# Patient Record
Sex: FEMALE | Race: WHITE | Employment: UNEMPLOYED | RURAL
[De-identification: names, ages, dates, MRNs, and addresses within clinical notes are randomized per-mention and may not be internally consistent; named-entity substitution may affect disease eponyms.]

---

## 2017-08-14 ENCOUNTER — OFFICE VISIT (OUTPATIENT)
Dept: INTERNAL MEDICINE CLINIC | Age: 57
End: 2017-08-14

## 2017-08-14 VITALS
HEART RATE: 73 BPM | BODY MASS INDEX: 24.88 KG/M2 | OXYGEN SATURATION: 95 % | DIASTOLIC BLOOD PRESSURE: 52 MMHG | RESPIRATION RATE: 16 BRPM | SYSTOLIC BLOOD PRESSURE: 108 MMHG | WEIGHT: 168 LBS | HEIGHT: 69 IN | TEMPERATURE: 97 F

## 2017-08-14 DIAGNOSIS — J44.9 CHRONIC OBSTRUCTIVE PULMONARY DISEASE, UNSPECIFIED COPD TYPE (HCC): ICD-10-CM

## 2017-08-14 DIAGNOSIS — M48.00 SPINAL STENOSIS, UNSPECIFIED SPINAL REGION: Primary | ICD-10-CM

## 2017-08-14 DIAGNOSIS — Z72.0 TOBACCO ABUSE: ICD-10-CM

## 2017-08-14 RX ORDER — DICLOFENAC SODIUM 10 MG/G
GEL TOPICAL 4 TIMES DAILY
Qty: 1 EACH | Refills: 5 | Status: SHIPPED | OUTPATIENT
Start: 2017-08-14 | End: 2017-08-16 | Stop reason: CLARIF

## 2017-08-14 RX ORDER — DICLOFENAC SODIUM 10 MG/G
GEL TOPICAL 4 TIMES DAILY
COMMUNITY
End: 2017-08-14 | Stop reason: SDUPTHER

## 2017-08-14 RX ORDER — ALBUTEROL SULFATE 90 UG/1
1 AEROSOL, METERED RESPIRATORY (INHALATION)
Qty: 1 INHALER | Refills: 5 | Status: SHIPPED | OUTPATIENT
Start: 2017-08-14 | End: 2017-11-17 | Stop reason: SDUPTHER

## 2017-08-14 RX ORDER — GABAPENTIN 300 MG/1
300 CAPSULE ORAL 3 TIMES DAILY
Qty: 90 CAP | Refills: 5 | Status: SHIPPED | OUTPATIENT
Start: 2017-08-14 | End: 2017-11-17 | Stop reason: SDUPTHER

## 2017-08-14 RX ORDER — OXYCODONE AND ACETAMINOPHEN 5; 325 MG/1; MG/1
TABLET ORAL
COMMUNITY
End: 2017-08-14 | Stop reason: ALTCHOICE

## 2017-08-14 RX ORDER — LIDOCAINE 50 MG/G
PATCH TOPICAL
Qty: 30 EACH | Refills: 5 | Status: SHIPPED | OUTPATIENT
Start: 2017-08-14 | End: 2018-08-30 | Stop reason: ALTCHOICE

## 2017-08-14 RX ORDER — FLUTICASONE PROPIONATE AND SALMETEROL 500; 50 UG/1; UG/1
1 POWDER RESPIRATORY (INHALATION) EVERY 12 HOURS
Qty: 1 INHALER | Refills: 5 | Status: SHIPPED | OUTPATIENT
Start: 2017-08-14 | End: 2017-11-17 | Stop reason: SDUPTHER

## 2017-08-14 RX ORDER — TRAMADOL HYDROCHLORIDE 50 MG/1
50 TABLET ORAL
Qty: 30 TAB | Refills: 0 | Status: SHIPPED | OUTPATIENT
Start: 2017-08-14 | End: 2017-09-13 | Stop reason: SDUPTHER

## 2017-08-14 RX ORDER — CYCLOBENZAPRINE HCL 5 MG
5 TABLET ORAL
Qty: 90 TAB | Refills: 1 | Status: SHIPPED | OUTPATIENT
Start: 2017-08-14 | End: 2017-10-17 | Stop reason: SDUPTHER

## 2017-08-14 RX ORDER — FLUTICASONE PROPIONATE AND SALMETEROL 500; 50 UG/1; UG/1
1 POWDER RESPIRATORY (INHALATION) EVERY 12 HOURS
COMMUNITY
End: 2017-08-14 | Stop reason: SDUPTHER

## 2017-08-14 RX ORDER — GABAPENTIN 300 MG/1
300 CAPSULE ORAL 3 TIMES DAILY
COMMUNITY
End: 2017-08-14 | Stop reason: SDUPTHER

## 2017-08-14 RX ORDER — DULOXETIN HYDROCHLORIDE 30 MG/1
30 CAPSULE, DELAYED RELEASE ORAL DAILY
COMMUNITY
End: 2017-08-14 | Stop reason: SDUPTHER

## 2017-08-14 RX ORDER — ALBUTEROL SULFATE 90 UG/1
AEROSOL, METERED RESPIRATORY (INHALATION)
COMMUNITY
End: 2017-08-14 | Stop reason: SDUPTHER

## 2017-08-14 RX ORDER — CYCLOBENZAPRINE HCL 5 MG
5 TABLET ORAL
COMMUNITY
End: 2017-08-14 | Stop reason: SDUPTHER

## 2017-08-14 RX ORDER — DULOXETIN HYDROCHLORIDE 30 MG/1
30 CAPSULE, DELAYED RELEASE ORAL DAILY
Qty: 30 CAP | Refills: 5 | Status: SHIPPED | OUTPATIENT
Start: 2017-08-14 | End: 2017-11-17 | Stop reason: SDUPTHER

## 2017-08-14 RX ORDER — HYDROCODONE BITARTRATE AND ACETAMINOPHEN 5; 325 MG/1; MG/1
TABLET ORAL
COMMUNITY
End: 2017-08-14 | Stop reason: ALTCHOICE

## 2017-08-14 NOTE — PROGRESS NOTES
HISTORY OF PRESENT ILLNESS  Adryan Marcelino is a 55 y.o. female. Breathing Problem   The history is provided by the patient. This is a chronic problem. The problem occurs intermittently. The problem has been gradually improving (since dec tob to 1 pack per day). Associated symptoms include cough. Pertinent negatives include no fever and no hemoptysis. She has tried beta-agonist inhalers and inhaled steroids for the symptoms. The treatment provided mild relief. Associated medical issues include COPD and chronic lung disease. Associated medical issues do not include CAD, heart failure, past MI, DVT or recent surgery. Back Pain    This is a chronic problem. The problem occurs daily. The pain is associated with no known injury. The pain is present in the lower back. The quality of the pain is described as sharp. The pain is at a severity of 7/10 (at night 8). The pain is worse during the night. Pertinent negatives include no fever, no weight loss, no bowel incontinence and no bladder incontinence. Treatments tried: see med list. The patient's surgical history includes laminectomy. Has in the past been Rx with percocet and HC with some relief but the other meds like top have provided some relief. Patient is new to this clinic. Comes in to establish care. Previous care was with in [facility] doesn't know his name. Reason for the change is: moved, has been out of all meds since left and would like to re-start. Current Outpatient Prescriptions   Medication Sig Dispense Refill    oxyCODONE-acetaminophen (PERCOCET) 5-325 mg per tablet Take  by mouth every four (4) hours as needed for Pain.  HYDROcodone-acetaminophen (NORCO) 5-325 mg per tablet Take  by mouth.  gabapentin (NEURONTIN) 300 mg capsule Take 300 mg by mouth three (3) times daily.  diclofenac (VOLTAREN) 1 % gel Apply  to affected area four (4) times daily.  DULoxetine (CYMBALTA) 30 mg capsule Take 30 mg by mouth daily.       fluticasone-salmeterol (ADVAIR DISKUS) 500-50 mcg/dose diskus inhaler Take 1 Puff by inhalation every twelve (12) hours.  albuterol (PROVENTIL HFA, VENTOLIN HFA, PROAIR HFA) 90 mcg/actuation inhaler Take  by inhalation.  cyclobenzaprine (FLEXERIL) 5 mg tablet Take 5 mg by mouth three (3) times daily as needed for Muscle Spasm(s). No Known Allergies    Review of Systems   Constitutional: Negative for fever and weight loss. Respiratory: Positive for cough. Negative for hemoptysis. Gastrointestinal: Negative for bowel incontinence. Genitourinary: Negative for bladder incontinence. Musculoskeletal: Positive for back pain. Past Medical History:   Diagnosis Date    COPD (chronic obstructive pulmonary disease) ([facility] [address]     Spinal stenosis      Social History     Social History    Marital status: LEGALLY      Spouse name: N/A    Number of children: 2    Years of education: N/A     Occupational History    disabled      Social History Main Topics    Smoking status: Current Every Day Smoker     Packs/day: 1.00     Years: 30.00     Types: Cigarettes    Smokeless tobacco: Never Used    Alcohol use 11.4 oz/week     14 Glasses of wine, 5 Cans of beer per week    Drug use: No    Sexual activity: Not Currently     Partners: Male     Other Topics Concern    Not on file     Social History Narrative    Lives with friends     Denies drug abuse prob  Physical Exam  Visit Vitals    /52 (BP 1 Location: Left arm, BP Patient Position: Sitting)    Pulse 73    Temp 97 °F (36.1 °C) (Oral)    Resp 16    Ht 5' 9\" (1.753 m)    Wt 168 lb (76.2 kg)    SpO2 95%    BMI 24.81 kg/m2       WDWN NAD  TM clear, throat wnl  Neck no adenopathy  Heart RRR no C/M/R  Lungs CTA  Abdo soft non tender  Ext No redness swelling or edema  Back was examined, grossly normal, no lesions or rash. Saddle area, sensation present. Patellar reflexes 2+ equal bilaterally.   Lower leg strength 5 out of 5 bilateral.  Straight leg raises +    ASSESSMENT and PLAN  Encounter Diagnoses   Name Primary?  Spinal stenosis, unspecified spinal region Yes    Chronic obstructive pulmonary disease, unspecified COPD type ([facility] [address]     Tobacco abuse      Orders Placed This Encounter    REFERRAL TO PAIN MANAGEMENT    DISCONTD: oxyCODONE-acetaminophen (PERCOCET) 5-325 mg per tablet    DISCONTD: HYDROcodone-acetaminophen (NORCO) 5-325 mg per tablet    DISCONTD: gabapentin (NEURONTIN) 300 mg capsule    DISCONTD: diclofenac (VOLTAREN) 1 % gel    DISCONTD: DULoxetine (CYMBALTA) 30 mg capsule    DISCONTD: fluticasone-salmeterol (ADVAIR DISKUS) 500-50 mcg/dose diskus inhaler    DISCONTD: albuterol (PROVENTIL HFA, VENTOLIN HFA, PROAIR HFA) 90 mcg/actuation inhaler    DISCONTD: cyclobenzaprine (FLEXERIL) 5 mg tablet    gabapentin (NEURONTIN) 300 mg capsule    diclofenac (VOLTAREN) 1 % gel    DULoxetine (CYMBALTA) 30 mg capsule    fluticasone-salmeterol (ADVAIR DISKUS) 500-50 mcg/dose diskus inhaler    albuterol (PROVENTIL HFA, VENTOLIN HFA, PROAIR HFA) 90 mcg/actuation inhaler    cyclobenzaprine (FLEXERIL) 5 mg tablet    lidocaine (LIDODERM) 5 %    traMADol (ULTRAM) 50 mg tablet     Discussed the nature of back pain. Discussed how this is in general a 'red flag' diagnosis and the general limited and temporary nature of this problem as well as its re-occurrence. Discussed further work-up and treatment options. Discused narcotics and back pain: yes and we don't do chronic narcotics for back pain. SHe asks for some tramadol and I agreed to a short coarse but I will not RF this HC or percs, can try and get her into see a PM.   COPD RX as above. Discussed possible side affects, precautions, and drug interactions and possible benefits of the medication(s). The patient was counseled on the dangers of tobacco use, and was advised to quit. Reviewed strategies to maximize success, including written materials.     Follow-up Disposition:  Return in about 4 weeks (around 9/11/2017) for routine follow up.

## 2017-08-14 NOTE — PATIENT INSTRUCTIONS

## 2017-08-14 NOTE — MR AVS SNAPSHOT
Visit Information Date & Time Provider Department Dept. Phone Encounter #  
 8/14/2017  2:00 PM Renata Mayes  [place]  [id number] Follow-up Instructions Return in about 4 weeks (around 9/11/2017) for routine follow up. Upcoming Health Maintenance Date Due Hepatitis C Screening 1960 DTaP/Tdap/Td series (1 - Tdap) 12/10/1982 PAP AKA CERVICAL CYTOLOGY 12/10/1982 BREAST CANCER SCRN MAMMOGRAM 12/10/2011 FOBT Q 1 YEAR AGE 50-75 12/10/2011 INFLUENZA AGE 9 TO ADULT 8/1/2017 Allergies as of 8/14/2017  Review Complete On: 8/14/2017 By: Renata Mayes MD  
 No Known Allergies Current Immunizations  Never Reviewed No immunizations on file. Not reviewed this visit You Were Diagnosed With   
  
 Codes Comments Spinal stenosis, unspecified spinal region    -  Primary ICD-10-CM: M48.00 ICD-9-CM: 724.00 Chronic obstructive pulmonary disease, unspecified COPD type ([facility]     ICD-10-CM: J44.9 ICD-9-CM: 494 Tobacco abuse     ICD-10-CM: Z72.0 ICD-9-CM: 305.1 Vitals BP Pulse Temp Resp Height(growth percentile) Weight(growth percentile) 108/52 (BP 1 Location: Left arm, BP Patient Position: Sitting) 73 97 °F (36.1 °C) (Oral) 16 5' 9\" (1.753 m) 168 lb (76.2 kg) SpO2 BMI OB Status Smoking Status 95% 24.81 kg/m2 Hysterectomy Current Every Day Smoker BMI and BSA Data Body Mass Index Body Surface Area  
 24.81 kg/m 2 1.93 m 2 Preferred Pharmacy Pharmacy Name Phone [address] [facility], [address] [facility] [phone] Your Updated Medication List  
  
   
This list is accurate as of: 8/14/17  3:25 PM.  Always use your most recent med list.  
  
  
  
  
 albuterol 90 mcg/actuation inhaler Commonly known as:  PROVENTIL HFA, VENTOLIN HFA, PROAIR HFA Take 1 Puff by inhalation every six (6) hours as needed for Wheezing. cyclobenzaprine 5 mg tablet Commonly known as:  FLEXERIL Take 1 Tab by mouth three (3) times daily as needed for Muscle Spasm(s). diclofenac 1 % Gel Commonly known as:  VOLTAREN Apply  to affected area four (4) times daily. DULoxetine 30 mg capsule Commonly known as:  CYMBALTA Take 1 Cap by mouth daily. fluticasone-salmeterol 500-50 mcg/dose diskus inhaler Commonly known as:  ADVAIR DISKUS Take 1 Puff by inhalation every twelve (12) hours. gabapentin 300 mg capsule Commonly known as:  NEURONTIN Take 1 Cap by mouth three (3) times daily. lidocaine 5 % Commonly known as:  Alberteen Citron Apply patch to the affected area for 12 hours a day and remove for 12 hours a day. Prescriptions Sent to Pharmacy Refills  
 gabapentin (NEURONTIN) 300 mg capsule 5 Sig: Take 1 Cap by mouth three (3) times daily. Class: Normal  
 Pharmacy: [address], [facility] [address] [facility] Ph #: [phone] Route: Oral  
 diclofenac (VOLTAREN) 1 % gel 5 Sig: Apply  to affected area four (4) times daily. Class: Normal  
 Pharmacy: [address], [facility] Ph #: [phone] Route: Topical  
 DULoxetine (CYMBALTA) 30 mg capsule 5 Sig: Take 1 Cap by mouth daily. Class: Normal  
 Pharmacy: [address], [facility] Ph #: [phone] Route: Oral  
 fluticasone-salmeterol (ADVAIR DISKUS) 500-50 mcg/dose diskus inhaler 5 Sig: Take 1 Puff by inhalation every twelve (12) hours. Class: Normal  
 Pharmacy: [address], [address] [facility] Ph #: [phone] Route: Inhalation  
 albuterol (PROVENTIL HFA, VENTOLIN HFA, PROAIR HFA) 90 mcg/actuation inhaler 5 Sig: Take 1 Puff by inhalation every six (6) hours as needed for Wheezing.   
 Class: Normal  
 Pharmacy: [facility] [address] [facility], [address] Ph #: [phone] Route: Inhalation  
 cyclobenzaprine (FLEXERIL) 5 mg tablet 1 Sig: Take 1 Tab by mouth three (3) times daily as needed for Muscle Spasm(s). Class: Normal  
 Pharmacy: [facility] [address] [facility], [address] Ph #: [phone] Route: Oral  
 lidocaine (LIDODERM) 5 % 5 Sig: Apply patch to the affected area for 12 hours a day and remove for 12 hours a day. Class: Normal  
 Pharmacy: [facility] [address] [facility], [address] Ph #: [phone] We Performed the Following REFERRAL TO PAIN MANAGEMENT [[id number] Custom] Comments:  
 Please evaluate patient for spinal stenosis records at [facility]. Follow-up Instructions Return in about 4 weeks (around 9/11/2017) for routine follow up. Referral Information Referral ID Referred By Referred To  
  
 [id number] Lorelei KINCAID MD   
   [address] [facility] Phone: [phone] Fax: [phone] Visits Status Start Date End Date 1 New Request 8/14/17 8/14/18 If your referral has a status of pending review or denied, additional information will be sent to support the outcome of this decision. Patient Instructions Stopping Smoking: Care Instructions Your Care Instructions Cigarette smokers crave the nicotine in cigarettes. Giving it up is much harder than simply changing a habit. Your body has to stop craving the nicotine. It is hard to quit, but you can do it. There are many tools that people use to quit smoking. You may find that combining tools works best for you. There are several steps to quitting. First you get ready to quit. Then you get support to help you. After that, you learn new skills and behaviors to become a nonsmoker. For many people, a necessary step is getting and using medicine. Your doctor will help you set up the plan that best meets your needs. You may want to attend a smoking cessation program to help you quit smoking. When you choose a program, look for one that has proven success. Ask your doctor for ideas. You will greatly increase your chances of success if you take medicine as well as get counseling or join a cessation program. 
Some of the changes you feel when you first quit tobacco are uncomfortable. Your body will miss the nicotine at first, and you may feel short-tempered and grumpy. You may have trouble sleeping or concentrating. Medicine can help you deal with these symptoms. You may struggle with changing your smoking habits and rituals. The last step is the tricky one: Be prepared for the smoking urge to continue for a time. This is a lot to deal with, but keep at it. You will feel better. Follow-up care is a key part of your treatment and safety. Be sure to make and go to all appointments, and call your doctor if you are having problems. Its also a good idea to know your test results and keep a list of the medicines you take. How can you care for yourself at home? · Ask your family, friends, and coworkers for support. You have a better chance of quitting if you have help and support. · Join a support group, such as Nicotine Anonymous, for people who are trying to quit smoking. · Consider signing up for a smoking cessation program, such as the American Lung Association's Freedom from Smoking program. 
· Set a quit date. Pick your date carefully so that it is not right in the middle of a big deadline or stressful time. Once you quit, do not even take a puff. Get rid of all ashtrays and lighters after your last cigarette. Clean your house and your clothes so that they do not smell of smoke. · Learn how to be a nonsmoker. Think about ways you can avoid those things that make you reach for a cigarette. ¨ Avoid situations that put you at greatest risk for smoking. For some people, it is hard to have a drink with friends without smoking. For others, they might skip a coffee break with coworkers who smoke. ¨ Change your daily routine. Take a different route to work or eat a meal in a different place. · Cut down on stress. Calm yourself or release tension by doing an activity you enjoy, such as reading a book, taking a hot bath, or gardening. · Talk to your doctor or pharmacist about nicotine replacement therapy, which replaces the nicotine in your body. You still get nicotine but you do not use tobacco. Nicotine replacement products help you slowly reduce the amount of nicotine you need. These products come in several forms, many of them available over-the-counter: ¨ Nicotine patches ¨ Nicotine gum and lozenges ¨ Nicotine inhaler · Ask your doctor about bupropion (Wellbutrin) or varenicline (Chantix), which are prescription medicines. They do not contain nicotine. They help you by reducing withdrawal symptoms, such as stress and anxiety. · Some people find hypnosis, acupuncture, and massage helpful for ending the smoking habit. · Eat a healthy diet and get regular exercise. Having healthy habits will help your body move past its craving for nicotine. · Be prepared to keep trying. Most people are not successful the first few times they try to quit. Do not get mad at yourself if you smoke again. Make a list of things you learned and think about when you want to try again, such as next week, next month, or next year. Where can you learn more? Go to http://ruperto-gabriela.info/. Enter W125 in the search box to learn more about \"Stopping Smoking: Care Instructions. \" Current as of: March 20, 2017 Content Version: 11.3 © 4979-0822 [employer], [employer].  Care instructions adapted under license by [employer] (which disclaims liability or warranty for this information). If you have questions about a medical condition or this instruction, always ask your healthcare professional. [address] any warranty or liability for your use of this information. Introducing [facility] & HEALTH SERVICES! Antonia Wilson introduces TouchOfModern patient portal. Now you can access parts of your medical record, email your doctor's office, and request medication refills online. 1. In your internet browser, go to [email or web address] 2. Click on the First Time User? Click Here link in the Sign In box. You will see the New Member Sign Up page. 3. Enter your TouchOfModern Access Code exactly as it appears below. You will not need to use this code after youve completed the sign-up process. If you do not sign up before the expiration date, you must request a new code. · TouchOfModern Access Code: [email or web address] Expires: 11/12/2017  2:06 PM 
 
4. Enter the last four digits of your Social Security Number (xxxx) and Date of Birth (mm/dd/yyyy) as indicated and click Submit. You will be taken to the next sign-up page. 5. Create a TouchOfModern ID. This will be your TouchOfModern login ID and cannot be changed, so think of one that is secure and easy to remember. 6. Create a TouchOfModern password. You can change your password at any time. 7. Enter your Password Reset Question and Answer. This can be used at a later time if you forget your password. 8. Enter your e-mail address. You will receive e-mail notification when new information is available in [address] 9. Click Sign Up. You can now view and download portions of your medical record. 10. Click the Download Summary menu link to download a portable copy of your medical information. If you have questions, please visit the Frequently Asked Questions section of the TouchOfModern website. Remember, TouchOfModern is NOT to be used for urgent needs. For medical emergencies, dial 911. Now available from your iPhone and Android! Please provide this summary of care documentation to your next provider. Your primary care clinician is listed as Sully Jaramillo. If you have any questions after today's visit, please call [phone].

## 2017-08-14 NOTE — PROGRESS NOTES
Chief Complaint   Patient presents with   [address]     I have reviewed the patient's medical history in detail and updated the computerized patient record. Do you have an [address] in place in the event that you have a healthcare crisis that could impact your decision making as it pertains to your health?no    Would you like information about the [address] [place], [place] [address]way given. no

## 2017-08-15 ENCOUNTER — DOCUMENTATION ONLY (OUTPATIENT)
Dept: INTERNAL MEDICINE CLINIC | Age: 57
End: 2017-08-15

## 2017-08-15 NOTE — PROGRESS NOTES
8/15/17 PA submitted thru cover my meds for Diclofenac sodium 15 GEL. Plan typically respond back via fax in 1 to 5 business days. If no response contact plan @ [phone].

## 2017-08-16 ENCOUNTER — TELEPHONE (OUTPATIENT)
Dept: INTERNAL MEDICINE CLINIC | Age: 57
End: 2017-08-16

## 2017-08-16 RX ORDER — DICLOFENAC SODIUM 50 MG/1
50 TABLET, DELAYED RELEASE ORAL 2 TIMES DAILY
Qty: 60 TAB | Refills: 5 | Status: SHIPPED | OUTPATIENT
Start: 2017-08-16 | End: 2017-11-17 | Stop reason: SDUPTHER

## 2017-08-16 NOTE — PROGRESS NOTES
8/16/17 @ 11:14 AM Called  to supply diagnosis for Diclofenac 1% gel. PA rep Meca state patient will need to try oral Diclofenac prior to PA approval.Outcome will be faxed to office.

## 2017-09-13 ENCOUNTER — TELEPHONE (OUTPATIENT)
Dept: INTERNAL MEDICINE CLINIC | Age: 57
End: 2017-09-13

## 2017-09-13 NOTE — TELEPHONE ENCOUNTER
Patient would like a call in reference to wanting to know if records have been received from [facility]. She can be reached at [phone].

## 2017-09-15 RX ORDER — TRAMADOL HYDROCHLORIDE 50 MG/1
50 TABLET ORAL
Qty: 30 TAB | Refills: 0 | Status: SHIPPED | OUTPATIENT
Start: 2017-09-15 | End: 2017-10-17 | Stop reason: SDUPTHER

## 2017-09-19 NOTE — TELEPHONE ENCOUNTER
Called patient - no record of receiving any records from [facility] - patient states that she signed a request get medical records from them on August 14, 2017 - dont see either in her record - will have to contact [facility] medical records to request these again  Mounika San LPN  3/55/1561  7:18 PM

## 2017-10-06 ENCOUNTER — TELEPHONE (OUTPATIENT)
Dept: INTERNAL MEDICINE CLINIC | Age: 57
End: 2017-10-06

## 2017-10-10 ENCOUNTER — TELEPHONE (OUTPATIENT)
Dept: INTERNAL MEDICINE CLINIC | Age: 57
End: 2017-10-10

## 2017-10-10 NOTE — TELEPHONE ENCOUNTER
Patient called in reference to wanting to know if Dr. Pau Castillo will increase her tramadol to 2 at a time because she is in so much pain. Please call her at [phone].

## 2017-10-16 ENCOUNTER — TELEPHONE (OUTPATIENT)
Dept: INTERNAL MEDICINE CLINIC | Age: 57
End: 2017-10-16

## 2017-10-16 NOTE — TELEPHONE ENCOUNTER
Patient called in reference to wanting to know if Dr. Miguelina Sawant will increase her tramadol to 2 at a time because she is in so much pain. Please call her at [phone].

## 2017-10-16 NOTE — TELEPHONE ENCOUNTER
Returned call, pt stated her pain is 8/10 with back and legs and wanted to know if she can take two Ultram BID.   She is making an appt fo rnext week with Juliana Chung

## 2017-10-17 ENCOUNTER — TELEPHONE (OUTPATIENT)
Dept: INTERNAL MEDICINE CLINIC | Age: 57
End: 2017-10-17

## 2017-10-17 RX ORDER — TRAMADOL HYDROCHLORIDE 50 MG/1
50 TABLET ORAL
Qty: 30 TAB | Refills: 0 | Status: SHIPPED | OUTPATIENT
Start: 2017-10-17 | End: 2017-12-20 | Stop reason: ALTCHOICE

## 2017-10-17 RX ORDER — CYCLOBENZAPRINE HCL 5 MG
TABLET ORAL
Qty: 270 TAB | Refills: 1 | Status: SHIPPED | OUTPATIENT
Start: 2017-10-17 | End: 2017-11-17 | Stop reason: SDUPTHER

## 2017-10-17 NOTE — TELEPHONE ENCOUNTER
Pt stated that she was advised to call back in ref to her tramadol,stated that she would like a call once it is ready

## 2017-10-17 NOTE — TELEPHONE ENCOUNTER
Requested Prescriptions     Pending Prescriptions Disp Refills    traMADol (ULTRAM) 50 mg tablet [Pharmacy Med Name: TRAMADOL HCL 50MG   TAB] 120 Tab 0     Sig: TAKE ONE TABLET BY MOUTH EVERY 6 HOURS AS NEEDED FOR PAIN    cyclobenzaprine (FLEXERIL) 5 mg tablet [Pharmacy Med Name: CYCLOBENZAPR 5MG    TAB] 270 Tab 1     Sig: TAKE ONE TABLET BY MOUTH THREE TIMES DAILY AS NEEDED FOR MUSCLE SPASM     Last office visit 8/14/17 future 10/23/17  Last filled 9/15/17 and 8/14/17  Changes made to medication on last visit none

## 2017-12-20 ENCOUNTER — OFFICE VISIT (OUTPATIENT)
Dept: INTERNAL MEDICINE CLINIC | Age: 57
End: 2017-12-20

## 2017-12-20 VITALS
DIASTOLIC BLOOD PRESSURE: 88 MMHG | TEMPERATURE: 96.7 F | WEIGHT: 170 LBS | HEART RATE: 75 BPM | RESPIRATION RATE: 16 BRPM | HEIGHT: 69 IN | BODY MASS INDEX: 25.18 KG/M2 | OXYGEN SATURATION: 99 % | SYSTOLIC BLOOD PRESSURE: 130 MMHG

## 2017-12-20 DIAGNOSIS — Z72.0 TOBACCO ABUSE: ICD-10-CM

## 2017-12-20 DIAGNOSIS — M48.04 SPINAL STENOSIS OF THORACIC REGION: Primary | ICD-10-CM

## 2017-12-20 DIAGNOSIS — G25.81 RESTLESS LEGS SYNDROME: ICD-10-CM

## 2017-12-20 DIAGNOSIS — F32.0 MILD MAJOR DEPRESSION (HCC): ICD-10-CM

## 2017-12-20 RX ORDER — TRAMADOL HYDROCHLORIDE 50 MG/1
50 TABLET ORAL
Qty: 30 TAB | Refills: 0 | Status: CANCELLED | OUTPATIENT
Start: 2017-12-20

## 2017-12-20 RX ORDER — TRAMADOL HYDROCHLORIDE 50 MG/1
100 TABLET ORAL
Qty: 120 TAB | Refills: 1 | Status: SHIPPED | OUTPATIENT
Start: 2017-12-20 | End: 2018-08-30 | Stop reason: SDUPTHER

## 2017-12-20 RX ORDER — IBUPROFEN 200 MG
1 TABLET ORAL EVERY 24 HOURS
Qty: 30 PATCH | Refills: 1 | Status: SHIPPED | OUTPATIENT
Start: 2017-12-20 | End: 2018-01-19

## 2017-12-20 RX ORDER — PRAMIPEXOLE DIHYDROCHLORIDE 0.12 MG/1
0.25 TABLET ORAL
Qty: 60 TAB | Refills: 2 | Status: SHIPPED | OUTPATIENT
Start: 2017-12-20 | End: 2018-08-30

## 2017-12-20 NOTE — TELEPHONE ENCOUNTER
Requested Prescriptions     Pending Prescriptions Disp Refills    traMADol (ULTRAM) 50 mg tablet 30 Tab 0     Sig: Take 1 Tab by mouth every eight (8) hours as needed for Pain. Max Daily Amount: 150 mg. Appointment needed to refill this medication again.

## 2017-12-20 NOTE — PATIENT INSTRUCTIONS
Deciding About Using Medicines To Quit Smoking  Deciding About Using Medicines To Quit Smoking  What are the medicines you can use? Your doctor may prescribe varenicline (Chantix) or bupropion (Zyban). These medicines can help you cope with cravings for tobacco. They are pills that don't contain nicotine. You also can use nicotine replacement products. These do contain nicotine. There are many types. · Gum and lozenges slowly release nicotine into your mouth. · Patches stick to your skin. They slowly release nicotine into your bloodstream.  · An inhaler has a long that contains nicotine. You breathe in a puff of nicotine vapor through your mouth and throat. · Nasal spray releases a mist that contains nicotine. What are key points about this decision? · Using medicines can double your chances of quitting smoking. They can ease cravings and withdrawal symptoms. · Getting counseling along with using medicine can raise your chances of quitting even more. · If you smoke fewer than 5 cigarettes a day, you may not need medicines to help you quit smoking. · These medicines have less nicotine than cigarettes. And by itself, nicotine is not nearly as harmful as smoking. The tars, carbon monoxide, and other toxic chemicals in tobacco cause the harmful effects. · The side effects of nicotine replacement products depend on the type of product. For example, a patch can make your skin red and itchy. Medicines in pill form can make you sick to your stomach. They can also cause dry mouth and trouble sleeping. For most people, the side effects are not bad enough to make them stop using the products. Why might you choose to use medicines to quit smoking? · You have tried on your own to stop smoking, but you were not able to stop. · You smoke more than 5 cigarettes a day. · You want to increase your chances of quitting smoking. · You want to reduce your cravings and withdrawal symptoms.   · You feel the benefits of medicine outweigh the side effects. Why might you choose not to use medicine? · You want to try quitting on your own by stopping all at once (\"cold turkey\"). · You want to cut back slowly on the number of cigarettes you smoke. · You smoke fewer than 5 cigarettes a day. · You do not like using medicine. · You feel the side effects of medicines outweigh the benefits. · You are worried about the cost of medicines. Your decision  Thinking about the facts and your feelings can help you make a decision that is right for you. Be sure you understand the benefits and risks of your options, and think about what else you need to do before you make the decision. Where can you learn more? Go to http://ruperto-gabriela.info/. Enter T054 in the search box to learn more about \"Deciding About Using Medicines To Quit Smoking. \"  Current as of: March 20, 2017  Content Version: 11.4  © 3633-2742 [employer]. Care instructions adapted under license by [employer] (which disclaims liability or warranty for this information). If you have questions about a medical condition or this instruction, always ask your healthcare professional. [place] [address] any warranty or liability for your use of this information.

## 2017-12-20 NOTE — MR AVS SNAPSHOT
Visit Information Date & Time Provider Department Dept. Phone Encounter #  
 12/20/2017  2:30 PM Domi Mckeon MD [facility] [phone] [id number] Follow-up Instructions Return in about 2 months (around 2/20/2018). Upcoming Health Maintenance Date Due Hepatitis C Screening 1960 Pneumococcal 19-64 Medium Risk (1 of 1 - PPSV23) 12/10/1980 DTaP/Tdap/Td series (1 - Tdap) 12/10/1982 PAP AKA CERVICAL CYTOLOGY 12/10/1982 FOBT Q 1 YEAR AGE 50-75 12/10/2011 Influenza Age 5 to Adult 8/1/2017 Allergies as of 12/20/2017  Review Complete On: 12/20/2017 By: Domi Mckeon MD  
 No Known Allergies Current Immunizations  Never Reviewed No immunizations on file. Not reviewed this visit You Were Diagnosed With   
  
 Codes Comments Spinal stenosis of thoracic region    -  Primary ICD-10-CM: M48.04 
ICD-9-CM: 724.01 Restless legs syndrome     ICD-10-CM: G25.81 ICD-9-CM: 333.94 Mild major depression (HCC)     ICD-10-CM: F32.0 ICD-9-CM: 296.21 Vitals BP Pulse Temp Resp Height(growth percentile) Weight(growth percentile) 130/88 (BP 1 Location: Left arm, BP Patient Position: Sitting) 75 96.7 °F (35.9 °C) (Oral) 16 5' 9\" (1.753 m) 170 lb (77.1 kg) SpO2 BMI OB Status Smoking Status 99% 25.1 kg/m2 Hysterectomy Current Every Day Smoker Vitals History BMI and BSA Data Body Mass Index Body Surface Area  
 25.1 kg/m 2 1.94 m 2 Preferred Pharmacy Pharmacy Name Phone [facility] [phone] [facility] 20 [phone] Your Updated Medication List  
  
   
This list is accurate as of: 12/20/17  3:17 PM.  Always use your most recent med list.  
  
  
  
  
 albuterol 90 mcg/actuation inhaler Commonly known as:  PROVENTIL HFA, VENTOLIN HFA, PROAIR HFA Take 1 Puff by inhalation every six (6) hours as needed for Wheezing. cyclobenzaprine 5 mg tablet Commonly known as:  FLEXERIL  
TAKE ONE TABLET BY MOUTH THREE TIMES DAILY AS NEEDED FOR MUSCLE SPASM  
  
 diclofenac EC 50 mg EC tablet Commonly known as:  VOLTAREN Take 1 Tab by mouth two (2) times a day. DULoxetine 30 mg capsule Commonly known as:  CYMBALTA Take 1 Cap by mouth daily. fluticasone-salmeterol 500-50 mcg/dose diskus inhaler Commonly known as:  ADVAIR DISKUS Take 1 Puff by inhalation every twelve (12) hours. gabapentin 300 mg capsule Commonly known as:  NEURONTIN Take 1 Cap by mouth three (3) times daily. lidocaine 5 % Commonly known as:  Susy Connellch Apply patch to the affected area for 12 hours a day and remove for 12 hours a day. pramipexole 0.125 mg tablet Commonly known as:  MIRAPEX Take 2 Tabs by mouth nightly. Start 1 tablet daily in the evening, every few days increase as tolerated, for legs. traMADol 50 mg tablet Commonly known as:  ULTRAM  
Take 2 Tabs by mouth two (2) times daily as needed for Pain. Max Daily Amount: 200 mg. Prescriptions Printed Refills  
 traMADol (ULTRAM) 50 mg tablet 1 Sig: Take 2 Tabs by mouth two (2) times daily as needed for Pain. Max Daily Amount: 200 mg. Class: Print Route: Oral  
  
Prescriptions Sent to Pharmacy Refills  
 pramipexole (MIRAPEX) 0.125 mg tablet 2 Sig: Take 2 Tabs by mouth nightly. Start 1 tablet daily in the evening, every few days increase as tolerated, for legs. Class: Normal  
 Pharmacy: [address] #: [phone] Route: Oral  
  
Follow-up Instructions Return in about 2 months (around 2/20/2018). Introducing [facility]! Shayy Worthy introduces [employer] patient portal. Now you can access parts of your medical record, email your doctor's office, and request medication refills online. 1. In your internet browser, go to [email or web address] 2. Click on the First Time User? Click Here link in the Sign In box. You will see the New Member Sign Up page. 3. Enter your [employer] Access Code exactly as it appears below. You will not need to use this code after youve completed the sign-up process. If you do not sign up before the expiration date, you must request a new code. · [employer] Access Code: [email or web address] Expires: 3/20/2018  1:48 PM 
 
4. Enter the last four digits of your Social Security Number (xxxx) and Date of Birth (mm/dd/yyyy) as indicated and click Submit. You will be taken to the next sign-up page. 5. Create a [employer] ID. This will be your [employer] login ID and cannot be changed, so think of one that is secure and easy to remember. 6. Create a [employer] password. You can change your password at any time. 7. Enter your Password Reset Question and Answer. This can be used at a later time if you forget your password. 8. Enter your e-mail address. You will receive e-mail notification when new information is available in [address] 9. Click Sign Up. You can now view and download portions of your medical record. 10. Click the Download Summary menu link to download a portable copy of your medical information. If you have questions, please visit the Frequently Asked Questions section of the [employer] website. Remember, [employer] is NOT to be used for urgent needs. For medical emergencies, dial 911. Now available from your iPhone and Android! Please provide this summary of care documentation to your next provider. Your primary care clinician is listed as Salome Chang. If you have any questions after today's visit, please call [phone].

## 2017-12-20 NOTE — PROGRESS NOTES
Chief Complaint   Patient presents with    Wrist Pain     I have reviewed the patient's medical history in detail and updated the computerized patient record. Health Maintenance reviewed. 1. Have you been to the ER, urgent care clinic since your last visit? Hospitalized since your last visit?no    2. Have you seen or consulted any other health care providers outside of the [address] [place] [address] since your last visit? Include any pap smears or colon screening.  No    Encouraged pt to discuss pt's wishes with spouse/partner/family and bring them in the next appt to follow thru with the Advanced Directive

## 2017-12-20 NOTE — LETTER
AGREEMENT for controlled medication treatment Nina Sanchez, have agreed to a course of treatment that includes taking controlled medication. For this treatment I designate _____________________________________ as the [facility] Provider.  The purpose of this agreement is to prevent misunderstandings about controlled medications I may be taking for treatment, and to comply with applicable laws. I understand this agreement is essential to the trust and confidence necessary in a provider-patient relationship and that the designated provider will treat me in accordance with the statements below. As part of my treatment I agree to the followin.  USE 
a. I will take the controlled medication as instructed by the designated provider and avoid improper use of controlled medications. b.   I will not share, sell or trade controlled medication with anyone as this is a criminal offense.  
c.   I will not use any illegal controlled substance, including marijuana, cocaine, etc. as this is a criminal offense. 2.  PROVIDERS 
a. I will only obtain controlled medication from the designated provider. b.   I will not attempt to obtain the same or similar controlled medications, such as opioid pain medication, stimulants, anti-anxiety or hypnotics from any other provider as this is a criminal offense. 
c.   I will inform the designated provider about all other licensed professionals providing medical care to me and authorize communication between all providers to coordinate care, particularly prescribing or dispensing of controlled medications. 3.  PHARMACY 
a.   I will only fill controlled medication prescriptions at the approved pharmacy as listed below. 4.  OFFICE VISITS 
a. I agree to attend scheduled office visit appointments. b.   I am aware my office visits may be monthly or as determined necessary by the designated provider. c.   I will communicate fully with the designated provider about the character and intensity of my symptoms, the effect of the symptoms on my daily life, and how well the controlled medication is helping to relieve the cause of my symptoms. 5.  REFILLS OR CALL-IN PRESCRIPTIONS OF CONTROLLED MEDICATIONS 
a. I agree that refills of my prescriptions for controlled medications will be made at the time of an office visit during regular office hours. No refills will be available during evenings or on weekends. Abusive or inappropriate behavior related to medication refills will not be tolerated. b.   I will not call the office repeatedly to inquire about my controlled medication. I understand that medications will be written on the due date and not before. Calling the office repeatedly will be considered harassment, and I may be discharged from the practice. c.   Controlled medications may not be called in for refill, but doing so is at the discretion of the designated provider. d.   I am aware that only I must  prescriptions for controlled medications at the office but the designated provider may allow a designee to  the prescription from the office under very specific circumstance that may develop. 6.  TOXICOLOGY SCREENING 
a. I agree to random urine toxicology screenings in order to comply with government and [address] [place] [address] regulations. I understand that I will be financially responsible for any charges incurred by this office, [facility], [employer], or [facility] for the urine toxicology screening, which may not be covered by my insurance. Failure to do so will be considered non-compliance and I may be discharged. b. In some cases an oral swab or hair sample may be substituted for a urine screen. This is at the discretion of the designated provider. I understand that I will be financially responsible for any charges incurred as well. c.   I understand that if the urine toxicology does not show my medications prescribed to me by the designated provider, or it shows any illegal substance or any other medications NOT prescribed by the designated providers, I may be discharged from this practice at the discretion of the designated provider and no further controlled medications will be prescribed or follow-up appointments scheduled. Also, if any illegal substances are detected on the urine toxicology, this information may be provided to local law enforcement. 7. PILL COUNTS 
a. I am aware I may be called at any time to come into the office for a count of all my remaining controlled medications in order to help the designated provider understand the rate at which I use my controlled medications and to more effectively adjust dosage. b. I agree that I will use my medication at a rate NO greater than the prescribed rate and that use of my medication at a greater rate will result in my being without medication for the period of time until next expected due date. c. I will bring in the containers with the medication prescribed by all providers, including the designated provider, to each office visit even if there is no medication remaining. All controlled medication will be in the original containers from the pharmacy for each medication. Failure to do so will be considered non-compliance and I may be discharged from the practice. 8.  LOSS OR THEFT OF MEDICATION 
a. I will safeguard my controlled medication from loss or theft. b.   Lost or stolen controlled medication may not be replaced. This includes a prescription that has not yet been filled at the pharmacy. c.   In the event my controlled medications are stolen or lost, I will notify the designated providers office immediately.   If such event occurs during the night, weekend or holiday, I will leave a detailed message on the answering machine or answering service at the number listed above. 
d.   I will file and produce an official police report for any effort to replace controlled medications prescribed. 9.  AGENCY COLLABORATION I authorize the designated provider and the authorized pharmacy/pharmacist to cooperate fully with any city, state, or federal law enforcement agency in the investigation of any possible misuse, sale or other diversion of my controlled medication. 10.  TREATMENT I understand that if I violate any of the conditions, my controlled medication and/or treatment will be terminated. If the violation involves obtaining controlled substances and/or dangerous drugs from another source, the incident may be reported to other medical facilities and authorities, including law enforcement. In this case, the designated provider may taper off the medication over a period of several days, as necessary, to avoid withdrawal symptoms or will suggest alternate treatment facilities. Also, a drug dependence treatment program may be recommended. 11.  AGREEMENT I agree to follow these guidelines that have been fully explained to me. All of my questions and concerns regarding treatment have been adequately answered. A copy of this document has been given to me. I agree to use ______________________________ Authorized Pharmacy located at _________________________________________________________________ Telephone ________________________________for filling ALL controlled medication prescriptions. This agreement is entered into on 12/20/2017 Patient signature__________________________________________________________ Legal Guardian signature___________________________________________________ Provider signature_________________________________________________________ Witness signature_________________________________________________________

## 2017-12-20 NOTE — PROGRESS NOTES
PROGRESS NOTE        SUBJECTIVE:  Diagnosis/Chief Complaint: Wrist Pain and Arthritis      Doing well with pain yes - tramadol works OK  Improvement in function: yes - helps  Pain levels 7.5/10   Usage of benzodiazapine or other sedatives no  Symptoms pain is back from spinal stenosis, had surgery did nopt help  Activities: Able to do activities of daily living. yes - OK uses a cane  Side affects: no  States taking medications per medicine list.yes - wanats restless legs   queried yes - HC 10 tabs from [facility] 12/9/2017    Urine drug screen done no  Opiod Rx exceeds 50 MME/dayno  Hx of depression yes seeing counselor  Hx of drug addiction: no  Safe storage of the opiods: no  Narcotic contract reviewed and discussed: yes - signed since Percocets which she had been on previously were too strong, hydrocodone works better but tramadol works okay. Notes that her legs are jittery and that she has restless legs and has not been treated for it. No fever, weight loss, urine or fecal incontinence, suicidal ideation.     Patient Active Problem List    Diagnosis Date Noted    Spinal stenosis 08/14/2017       No Known Allergies  Social History   Substance Use Topics    Smoking status: Current Every Day Smoker     Packs/day: 1.00     Years: 30.00     Types: Cigarettes    Smokeless tobacco: Never Used    Alcohol use 11.4 oz/week     14 Glasses of wine, 5 Cans of beer per week        OBJECTIVE:    .  Visit Vitals    /88 (BP 1 Location: Left arm, BP Patient Position: Sitting)    Pulse 75    Temp 96.7 °F (35.9 °C) (Oral)    Resp 16    Ht 5' 9\" (1.753 m)    Wt 170 lb (77.1 kg)    SpO2 99%    BMI 25.1 kg/m2     WDWN in NAD, mental status normal  Heart RRR, no:C/M/R  Lungs CTA No wheezes, rales or rhonchi  Abdo: soft no tenderness, rebound or guarding  Neurological exam[de-identified] 2-12 intact  Psychiatric: Normal mood, judgement    Reviewed: Medications, allergies, clinical lab test results and imaging results have been reviewed. Any abnormal findings have been addressed. ASSESSMENT:       ICD-10-CM ICD-9-CM    1. Spinal stenosis of thoracic region M48.04 724.01 traMADol (ULTRAM) 50 mg tablet   2. Restless legs syndrome G25.81 333.94    3. Mild major depression ([address] F32.0 296.21      Okay for tramadol refill. We will treat for restless legs as below. Depression stable. Would need compliance upon F/u    PLAN:     Orders Placed This Encounter    traMADol (ULTRAM) 50 mg tablet     Sig: Take 2 Tabs by mouth two (2) times daily as needed for Pain. Max Daily Amount: 200 mg. Dispense:  120 Tab     Refill:  1    pramipexole (MIRAPEX) 0.125 mg tablet     Sig: Take 2 Tabs by mouth nightly. Start 1 tablet daily in the evening, every few days increase as tolerated, for legs. Dispense:  60 Tab     Refill:  2    nicotine (NICODERM CQ) 21 mg/24 hr     Si Patch by TransDERmal route every twenty-four (24) hours for 30 days. Dispense:  30 Patch     Refill:  1     The patient was counseled on the dangers of tobacco use, and was advised to quit. Reviewed strategies to maximize success, including pharmacotherapy (Nicotine patches). Discussed possible side affects, precautions, and drug interactions and possible benefits of the medication(s). Ok Rx for restless legs. Depression stable    Follow-up Disposition:  Return in about 2 months (around 2018).

## 2017-12-21 PROBLEM — Z72.0 TOBACCO ABUSE: Status: ACTIVE | Noted: 2017-12-21

## 2017-12-21 PROBLEM — F33.0 DEPRESSION, MAJOR, RECURRENT, MILD (HCC): Status: ACTIVE | Noted: 2017-12-21

## 2017-12-21 PROBLEM — G25.81 RESTLESS LEGS: Status: ACTIVE | Noted: 2017-12-21

## 2018-01-17 DIAGNOSIS — M48.04 SPINAL STENOSIS OF THORACIC REGION: ICD-10-CM

## 2018-01-17 RX ORDER — TRAMADOL HYDROCHLORIDE 50 MG/1
100 TABLET ORAL
Qty: 120 TAB | Refills: 1 | Status: CANCELLED | OUTPATIENT
Start: 2018-01-17

## 2018-01-17 NOTE — TELEPHONE ENCOUNTER
Requested Prescriptions     Pending Prescriptions Disp Refills    traMADol (ULTRAM) 50 mg tablet 120 Tab 1     Sig: Take 2 Tabs by mouth two (2) times daily as needed for Pain. Max Daily Amount: 200 mg. Pt can be reached on [phone].

## 2018-01-17 NOTE — TELEPHONE ENCOUNTER
Last office visit:  12/20/17  Last filled:  Tramadol 12/20/17 #120 X 1 refills  BID dosing - too soon to refill

## 2018-01-18 ENCOUNTER — TELEPHONE (OUTPATIENT)
Dept: INTERNAL MEDICINE CLINIC | Age: 58
End: 2018-01-18

## 2018-01-18 NOTE — TELEPHONE ENCOUNTER
1/18/18 @ 13:59 Called 9  attempted PA on Diclofenac with PA rep Vivienne,for Diclofenac gel 1 %,stated no PA is needed if pharmacy run as [facility] which is the preferred drug per plan. [address] called @ [phone] given to pharmacist Viky Wong with phone # to call back if need. Melo verified understanding.

## 2018-01-25 ENCOUNTER — TELEPHONE (OUTPATIENT)
Dept: INTERNAL MEDICINE CLINIC | Age: 58
End: 2018-01-25

## 2018-01-25 NOTE — TELEPHONE ENCOUNTER
Please call pt on [phone] in ref to her tramadol she sttd that she wanted to know what was going on with her refill

## 2018-01-25 NOTE — TELEPHONE ENCOUNTER
Returned call to patient - no answer - LM on phone number of Larbryan Less) to return my call  Eliu Yang LPN  1/29/4383  4:45 PM  Patient filled #120 on 12/20/17, 4 tablets per day, =30 days - with 1 refill ordered -  still should have 1 refill left on this prescription  Eliu Yang LPN  6/64/3798  2:67 PM

## 2018-01-25 NOTE — TELEPHONE ENCOUNTER
Filled last prescription at [facility] - did not realize she had a refill - will call Feroz Salinas and get that ordered today - requested she follow up on 2/20/18 as scheduled  Star Naranjo LPN  6/97/0528  8:19 PM

## 2018-03-06 DIAGNOSIS — M48.04 SPINAL STENOSIS OF THORACIC REGION: ICD-10-CM
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
## 2018-03-07 RX ORDER — LIDOCAINE 50 MG/G
PATCH TOPICAL
Qty: 30 PATCH | Refills: 2 | OUTPATIENT
Start: 2018-03-07

## 2018-03-07 RX ORDER — TRAMADOL HYDROCHLORIDE 50 MG/1
100 TABLET ORAL
Qty: 120 TAB | Refills: 1 | OUTPATIENT
Start: 2018-03-07

## 2018-05-30 RX ORDER — GABAPENTIN 300 MG/1
CAPSULE ORAL
Qty: 90 CAP | Refills: 2 | Status: SHIPPED | OUTPATIENT
Start: 2018-05-30 | End: 2018-08-30 | Stop reason: SDUPTHER

## 2018-05-30 NOTE — TELEPHONE ENCOUNTER
Last office visit:  12/20/17  Last filled:  Gabapentin 300mg 11/17/17 #180 X 1 refill  No changes  Follow up 6/1/18 with Dr Lulu Harmon

## 2018-05-30 NOTE — TELEPHONE ENCOUNTER
----- Message from Henny Sharma sent at 5/30/2018  9:33 AM EDT -----  Regarding: Dr. Jhonathan Peoples  Pt is inquiring on the status of a authorization request for \"Gabapentin\". Pt uses [employer] on file.     Pharmacy number [phone]  Pt best contact number [phone]

## 2018-08-08 RX ORDER — ALBUTEROL SULFATE 90 UG/1
1 AEROSOL, METERED RESPIRATORY (INHALATION)
Qty: 3 INHALER | Refills: 1 | Status: SHIPPED | OUTPATIENT
Start: 2018-08-08 | End: 2018-08-22 | Stop reason: SDUPTHER

## 2018-08-08 NOTE — TELEPHONE ENCOUNTER
Requested Prescriptions     Pending Prescriptions Disp Refills    albuterol (PROVENTIL HFA, VENTOLIN HFA, PROAIR HFA) 90 mcg/actuation inhaler 3 Inhaler 1     Sig: Take 1 Puff by inhalation every six (6) hours as needed for Wheezing.

## 2018-08-08 NOTE — TELEPHONE ENCOUNTER
Requested Prescriptions     Pending Prescriptions Disp Refills    albuterol (PROVENTIL HFA, VENTOLIN HFA, PROAIR HFA) 90 mcg/actuation inhaler 3 Inhaler 1     Sig: Take 1 Puff by inhalation every six (6) hours as needed for Wheezing.      Future Appointments:  8/9/2018   8:45 AM    Toby Flower MD                Last Appointment With Me:  3/16/2018   Last Filled:  11.17.2017 + 1 refill  Changes Made to Medication on Last Visit:  None

## 2018-08-22 RX ORDER — ALBUTEROL SULFATE 90 UG/1
1 AEROSOL, METERED RESPIRATORY (INHALATION)
Qty: 3 INHALER | Refills: 1 | Status: SHIPPED | OUTPATIENT
Start: 2018-08-22 | End: 2018-08-30 | Stop reason: SDUPTHER

## 2018-08-22 RX ORDER — FLUTICASONE PROPIONATE AND SALMETEROL 500; 50 UG/1; UG/1
1 POWDER RESPIRATORY (INHALATION) EVERY 12 HOURS
Qty: 3 INHALER | Refills: 1 | Status: SHIPPED | OUTPATIENT
Start: 2018-08-22 | End: 2018-08-30 | Stop reason: SDUPTHER

## 2018-08-22 NOTE — TELEPHONE ENCOUNTER
----- Message from Inocente Zuniga sent at 8/22/2018 12:45 PM EDT -----  Regarding: Toni Murdock / Telephone  Pt is requesting a refill for her breathing medication. Best contact number is  [phone].

## 2018-08-22 NOTE — TELEPHONE ENCOUNTER
Unable to reach patient - voice mail box is full - unsure of which breathing medication she needs, but will send a refill request to Dr Karyle Balding for both Albuterol and Advair  Si Grace, COLEMAN  0/17/7030  2:99 PM

## 2018-08-28 ENCOUNTER — TELEPHONE (OUTPATIENT)
Dept: INTERNAL MEDICINE CLINIC | Age: 58
End: 2018-08-28

## 2018-08-28 NOTE — TELEPHONE ENCOUNTER
----- Message from Olivia Gato sent at 8/28/2018 11:54 AM EDT -----  Regarding: Dr. Lakeisha Kraus  Pt called to advice practice that her incontinence medical supplies is being faxed over. Pt requested that it be filled out and faxed back asap.  Best contact: [phone]

## 2018-08-30 ENCOUNTER — OFFICE VISIT (OUTPATIENT)
Dept: INTERNAL MEDICINE CLINIC | Age: 58
End: 2018-08-30

## 2018-08-30 VITALS
OXYGEN SATURATION: 98 % | TEMPERATURE: 95.8 F | WEIGHT: 175 LBS | HEIGHT: 69 IN | BODY MASS INDEX: 25.92 KG/M2 | HEART RATE: 90 BPM | SYSTOLIC BLOOD PRESSURE: 131 MMHG | RESPIRATION RATE: 20 BRPM | DIASTOLIC BLOOD PRESSURE: 84 MMHG

## 2018-08-30 DIAGNOSIS — Z12.39 SCREENING FOR BREAST CANCER: ICD-10-CM

## 2018-08-30 DIAGNOSIS — F33.0 DEPRESSION, MAJOR, RECURRENT, MILD (HCC): ICD-10-CM

## 2018-08-30 DIAGNOSIS — Z72.0 TOBACCO ABUSE: ICD-10-CM

## 2018-08-30 DIAGNOSIS — Z23 ENCOUNTER FOR IMMUNIZATION: ICD-10-CM

## 2018-08-30 DIAGNOSIS — M48.00 SPINAL STENOSIS, UNSPECIFIED SPINAL REGION: Primary | ICD-10-CM

## 2018-08-30 DIAGNOSIS — M48.04 SPINAL STENOSIS OF THORACIC REGION: ICD-10-CM

## 2018-08-30 DIAGNOSIS — G25.81 RESTLESS LEGS: ICD-10-CM

## 2018-08-30 RX ORDER — ROPINIROLE 0.25 MG/1
0.5 TABLET, FILM COATED ORAL
Qty: 60 TAB | Refills: 1 | Status: SHIPPED | OUTPATIENT
Start: 2018-08-30 | End: 2018-12-11 | Stop reason: SDUPTHER

## 2018-08-30 RX ORDER — TRAMADOL HYDROCHLORIDE 50 MG/1
100 TABLET ORAL
Qty: 120 TAB | Refills: 1 | Status: SHIPPED | OUTPATIENT
Start: 2018-08-30 | End: 2019-01-11 | Stop reason: ALTCHOICE

## 2018-08-30 RX ORDER — GABAPENTIN 300 MG/1
CAPSULE ORAL
Qty: 270 CAP | Refills: 1 | Status: SHIPPED | OUTPATIENT
Start: 2018-08-30 | End: 2019-01-11 | Stop reason: SDUPTHER

## 2018-08-30 RX ORDER — FLUTICASONE PROPIONATE AND SALMETEROL 500; 50 UG/1; UG/1
1 POWDER RESPIRATORY (INHALATION) EVERY 12 HOURS
Qty: 3 INHALER | Refills: 1 | Status: SHIPPED | OUTPATIENT
Start: 2018-08-30 | End: 2019-07-03 | Stop reason: ALTCHOICE

## 2018-08-30 RX ORDER — DICLOFENAC SODIUM 50 MG/1
50 TABLET, DELAYED RELEASE ORAL 2 TIMES DAILY
Qty: 180 TAB | Refills: 1 | Status: SHIPPED | OUTPATIENT
Start: 2018-08-30 | End: 2019-01-11 | Stop reason: ALTCHOICE

## 2018-08-30 RX ORDER — DULOXETIN HYDROCHLORIDE 30 MG/1
30 CAPSULE, DELAYED RELEASE ORAL DAILY
Qty: 90 CAP | Refills: 1 | Status: SHIPPED | OUTPATIENT
Start: 2018-08-30 | End: 2019-01-11 | Stop reason: SDUPTHER

## 2018-08-30 RX ORDER — BUPROPION HYDROCHLORIDE 100 MG/1
100 TABLET ORAL 3 TIMES DAILY
Qty: 90 TAB | Refills: 1 | Status: SHIPPED | OUTPATIENT
Start: 2018-08-30 | End: 2018-12-11 | Stop reason: SDUPTHER

## 2018-08-30 RX ORDER — CYCLOBENZAPRINE HCL 5 MG
TABLET ORAL
Qty: 270 TAB | Refills: 1 | Status: SHIPPED | OUTPATIENT
Start: 2018-08-30 | End: 2019-07-24 | Stop reason: SDUPTHER

## 2018-08-30 RX ORDER — ALBUTEROL SULFATE 90 UG/1
1 AEROSOL, METERED RESPIRATORY (INHALATION)
Qty: 3 INHALER | Refills: 1 | Status: SHIPPED | OUTPATIENT
Start: 2018-08-30 | End: 2019-07-03 | Stop reason: SDUPTHER

## 2018-08-30 NOTE — MR AVS SNAPSHOT
[address] [place] [address]. [phone] [facility] 
[phone] Patient: Shaquille Kemp MRN: [id number] :1960 Visit Information Date & Time Provider Department Dept. Phone Encounter #  
 2018  7:30 AM Terry Florence MD [address] [facility] [phone] [id number] Follow-up Instructions Return in about 2 months (around 10/30/2018) for routine follow up. Upcoming Health Maintenance Date Due Hepatitis C Screening 1960 Pneumococcal 19-64 Medium Risk (1 of 1 - PPSV23) 12/10/1980 DTaP/Tdap/Td series (1 - Tdap) 12/10/1982 PAP AKA CERVICAL CYTOLOGY 12/10/1982 BREAST CANCER SCRN MAMMOGRAM 12/10/2011 FOBT Q 1 YEAR AGE 50-75 12/10/2011 Influenza Age 5 to Adult 2018 Allergies as of 2018  Review Complete On: 2018 By: Radha Gasca LPN No Known Allergies Current Immunizations  Never Reviewed Name Date Pneumococcal Conjugate (PCV-13)  Incomplete Not reviewed this visit You Were Diagnosed With   
  
 Codes Comments Spinal stenosis, unspecified spinal region    -  Primary ICD-10-CM: M48.00 ICD-9-CM: 724.00 Tobacco abuse     ICD-10-CM: Z72.0 ICD-9-CM: 305.1 Restless legs     ICD-10-CM: G25.81 ICD-9-CM: 333.94 Depression, major, recurrent, mild ([facility]     ICD-10-CM: F33.0 ICD-9-CM: 296.31 Screening for breast cancer     ICD-10-CM: Z12.31 
ICD-9-CM: V76.10 Encounter for immunization     ICD-10-CM: G32 ICD-9-CM: V03.89 Spinal stenosis of thoracic region     ICD-10-CM: M48.04 
ICD-9-CM: 724.01 Vitals BP Pulse Temp Resp Height(growth percentile) Weight(growth percentile) 131/84 (BP 1 Location: Left arm, BP Patient Position: Sitting) 90 95.8 °F (35.4 °C) (Oral) 20 5' 9\" (1.753 m) 175 lb (79.4 kg) SpO2 BMI OB Status Smoking Status 98% 25.84 kg/m2 Hysterectomy Current Every Day Smoker BMI and BSA Data Body Mass Index Body Surface Area  
 25.84 kg/m 2 1.97 m 2 Preferred Pharmacy Pharmacy Name Phone [facility] [address] [facility], [address] [place] [address] Demario Corey 101 Your Updated Medication List  
  
   
This list is accurate as of 8/30/18  7:57 AM.  Always use your most recent med list.  
  
  
  
  
 albuterol 90 mcg/actuation inhaler Commonly known as:  PROVENTIL HFA, VENTOLIN HFA, PROAIR HFA Take 1 Puff by inhalation every six (6) hours as needed for Wheezing. buPROPion 100 mg tablet Commonly known as:  [facility] Take 1 Tab by mouth three (3) times daily. Start 1 tablet daily, usually in the evening, every few days increase as tolerated, quit. cyclobenzaprine 5 mg tablet Commonly known as:  FLEXERIL  
TAKE ONE TABLET BY MOUTH THREE TIMES DAILY AS NEEDED FOR MUSCLE SPASM  
  
 diclofenac EC 50 mg EC tablet Commonly known as:  VOLTAREN Take 1 Tab by mouth two (2) times a day. diph,Pertuss(Acell),Tet Vac-PF 2 Lf-(2.5-5-3-5 mcg)-5Lf/0.5 mL susp Commonly known as:  ADACEL  
0.5 mL by IntraMUSCular route once for 1 dose. DULoxetine 30 mg capsule Commonly known as:  CYMBALTA Take 1 Cap by mouth daily. fluticasone-salmeterol 500-50 mcg/dose diskus inhaler Commonly known as:  ADVAIR DISKUS Take 1 Puff by inhalation every twelve (12) hours. gabapentin 300 mg capsule Commonly known as:  NEURONTIN  
take 1 capsule by mouth three times a day  
  
 rOPINIRole 0.25 mg tablet Commonly known as:  [place] Take 2 Tabs by mouth nightly. Start 1 tablet daily, usually in the evening, every few days increase as tolerated, restless. traMADol 50 mg tablet Commonly known as:  ULTRAM  
Take 2 Tabs by mouth two (2) times daily as needed for Pain. Max Daily Amount: 200 mg. Prescriptions Printed Refills  diph,Pertuss,Acell,,Tet Vac-PF (ADACEL) 2 Lf-(2.5-5-3-5 mcg)-5Lf/0.5 mL susp 0  
 Si.5 mL by IntraMUSCular route once for 1 dose. Class: Print Route: IntraMUSCular  
 traMADol (ULTRAM) 50 mg tablet 1 Sig: Take 2 Tabs by mouth two (2) times daily as needed for Pain. Max Daily Amount: 200 mg. Class: Print Route: Oral  
  
Prescriptions Sent to Pharmacy Refills buPROPion (WELLBUTRIN) 100 mg tablet 1 Sig: Take 1 Tab by mouth three (3) times daily. Start 1 tablet daily, usually in the evening, every few days increase as tolerated, quit. Class: Normal  
 Pharmacy: [id number] Ph #: [phone] Route: Oral  
 fluticasone-salmeterol (ADVAIR DISKUS) 500-50 mcg/dose diskus inhaler 1 Sig: Take 1 Puff by inhalation every twelve (12) hours. Class: Normal  
 Pharmacy: [id number] [address], [place] Ph #: [phone] Route: Inhalation  
 albuterol (PROVENTIL HFA, VENTOLIN HFA, PROAIR HFA) 90 mcg/actuation inhaler 1 Sig: Take 1 Puff by inhalation every six (6) hours as needed for Wheezing. Class: Normal  
 Pharmacy: [id number] [address], [place] Ph #: [phone] Route: Inhalation  
 cyclobenzaprine (FLEXERIL) 5 mg tablet 1 Sig: TAKE ONE TABLET BY MOUTH THREE TIMES DAILY AS NEEDED FOR MUSCLE SPASM Class: Normal  
 Pharmacy: [id number] [address], [address] Ph #: [phone]  
 gabapentin (NEURONTIN) 300 mg capsule 1 Sig: take 1 capsule by mouth three times a day Class: Normal  
 Pharmacy: [id number] [address], [place] Ph #: [phone] DULoxetine (CYMBALTA) 30 mg capsule 1 Sig: Take 1 Cap by mouth daily. Class: Normal  
 Pharmacy: [facility] [place] Ph #: [phone] Route: Oral  
 diclofenac EC (VOLTAREN) 50 mg EC tablet 1 Sig: Take 1 Tab by mouth two (2) times a day.   
 Class: Normal  
 [facility] [address] [place] [address] [place]  Barbra Reed Ph #: [phone] Route: Oral  
 rOPINIRole (REQUIP) 0.25 mg tablet 1 Sig: Take 2 Tabs by mouth nightly. Start 1 tablet daily, usually in the evening, every few days increase as tolerated, restless. Class: Normal  
 Pharmacy: [id number] [place] Ph #: [phone] Route: Oral  
  
We Performed the Following PNEUMOCOCCAL CONJ VACCINE 13 VALENT IM [id number] CPT(R)] Follow-up Instructions Return in about 2 months (around 10/30/2018) for routine follow up. To-Do List   
 08/30/2018 Imaging:  [facility] MAMMO BI SCREENING INCL CAD Patient Instructions Deciding About Using Medicines To Quit Smoking Deciding About Using Medicines To Quit Smoking What are the medicines you can use? Your doctor may prescribe varenicline (Chantix) or bupropion (Zyban). These medicines can help you cope with cravings for tobacco. They are pills that don't contain nicotine. You also can use nicotine replacement products. These do contain nicotine. There are many types. · Gum and lozenges slowly release nicotine into your mouth. · Patches stick to your skin. They slowly release nicotine into your bloodstream. 
· An inhaler has a long that contains nicotine. You breathe in a puff of nicotine vapor through your mouth and throat. · Nasal spray releases a mist that contains nicotine. What are key points about this decision? · Using medicines can double your chances of quitting smoking. They can ease cravings and withdrawal symptoms. · Getting counseling along with using medicine can raise your chances of quitting even more. · If you smoke fewer than 5 cigarettes a day, you may not need medicines to help you quit smoking. · These medicines have less nicotine than cigarettes. And by itself, nicotine is not nearly as harmful as smoking.  The tars, carbon monoxide, and other toxic chemicals in tobacco cause the harmful effects. · The side effects of nicotine replacement products depend on the type of product. For example, a patch can make your skin red and itchy. Medicines in pill form can make you sick to your stomach. They can also cause dry mouth and trouble sleeping. For most people, the side effects are not bad enough to make them stop using the products. Why might you choose to use medicines to quit smoking? · You have tried on your own to stop smoking, but you were not able to stop. · You smoke more than 5 cigarettes a day. · You want to increase your chances of quitting smoking. · You want to reduce your cravings and withdrawal symptoms. · You feel the benefits of medicine outweigh the side effects. Why might you choose not to use medicine? · You want to try quitting on your own by stopping all at once (\"cold turkey\"). · You want to cut back slowly on the number of cigarettes you smoke. · You smoke fewer than 5 cigarettes a day. · You do not like using medicine. · You feel the side effects of medicines outweigh the benefits. · You are worried about the cost of medicines. Your decision Thinking about the facts and your feelings can help you make a decision that is right for you. Be sure you understand the benefits and risks of your options, and think about what else you need to do before you make the decision. Where can you learn more? Go to http://ruperto-gabriela.info/. Enter H856 in the search box to learn more about \"Deciding About Using Medicines To Quit Smoking. \" Current as of: November 29, 2017 Content Version: 11.7 © 3224-0070 [employer]. Care instructions adapted under license by [employer] (which disclaims liability or warranty for this information).  If you have questions about a medical condition or this instruction, always ask your healthcare professional. [employer] disclaims any warranty or liability for your use of this information. Introducing [facility]! [employer] introduces [employer] patient portal. Now you can access parts of your medical record, email your doctor's office, and request medication refills online. 1. In your internet browser, go to [email or web address] 2. Click on the First Time User? Click Here link in the Sign In box. You will see the New Member Sign Up page. 3. Enter your [employer] Access Code exactly as it appears below. You will not need to use this code after youve completed the sign-up process. If you do not sign up before the expiration date, you must request a new code. · [employer] Access Code: [email or web address] Expires: 11/28/2018  7:26 AM 
 
4. Enter the last four digits of your Social Security Number (xxxx) and Date of Birth (mm/dd/yyyy) as indicated and click Submit. You will be taken to the next sign-up page. 5. Create a [employer] ID. This will be your [employer] login ID and cannot be changed, so think of one that is secure and easy to remember. 6. Create a [employer] password. You can change your password at any time. 7. Enter your Password Reset Question and Answer. This can be used at a later time if you forget your password. 8. Enter your e-mail address. You will receive e-mail notification when new information is available in [address] 9. Click Sign Up. You can now view and download portions of your medical record. 10. Click the Download Summary menu link to download a portable copy of your medical information. If you have questions, please visit the Frequently Asked Questions section of the [employer] website. Remember, [employer] is NOT to be used for urgent needs. For medical emergencies, dial 911. Now available from your iPhone and Android! Please provide this summary of care documentation to your next provider. Your primary care clinician is listed as Iva Hough. If you have any questions after today's visit, please call [phone].

## 2018-08-30 NOTE — PROGRESS NOTES
Needs refills of all medications - neck pain and back pain - insurance will no longer pay for Diclofenac , needs a replacement  Giuliaedyta QuinnCOLEMAN  9/20/[birth year]36  7:74 AM  Fall Risk Assessment, last 12 mths 8/14/2017   Able to walk? Yes   Fall in past 12 months? Yes   Fall with injury? Yes   Number of falls in past 12 months 3   Fall Risk Score 4       PHQ over the last two weeks 12/20/2017   Little interest or pleasure in doing things Several days   Feeling down, depressed, irritable, or hopeless -   Total Score PHQ 2 -   Trouble falling or staying asleep, or sleeping too much Nearly every day   Feeling tired or having little energy Several days   Poor appetite, weight loss, or overeating Several days   Feeling bad about yourself - or that you are a failure or have let yourself or your family down Not at all   Trouble concentrating on things such as school, work, reading, or watching TV Not at all   Moving or speaking so slowly that other people could have noticed; or the opposite being so fidgety that others notice Not at all   Thoughts of being better off dead, or hurting yourself in some way Not at all   How difficult have these problems made it for you to do your work, take care of your home and get along with others Not difficult at all       Abuse Screening Questionnaire 8/14/2017   Do you ever feel afraid of your partner? N   Are you in a relationship with someone who physically or mentally threatens you? N   Is it safe for you to go home?  Y       ADL Assessment 8/14/2017   Feeding yourself No Help Needed   Getting from bed to chair No Help Needed   Getting dressed No Help Needed   Bathing or showering No Help Needed   Walk across the room (includes cane/walker) No Help Needed   Using the telphone No Help Needed   Taking your medications No Help Needed   Preparing meals No Help Needed   Managing money (expenses/bills) No Help Needed   Moderately strenuous housework (laundry) No Help Needed   Shopping for personal items (toiletries/medicines) No Help Needed   Shopping for groceries No Help Needed   Driving No Help Needed   Climbing a flight of stairs No Help Needed   Getting to places beyond walking distances Help Needed

## 2018-08-30 NOTE — PATIENT INSTRUCTIONS
Deciding About Using Medicines To Quit Smoking  Deciding About Using Medicines To Quit Smoking  What are the medicines you can use? Your doctor may prescribe varenicline (Chantix) or bupropion (Zyban). These medicines can help you cope with cravings for tobacco. They are pills that don't contain nicotine. You also can use nicotine replacement products. These do contain nicotine. There are many types. · Gum and lozenges slowly release nicotine into your mouth. · Patches stick to your skin. They slowly release nicotine into your bloodstream.  · An inhaler has a long that contains nicotine. You breathe in a puff of nicotine vapor through your mouth and throat. · Nasal spray releases a mist that contains nicotine. What are key points about this decision? · Using medicines can double your chances of quitting smoking. They can ease cravings and withdrawal symptoms. · Getting counseling along with using medicine can raise your chances of quitting even more. · If you smoke fewer than 5 cigarettes a day, you may not need medicines to help you quit smoking. · These medicines have less nicotine than cigarettes. And by itself, nicotine is not nearly as harmful as smoking. The tars, carbon monoxide, and other toxic chemicals in tobacco cause the harmful effects. · The side effects of nicotine replacement products depend on the type of product. For example, a patch can make your skin red and itchy. Medicines in pill form can make you sick to your stomach. They can also cause dry mouth and trouble sleeping. For most people, the side effects are not bad enough to make them stop using the products. Why might you choose to use medicines to quit smoking? · You have tried on your own to stop smoking, but you were not able to stop. · You smoke more than 5 cigarettes a day. · You want to increase your chances of quitting smoking. · You want to reduce your cravings and withdrawal symptoms.   · You feel the benefits of medicine outweigh the side effects. Why might you choose not to use medicine? · You want to try quitting on your own by stopping all at once (\"cold turkey\"). · You want to cut back slowly on the number of cigarettes you smoke. · You smoke fewer than 5 cigarettes a day. · You do not like using medicine. · You feel the side effects of medicines outweigh the benefits. · You are worried about the cost of medicines. Your decision  Thinking about the facts and your feelings can help you make a decision that is right for you. Be sure you understand the benefits and risks of your options, and think about what else you need to do before you make the decision. Where can you learn more? Go to http://ruperto-gabriela.info/. Enter F961 in the search box to learn more about \"Deciding About Using Medicines To Quit Smoking. \"  Current as of: November 29, 2017  Content Version: 11.7  © 7526-5880 [employer]. Care instructions adapted under license by [employer] (which disclaims liability or warranty for this information). If you have questions about a medical condition or this instruction, always ask your healthcare professional. [place] [address] any warranty or liability for your use of this information.

## 2018-08-30 NOTE — PROGRESS NOTES
PROGRESS NOTE        SUBJECTIVE:  Diagnosis/Chief Complaint:   Pain in neck back and knees  Restless legs cont to bother her despite relpax. Doing well with pain no 8 to 10 after exertions  Improvement in function: yes - tramadol helps  Pain levels 8/10   Usage of benzodiazapine or other sedatives no  Symptoms pain  Activities: Able to do activities of daily living. yes - moves better with tramadol  Side affects: no  States taking medications per medicine list.no   queried yes - re  Urine drug screen done no  Opiod Rx exceeds 50 MME/dayno  Hx of depression yes, not currently, Cymbalta helps  Hx of drug addiction: no  Safe storage of the opiods: yes - lockbox  Narcotic contract reviewed and discussed: yes - has 15 y/o boy    Patient Active Problem List    Diagnosis Date Noted    Depression, major, recurrent, mild ([facility] [address] 12/21/2017    Restless legs 12/21/2017    Tobacco abuse 12/21/2017    Spinal stenosis 08/14/2017     No Known Allergies  Social History   Substance Use Topics    Smoking status: Current Every Day Smoker     Packs/day: 0.50     Years: 30.00     Types: Cigarettes    Smokeless tobacco: Never Used    Alcohol use 11.4 oz/week     14 Glasses of wine, 5 Cans of beer per week        OBJECTIVE:    .  Visit Vitals    /84 (BP 1 Location: Left arm, BP Patient Position: Sitting)    Pulse 90    Temp 95.8 °F (35.4 °C) (Oral)    Resp 20    Ht 5' 9\" (1.753 m)    Wt 175 lb (79.4 kg)    SpO2 98%    BMI 25.84 kg/m2     WDWN in NAD, mental status normal  Heart RRR, no:C/M/R  Lungs CTA No wheezes, rales or rhonchi  Abdo: soft no tenderness, rebound or guarding  Neurological exam[de-identified] 2-12 intact  Psychiatric: Normal mood, judgement    Reviewed: Medications, allergies, clinical lab test results and imaging results have been reviewed. Any abnormal findings have been addressed. ASSESSMENT:       ICD-10-CM ICD-9-CM    1.  Spinal stenosis, unspecified spinal region M48.00 724.00 [place] IMMUNIZ ADMIN,1 SINGLE/COMB VAC/TOXOID   2. Tobacco abuse Z72.0 305.1 [place] IMMUNIZ ADMIN,1 SINGLE/COMB VAC/TOXOID   3. Restless legs G25.81 333.94 [place] IMMUNIZ ADMIN,1 SINGLE/COMB VAC/TOXOID   4. Depression, major, recurrent, mild (HCC) F33.0 296.31 [place] IMMUNIZ ADMIN,1 SINGLE/COMB VAC/TOXOID   5. Screening for breast cancer Z12.31 V76.10 GOVIND MAMMO BI SCREENING INCL CAD      [place] IMMUNIZ ADMIN,1 SINGLE/COMB VAC/TOXOID   6. Encounter for immunization Z23 V03.89 PNEUMOCOCCAL CONJ VACCINE 13 VALENT IM      [place] IMMUNIZ ADMIN,1 SINGLE/COMB VAC/TOXOID   7. Spinal stenosis of thoracic region M48.04 724.01 traMADol (ULTRAM) 50 mg tablet      [place] IMMUNIZ ADMIN,1 SINGLE/COMB VAC/TOXOID         Patient is 65 y.o. with diagnosis of :   Patient Active Problem List   Diagnosis Code    Spinal stenosis M48.00    Depression, major, recurrent, mild ([facility] [address] F33.0    Restless legs G25.81    Tobacco abuse Z72.0       PLAN:     Orders Placed This Encounter    [place] IMMUNIZ ADMIN,1 SINGLE/COMB VAC/TOXOID    GOVIND MAMMO BI SCREENING INCL CAD     Standing Status:   Future     Standing Expiration Date:   2019     Scheduling Instructions:      [place]     Order Specific Question:   Reason for Exam     Answer:   screening breast CA    PNEUMOCOCCAL CONJ VACCINE 13 VALENT IM    diph,Pertuss,Acell,,Tet Vac-PF (ADACEL) 2 Lf-(2.5-5-3-5 mcg)-5Lf/0.5 mL susp     Si.5 mL by IntraMUSCular route once for 1 dose. Dispense:  1 Syringe     Refill:  0    buPROPion (WELLBUTRIN) 100 mg tablet     Sig: Take 1 Tab by mouth three (3) times daily. Start 1 tablet daily, usually in the evening, every few days increase as tolerated, quit. Dispense:  90 Tab     Refill:  1    fluticasone-salmeterol (ADVAIR DISKUS) 500-50 mcg/dose diskus inhaler     Sig: Take 1 Puff by inhalation every twelve (12) hours.      Dispense:  3 Inhaler     Refill:  1    albuterol (PROVENTIL HFA, VENTOLIN HFA, PROAIR HFA) 90 mcg/actuation inhaler     Sig: Take 1 Puff by inhalation every six (6) hours as needed for Wheezing. Dispense:  3 Inhaler     Refill:  1    cyclobenzaprine (FLEXERIL) 5 mg tablet     Sig: TAKE ONE TABLET BY MOUTH THREE TIMES DAILY AS NEEDED FOR MUSCLE SPASM     Dispense:  270 Tab     Refill:  1     Please consider 90 day supplies to promote better adherence    gabapentin (NEURONTIN) 300 mg capsule     Sig: take 1 capsule by mouth three times a day     Dispense:  270 Cap     Refill:  1    traMADol (ULTRAM) 50 mg tablet     Sig: Take 2 Tabs by mouth two (2) times daily as needed for Pain. Max Daily Amount: 200 mg. Dispense:  120 Tab     Refill:  1    DULoxetine (CYMBALTA) 30 mg capsule     Sig: Take 1 Cap by mouth daily. Dispense:  90 Cap     Refill:  1    diclofenac EC (VOLTAREN) 50 mg EC tablet     Sig: Take 1 Tab by mouth two (2) times a day. Dispense:  180 Tab     Refill:  1    rOPINIRole (REQUIP) 0.25 mg tablet     Sig: Take 2 Tabs by mouth nightly. Start 1 tablet daily, usually in the evening, every few days increase as tolerated, restless. Dispense:  60 Tab     Refill:  1       We discussed this pain and the usage of controlled substances for back pain relief. In general these medications are indicated for the acute symptom relief and not for chronic usage, allthough they are frequently used for chronic management  After a certain period of time they should be stopped to avoid dependence and/or addiction. I warned her about the dangers of addiction and other side affects including respiratory depression and death. Discussed how this is a controlled substance and that the prescribing of it is should be monitored very carefully. Drug usage is also monitored by our practise and the [facility], since there has been a lot of abuse and diversion of controlled substances. In general we do not refill this medication over the phone. PLease ask for enough pills to get you to your next appointment and make sure you keep your appointments.  Warned not to take other medications like alcohol, other benzodiazapines marijuana or other narcotics when on this medication. Current Outpatient Prescriptions   Medication Sig Dispense Refill    diph,Pertuss,Acell,,Tet Vac-PF (ADACEL) 2 Lf-(2.5-5-3-5 mcg)-5Lf/0.5 mL susp 0.5 mL by IntraMUSCular route once for 1 dose. 1 Syringe 0    buPROPion (WELLBUTRIN) 100 mg tablet Take 1 Tab by mouth three (3) times daily. Start 1 tablet daily, usually in the evening, every few days increase as tolerated, quit. 90 Tab 1    fluticasone-salmeterol (ADVAIR DISKUS) 500-50 mcg/dose diskus inhaler Take 1 Puff by inhalation every twelve (12) hours. 3 Inhaler 1    albuterol (PROVENTIL HFA, VENTOLIN HFA, PROAIR HFA) 90 mcg/actuation inhaler Take 1 Puff by inhalation every six (6) hours as needed for Wheezing. 3 Inhaler 1    cyclobenzaprine (FLEXERIL) 5 mg tablet TAKE ONE TABLET BY MOUTH THREE TIMES DAILY AS NEEDED FOR MUSCLE SPASM 270 Tab 1    gabapentin (NEURONTIN) 300 mg capsule take 1 capsule by mouth three times a day 270 Cap 1    traMADol (ULTRAM) 50 mg tablet Take 2 Tabs by mouth two (2) times daily as needed for Pain. Max Daily Amount: 200 mg. 120 Tab 1    DULoxetine (CYMBALTA) 30 mg capsule Take 1 Cap by mouth daily. 90 Cap 1    diclofenac EC (VOLTAREN) 50 mg EC tablet Take 1 Tab by mouth two (2) times a day. 180 Tab 1    rOPINIRole (REQUIP) 0.25 mg tablet Take 2 Tabs by mouth nightly. Start 1 tablet daily, usually in the evening, every few days increase as tolerated, restless. 60 Tab 1     The patient was counseled on the dangers of tobacco use, and was advised to quit. Reviewed strategies to maximize success, including pharmacotherapy (wellbutrin).

## 2018-09-11 ENCOUNTER — DOCUMENTATION ONLY (OUTPATIENT)
Dept: INTERNAL MEDICINE CLINIC | Age: 58
End: 2018-09-11

## 2018-09-11 NOTE — PROGRESS NOTES
Received notification from [facility] - approval status received for PA request for Tramadol - approved 9/5/18 through 10/5/18

## 2018-09-24 DIAGNOSIS — M48.04 SPINAL STENOSIS OF THORACIC REGION: ICD-10-CM

## 2018-09-25 ENCOUNTER — DOCUMENTATION ONLY (OUTPATIENT)
Dept: INTERNAL MEDICINE CLINIC | Age: 58
End: 2018-09-25

## 2018-09-25 RX ORDER — TRAMADOL HYDROCHLORIDE 50 MG/1
100 TABLET ORAL
Qty: 120 TAB | Refills: 1 | OUTPATIENT
Start: 2018-09-25

## 2018-09-25 NOTE — PROGRESS NOTES
Pt came in today with a FX knee and ankle asking permission to take the ER RX [facility] gave her for percocet (6) pills, Dr. Velasquez Graces the RX. Pt has a contract with us.

## 2018-10-18 RX ORDER — LIDOCAINE 50 MG/G
PATCH TOPICAL
Qty: 30 PATCH | Refills: 2 | Status: SHIPPED | OUTPATIENT
Start: 2018-10-18 | End: 2020-10-26

## 2019-01-11 ENCOUNTER — OFFICE VISIT (OUTPATIENT)
Dept: INTERNAL MEDICINE CLINIC | Age: 59
End: 2019-01-11

## 2019-01-11 VITALS
WEIGHT: 185 LBS | RESPIRATION RATE: 16 BRPM | HEIGHT: 69 IN | SYSTOLIC BLOOD PRESSURE: 134 MMHG | BODY MASS INDEX: 27.4 KG/M2 | TEMPERATURE: 97.8 F | HEART RATE: 86 BPM | DIASTOLIC BLOOD PRESSURE: 54 MMHG | OXYGEN SATURATION: 96 %

## 2019-01-11 DIAGNOSIS — M89.9 BONE DISORDER: ICD-10-CM

## 2019-01-11 DIAGNOSIS — Z23 ENCOUNTER FOR IMMUNIZATION: ICD-10-CM

## 2019-01-11 DIAGNOSIS — J44.9 CHRONIC OBSTRUCTIVE PULMONARY DISEASE, UNSPECIFIED COPD TYPE (HCC): ICD-10-CM

## 2019-01-11 DIAGNOSIS — Z12.11 SCREENING FOR COLON CANCER: ICD-10-CM

## 2019-01-11 DIAGNOSIS — G25.81 RESTLESS LEGS: ICD-10-CM

## 2019-01-11 DIAGNOSIS — M48.00 SPINAL STENOSIS, UNSPECIFIED SPINAL REGION: Primary | ICD-10-CM

## 2019-01-11 DIAGNOSIS — Z12.39 SCREENING FOR BREAST CANCER: ICD-10-CM

## 2019-01-11 RX ORDER — PREDNISONE 10 MG/1
TABLET ORAL
Qty: 20 TAB | Refills: 0 | Status: SHIPPED | OUTPATIENT
Start: 2019-01-11 | End: 2019-02-11 | Stop reason: ALTCHOICE

## 2019-01-11 RX ORDER — GABAPENTIN 400 MG/1
CAPSULE ORAL
Qty: 90 CAP | Refills: 5 | Status: SHIPPED | OUTPATIENT
Start: 2019-01-11 | End: 2019-01-11 | Stop reason: SDUPTHER

## 2019-01-11 RX ORDER — GABAPENTIN 400 MG/1
CAPSULE ORAL
Qty: 90 CAP | Refills: 5 | Status: SHIPPED | OUTPATIENT
Start: 2019-01-11 | End: 2019-07-24 | Stop reason: SDUPTHER

## 2019-01-11 RX ORDER — DULOXETIN HYDROCHLORIDE 30 MG/1
30 CAPSULE, DELAYED RELEASE ORAL DAILY
Qty: 90 CAP | Refills: 1 | Status: SHIPPED | OUTPATIENT
Start: 2019-01-11 | End: 2019-01-11 | Stop reason: SDUPTHER

## 2019-01-11 RX ORDER — DICLOFENAC SODIUM 10 MG/G
GEL TOPICAL 4 TIMES DAILY
Qty: 1 EACH | Refills: 5 | Status: SHIPPED | OUTPATIENT
Start: 2019-01-11 | End: 2019-03-08 | Stop reason: SDUPTHER

## 2019-01-11 RX ORDER — PREDNISONE 10 MG/1
TABLET ORAL
Qty: 20 TAB | Refills: 0 | Status: SHIPPED | OUTPATIENT
Start: 2019-01-11 | End: 2019-01-11 | Stop reason: SDUPTHER

## 2019-01-11 RX ORDER — DULOXETIN HYDROCHLORIDE 30 MG/1
30 CAPSULE, DELAYED RELEASE ORAL DAILY
Qty: 90 CAP | Refills: 1 | Status: SHIPPED | OUTPATIENT
Start: 2019-01-11 | End: 2019-07-24

## 2019-01-11 RX ORDER — DICLOFENAC SODIUM 10 MG/G
GEL TOPICAL 4 TIMES DAILY
Qty: 1 EACH | Refills: 5 | Status: SHIPPED | OUTPATIENT
Start: 2019-01-11 | End: 2019-01-11 | Stop reason: SDUPTHER

## 2019-01-11 RX ORDER — TRAMADOL HYDROCHLORIDE 50 MG/1
50 TABLET ORAL
Qty: 30 TAB | Refills: 0 | Status: SHIPPED | OUTPATIENT
Start: 2019-01-11 | End: 2019-02-11 | Stop reason: SDUPTHER

## 2019-01-11 NOTE — PROGRESS NOTES
Chief Complaint Patient presents with  Back Pain  
  low back pain, neck pain and L/knee I have reviewed the patient's medical history in detail and updated the computerized patient record. Health Maintenance reviewed. 1. Have you been to the ER, urgent care clinic since your last visit? Hospitalized since your last visit?no 2. Have you seen or consulted any other health care providers outside of the [address] [place] [address] since your last visit? Include any pap smears or colon screening. No 
 
 
Encouraged pt to discuss pt's wishes with spouse/partner/family and bring them in the next appt to follow thru with the Advanced Directive Fall Risk Assessment, last 12 mths 1/11/2019 Able to walk? Yes Fall in past 12 months? No  
Fall with injury? -  
Number of falls in past 12 months - Fall Risk Score -  
 
 
PHQ over the last two weeks 1/11/2019 Little interest or pleasure in doing things Several days Feeling down, depressed, irritable, or hopeless Several days Total Score PHQ 2 2 Trouble falling or staying asleep, or sleeping too much - Feeling tired or having little energy - Poor appetite, weight loss, or overeating - Feeling bad about yourself - or that you are a failure or have let yourself or your family down - Trouble concentrating on things such as school, work, reading, or watching TV - Moving or speaking so slowly that other people could have noticed; or the opposite being so fidgety that others notice - Thoughts of being better off dead, or hurting yourself in some way - How difficult have these problems made it for you to do your work, take care of your home and get along with others - Abuse Screening Questionnaire 1/11/2019 Do you ever feel afraid of your partner? Willow Forbes Are you in a relationship with someone who physically or mentally threatens you? Willow Forbes Is it safe for you to go home? Y  
 
 
ADL Assessment 1/11/2019 Feeding yourself No Help Needed Getting from bed to chair No Help Needed Getting dressed No Help Needed Bathing or showering No Help Needed Walk across the room (includes cane/walker) No Help Needed Using the telphone No Help Needed Taking your medications No Help Needed Preparing meals No Help Needed Managing money (expenses/bills) No Help Needed Moderately strenuous housework (laundry) No Help Needed Shopping for personal items (toiletries/medicines) No Help Needed Shopping for groceries No Help Needed Driving Help Needed Climbing a flight of stairs Help Needed Getting to places beyond walking distances Help Needed

## 2019-01-11 NOTE — PROGRESS NOTES
PROGRESS NOTE SUBJECTIVE: 
Diagnosis/Chief Complaint:  
Agree with comments, see chief complaint. She wants tramadol for her pain. Told her we do not prescribe chronic tramadol chronic pain. She got upset and left. Later did return. Doing well with pain no- want tramadol at least to help her through the weekend. She is been seeing surgeons at [facility]. Improvement in function: yes -cannot play with her grandkids. Request RF, despite Requip still complains of restless legs. Takes her gabapentin, some improvement. Pain levels 9/10 Usage of benzodiazapine or other sedatives no Symptoms low back pain Side affects: no 
States taking medications per medicine list.yes - but out of tramadol  queried Urine drug screen done no Opiod Rx exceeds 50 MME/dayno Hx of depression no Hx of drug addiction: no No fever, weight loss, urine or fecal incontinence. Patient Active Problem List  
 Diagnosis Date Noted  Depression, major, recurrent, mild ([facility] [address] 12/21/2017  Restless legs 12/21/2017  Tobacco abuse 12/21/2017  Spinal stenosis 08/14/2017 Current Outpatient Medications Medication Sig Dispense Refill  buPROPion (WELLBUTRIN) 100 mg tablet Take 1 Tab by mouth three (3) times daily. 90 Tab 5  lidocaine (LIDODERM) 5 % apply 1 patch to the affected area daily. Leave on for 12 hours and then REMOVE for 12 hours. 30 Patch 2  
 fluticasone-salmeterol (ADVAIR DISKUS) 500-50 mcg/dose diskus inhaler Take 1 Puff by inhalation every twelve (12) hours. 3 Inhaler 1  
 albuterol (PROVENTIL HFA, VENTOLIN HFA, PROAIR HFA) 90 mcg/actuation inhaler Take 1 Puff by inhalation every six (6) hours as needed for Wheezing. 3 Inhaler 1  cyclobenzaprine (FLEXERIL) 5 mg tablet TAKE ONE TABLET BY MOUTH THREE TIMES DAILY AS NEEDED FOR MUSCLE SPASM 270 Tab 1  
 gabapentin (NEURONTIN) 300 mg capsule take 1 capsule by mouth three times a day 270 Cap 1  
  DULoxetine (CYMBALTA) 30 mg capsule Take 1 Cap by mouth daily. 90 Cap 1  
 rOPINIRole (REQUIP) 0.25 mg tablet Take 2 Tabs by mouth nightly. 60 Tab 5 No Known Allergies Social History Tobacco Use  Smoking status: Current Every Day Smoker Packs/day: 0.50 Years: 30.00 Pack years: 15.00 Types: Cigarettes  Smokeless tobacco: Never Used Substance Use Topics  Alcohol use: Yes Alcohol/week: 11.4 oz Types: 14 Glasses of wine, 5 Cans of beer per week OBJECTIVE: . 
Visit Vitals /54 (BP 1 Location: Left arm, BP Patient Position: Sitting) Pulse 86 Resp 16 Ht 5' 9\" (1.753 m) Wt 185 lb (83.9 kg) SpO2 96% BMI 27.32 kg/m² WDWN in NAD, mental status normal 
Heart RRR, no:C/M/R Lungs CTA No wheezes, rales or rhonchi Abdo: soft no tenderness, rebound or guarding Neurological exam[de-identified] 2-12 intact Psychiatric: Normal mood, judgement Reviewed: Medications, allergies, clinical lab test results and imaging results have been reviewed. Any abnormal findings have been addressed. ASSESSMENT:  
 
  ICD-10-CM ICD-9-CM 1. Spinal stenosis, unspecified spinal region M48.00 724.00 REFERRAL TO NEUROSURGERY  
   traMADol (ULTRAM) 50 mg tablet THER/PROPH/DIAG INJECTION, SUBCUT/IM  
   diclofenac (VOLTAREN) 1 % gel  
   gabapentin (NEURONTIN) 400 mg capsule DISCONTINUED: diclofenac (VOLTAREN) 1 % gel DISCONTINUED: gabapentin (NEURONTIN) 400 mg capsule 2. Encounter for immunization Z23 V03.89 INFLUENZA VIRUS VAC QUAD,SPLIT,PRESV FREE SYRINGE IM  
   THER/PROPH/DIAG INJECTION, SUBCUT/IM 3. Restless legs G25.81 333.94 THER/PROPH/DIAG INJECTION, SUBCUT/IM 4. Bone disorder M89.9 733.90 DEXA BONE DENSITY STUDY AXIAL  
   THER/PROPH/DIAG INJECTION, SUBCUT/IM 5. Screening for breast cancer Z12.31 V76.10 GOVIND MAMMO BI SCREENING INCL CAD THER/PROPH/DIAG INJECTION, SUBCUT/IM 6. Screening for colon cancer Z12.11 V76.51 AMB POC FECAL OCCULT BLOOD (INSURE FIT) THER/PROPH/DIAG INJECTION, SUBCUT/IM  
   OCCULT BLOOD IMMUNOASSAY,DIAGNOSTIC  
   CANCELED: OCCULT BLOOD IMMUNOASSAY,DIAGNOSTIC 7. Chronic obstructive pulmonary disease, unspecified COPD type ([facility] J44.9 496 THER/PROPH/DIAG INJECTION, SUBCUT/IM  
   predniSONE (DELTASONE) 10 mg tablet DISCONTINUED: predniSONE (DELTASONE) 10 mg tablet Patient is 63 y.o. with diagnosis of :  
Patient Active Problem List  
Diagnosis Code  Spinal stenosis M48.00  Depression, major, recurrent, mild ([facility] F33.0  Restless legs G25.81  
 Tobacco abuse Z72.0 PLAN:  
 
Orders Placed This Encounter  THER/PROPH/DIAG INJECTION, SUBCUT/IM  
 DEXA BONE DENSITY STUDY AXIAL Standing Status:   Future Standing Expiration Date:   2/12/2020 Order Specific Question:   Reason for Exam  
  Answer:   screening  GOVIND MAMMO BI SCREENING INCL CAD Standing Status:   Future Standing Expiration Date:   2/11/2020 Order Specific Question:   Reason for Exam  
  Answer:   screening breast cancer  INFLUENZA VIRUS VACCINE QUADRIVALENT, PRESERVATIVE FREE SYRINGE (71637)  OCCULT BLOOD IMMUNOASSAY,DIAGNOSTIC  
 REFERRAL TO NEUROSURGERY Referral Priority:   Routine Referral Type:   Consultation Referral Reason:   Specialty Services Required Referred to Provider:   Lc Pearce MD  
  Number of Visits Requested:   1  AMB POC FECAL OCCULT BLOOD (INSURE FIT)  DISCONTD: diclofenac (VOLTAREN) 1 % gel Sig: Apply  to affected area four (4) times daily. Dispense:  1 Each Refill:  5  
 DISCONTD: gabapentin (NEURONTIN) 400 mg capsule Sig: take 1 capsule by mouth three times a day Dispense:  90 Cap Refill:  5  
 traMADol (ULTRAM) 50 mg tablet Sig: Take 1 Tab by mouth every six (6) hours as needed for Pain. Max Daily Amount: 200 mg. Dispense:  30 Tab Refill:  0  
 DISCONTD: DULoxetine (CYMBALTA) 30 mg capsule Sig: Take 1 Cap by mouth daily. Dispense:  90 Cap Refill:  1  
 DISCONTD: predniSONE (DELTASONE) 10 mg tablet Si tabs for 2 day, 3 tabs for 2 days, 2 tabs for 2 days, 1tab for 2 days Dispense:  20 Tab Refill:  0  
 diclofenac (VOLTAREN) 1 % gel Sig: Apply  to affected area four (4) times daily. Dispense:  1 Each Refill:  5  
 gabapentin (NEURONTIN) 400 mg capsule Sig: take 1 capsule by mouth three times a day Dispense:  90 Cap Refill:  5  
 DULoxetine (CYMBALTA) 30 mg capsule Sig: Take 1 Cap by mouth daily. Dispense:  90 Cap Refill:  1  predniSONE (DELTASONE) 10 mg tablet Si tabs for 2 day, 3 tabs for 2 days, 2 tabs for 2 days, 1tab for 2 days Dispense:  20 Tab Refill:  0 We do not prescribe chronic narcotics for chronic back pain. Discussed the nature of back pain. Discussed how this is in general a 'red flag' diagnosis and the general limited and temporary nature of this problem as well as its re-occurrence. Discussed further work-up and treatment options. Discused narcotics and back pain: yes Did okay 30 tramadol but will not continue to refill this, needs to follow-up with the surgeons to see if treatments other than narcotics can be used to help her with her pain issues. We discussed a screening exam FIT bone density mammgram and the  the pros and cons of having the test done. The patient made a decision to do the test: yes Patient was looked up in the . There are multiple prescribers of controlled substances  yes. Follow-up Disposition: 
Return in about 3 months (around 2019) for routine follow up.

## 2019-01-21 ENCOUNTER — DOCUMENTATION ONLY (OUTPATIENT)
Dept: INTERNAL MEDICINE CLINIC | Age: 59
End: 2019-01-21

## 2019-01-29 ENCOUNTER — TELEPHONE (OUTPATIENT)
Dept: INTERNAL MEDICINE CLINIC | Age: 59
End: 2019-01-29

## 2019-01-29 NOTE — TELEPHONE ENCOUNTER
Pt called in reference to being diagnosed with pneumonia last night at the ER. She would like to know if some cough medicine can be called in to the [facility]. Ms. Joss Patel said that over the counter medicine does not help. She would also like the albuterol liquid solution called to the pharmacy. Please call her at [phone].

## 2019-01-29 NOTE — TELEPHONE ENCOUNTER
Returned pt's call and she went to [place] [facility] in [place] last night and diag with pneumonia. They put her on antib amoxicillin but no cough meds.   Over the counter cough meds not working please send something in to [facility] in [place], [address]  Thank you

## 2019-01-29 NOTE — TELEPHONE ENCOUNTER
Ana, from [facility], called in reference to wanting to speak with the provider know that they will having a interdisciplinary meeting on February 7, 2019 at 3:15pm. If Dr. Marilu Sood would like to call in he can dial [phone] ext [phone].

## 2019-01-30 RX ORDER — BENZONATATE 200 MG/1
200 CAPSULE ORAL
Qty: 21 CAP | Refills: 0 | Status: SHIPPED | OUTPATIENT
Start: 2019-01-30 | End: 2019-02-06 | Stop reason: SDUPTHER

## 2019-01-30 NOTE — TELEPHONE ENCOUNTER
Attempted to call patient - unable to reach her - phone number is not in service  Read COLEMAN Watkins  9/74/4319  1:48 PM

## 2019-01-31 LAB — HEMOCCULT STL QL IA: ABNORMAL

## 2019-02-05 ENCOUNTER — TELEPHONE (OUTPATIENT)
Dept: INTERNAL MEDICINE CLINIC | Age: 59
End: 2019-02-05

## 2019-02-06 ENCOUNTER — TELEPHONE (OUTPATIENT)
Dept: INTERNAL MEDICINE CLINIC | Age: 59
End: 2019-02-06

## 2019-02-06 DIAGNOSIS — R19.5 STOOL GUAIAC POSITIVE: Primary | ICD-10-CM

## 2019-02-06 RX ORDER — BENZONATATE 200 MG/1
200 CAPSULE ORAL
Qty: 21 CAP | Refills: 0 | Status: SHIPPED | OUTPATIENT
Start: 2019-02-06 | End: 2019-02-11 | Stop reason: SDUPTHER

## 2019-02-06 NOTE — TELEPHONE ENCOUNTER
Chief Complaint   Patient presents with    Incontinence    Results     STOOL OCCULT     Informed patient that an appointment is required to discuss incontinence supplies through mail order. Appointment scheduled. Informed patient that the stool test needs to be repeated related to issues with the [facility].    Requested Prescriptions     Pending Prescriptions Disp Refills    benzonatate (TESSALON) 200 mg capsule 21 Cap 0     Sig: Take 1 Cap by mouth three (3) times daily as needed for Cough for up to 7 days. Patient is out of her medication and has scheduled an appointment to be seen 11 February 2019.

## 2019-02-07 ENCOUNTER — DOCUMENTATION ONLY (OUTPATIENT)
Dept: INTERNAL MEDICINE CLINIC | Age: 59
End: 2019-02-07

## 2019-02-08 ENCOUNTER — TELEPHONE (OUTPATIENT)
Dept: INTERNAL MEDICINE CLINIC | Age: 59
End: 2019-02-08

## 2019-02-08 DIAGNOSIS — R19.5 POSITIVE FIT (FECAL IMMUNOCHEMICAL TEST): Primary | ICD-10-CM

## 2019-02-08 NOTE — TELEPHONE ENCOUNTER
----- Message from Mamadou Motta sent at 2/8/2019 11:23 AM EST -----  Regarding: Dr. Mg Homes  Patient needs a referral for an Oncologist. She has not yet set up an appointment. Please call her at [phone].

## 2019-02-08 NOTE — TELEPHONE ENCOUNTER
verified. Patient requests an oncology referral related to positive stool occult test.  She prefers Dr. Adina Lazo. Phone number [phone].

## 2019-02-08 NOTE — TELEPHONE ENCOUNTER
----- Message from Judith Sykes sent at 2/8/2019  1:33 PM EST -----  Regarding: Dr. Dennie Evener  Pt would like the nurse to give her a call back about get a referral to see Dr. Aggie Brown the Oncologist in [place].  Pt's callback: [phone]

## 2019-02-11 ENCOUNTER — OFFICE VISIT (OUTPATIENT)
Dept: INTERNAL MEDICINE CLINIC | Age: 59
End: 2019-02-11

## 2019-02-11 ENCOUNTER — TELEPHONE (OUTPATIENT)
Dept: INTERNAL MEDICINE CLINIC | Age: 59
End: 2019-02-11

## 2019-02-11 VITALS
DIASTOLIC BLOOD PRESSURE: 82 MMHG | OXYGEN SATURATION: 95 % | BODY MASS INDEX: 28.08 KG/M2 | WEIGHT: 189.6 LBS | RESPIRATION RATE: 22 BRPM | TEMPERATURE: 97.3 F | HEART RATE: 75 BPM | SYSTOLIC BLOOD PRESSURE: 127 MMHG | HEIGHT: 69 IN

## 2019-02-11 DIAGNOSIS — J42 CHRONIC BRONCHITIS, UNSPECIFIED CHRONIC BRONCHITIS TYPE (HCC): Primary | ICD-10-CM

## 2019-02-11 DIAGNOSIS — R19.5 GUAIAC + STOOL: ICD-10-CM

## 2019-02-11 DIAGNOSIS — M48.00 SPINAL STENOSIS, UNSPECIFIED SPINAL REGION: ICD-10-CM

## 2019-02-11 DIAGNOSIS — F32.0 MILD MAJOR DEPRESSION (HCC): ICD-10-CM

## 2019-02-11 RX ORDER — PREDNISONE 20 MG/1
40 TABLET ORAL
Qty: 10 TAB | Refills: 0 | Status: SHIPPED | OUTPATIENT
Start: 2019-02-11 | End: 2019-02-16

## 2019-02-11 RX ORDER — BENZONATATE 200 MG/1
200 CAPSULE ORAL
Qty: 21 CAP | Refills: 0 | Status: SHIPPED | OUTPATIENT
Start: 2019-02-11 | End: 2019-02-20 | Stop reason: SDUPTHER

## 2019-02-11 RX ORDER — TRAMADOL HYDROCHLORIDE 50 MG/1
50 TABLET ORAL
Qty: 30 TAB | Refills: 0 | Status: SHIPPED | OUTPATIENT
Start: 2019-02-11 | End: 2019-03-07 | Stop reason: SDUPTHER

## 2019-02-11 NOTE — PROGRESS NOTES
Chief Complaint   Patient presents with    Urinary Incontinence     1. Have you been to the ER, urgent care clinic since your last visit? Hospitalized since your last visit? Yes When: 2 weeks ago [facility] Reason for visit: Fall    2. Have you seen or consulted any other health care providers outside of the [address] [place] [address] since your last visit? Include any pap smears or colon screening.  No     Health Maintenance Due   Topic Date Due    Hepatitis C Screening  1960    Pneumococcal 19-64 Medium Risk (1 of 1 - PPSV23) 12/10/1980    DTaP/Tdap/Td series (1 - Tdap) 12/10/1982    PAP AKA CERVICAL CYTOLOGY  12/10/1982    Shingrix Vaccine Age 50> (1 of 2) 12/10/2011    BREAST CANCER SCRN MAMMOGRAM  12/10/2011

## 2019-02-11 NOTE — PROGRESS NOTES
Subjective:   Naya Mejia is a 63 y.o. female who complains of cough described as productive of clear sputum and wheezing for 30 days, stable since that time. She denies a history of fevers and rash on body. States she is been having some diarrhea has noted some darkness to the stools. Evaluation to date: none. Treatment to date: OTC products. Patient does not smoke cigarettes. Relevant PMH: No pertinent additional PMH. Has chronic pain, tramadol helps with that. Needs urine incontinence supplies. Her spinal stenosis makes it difficult to walk and she requests a cane. No Known Allergies      Patient Active Problem List    Diagnosis Date Noted    Depression, major, recurrent, mild ([facility] [address] 12/21/2017    Restless legs 12/21/2017    Tobacco abuse 12/21/2017    Spinal stenosis 08/14/2017     Current Outpatient Medications   Medication Sig Dispense Refill    benzonatate (TESSALON) 200 mg capsule Take 1 Cap by mouth three (3) times daily as needed for Cough for up to 7 days. 21 Cap 0    traMADol (ULTRAM) 50 mg tablet Take 1 Tab by mouth two (2) times daily as needed for Pain. Max Daily Amount: 100 mg. 30 Tab 0    predniSONE (DELTASONE) 20 mg tablet Take 40 mg by mouth daily (with breakfast) for 5 days. 10 Tab 0    tiotropium (SPIRIVA) 18 mcg inhalation capsule Take 1 Cap by inhalation daily. 30 Cap 5    diclofenac (VOLTAREN) 1 % gel Apply  to affected area four (4) times daily. 1 Each 5    gabapentin (NEURONTIN) 400 mg capsule take 1 capsule by mouth three times a day 90 Cap 5    DULoxetine (CYMBALTA) 30 mg capsule Take 1 Cap by mouth daily. 90 Cap 1    buPROPion (WELLBUTRIN) 100 mg tablet Take 1 Tab by mouth three (3) times daily. 90 Tab 5    rOPINIRole (REQUIP) 0.25 mg tablet Take 2 Tabs by mouth nightly. 60 Tab 5    lidocaine (LIDODERM) 5 % apply 1 patch to the affected area daily. Leave on for 12 hours and then REMOVE for 12 hours.  30 Patch 2    fluticasone-salmeterol (ADVAIR DISKUS) 500-50 mcg/dose diskus inhaler Take 1 Puff by inhalation every twelve (12) hours. 3 Inhaler 1    albuterol (PROVENTIL HFA, VENTOLIN HFA, PROAIR HFA) 90 mcg/actuation inhaler Take 1 Puff by inhalation every six (6) hours as needed for Wheezing. 3 Inhaler 1    cyclobenzaprine (FLEXERIL) 5 mg tablet TAKE ONE TABLET BY MOUTH THREE TIMES DAILY AS NEEDED FOR MUSCLE SPASM 270 Tab 1     No Known Allergies  Social History     Tobacco Use    Smoking status: Current Every Day Smoker     Packs/day: 0.25     Years: 30.00     Pack years: 7.50     Types: Cigarettes    Smokeless tobacco: Never Used   Substance Use Topics    Alcohol use: Yes     Alcohol/week: 11.4 oz     Types: 14 Glasses of wine, 5 Cans of beer per week        Review of Systems  Pertinent items are noted in HPI. Objective:     Visit Vitals  /82 (BP 1 Location: Right arm, BP Patient Position: Sitting)   Pulse 75   Temp 97.3 °F (36.3 °C) (Oral)   Resp 22   Ht 5' 9\" (1.753 m)   Wt 189 lb 9.6 oz (86 kg)   SpO2 95%   BMI 28.00 kg/m²     General:  alert, fatigued, cooperative, no distress   Eyes: negative   Ears: normal TM's and external ear canals AU   Sinuses: Normal paranasal sinuses without tenderness   Mouth:  Lips, mucosa, and tongue normal. Teeth and gums normal   Neck: supple, symmetrical, trachea midline and no adenopathy. Heart: S1 and S2 normal, no murmurs noted. Lungs: clear to auscultation bilaterally   Abdomen: soft, non-tender. Bowel sounds normal. No masses,  no organomegaly, abnormal findings:  tenderness mild in the lower abdomen, guic + stool      Assessment/Plan:   asthma  Suggested symptomatic OTC remedies. RTC in 6 weeks or PRN. Discussed diagnosis and treatment of viral URIs. Discussed the importance of avoiding unnecessary antibiotic therapy. Encounter Diagnoses   Name Primary?     Chronic bronchitis, unspecified chronic bronchitis type ([address] Yes    Guaiac + stool     Spinal stenosis, unspecified spinal region    [address] [place] [address] Mild major depression (HCC)      Chronic Conditions Addressed Today     1. Spinal stenosis     Relevant Medications     traMADol (ULTRAM) 50 mg tablet     Other Relevant Orders     REFERRAL TO BEHAVIORAL HEALTH     AMB SUPPLY ORDER      Acute Diagnoses Addressed Today     Chronic bronchitis, unspecified chronic bronchitis type ([facility] [address]    -  Primary        Relevant Medications        benzonatate (TESSALON) 200 mg capsule        predniSONE (DELTASONE) 20 mg tablet        tiotropium (SPIRIVA) 18 mcg inhalation capsule    Guaiac + stool            Relevant Orders        REFERRAL TO GENERAL SURGERY    Mild major depression (HCC)            Relevant Medications        traMADol (ULTRAM) 50 mg tablet          Orders Placed This Encounter    AMB SUPPLY ORDER    REFERRAL TO GENERAL SURGERY    REFERRAL TO BEHAVIORAL HEALTH    benzonatate (TESSALON) 200 mg capsule    traMADol (ULTRAM) 50 mg tablet    predniSONE (DELTASONE) 20 mg tablet    tiotropium (SPIRIVA) 18 mcg inhalation capsule   . Guaiac positive stool and diarrhea, will send her to surgeon for colonoscopy. Asthma COPD treatment as above. Chronic pain issues, okay refill of tramadol but will not continue to feel that. Perhaps Suboxone might help her and we will send her to see addiction medicine. We discussed this pain and the usage of controlled substances for back relief. In general these medications are indicated for the acute symptom relief and not for chronic usage, allthough they are frequently used for chronic management  After a certain period of time they should be stopped to avoid dependence and/or addiction. I warned her about the dangers of addiction and other side affects including respiratory depression and death. Discussed how this is a controlled substance and that the prescribing of it is should be monitored very carefully.  Drug usage is also monitored by our practise and the [facility], since there has been a lot of abuse and diversion of controlled substances. In general we do not refill this medication over the phone. PLease ask for enough pills to get you to your next appointment and make sure you keep your appointments. Warned not to take other medications like alcohol, other benzodiazapines marijuana or other narcotics when on this medication. Orders Placed This Encounter    REFERRAL TO GENERAL SURGERY     Referral Priority:   Routine     Referral Type:   Consultation     Referral Reason:   Specialty Services Required     Referred to Provider:   Alana Knight MD     Follow-up Disposition:  Return in about 6 weeks (around 3/25/2019).

## 2019-02-11 NOTE — TELEPHONE ENCOUNTER
Pt called in reference to trying to  tramadol. She said when she got to the pharmacy they said ins would not allow her to get it because the office had to authorize it. Please call her at [phone].

## 2019-02-12 ENCOUNTER — TELEPHONE (OUTPATIENT)
Dept: INTERNAL MEDICINE CLINIC | Age: 59
End: 2019-02-12

## 2019-02-12 ENCOUNTER — DOCUMENTATION ONLY (OUTPATIENT)
Dept: INTERNAL MEDICINE CLINIC | Age: 59
End: 2019-02-12

## 2019-02-12 NOTE — TELEPHONE ENCOUNTER
Pt would like a prescription for diarrhea called into the [place] pharmacy. She said she has taken everything over the counter. She can be reached at [phone].

## 2019-02-12 NOTE — TELEPHONE ENCOUNTER
Returned pt's call and told her she needed an appt for a diarrhea RX. She has gone 8 times today. Nothing over the counter is working. Will you call in something into [facility] until she sees her oncology doctor this Friday?

## 2019-02-12 NOTE — PROGRESS NOTES
2/11/19 PA submitted thru [employer] for Tramadol 50 mg tab. Claim pending outcome. Decision will be faxed to office. May call Rigo @ [phone] if outcome is not received in time frame allowed.

## 2019-02-13 ENCOUNTER — TELEPHONE (OUTPATIENT)
Dept: INTERNAL MEDICINE CLINIC | Age: 59
End: 2019-02-13

## 2019-02-13 ENCOUNTER — DOCUMENTATION ONLY (OUTPATIENT)
Dept: INTERNAL MEDICINE CLINIC | Age: 59
End: 2019-02-13

## 2019-02-13 DIAGNOSIS — M17.0 PRIMARY OSTEOARTHRITIS OF BOTH KNEES: Primary | ICD-10-CM

## 2019-02-13 NOTE — TELEPHONE ENCOUNTER
----- Message from Kelley Roy sent at 2/12/2019  5:34 PM EST -----  Regarding: Flori Guo / telephone   Patient is requesting a call back regarding a recent prescription Best contact [phone]

## 2019-02-13 NOTE — TELEPHONE ENCOUNTER
Pt called and wanted to let Dr. Ginna Jorge know she was cleared at the hospital and she needs her prescription sent in to the [address] [place] [address] pharmacy please call pt

## 2019-02-13 NOTE — PROGRESS NOTES
Faxed signed and completed form for short & long acting opioid PA to Lamar Freeman at [phone] - confirmation of receipt received  Luis F Stephens LPN  9/04/5259  8:95 PMJuliana Power LPN  7/38/1575  5:76 PM

## 2019-02-13 NOTE — PROGRESS NOTES
Received notice from Sanjay Harrington regarding denial of Tramadol PA request - will need to submit a long and short acting opioid form - PA coordinator notified  Yamilex German LPN  7/16/9312  0:44 AM

## 2019-02-13 NOTE — PROGRESS NOTES
2/13/19 PA form for  Long acting opioid,obtained and initiated for Tramodol 50 mg tabs. Andreina chaves will complete remainder from  sheet with Dr Meseret Flower signature prior to faxing back to plan for reconsideration of denial sent via fax on 2/12/19.

## 2019-02-13 NOTE — TELEPHONE ENCOUNTER
Says tarry black diarrhea and very watrey stools, given guiac +, told her to go to the emrgency room to check H/H.

## 2019-02-14 ENCOUNTER — TELEPHONE (OUTPATIENT)
Dept: INTERNAL MEDICINE CLINIC | Age: 59
End: 2019-02-14

## 2019-02-14 NOTE — TELEPHONE ENCOUNTER
JANIAI - Returned pt's call and told her she would be OK. Encouraged pt to try to wait until tomorrow before taking a dose again. Call us if needed.

## 2019-02-14 NOTE — TELEPHONE ENCOUNTER
Pt called in reference to wanting to speak to the nurse. She said that she swallowed instead of inhaling her spiriva. Please call her at [phone].

## 2019-02-15 ENCOUNTER — OFFICE VISIT (OUTPATIENT)
Dept: ONCOLOGY | Age: 59
End: 2019-02-15

## 2019-02-15 ENCOUNTER — TELEPHONE (OUTPATIENT)
Dept: INTERNAL MEDICINE CLINIC | Age: 59
End: 2019-02-15

## 2019-02-15 VITALS
SYSTOLIC BLOOD PRESSURE: 170 MMHG | OXYGEN SATURATION: 97 % | BODY MASS INDEX: 27.76 KG/M2 | TEMPERATURE: 96.3 F | DIASTOLIC BLOOD PRESSURE: 73 MMHG | HEART RATE: 88 BPM | WEIGHT: 187.4 LBS | RESPIRATION RATE: 19 BRPM | HEIGHT: 69 IN

## 2019-02-15 DIAGNOSIS — R19.7 DIARRHEA, UNSPECIFIED TYPE: Primary | ICD-10-CM

## 2019-02-15 RX ORDER — LOPERAMIDE HYDROCHLORIDE 2 MG/1
2 CAPSULE ORAL AS NEEDED
COMMUNITY
End: 2019-07-03 | Stop reason: ALTCHOICE

## 2019-02-15 RX ORDER — DIPHENOXYLATE HYDROCHLORIDE AND ATROPINE SULFATE 2.5; .025 MG/1; MG/1
1 TABLET ORAL
Qty: 60 TAB | Refills: 1 | Status: SHIPPED | OUTPATIENT
Start: 2019-02-15 | End: 2019-07-03 | Stop reason: ALTCHOICE

## 2019-02-15 NOTE — TELEPHONE ENCOUNTER
Pt called wanting to let Dr. Owen Morejon know that her cancer doctor sent over a prescription for her diarrhea. She wants to know the status of her tramadol. Please call her at [phone].

## 2019-02-15 NOTE — PROGRESS NOTES
Kimberly Schmidt is a 63 y.o. female diarrhea for the past couple months, Hemorid which bleed. She is having black, tarry stools. Denies problems with eating, States she is holding her weight. Patient has never had a colonoscopy. She is taking imodium for the diarrhea, she takes 3 imodium at a time.

## 2019-02-15 NOTE — PROGRESS NOTES
Reason for Visit:  
Naya Mejia is a 63 y.o. female who is seen in consultation at the request of Dr. Familia Mcclain Treatment History:  
Heme positive stool History of Present Illness:  
64 yo here for eval of heme positive stool. Couple of months of diarrhea, some blood. Getting mammogram this month. Never had colonoscopy. Has flushing associated with her diarrhea. Past Medical History:  
Diagnosis Date  Arthritis  Asthma  COPD (chronic obstructive pulmonary disease) (HCC)  Occult blood positive stool  Spinal stenosis Past Surgical History:  
Procedure Laterality Date  ABDOMEN SURGERY PROC UNLISTED    
 hystrectomy  HX BACK SURGERY  09/2016  
 laminectomy  HX CERVICAL FUSION    
 HX HYSTERECTOMY Social History Tobacco Use  Smoking status: Current Every Day Smoker Packs/day: 0.50 Years: 35.00 Pack years: 17.50 Types: Cigarettes  Smokeless tobacco: Never Used Substance Use Topics  Alcohol use: Yes Alcohol/week: 11.4 oz Types: 14 Glasses of wine, 5 Cans of beer per week Frequency: Monthly or less Drinks per session: 1 or 2 Binge frequency: Never Family History Problem Relation Age of Onset  Cancer Mother   
     colon  Cancer Father 79  
     lung  Diabetes Sister  Cancer Brother 30  
     lymphoma Current Outpatient Medications Medication Sig  loperamide (IMODIUM) 2 mg capsule Take 2 mg by mouth as needed for Diarrhea.  diphenoxylate-atropine (LOMOTIL) 2.5-0.025 mg per tablet Take 1 Tab by mouth four (4) times daily as needed for Diarrhea. Max Daily Amount: 4 Tabs.  benzonatate (TESSALON) 200 mg capsule Take 1 Cap by mouth three (3) times daily as needed for Cough for up to 7 days.  predniSONE (DELTASONE) 20 mg tablet Take 40 mg by mouth daily (with breakfast) for 5 days.  tiotropium (SPIRIVA) 18 mcg inhalation capsule Take 1 Cap by inhalation daily.  diclofenac (VOLTAREN) 1 % gel Apply  to affected area four (4) times daily.  gabapentin (NEURONTIN) 400 mg capsule take 1 capsule by mouth three times a day  DULoxetine (CYMBALTA) 30 mg capsule Take 1 Cap by mouth daily.  buPROPion (WELLBUTRIN) 100 mg tablet Take 1 Tab by mouth three (3) times daily.  rOPINIRole (REQUIP) 0.25 mg tablet Take 2 Tabs by mouth nightly.  lidocaine (LIDODERM) 5 % apply 1 patch to the affected area daily. Leave on for 12 hours and then REMOVE for 12 hours.  fluticasone-salmeterol (ADVAIR DISKUS) 500-50 mcg/dose diskus inhaler Take 1 Puff by inhalation every twelve (12) hours.  albuterol (PROVENTIL HFA, VENTOLIN HFA, PROAIR HFA) 90 mcg/actuation inhaler Take 1 Puff by inhalation every six (6) hours as needed for Wheezing.  cyclobenzaprine (FLEXERIL) 5 mg tablet TAKE ONE TABLET BY MOUTH THREE TIMES DAILY AS NEEDED FOR MUSCLE SPASM  traMADol (ULTRAM) 50 mg tablet Take 1 Tab by mouth two (2) times daily as needed for Pain. Max Daily Amount: 100 mg. No current facility-administered medications for this visit. No Known Allergies Review of Systems: A complete review of systems was obtained, negative except as described above. Physical Exam:  
 
Visit Vitals /73 (BP 1 Location: Left arm, BP Patient Position: Sitting) Pulse 88 Temp 96.3 °F (35.7 °C) (Oral) Resp 19 Ht 5' 9\" (1.753 m) Wt 187 lb 6.4 oz (85 kg) SpO2 97% BMI 27.67 kg/m² ECOG PS: 0 General: No distress Eyes: PERRLA, anicteric sclerae HENT: Atraumatic, OP clear Neck: Supple Lymphatic: No cervical, supraclavicular, or inguinal adenopathy Respiratory: CTAB, normal respiratory effort CV: Normal rate, regular rhythm, no murmurs, no peripheral edema GI: Soft, nontender, nondistended, no masses, no hepatomegaly, no splenomegaly MS: Normal gait and station. Digits without clubbing or cyanosis. Skin: No rashes, ecchymoses, or petechiae. Normal temperature, turgor, and texture. Psych: Alert, oriented, appropriate affect, normal judgment/insight Results: No results found for: WBC, HGB, HCT, PLT, MCV, ANEU, HGBPOC, HCTPOC, HGBEXT, HCTEXT, PLTEXT No results found for: NA, K, CL, CO2, GLU, BUN, CREA, GFRAA, GFRNA, CA, NAPOC, KPOCT, CLPOC, GLUCPOC, IBUN, CREAPOC, ICAI No results found for: TBILI, ALT, SGOT, AP, TP, ALB, GLOB No imaging Assessment:  
1) Heme positive stool 2) Diarrhea Plan:  
Baseline labs, Chromogranin for carcinoid, refer for colonoscopy Signed By: Nithin Haas MD

## 2019-02-18 ENCOUNTER — TELEPHONE (OUTPATIENT)
Dept: ONCOLOGY | Age: 59
End: 2019-02-18

## 2019-02-18 NOTE — PROGRESS NOTES
Received letter from [address] - Tramadol request has been denied because there was no evidence that a urine drug screen test has been performed - this is a requirement that must be filled before any approval is given on long or short acting opioids - Dr Mich Jalloh has been notified of this decision   Gracie Rousseau LPN  0/46/0184  9:49 AM

## 2019-02-18 NOTE — TELEPHONE ENCOUNTER
Patient has a referral to General Surgery. Patient is scheduled with Dr. Lynn Terrazas on 03/4/2019 @ 2:00. Pt aware of consult.

## 2019-02-18 NOTE — TELEPHONE ENCOUNTER
Patient has been notified that her Tramadol request will ot be approved without a urine drug screen - she will need to make an appointment with the nurse in order to get this done  Jenny Rios LPN  1/09/4084  43:78 AM

## 2019-02-18 NOTE — PROGRESS NOTES
Patient notified that she will need a nursing visit to perform a urine drug screen to reapply for PA of Tramadol  Brianna Agee LPN  0/37/7363  90:48 AM

## 2019-02-19 PROBLEM — R74.8 ELEVATED LIVER ENZYMES: Status: ACTIVE | Noted: 2019-02-19

## 2019-02-19 PROBLEM — R19.5 HEME + STOOL: Status: ACTIVE | Noted: 2019-02-19

## 2019-02-20 DIAGNOSIS — J42 CHRONIC BRONCHITIS, UNSPECIFIED CHRONIC BRONCHITIS TYPE (HCC): ICD-10-CM

## 2019-02-20 RX ORDER — BENZONATATE 200 MG/1
200 CAPSULE ORAL
Qty: 21 CAP | Refills: 0 | Status: SHIPPED | OUTPATIENT
Start: 2019-02-20 | End: 2019-02-27

## 2019-02-20 NOTE — TELEPHONE ENCOUNTER
Requested Prescriptions     Pending Prescriptions Disp Refills    benzonatate (TESSALON) 200 mg capsule 21 Cap 0     Sig: Take 1 Cap by mouth three (3) times daily as needed for Cough for up to 7 days.      Future Appointments:  2/25/2019 11:00 AM Yeimi Stuart MD   Last Appointment With Me:  2/11/2019   Last Filled:  02.06.2019  Changes Made to Medication on Last Visit:  None

## 2019-03-04 ENCOUNTER — OFFICE VISIT (OUTPATIENT)
Dept: SURGERY | Age: 59
End: 2019-03-04

## 2019-03-04 VITALS
BODY MASS INDEX: 27.71 KG/M2 | WEIGHT: 187.1 LBS | HEIGHT: 69 IN | DIASTOLIC BLOOD PRESSURE: 86 MMHG | HEART RATE: 77 BPM | SYSTOLIC BLOOD PRESSURE: 146 MMHG

## 2019-03-04 DIAGNOSIS — R19.4 ENCOUNTER FOR DIAGNOSTIC COLONOSCOPY DUE TO CHANGE IN BOWEL HABITS: Primary | ICD-10-CM

## 2019-03-04 NOTE — PATIENT INSTRUCTIONS
Learning About Colonoscopy  What is a colonoscopy? A colonoscopy is a test (also called a procedure) that lets a doctor look inside your large intestine. The doctor uses a thin, lighted tube called a colonoscope. The doctor uses it to look for small growths called polyps, colon or rectal cancer (colorectal cancer), or other problems like bleeding. During the procedure, the doctor can take samples of tissue. The samples can then be checked for cancer or other conditions. The doctor can also take out polyps. How is colonoscopy done? This procedure is done in a doctor's office or a clinic or hospital. You will get medicine to help you relax and not feel pain. Some people find that they do not remember having the test because of the medicine. The doctor gently moves the colonoscope, or scope, through the colon. The scope is also a small video camera. It lets the doctor see the colon and take pictures. A colonoscopy usually takes 30 to 45 minutes. It may take longer if the doctor has to remove polyps. How do you prepare for the procedure? You need to clean out your colon before the procedure so the doctor can see all of your colon. You may start the cleaning process a day or two before the test. This depends on which \"colon prep\" your doctor recommends. To clean your colon, you stop eating solid foods and drink only clear liquids. You can have water, tea, coffee, clear juices, clear broths, flavored ice pops, and gelatin (such as Jell-O). Do not drink anything red or purple, such as grape juice or fruit punch. And do not eat red or purple foods, such as grape ice pops or cherry gelatin. The day or night before the procedure, you drink a large amount of a special liquid. This causes loose, frequent stools. You will go to the bathroom a lot. It is very important to drink all of the colon prep liquid. If you have problems drinking the liquid, call your doctor.   For many people, the prep is worse than the test. It may be uncomfortable, and you may feel hungry on the clear liquid diet. Some people do not go to work or do their usual activities on the day of the prep. Arrange to have someone take you home after the test.  What can you expect after a colonoscopy? The nurses will watch you for 1 to 2 hours until the medicines wear off. Then you can go home. You will need a ride. Your doctor will tell you when you can eat and do your usual activities. Your doctor will talk to you about when you will need your next colonoscopy. The results of your test and your risk for colorectal cancer will help your doctor decide how often you need to be checked. Follow-up care is a key part of your treatment and safety. Be sure to make and go to all appointments, and call your doctor if you are having problems. It's also a good idea to know your test results and keep a list of the medicines you take. Where can you learn more? Go to http://ruperto-gabriela.info/. Enter W525 in the search box to learn more about \"Learning About Colonoscopy. \"  Current as of: March 27, 2018  Content Version: 11.9  © 3988-9588 [employer]. Care instructions adapted under license by [employer] (which disclaims liability or warranty for this information). If you have questions about a medical condition or this instruction, always ask your healthcare professional. [address] any warranty or liability for your use of this information.

## 2019-03-04 NOTE — PROGRESS NOTES
Srikanth Saleh is a 63 y.o. female who presents today with the following:  Chief Complaint   Patient presents with    Melena       HPI  27-year-old female who presents to us for possible colonoscopy. She states that she has been having intermittent diarrhea and constipation for at least the last 2 years. When she has diarrhea she can have up to 8 bowel movements that are watery and then she can go in the opposite direction in terms of constipation. She admits to bright red blood when she wipes occasionally. She stated 2 months ago she had some dark stools and she had fecal occult blood testing which was positive. He has had no prior colonoscopy and her mother  of colon cancer at age 79. She denies any unexpected weight loss. She is a smoker and has smoked his heaviest 2 packs a day since the age of 14. She only drinks wine a few times a month and denies any illicit drugs.     Past Medical History:   Diagnosis Date    Arthritis     Asthma     COPD (chronic obstructive pulmonary disease) ([address]     Depression     Hemorrhoids     Occult blood positive stool     Spinal stenosis        Past Surgical History:   Procedure Laterality Date    ABDOMEN SURGERY PROC UNLISTED      hystrectomy    HX BACK SURGERY  2016    laminectomy    HX CERVICAL FUSION      HX HYSTERECTOMY         Social History     Socioeconomic History    Marital status: LEGALLY      Spouse name: Not on file    Number of children: 2    Years of education: Not on file    Highest education level: 10th grade   Social Needs    Financial resource strain: Not on file    Food insecurity - worry: Not on file    Food insecurity - inability: Not on file   [employer] needs - medical: Not on file   [employer] needs - non-medical: Not on file   Occupational History    Occupation: disabled   Tobacco Use    Smoking status: Current Every Day Smoker     Packs/day: 0.50     Years: 35.00     Pack years: 17.50 Types: Cigarettes    Smokeless tobacco: Never Used   Substance and Sexual Activity    Alcohol use: Yes     Alcohol/week: 11.4 oz     Types: 14 Glasses of wine, 5 Cans of beer per week     Frequency: Monthly or less     Drinks per session: 1 or 2     Binge frequency: Never    Drug use: No    Sexual activity: Not Currently     Partners: Male   Other Topics Concern     Service No    Blood Transfusions No    Caffeine Concern Not Asked    Occupational Exposure Not Asked    Hobby Hazards Not Asked    Sleep Concern Not Asked    Stress Concern Not Asked    Weight Concern Not Asked    Special Diet Not Asked    Back Care Not Asked    Exercise Not Asked    Bike Helmet Not Asked   [address] Yes    Self-Exams Yes   Social History Narrative    Lives with friends       Family History   Problem Relation Age of Onset    Cancer Mother         colon    Cancer Father 79        lung    Diabetes Sister     Cancer Brother 30        lymphoma       No Known Allergies    Current Outpatient Medications   Medication Sig    loperamide (IMODIUM) 2 mg capsule Take 2 mg by mouth as needed for Diarrhea.  diphenoxylate-atropine (LOMOTIL) 2.5-0.025 mg per tablet Take 1 Tab by mouth four (4) times daily as needed for Diarrhea. Max Daily Amount: 4 Tabs.  traMADol (ULTRAM) 50 mg tablet Take 1 Tab by mouth two (2) times daily as needed for Pain. Max Daily Amount: 100 mg.  tiotropium (SPIRIVA) 18 mcg inhalation capsule Take 1 Cap by inhalation daily.  diclofenac (VOLTAREN) 1 % gel Apply  to affected area four (4) times daily.  gabapentin (NEURONTIN) 400 mg capsule take 1 capsule by mouth three times a day    DULoxetine (CYMBALTA) 30 mg capsule Take 1 Cap by mouth daily.  buPROPion (WELLBUTRIN) 100 mg tablet Take 1 Tab by mouth three (3) times daily.  rOPINIRole (REQUIP) 0.25 mg tablet Take 2 Tabs by mouth nightly.  lidocaine (LIDODERM) 5 % apply 1 patch to the affected area daily.  Leave on for 12 hours and then REMOVE for 12 hours.  fluticasone-salmeterol (ADVAIR DISKUS) 500-50 mcg/dose diskus inhaler Take 1 Puff by inhalation every twelve (12) hours.  albuterol (PROVENTIL HFA, VENTOLIN HFA, PROAIR HFA) 90 mcg/actuation inhaler Take 1 Puff by inhalation every six (6) hours as needed for Wheezing.  cyclobenzaprine (FLEXERIL) 5 mg tablet TAKE ONE TABLET BY MOUTH THREE TIMES DAILY AS NEEDED FOR MUSCLE SPASM     No current facility-administered medications for this visit. The above histories, medications and allergies have been reviewed. Review of Systems   HENT: Positive for sore throat. Gastrointestinal: Positive for blood in stool, constipation, diarrhea and melena. Musculoskeletal: Positive for back pain, joint pain and neck pain. Endo/Heme/Allergies: Bruises/bleeds easily. Psychiatric/Behavioral: Positive for depression. Visit Vitals  /86 (BP 1 Location: Left arm, BP Patient Position: Sitting)   Pulse 77   Ht 5' 9\" (1.753 m)   Wt 187 lb 1.6 oz (84.9 kg)   BMI 27.63 kg/m²     Physical Exam   Constitutional: She is well-developed, well-nourished, and in no distress. Cardiovascular: Normal rate and regular rhythm. Pulmonary/Chest: She has wheezes. She has no rales. Distant breath sounds   Abdominal: Soft. She exhibits no distension and no mass. There is no tenderness. There is no rebound and no guarding. Neurological: She is alert. Skin: Skin is warm and dry. 1. Encounter for diagnostic colonoscopy due to change in bowel habits  Recommend colonoscopy. The procedure was explained in detail including the risks and benefits. Risks shared included risks of missed lesions, incomplete exam, colon injury or perforation. Alternative treatments discussed included barium enema. Risks associated with anesthesia were also discussed. The patient wishes to proceed and we will schedule.     Follow-up Disposition:  Return in about 1 week (around 3/11/2019) for post procedure.     Darin Cordova MD

## 2019-03-05 ENCOUNTER — TELEPHONE (OUTPATIENT)
Dept: INTERNAL MEDICINE CLINIC | Age: 59
End: 2019-03-05

## 2019-03-05 NOTE — TELEPHONE ENCOUNTER
3/5/19 PA initiated with PA rep Phyllis @ 2  for Diclofenac 1% Gel. Authorization # [id number] Decision will be faxed to office within 24 hrs.

## 2019-03-06 NOTE — TELEPHONE ENCOUNTER
Received notice from [employer] request for Diclofenac Sodium 1% gel has been denied for coverage - diagnosis of spinal stenosis is not a covered diagnosis for this medication - may cover Brand name Voltaren Gel 1% if tried first - please send a new prescription for VOLTAREN GEL 1% (brand name) to [address] [place] [address], N  7/0/8127  7:02 PM

## 2019-03-07 ENCOUNTER — TELEPHONE (OUTPATIENT)
Dept: INTERNAL MEDICINE CLINIC | Age: 59
End: 2019-03-07

## 2019-03-07 ENCOUNTER — OFFICE VISIT (OUTPATIENT)
Dept: INTERNAL MEDICINE CLINIC | Age: 59
End: 2019-03-07

## 2019-03-07 VITALS
SYSTOLIC BLOOD PRESSURE: 143 MMHG | HEART RATE: 73 BPM | OXYGEN SATURATION: 97 % | TEMPERATURE: 97.6 F | BODY MASS INDEX: 27.55 KG/M2 | RESPIRATION RATE: 18 BRPM | DIASTOLIC BLOOD PRESSURE: 84 MMHG | HEIGHT: 69 IN | WEIGHT: 186 LBS

## 2019-03-07 DIAGNOSIS — R19.5 POSITIVE FIT (FECAL IMMUNOCHEMICAL TEST): ICD-10-CM

## 2019-03-07 DIAGNOSIS — Z02.71 DISABILITY EXAMINATION: ICD-10-CM

## 2019-03-07 DIAGNOSIS — J42 CHRONIC BRONCHITIS, UNSPECIFIED CHRONIC BRONCHITIS TYPE (HCC): ICD-10-CM

## 2019-03-07 DIAGNOSIS — F32.0 MILD MAJOR DEPRESSION (HCC): ICD-10-CM

## 2019-03-07 DIAGNOSIS — M48.00 SPINAL STENOSIS, UNSPECIFIED SPINAL REGION: Primary | ICD-10-CM

## 2019-03-07 RX ORDER — POLYETHYLENE GLYCOL 3350 17 G/17G
17 POWDER, FOR SOLUTION ORAL
Qty: 20 PACKET | Refills: 0 | Status: SHIPPED | OUTPATIENT
Start: 2019-03-07 | End: 2019-03-08

## 2019-03-07 RX ORDER — TRAMADOL HYDROCHLORIDE 50 MG/1
50 TABLET ORAL
Qty: 60 TAB | Refills: 0 | Status: SHIPPED | OUTPATIENT
Start: 2019-03-07 | End: 2019-04-05 | Stop reason: SDUPTHER

## 2019-03-07 RX ORDER — IBUPROFEN 200 MG
1 TABLET ORAL EVERY 24 HOURS
Qty: 30 PATCH | Refills: 0 | Status: SHIPPED | OUTPATIENT
Start: 2019-03-07 | End: 2019-08-28 | Stop reason: SDUPTHER

## 2019-03-07 RX ORDER — BENZONATATE 200 MG/1
200 CAPSULE ORAL
Qty: 60 CAP | Refills: 0 | Status: SHIPPED | OUTPATIENT
Start: 2019-03-07 | End: 2019-04-16 | Stop reason: SDUPTHER

## 2019-03-07 NOTE — TELEPHONE ENCOUNTER
Pt said it is 2 prescriptions she needs Padmaja to pre-authrize  for her at the pharmacy   -tramadol   -gel

## 2019-03-07 NOTE — PROGRESS NOTES
Chief Complaint   Patient presents with    Back Pain     med refill      I have reviewed the patient's medical history in detail and updated the computerized patient record. Health Maintenance reviewed. 1. Have you been to the ER, urgent care clinic since your last visit? Hospitalized since your last visit?no    2. Have you seen or consulted any other health care providers outside of the [address] [place] [address] since your last visit? Include any pap smears or colon screening. no    Encouraged pt to discuss pt's wishes with spouse/partner/family and bring them in the next appt to follow thru with the Advanced Directive    Fall Risk Assessment, last 12 mths 3/7/2019   Able to walk? Yes   Fall in past 12 months? No   Fall with injury? -   Number of falls in past 12 months -   Fall Risk Score -       3 most recent PHQ Screens 3/7/2019   Little interest or pleasure in doing things More than half the days   Feeling down, depressed, irritable, or hopeless More than half the days   Total Score PHQ 2 4   Trouble falling or staying asleep, or sleeping too much -   Feeling tired or having little energy -   Poor appetite, weight loss, or overeating -   Feeling bad about yourself - or that you are a failure or have let yourself or your family down -   Trouble concentrating on things such as school, work, reading, or watching TV -   Moving or speaking so slowly that other people could have noticed; or the opposite being so fidgety that others notice -   Thoughts of being better off dead, or hurting yourself in some way -   How difficult have these problems made it for you to do your work, take care of your home and get along with others -       Abuse Screening Questionnaire 3/7/2019   Do you ever feel afraid of your partner? N   Are you in a relationship with someone who physically or mentally threatens you? N   Is it safe for you to go home?  Y       ADL Assessment 3/7/2019   Feeding yourself No Help Needed   Getting from bed to chair No Help Needed   Getting dressed No Help Needed   Bathing or showering No Help Needed   Walk across the room (includes cane/walker) No Help Needed   Using the telphone No Help Needed   Taking your medications No Help Needed   Preparing meals No Help Needed   Managing money (expenses/bills) No Help Needed   Moderately strenuous housework (laundry) No Help Needed   Shopping for personal items (toiletries/medicines) No Help Needed   Shopping for groceries No Help Needed   Driving Help Needed   Climbing a flight of stairs No Help Needed   Getting to places beyond walking distances Help Needed

## 2019-03-08 RX ORDER — DICLOFENAC SODIUM 10 MG/G
GEL TOPICAL 4 TIMES DAILY
Qty: 1 EACH | Refills: 5 | Status: SHIPPED | OUTPATIENT
Start: 2019-03-08 | End: 2019-06-21 | Stop reason: SDUPTHER

## 2019-03-08 RX ORDER — DICLOFENAC SODIUM 10 MG/G
GEL TOPICAL 4 TIMES DAILY
Qty: 1 EACH | Refills: 5 | Status: SHIPPED | OUTPATIENT
Start: 2019-03-08 | End: 2019-03-08 | Stop reason: SDUPTHER

## 2019-03-09 NOTE — PROGRESS NOTES
PROGRESS NOTE        SUBJECTIVE:  Diagnosis/Chief Complaint: Back Pain (med refill ) and Documentation (disability paperwork)      Doing well with pain yes - OK with tramadol  Improvement in function: yes - helps, wants cane to help  Pain levels 8/10   Usage of benzodiazapine or other sedatives no  Symptoms back and leg pain  Activities: Able to do activities of daily living. yes - doing  Side affects: no  States taking medications per medicine list.yes -  As rx knows that temp Rx only till gets pain down from other means   queried yes - only Rx  Urine drug screen done no  Opiod Rx exceeds 50 MME/dayno  Hx of depression yes  Hx of drug addiction: no  Safe storage of the opiods: ?  Narcotic contract reviewed and discussed: yes - in chart    Patient Active Problem List    Diagnosis Date Noted    Encounter for diagnostic colonoscopy due to change in bowel habits 03/04/2019    Elevated liver enzymes 02/19/2019    Heme + stool 02/19/2019    Depression, major, recurrent, mild ([facility] 12/21/2017    Restless legs 12/21/2017    Tobacco abuse 12/21/2017    Spinal stenosis 08/14/2017     Current Outpatient Medications   Medication Sig Dispense Refill    benzonatate (TESSALON) 200 mg capsule Take 1 Cap by mouth three (3) times daily as needed for Cough for up to 7 days. 60 Cap 0    nicotine (NICODERM CQ) 21 mg/24 hr 1 Patch by TransDERmal route every twenty-four (24) hours for 30 days. 30 Patch 0    traMADol (ULTRAM) 50 mg tablet Take 1 Tab by mouth two (2) times daily as needed for Pain for up to 30 days. Max Daily Amount: 100 mg. 60 Tab 0    loperamide (IMODIUM) 2 mg capsule Take 2 mg by mouth as needed for Diarrhea.  diphenoxylate-atropine (LOMOTIL) 2.5-0.025 mg per tablet Take 1 Tab by mouth four (4) times daily as needed for Diarrhea. Max Daily Amount: 4 Tabs. 60 Tab 1    tiotropium (SPIRIVA) 18 mcg inhalation capsule Take 1 Cap by inhalation daily.  30 Cap 5    gabapentin (NEURONTIN) 400 mg capsule take 1 capsule by mouth three times a day 90 Cap 5    DULoxetine (CYMBALTA) 30 mg capsule Take 1 Cap by mouth daily. 90 Cap 1    buPROPion (WELLBUTRIN) 100 mg tablet Take 1 Tab by mouth three (3) times daily. 90 Tab 5    rOPINIRole (REQUIP) 0.25 mg tablet Take 2 Tabs by mouth nightly. 60 Tab 5    lidocaine (LIDODERM) 5 % apply 1 patch to the affected area daily. Leave on for 12 hours and then REMOVE for 12 hours. 30 Patch 2    fluticasone-salmeterol (ADVAIR DISKUS) 500-50 mcg/dose diskus inhaler Take 1 Puff by inhalation every twelve (12) hours. 3 Inhaler 1    albuterol (PROVENTIL HFA, VENTOLIN HFA, PROAIR HFA) 90 mcg/actuation inhaler Take 1 Puff by inhalation every six (6) hours as needed for Wheezing. 3 Inhaler 1    cyclobenzaprine (FLEXERIL) 5 mg tablet TAKE ONE TABLET BY MOUTH THREE TIMES DAILY AS NEEDED FOR MUSCLE SPASM 270 Tab 1    diclofenac (VOLTAREN) 1 % gel Apply  to affected area four (4) times daily. 1 Each 5     No Known Allergies  Social History     Tobacco Use    Smoking status: Current Every Day Smoker     Packs/day: 0.50     Years: 35.00     Pack years: 17.50     Types: Cigarettes    Smokeless tobacco: Never Used   Substance Use Topics    Alcohol use: Yes     Alcohol/week: 11.4 oz     Types: 14 Glasses of wine, 5 Cans of beer per week     Frequency: Monthly or less     Drinks per session: 1 or 2     Binge frequency: Never        OBJECTIVE:    .  Visit Vitals  /84   Pulse 73   Temp 97.6 °F (36.4 °C) (Oral)   Resp 18   Ht 5' 9\" (1.753 m)   Wt 186 lb (84.4 kg)   SpO2 97%   BMI 27.47 kg/m²     WDWN in NAD, mental status normal  Heart RRR, no:C/M/R  Lungs CTA No wheezes, rales or rhonchi  Abdo: soft no tenderness, rebound or guarding  Neurological exam[de-identified] 2-12 intact  Psychiatric: Normal mood, judgement    Reviewed: Medications, allergies, clinical lab test results and imaging results have been reviewed. Any abnormal findings have been addressed. ASSESSMENT:     ICD-10-CM ICD-9-CM    1. Spinal stenosis, unspecified spinal region M48.00 724.00 AMB SUPPLY ORDER      REFERRAL TO PAIN MANAGEMENT      traMADol (ULTRAM) 50 mg tablet   2. Mild major depression (HCC) F32.0 296.21    3. Chronic bronchitis, unspecified chronic bronchitis type ([facility] J42 491.9    4. Positive FIT (fecal immunochemical test) R19.5 792.1    5. Disability examination Z02.71 V68.01        Patient is 63 y.o. with diagnosis of :   Patient Active Problem List   Diagnosis Code    Spinal stenosis M48.00    Depression, major, recurrent, mild (HCC) F33.0    Restless legs G25.81    Tobacco abuse Z72.0    Elevated liver enzymes R74.8    Heme + stool R19.5    Encounter for diagnostic colonoscopy due to change in bowel habits R19.4       PLAN:     Orders Placed This Encounter    AMB SUPPLY ORDER     Cane 4 prong    REFERRAL TO PAIN MANAGEMENT     Referral Priority:   Routine     Referral Type:   Consultation     Referral Reason:   Specialty Services Required     Referred to Provider:   Jacqueline Tejeda MD     Number of Visits Requested:   1    polyethylene glycol (MIRALAX) 17 gram packet     Sig: Take 1 Packet by mouth every two (2) hours for 1 day. Before colonoscopy     Dispense:  20 Packet     Refill:  0    benzonatate (TESSALON) 200 mg capsule     Sig: Take 1 Cap by mouth three (3) times daily as needed for Cough for up to 7 days. Dispense:  60 Cap     Refill:  0    nicotine (NICODERM CQ) 21 mg/24 hr     Si Patch by TransDERmal route every twenty-four (24) hours for 30 days. Dispense:  30 Patch     Refill:  0    traMADol (ULTRAM) 50 mg tablet     Sig: Take 1 Tab by mouth two (2) times daily as needed for Pain for up to 30 days. Max Daily Amount: 100 mg. Dispense:  60 Tab     Refill:  0     Follow-up Disposition:  Return in about 4 weeks (around 2019) for routine follow up.

## 2019-03-11 ENCOUNTER — TELEPHONE (OUTPATIENT)
Dept: INTERNAL MEDICINE CLINIC | Age: 59
End: 2019-03-11

## 2019-03-11 NOTE — TELEPHONE ENCOUNTER
ot wanted to know if her 2 medications cant get the prior authorizations done today can another prescription be written for her so she wont be in pain after her precedure tomorrow. Please contact her to let her know whats going on.

## 2019-03-11 NOTE — TELEPHONE ENCOUNTER
Still waiting on PA for Tramadol - patient would like to know what else she can take for her pain until it gets approved  Maximo Meraz LPN  7/84/2324  0:75 PM

## 2019-03-12 ENCOUNTER — TELEPHONE (OUTPATIENT)
Dept: INTERNAL MEDICINE CLINIC | Age: 59
End: 2019-03-12

## 2019-03-13 NOTE — TELEPHONE ENCOUNTER
PA request for Tramadol 50mg has been denied for diagnosis of spinal stenosis - may consider approval of this drug if a short and long acting opioid PA request form  Render COLEMAN Hernandes  5/24/5331  5:63 PM

## 2019-03-13 NOTE — TELEPHONE ENCOUNTER
Patient paid out of pocket for her most recent fill of Tramadol on 3/11/19  Brianna Agee LPN  6/75/1459  4:55 AM

## 2019-03-13 NOTE — TELEPHONE ENCOUNTER
Have not received the long and short acting opioid form yet - resubmitted PA request through Cover My Meds - awaiting response from [facility] regarding Tramadol  Ghassan Birch LPN  5/66/5523  7:16 AM

## 2019-03-19 ENCOUNTER — TELEPHONE (OUTPATIENT)
Dept: INTERNAL MEDICINE CLINIC | Age: 59
End: 2019-03-19

## 2019-03-19 NOTE — TELEPHONE ENCOUNTER
3/19/19 Called 9  Tacho Estrella stated Diclofenac 1% was denied on 3/5/19. Brand name Voltaren 1% went thru plan with 0 copay. Elmer Kee called spoke with Shereen,she stated claim went thru with brand name,claim will be switched by pharmacist.Please submit new claim if neccessary. Thanks

## 2019-04-03 DIAGNOSIS — Z12.39 SCREENING FOR BREAST CANCER: ICD-10-CM

## 2019-04-04 ENCOUNTER — TELEPHONE (OUTPATIENT)
Dept: ONCOLOGY | Age: 59
End: 2019-04-04

## 2019-04-04 NOTE — TELEPHONE ENCOUNTER
Patient called & canceled apt scheduled on 4/12/19. Patient will call back to r/s after she has her colonoscopy done.

## 2019-04-05 ENCOUNTER — OFFICE VISIT (OUTPATIENT)
Dept: INTERNAL MEDICINE CLINIC | Age: 59
End: 2019-04-05

## 2019-04-05 VITALS
DIASTOLIC BLOOD PRESSURE: 68 MMHG | WEIGHT: 185 LBS | HEIGHT: 69 IN | SYSTOLIC BLOOD PRESSURE: 138 MMHG | RESPIRATION RATE: 16 BRPM | BODY MASS INDEX: 27.4 KG/M2 | OXYGEN SATURATION: 98 % | TEMPERATURE: 96.7 F | HEART RATE: 83 BPM

## 2019-04-05 DIAGNOSIS — M54.40 BILATERAL LOW BACK PAIN WITH SCIATICA, SCIATICA LATERALITY UNSPECIFIED, UNSPECIFIED CHRONICITY: ICD-10-CM

## 2019-04-05 DIAGNOSIS — S63.501A WRIST SPRAIN, RIGHT, INITIAL ENCOUNTER: Primary | ICD-10-CM

## 2019-04-05 DIAGNOSIS — M48.00 SPINAL STENOSIS, UNSPECIFIED SPINAL REGION: ICD-10-CM

## 2019-04-05 DIAGNOSIS — M17.0 PRIMARY OSTEOARTHRITIS OF BOTH KNEES: ICD-10-CM

## 2019-04-05 RX ORDER — TRAMADOL HYDROCHLORIDE 50 MG/1
100 TABLET ORAL
Qty: 120 TAB | Refills: 1 | Status: SHIPPED | OUTPATIENT
Start: 2019-04-05 | End: 2019-06-21 | Stop reason: SDUPTHER

## 2019-04-05 NOTE — PROGRESS NOTES
Chief Complaint   Patient presents with    Knee Pain     L/R knee pain     I have reviewed the patient's medical history in detail and updated the computerized patient record. Health Maintenance reviewed. 1. Have you been to the ER, urgent care clinic since your last visit? Hospitalized since your last visit?no    2. Have you seen or consulted any other health care providers outside of the [address] [place] [address] since your last visit? Include any pap smears or colon screening. No     Encouraged pt to discuss pt's wishes with spouse/partner/family and bring them in the next appt to follow thru with the Advanced Directive    Fall Risk Assessment, last 12 mths 3/7/2019   Able to walk? Yes   Fall in past 12 months? No   Fall with injury? -   Number of falls in past 12 months -   Fall Risk Score -       3 most recent PHQ Screens 3/7/2019   Little interest or pleasure in doing things More than half the days   Feeling down, depressed, irritable, or hopeless More than half the days   Total Score PHQ 2 4   Trouble falling or staying asleep, or sleeping too much -   Feeling tired or having little energy -   Poor appetite, weight loss, or overeating -   Feeling bad about yourself - or that you are a failure or have let yourself or your family down -   Trouble concentrating on things such as school, work, reading, or watching TV -   Moving or speaking so slowly that other people could have noticed; or the opposite being so fidgety that others notice -   Thoughts of being better off dead, or hurting yourself in some way -   How difficult have these problems made it for you to do your work, take care of your home and get along with others -       Abuse Screening Questionnaire 3/7/2019   Do you ever feel afraid of your partner? N   Are you in a relationship with someone who physically or mentally threatens you? N   Is it safe for you to go home?  Y       ADL Assessment 3/7/2019   Feeding yourself No Help Needed Getting from bed to chair No Help Needed   Getting dressed No Help Needed   Bathing or showering No Help Needed   Walk across the room (includes cane/walker) No Help Needed   Using the telphone No Help Needed   Taking your medications No Help Needed   Preparing meals No Help Needed   Managing money (expenses/bills) No Help Needed   Moderately strenuous housework (laundry) No Help Needed   Shopping for personal items (toiletries/medicines) No Help Needed   Shopping for groceries No Help Needed   Driving Help Needed   Climbing a flight of stairs No Help Needed   Getting to places beyond walking distances Help Needed     [facility] PRIMARY CARE  OFFICE PROCEDURE PROGRESS NOTE        Chart reviewed for the following:   Libertad LEBRON LPN, have reviewed the History, Physical and updated the Allergic reactions for [address] performed immediately prior to start of procedure:   [place] LPN, have performed the following reviews on Julia Alves prior to the start of the procedure:            * Patient was identified by name and date of birth   * Agreement on procedure being performed was verified  * Risks and Benefits explained to the patient by Dr. Geri Nguyen  * Procedure site verified and marked as necessary  * Patient was positioned for comfort  * Consent was signed and verified     Time: 4:10PM      Date of procedure: 4/5/2019    Procedure performed by:  Geri Nguyen MD    Provider assisted by: Tami Guadalupe LPN    Patient assisted by: Tami Guadalupe LPN    How tolerated by patient: Well    Post Procedural Pain Scale: 7/10    Comments: None    Dr. Glenis Velazquez did the procedure

## 2019-04-05 NOTE — PROGRESS NOTES
HISTORY OF PRESENT ILLNESS  Julia Alves is a 63 y.o. female. Knee Pain   The history is provided by the patient. This is a chronic problem. The problem occurs constantly. The problem has been gradually worsening. Pertinent negatives include no chest pain and no shortness of breath. Exacerbated by: weather changes. Treatments tried: knee injections tramadol. The treatment provided mild relief. Has also been referred for Suboxone therapy. There is seem to be some insurance issues with that but she is going to try again. She wants to do bilateral knee steroid injections for her long-standing bilateral knee pain. Also asks about steroid back injections. Cami Payton the other day hit her distal right arm. Says it is a little sore but is recovering. Patient Active Problem List   Diagnosis Code    Spinal stenosis M48.00    Depression, major, recurrent, mild (HCC) F33.0    Restless legs G25.81    Tobacco abuse Z72.0    Elevated liver enzymes R74.8    Heme + stool R19.5    Encounter for diagnostic colonoscopy due to change in bowel habits R19.4       Review of Systems   Respiratory: Negative for shortness of breath. Cardiovascular: Negative for chest pain. Musculoskeletal: Positive for falls. Social History     Socioeconomic History    Marital status: LEGALLY      Spouse name: Not on file    Number of children: 2    Years of education: Not on file    Highest education level: 10th grade   Occupational History    Occupation: disabled   Social Needs    Financial resource strain: Not on file    Food insecurity:     Worry: Not on file     Inability: Not on file   [employer] needs:     Medical: Not on file     Non-medical: Not on file   Tobacco Use    Smoking status: Current Every Day Smoker     Packs/day: 0.50     Years: 35.00     Pack years: 17.50     Types: Cigarettes    Smokeless tobacco: Never Used   Substance and Sexual Activity    Alcohol use:  Yes     Alcohol/week: 11.4 oz     Types: 14 Glasses of wine, 5 Cans of beer per week     Frequency: Monthly or less     Drinks per session: 1 or 2     Binge frequency: Never    Drug use: No    Sexual activity: Not Currently     Partners: Male   Lifestyle    Physical activity:     Days per week: Not on file     Minutes per session: Not on file    Stress: Not on file   Relationships    Social connections:     Talks on phone: Not on file     Gets together: Not on file     Attends Congregational service: Not on file     Active member of club or organization: Not on file     Attends meetings of clubs or organizations: Not on file     Relationship status: Not on file    Intimate partner violence:     Fear of current or ex partner: Not on file     Emotionally abused: Not on file     Physically abused: Not on file     Forced sexual activity: Not on file   Other Topics Concern     Service No    Blood Transfusions No    Caffeine Concern Not Asked    Occupational Exposure Not Asked    Hobby Hazards Not Asked    Sleep Concern Not Asked    Stress Concern Not Asked    Weight Concern Not Asked    Special Diet Not Asked    Back Care Not Asked    Exercise Not Asked    Bike Helmet Not Asked    Seat Belt Yes    Self-Exams Yes   Social History Narrative    Lives with friends       Physical Exam  Visit Vitals  /68 (BP 1 Location: Left arm, BP Patient Position: Sitting)   Pulse 83   Temp 96.7 °F (35.9 °C) (Temporal)   Resp 16   Ht 5' 9\" (1.753 m)   Wt 185 lb (83.9 kg)   SpO2 98%   BMI 27.32 kg/m²     WD WN female NAD  Heart RRR without murmers clicks or rubs  Lungs CTA  Abdo soft nontender  Ext no edema slight edema right distal arm slight tenderness    ASSESSMENT and PLAN  Encounter Diagnoses   Name Primary?     Wrist sprain, right, initial encounter Yes    Primary osteoarthritis of both knees     Bilateral low back pain with sciatica, sciatica laterality unspecified, unspecified chronicity     Spinal stenosis, unspecified spinal region      Orders Placed This Encounter    DRAIN/INJECT LARGE JOINT/BURSA    DRAIN/INJECT LARGE JOINT/BURSA    XR WRIST RT AP/LAT    REFERRAL TO PAIN CLINIC    traMADol (ULTRAM) 50 mg tablet     Options discussed. Discussed possible side affects and benefits of the procedure and/or medications. The patient agrees to undergo the procedure. Consent obtained. Sterile procedure followed. There were no complications and the procedure was well tolerated. Instructed patient to call me if it is ineffective or if there are any complications like increasing pain, redness or swelling. The bilaterally knee was prepped and using a lateral approach a needle was inserted into the knee. no  attempt was made to obtain synovial fluid. 0  synovial fluid was obtained from the joint: The knee was then injected wit yesh  1 cc of kenalog and 6  cc of lidocaine. Her knee, post injection instructions given. she was instructed to call for increasing redness or pain in the injected knee    Pain management does do back injections referral as above  Hopefully she can get into a Suboxone clinic. Went ahead and refilled her tramadol. Patient was looked up in the . There are multiple prescribers of controlled substances  Yes    Follow-up and Dispositions    · Return in about 2 months (around 6/5/2019) for routine follow up.          .     .

## 2019-04-07 PROBLEM — F11.20 NARCOTIC DEPENDENCE (HCC): Status: ACTIVE | Noted: 2019-04-07

## 2019-04-08 ENCOUNTER — DOCUMENTATION ONLY (OUTPATIENT)
Dept: INTERNAL MEDICINE CLINIC | Age: 59
End: 2019-04-08

## 2019-05-07 LAB — COLONOSCOPY, EXTERNAL: NORMAL

## 2019-05-15 ENCOUNTER — OFFICE VISIT (OUTPATIENT)
Dept: SURGERY | Age: 59
End: 2019-05-15

## 2019-05-15 VITALS
WEIGHT: 185 LBS | HEART RATE: 96 BPM | BODY MASS INDEX: 27.4 KG/M2 | DIASTOLIC BLOOD PRESSURE: 71 MMHG | HEIGHT: 69 IN | SYSTOLIC BLOOD PRESSURE: 142 MMHG

## 2019-05-15 DIAGNOSIS — B82.9 PARASITES IN STOOL: Primary | ICD-10-CM

## 2019-05-15 DIAGNOSIS — Z86.010 PERSONAL HISTORY OF COLONIC POLYPS: ICD-10-CM

## 2019-05-15 NOTE — LETTER
5/15/2019 2:44 PM 
 
Patient:  Prince Chavez YOB: 1960 Date of Visit: 5/15/2019 Dear No Recipients: Thank you for referring Ms. Lucas Aldrich to me for evaluation/treatment. Below are the relevant portions of my assessment and plan of care. She underwent colonoscopy back on May 7. We actually identified a living parasite that was small in her colon however it was not able to be obtained for identification. Her stool that was sent at the time of the colonoscopy did not show any ova or parasites. We are in the process of doing repeat testing to identify the parasite. She also had a number of polyps. She is going to require repeat colonoscopy in approximately 1 year. I plan on bringing her back in 2 weeks after repeat stool testing. If we are unable to identify the parasite we will try empiric treatment. If you have questions, please do not hesitate to call me. I look forward to following Ms. Willam David along with you.  
 
 
 
Sincerely, 
 
 
Good Infante MD

## 2019-05-15 NOTE — PROGRESS NOTES
Tiny Wilson is a 63 y.o. female who presents today with the following:  Chief Complaint   Patient presents with    Post OP Follow Up       HPI  Presents in follow-up after colonoscopy that she had underwent back on May 7. She is continued to have some abdominal discomfort and gassy sensation as well as diarrhea. We shared with her that at the time of colonoscopy we actually visualized a parasite unfortunately her stool testing has not shown any ova or parasites. She also had a number of polyps with some of these being adenomatous polyps and the others being hyperplastic. Past Medical History:   Diagnosis Date    Arthritis     Asthma     COPD (chronic obstructive pulmonary disease) ([address]     Depression     Hemorrhoids     Occult blood positive stool     Spinal stenosis        Past Surgical History:   Procedure Laterality Date    ABDOMEN SURGERY PROC UNLISTED      hystrectomy    COLONOSCOPY N/A 5/7/2019    COLONOSCOPY performed by Laura Evans MD at [facility] [address]  09/2016    laminectomy    HX CERVICAL FUSION      HX COLONOSCOPY  05/07/2019    4 polyps- 2 tubular adenoma 2 hyperplastic polyp    HX HYSTERECTOMY         Social History     Socioeconomic History    Marital status: LEGALLY      Spouse name: Not on file    Number of children: 2    Years of education: Not on file    Highest education level: 10th grade   Occupational History    Occupation: disabled   Social Needs    Financial resource strain: Not on file    Food insecurity:     Worry: Not on file     Inability: Not on file   [employer] needs:     Medical: Not on file     Non-medical: Not on file   Tobacco Use    Smoking status: Current Every Day Smoker     Packs/day: 0.50     Years: 35.00     Pack years: 17.50     Types: Cigarettes    Smokeless tobacco: Never Used   Substance and Sexual Activity    Alcohol use:  Yes     Alcohol/week: 11.4 oz     Types: 14 Glasses of wine, 5 Cans of beer per week     Frequency: Monthly or less     Drinks per session: 1 or 2     Binge frequency: Never    Drug use: No    Sexual activity: Not Currently     Partners: Male   Lifestyle    Physical activity:     Days per week: Not on file     Minutes per session: Not on file    Stress: Not on file   Relationships    Social connections:     Talks on phone: Not on file     Gets together: Not on file     Attends Restorationism service: Not on file     Active member of club or organization: Not on file     Attends meetings of clubs or organizations: Not on file     Relationship status: Not on file    Intimate partner violence:     Fear of current or ex partner: Not on file     Emotionally abused: Not on file     Physically abused: Not on file     Forced sexual activity: Not on file   Other Topics Concern     Service No    Blood Transfusions No    Caffeine Concern Not Asked    Occupational Exposure Not Asked    Hobby Hazards Not Asked    Sleep Concern Not Asked    Stress Concern Not Asked    Weight Concern Not Asked    Special Diet Not Asked    Back Care Not Asked    Exercise Not Asked    Bike Helmet Not Asked   [address] [facility] Yes    Self-Exams Yes   Social History Narrative    Lives with friends       Family History   Problem Relation Age of Onset    Cancer Mother         colon    Cancer Father 79        lung    Diabetes Sister     Cancer Brother 30        lymphoma       No Known Allergies    Current Outpatient Medications   Medication Sig    benzonatate (TESSALON) 200 mg capsule TAKE 1 CAPSULE BY MOUTH THREE TIMES DAILY AS NEEDED FOR COUGH FOR UP TO 7 DAYS    traMADol (ULTRAM) 50 mg tablet Take 2 Tabs by mouth two (2) times daily as needed for Pain for up to 60 days. Max Daily Amount: 200 mg. Indications: Pain    diclofenac (VOLTAREN) 1 % gel Apply  to affected area four (4) times daily.  loperamide (IMODIUM) 2 mg capsule Take 2 mg by mouth as needed for Diarrhea.     diphenoxylate-atropine (LOMOTIL) 2.5-0.025 mg per tablet Take 1 Tab by mouth four (4) times daily as needed for Diarrhea. Max Daily Amount: 4 Tabs.  tiotropium (SPIRIVA) 18 mcg inhalation capsule Take 1 Cap by inhalation daily.  gabapentin (NEURONTIN) 400 mg capsule take 1 capsule by mouth three times a day    DULoxetine (CYMBALTA) 30 mg capsule Take 1 Cap by mouth daily.  buPROPion (WELLBUTRIN) 100 mg tablet Take 1 Tab by mouth three (3) times daily.  rOPINIRole (REQUIP) 0.25 mg tablet Take 2 Tabs by mouth nightly.  lidocaine (LIDODERM) 5 % apply 1 patch to the affected area daily. Leave on for 12 hours and then REMOVE for 12 hours.  fluticasone-salmeterol (ADVAIR DISKUS) 500-50 mcg/dose diskus inhaler Take 1 Puff by inhalation every twelve (12) hours.  albuterol (PROVENTIL HFA, VENTOLIN HFA, PROAIR HFA) 90 mcg/actuation inhaler Take 1 Puff by inhalation every six (6) hours as needed for Wheezing.  cyclobenzaprine (FLEXERIL) 5 mg tablet TAKE ONE TABLET BY MOUTH THREE TIMES DAILY AS NEEDED FOR MUSCLE SPASM     No current facility-administered medications for this visit. The above histories, medications and allergies have been reviewed. ROS    Visit Vitals  /71 (BP 1 Location: Left arm, BP Patient Position: Sitting)   Pulse 96   Ht 5' 9\" (1.753 m)   Wt 185 lb (83.9 kg)   BMI 27.32 kg/m²     Physical Exam   Constitutional: She is well-developed, well-nourished, and in no distress. 1. Parasites in stool  In light of the fact that we do not have a definitive diagnosis in terms of which parasites that she has before I begin empiric treatment I would like to do repeat stool testing. We are reaching out to our lab to determine which order needs to be placed and ideally would like to have at least 3 sample sent. We will bring her back in approximately 2 weeks assuming we have identified the parasite. If we have not we may try empiric treatment.     2. Personal history of colonic polyps  She will need a repeat colonoscopy in 1 year. Follow-up and Dispositions    · Return in about 2 weeks (around 5/29/2019) for after testing.          Yeimi Siegel MD

## 2019-05-15 NOTE — PATIENT INSTRUCTIONS
Colon Polyps: Care Instructions  Your Care Instructions    Colon polyps are growths in the colon or the rectum. The cause of most colon polyps is not known, and most people who get them do not have any problems. But a certain kind can turn into cancer. For this reason, regular testing for colon polyps is important for people age 48 and older and anyone who has an increased risk for colon cancer. Polyps are usually found through routine colon cancer screening tests. Although most colon polyps are not cancerous, they are usually removed and then tested for cancer. Screening for colon cancer saves lives because the cancer can usually be cured if it is caught early. If you have a polyp that is the type that can turn into cancer, you may need more tests to examine your entire colon. The doctor will remove any other polyps that he or she finds, and you will be tested more often. Follow-up care is a key part of your treatment and safety. Be sure to make and go to all appointments, and call your doctor if you are having problems. It's also a good idea to know your test results and keep a list of the medicines you take. How can you care for yourself at home? Regular exams to look for colon polyps are the best way to prevent polyps from turning into colon cancer. These can include stool tests, sigmoidoscopy, colonoscopy, and CT colonography. Talk with your doctor about a testing schedule that is right for you. To prevent polyps  There is no home treatment that can prevent colon polyps. But these steps may help lower your risk for cancer. · Stay active. Being active can help you get to and stay at a healthy weight. Try to exercise on most days of the week. Walking is a good choice. · Eat well. Choose a variety of vegetables, fruits, legumes (such as peas and beans), fish, poultry, and whole grains. · Do not smoke. If you need help quitting, talk to your doctor about stop-smoking programs and medicines.  These can The patient brought in his medication bottles to his appointment today. increase your chances of quitting for good. · If you drink alcohol, limit how much you drink. Limit alcohol to 2 drinks a day for men and 1 drink a day for women. When should you call for help? Call your doctor now or seek immediate medical care if:    · You have severe belly pain.     · Your stools are maroon or very bloody.    Watch closely for changes in your health, and be sure to contact your doctor if:    · You have a fever.     · You have nausea or vomiting.     · You have a change in bowel habits (new constipation or diarrhea).     · Your symptoms get worse or are not improving as expected. Where can you learn more? Go to http://ruperto-gabriela.info/. Enter [phone] [id number] in the search box to learn more about \"Colon Polyps: Care Instructions. \"  Current as of: March 27, 2018  Content Version: 11.9  © 3476-4864 [employer]. Care instructions adapted under license by [employer] (which disclaims liability or warranty for this information). If you have questions about a medical condition or this instruction, always ask your healthcare professional. [address] any warranty or liability for your use of this information.

## 2019-06-03 ENCOUNTER — OFFICE VISIT (OUTPATIENT)
Dept: SURGERY | Age: 59
End: 2019-06-03

## 2019-06-03 VITALS
SYSTOLIC BLOOD PRESSURE: 137 MMHG | OXYGEN SATURATION: 95 % | HEART RATE: 85 BPM | WEIGHT: 180.6 LBS | BODY MASS INDEX: 26.75 KG/M2 | DIASTOLIC BLOOD PRESSURE: 80 MMHG | HEIGHT: 69 IN | RESPIRATION RATE: 17 BRPM | TEMPERATURE: 98.2 F

## 2019-06-03 DIAGNOSIS — B82.9 PARASITES IN STOOL: Primary | ICD-10-CM

## 2019-06-03 RX ORDER — PRAZIQUANTEL 600 MG/1
TABLET, FILM COATED ORAL
Qty: 1 TAB | Refills: 0 | Status: SHIPPED | OUTPATIENT
Start: 2019-06-03 | End: 2019-06-21 | Stop reason: ALTCHOICE

## 2019-06-03 NOTE — PROGRESS NOTES
Joselyn Gilliam is a 63 y.o. female who presents today with the following:  No chief complaint on file. HPI  She presents in follow-up today after her stool testing for ova and parasites. Multiple specimens were negative. She states her abdominal pain is considerably better. She is taking Imodium. She thinks she has had at least a 40% improvement. She is continued to have some rectal bleeding. Past Medical History:   Diagnosis Date    Arthritis     Asthma     COPD (chronic obstructive pulmonary disease) ([address]     Depression     Hemorrhoids     Occult blood positive stool     Spinal stenosis        Past Surgical History:   Procedure Laterality Date    ABDOMEN SURGERY PROC UNLISTED      hystrectomy    COLONOSCOPY N/A 5/7/2019    COLONOSCOPY performed by Sera Dunham MD at [facility] [address]  09/2016    laminectomy    HX CERVICAL FUSION      HX COLONOSCOPY  05/07/2019    4 polyps- 2 tubular adenoma 2 hyperplastic polyp    HX HYSTERECTOMY         Social History     Socioeconomic History    Marital status: LEGALLY      Spouse name: Not on file    Number of children: 2    Years of education: Not on file    Highest education level: 10th grade   Occupational History    Occupation: disabled   Social Needs    Financial resource strain: Not on file    Food insecurity:     Worry: Not on file     Inability: Not on file   [employer] needs:     Medical: Not on file     Non-medical: Not on file   Tobacco Use    Smoking status: Current Every Day Smoker     Packs/day: 0.50     Years: 35.00     Pack years: 17.50     Types: Cigarettes    Smokeless tobacco: Never Used   Substance and Sexual Activity    Alcohol use:  Yes     Alcohol/week: 11.4 oz     Types: 14 Glasses of wine, 5 Cans of beer per week     Frequency: Monthly or less     Drinks per session: 1 or 2     Binge frequency: Never    Drug use: No    Sexual activity: Not Currently     Partners: Male Lifestyle    Physical activity:     Days per week: Not on file     Minutes per session: Not on file    Stress: Not on file   Relationships    Social connections:     Talks on phone: Not on file     Gets together: Not on file     Attends Rastafari service: Not on file     Active member of club or organization: Not on file     Attends meetings of clubs or organizations: Not on file     Relationship status: Not on file    Intimate partner violence:     Fear of current or ex partner: Not on file     Emotionally abused: Not on file     Physically abused: Not on file     Forced sexual activity: Not on file   Other Topics Concern     Service No    Blood Transfusions No    Caffeine Concern Not Asked    Occupational Exposure Not Asked    Hobby Hazards Not Asked    Sleep Concern Not Asked    Stress Concern Not Asked    Weight Concern Not Asked    Special Diet Not Asked    Back Care Not Asked    Exercise Not Asked    Bike Helmet Not Asked   [address] [facility] Yes    Self-Exams Yes   Social History Narrative    Lives with friends       Family History   Problem Relation Age of Onset    Cancer Mother         colon    Cancer Father 79        lung    Diabetes Sister     Cancer Brother 30        lymphoma       No Known Allergies    Current Outpatient Medications   Medication Sig    albuterol (PROVENTIL HFA, VENTOLIN HFA, PROAIR HFA) 90 mcg/actuation inhaler INHALE ONE PUFF BY MOUTH EVERY 6 HOURS AS NEEDED FOR WHEEZING    fluticasone propion-salmeterol (ADVAIR/WIXELA) 500-50 mcg/dose diskus inhaler INHALE ONE DOSE BY MOUTH EVERY 12 HOURS    benzonatate (TESSALON) 200 mg capsule TAKE 1 CAPSULE BY MOUTH THREE TIMES DAILY AS NEEDED FOR COUGH FOR UP TO 7 DAYS    traMADol (ULTRAM) 50 mg tablet Take 2 Tabs by mouth two (2) times daily as needed for Pain for up to 60 days. Max Daily Amount: 200 mg. Indications: Pain    diclofenac (VOLTAREN) 1 % gel Apply  to affected area four (4) times daily.     loperamide (IMODIUM) 2 mg capsule Take 2 mg by mouth as needed for Diarrhea.  diphenoxylate-atropine (LOMOTIL) 2.5-0.025 mg per tablet Take 1 Tab by mouth four (4) times daily as needed for Diarrhea. Max Daily Amount: 4 Tabs.  tiotropium (SPIRIVA) 18 mcg inhalation capsule Take 1 Cap by inhalation daily.  gabapentin (NEURONTIN) 400 mg capsule take 1 capsule by mouth three times a day    DULoxetine (CYMBALTA) 30 mg capsule Take 1 Cap by mouth daily.  buPROPion (WELLBUTRIN) 100 mg tablet Take 1 Tab by mouth three (3) times daily.  rOPINIRole (REQUIP) 0.25 mg tablet Take 2 Tabs by mouth nightly.  lidocaine (LIDODERM) 5 % apply 1 patch to the affected area daily. Leave on for 12 hours and then REMOVE for 12 hours.  fluticasone-salmeterol (ADVAIR DISKUS) 500-50 mcg/dose diskus inhaler Take 1 Puff by inhalation every twelve (12) hours.  albuterol (PROVENTIL HFA, VENTOLIN HFA, PROAIR HFA) 90 mcg/actuation inhaler Take 1 Puff by inhalation every six (6) hours as needed for Wheezing.  cyclobenzaprine (FLEXERIL) 5 mg tablet TAKE ONE TABLET BY MOUTH THREE TIMES DAILY AS NEEDED FOR MUSCLE SPASM     No current facility-administered medications for this visit. The above histories, medications and allergies have been reviewed. ROS    Visit Vitals  /80 (BP 1 Location: Left arm, BP Patient Position: Sitting)   Pulse 85   Temp 98.2 °F (36.8 °C) (Oral)   Resp 17   Ht 5' 9\" (1.753 m)   Wt 180 lb 9.6 oz (81.9 kg)   SpO2 95%   BMI 26.67 kg/m²     Physical Exam   Constitutional: She is well-developed, well-nourished, and in no distress. 1. Parasites in stool  Because we were able to directly visualize worms even though the stool cultures did not show any I believe we should empirically treat her. And I recommend a single dose of praziquantel 500mg. I would like to bring her back 2 weeks after she has had this dose to see if her symptoms have improved.       Follow-up and Dispositions    · Return in about 3 weeks (around 6/24/2019) for recheck.          Caryl Camacho MD

## 2019-06-03 NOTE — PATIENT INSTRUCTIONS

## 2019-06-10 DIAGNOSIS — M48.00 SPINAL STENOSIS, UNSPECIFIED SPINAL REGION: ICD-10-CM

## 2019-06-10 RX ORDER — TRAMADOL HYDROCHLORIDE 50 MG/1
100 TABLET ORAL
Qty: 120 TAB | Refills: 1 | Status: CANCELLED | OUTPATIENT
Start: 2019-06-10 | End: 2019-08-09

## 2019-06-10 NOTE — TELEPHONE ENCOUNTER
Requested Prescriptions     Pending Prescriptions Disp Refills    traMADol (ULTRAM) 50 mg tablet 120 Tab 1     Sig: Take 2 Tabs by mouth two (2) times daily as needed for Pain for up to 60 days. Max Daily Amount: 200 mg.  Indications: Pain

## 2019-06-18 DIAGNOSIS — M48.00 SPINAL STENOSIS, UNSPECIFIED SPINAL REGION: ICD-10-CM

## 2019-06-18 RX ORDER — BENZONATATE 200 MG/1
CAPSULE ORAL
Qty: 60 CAP | Refills: 1 | Status: CANCELLED | OUTPATIENT
Start: 2019-06-18

## 2019-06-18 RX ORDER — TRAMADOL HYDROCHLORIDE 50 MG/1
100 TABLET ORAL
Qty: 120 TAB | Refills: 1 | Status: CANCELLED | OUTPATIENT
Start: 2019-06-18 | End: 2019-08-17

## 2019-06-18 NOTE — TELEPHONE ENCOUNTER
Requested Prescriptions     Pending Prescriptions Disp Refills    benzonatate (TESSALON) 200 mg capsule 60 Cap 1    traMADol (ULTRAM) 50 mg tablet 120 Tab 1     Sig: Take 2 Tabs by mouth two (2) times daily as needed for Pain for up to 60 days. Max Daily Amount: 200 mg.  Indications: Pain   pt  Said please give her a call because she needs the tramadol

## 2019-06-21 ENCOUNTER — OFFICE VISIT (OUTPATIENT)
Dept: INTERNAL MEDICINE CLINIC | Age: 59
End: 2019-06-21

## 2019-06-21 VITALS
HEIGHT: 69 IN | DIASTOLIC BLOOD PRESSURE: 87 MMHG | OXYGEN SATURATION: 96 % | RESPIRATION RATE: 28 BRPM | BODY MASS INDEX: 25.92 KG/M2 | SYSTOLIC BLOOD PRESSURE: 125 MMHG | WEIGHT: 175 LBS | TEMPERATURE: 97.5 F | HEART RATE: 76 BPM

## 2019-06-21 DIAGNOSIS — M48.00 SPINAL STENOSIS, UNSPECIFIED SPINAL REGION: ICD-10-CM

## 2019-06-21 DIAGNOSIS — J18.9 COMMUNITY ACQUIRED PNEUMONIA, UNSPECIFIED LATERALITY: ICD-10-CM

## 2019-06-21 DIAGNOSIS — M19.90 ARTHRITIS: ICD-10-CM

## 2019-06-21 DIAGNOSIS — M17.0 PRIMARY OSTEOARTHRITIS OF BOTH KNEES: ICD-10-CM

## 2019-06-21 DIAGNOSIS — F11.20 NARCOTIC DEPENDENCE (HCC): Primary | ICD-10-CM

## 2019-06-21 DIAGNOSIS — J44.9 CHRONIC OBSTRUCTIVE PULMONARY DISEASE, UNSPECIFIED COPD TYPE (HCC): ICD-10-CM

## 2019-06-21 RX ORDER — DICLOFENAC SODIUM 10 MG/G
GEL TOPICAL 4 TIMES DAILY
Qty: 1 EACH | Refills: 5 | Status: SHIPPED | OUTPATIENT
Start: 2019-06-21 | End: 2019-11-18 | Stop reason: SDUPTHER

## 2019-06-21 RX ORDER — AZITHROMYCIN 250 MG/1
250 TABLET, FILM COATED ORAL SEE ADMIN INSTRUCTIONS
Qty: 6 TAB | Refills: 0 | Status: SHIPPED | OUTPATIENT
Start: 2019-06-21 | End: 2019-07-03 | Stop reason: ALTCHOICE

## 2019-06-21 RX ORDER — TRAMADOL HYDROCHLORIDE 50 MG/1
100 TABLET ORAL
Qty: 120 TAB | Refills: 0 | Status: SHIPPED | OUTPATIENT
Start: 2019-06-21 | End: 2019-07-03 | Stop reason: ALTCHOICE

## 2019-06-21 RX ORDER — BENZONATATE 200 MG/1
CAPSULE ORAL
Qty: 60 CAP | Refills: 1 | Status: SHIPPED | OUTPATIENT
Start: 2019-06-21 | End: 2019-08-28 | Stop reason: SDUPTHER

## 2019-06-21 RX ORDER — PREDNISONE 20 MG/1
40 TABLET ORAL
Qty: 10 TAB | Refills: 0 | Status: SHIPPED | OUTPATIENT
Start: 2019-06-21 | End: 2019-06-26

## 2019-06-21 NOTE — PROGRESS NOTES
OK letter      Subjective:   Cydney Olivera is a 63 y.o. female who complains of congestion, sneezing, sore throat, headache, right ear discomfort, fevers up to 101 degrees and wheezing for 4 days, stable since that time. She denies a history of anorexia, nausea, rash on body and vomiting. On pain meds asks for RF of tramadol  Evaluation to date: none. Treatment to date: OTC products. Took some LO amox 4 tabs  Patient does smoke cigarettes. Relevant PMH: Asthma and COPD. No Known Allergies      Patient Active Problem List    Diagnosis Date Noted    Parasites in stool 05/15/2019    Narcotic dependence ([facility] 04/07/2019    Encounter for diagnostic colonoscopy due to change in bowel habits 03/04/2019    Elevated liver enzymes 02/19/2019    Heme + stool 02/19/2019    Depression, major, recurrent, mild ([facility] [address] 12/21/2017    Restless legs 12/21/2017    Tobacco abuse 12/21/2017    Spinal stenosis 08/14/2017     Current Outpatient Medications   Medication Sig Dispense Refill    benzonatate (TESSALON) 200 mg capsule TAKE 1 CAPSULE BY MOUTH THREE TIMES DAILY AS NEEDED FOR COUGH FOR UP TO 7 DAYS 60 Cap 1    traMADol (ULTRAM) 50 mg tablet Take 2 Tabs by mouth two (2) times daily as needed for Pain for up to 60 days. Max Daily Amount: 200 mg. Indications: Pain 120 Tab 0    diclofenac (VOLTAREN) 1 % gel Apply  to affected area four (4) times daily. 1 Each 5    azithromycin (ZITHROMAX) 250 mg tablet Take 1 Tab by mouth See Admin Instructions. Take two tablets today then one tablet daily for next 4 days 6 Tab 0    predniSONE (DELTASONE) 20 mg tablet Take 40 mg by mouth daily (with breakfast) for 5 days. 10 Tab 0    tiotropium (SPIRIVA) 18 mcg inhalation capsule Take 1 Cap by inhalation daily.  30 Cap 5    fluticasone propion-salmeterol (ADVAIR/WIXELA) 500-50 mcg/dose diskus inhaler INHALE ONE DOSE BY MOUTH EVERY 12 HOURS 1 Inhaler 5    loperamide (IMODIUM) 2 mg capsule Take 2 mg by mouth as needed for Diarrhea.  diphenoxylate-atropine (LOMOTIL) 2.5-0.025 mg per tablet Take 1 Tab by mouth four (4) times daily as needed for Diarrhea. Max Daily Amount: 4 Tabs. 60 Tab 1    gabapentin (NEURONTIN) 400 mg capsule take 1 capsule by mouth three times a day 90 Cap 5    DULoxetine (CYMBALTA) 30 mg capsule Take 1 Cap by mouth daily. 90 Cap 1    buPROPion (WELLBUTRIN) 100 mg tablet Take 1 Tab by mouth three (3) times daily. 90 Tab 5    rOPINIRole (REQUIP) 0.25 mg tablet Take 2 Tabs by mouth nightly. 60 Tab 5    lidocaine (LIDODERM) 5 % apply 1 patch to the affected area daily. Leave on for 12 hours and then REMOVE for 12 hours. 30 Patch 2    fluticasone-salmeterol (ADVAIR DISKUS) 500-50 mcg/dose diskus inhaler Take 1 Puff by inhalation every twelve (12) hours. 3 Inhaler 1    cyclobenzaprine (FLEXERIL) 5 mg tablet TAKE ONE TABLET BY MOUTH THREE TIMES DAILY AS NEEDED FOR MUSCLE SPASM 270 Tab 1    albuterol (PROVENTIL HFA, VENTOLIN HFA, PROAIR HFA) 90 mcg/actuation inhaler INHALE ONE PUFF BY MOUTH EVERY 6 HOURS AS NEEDED FOR WHEEZING 1 Inhaler 5    albuterol (PROVENTIL HFA, VENTOLIN HFA, PROAIR HFA) 90 mcg/actuation inhaler Take 1 Puff by inhalation every six (6) hours as needed for Wheezing. 3 Inhaler 1     No Known Allergies  Social History     Tobacco Use    Smoking status: Current Every Day Smoker     Packs/day: 0.50     Years: 35.00     Pack years: 17.50     Types: Cigarettes    Smokeless tobacco: Never Used   Substance Use Topics    Alcohol use: Yes     Alcohol/week: 11.4 oz     Types: 14 Glasses of wine, 5 Cans of beer per week     Frequency: Monthly or less     Drinks per session: 1 or 2     Binge frequency: Never        Review of Systems  Pertinent items are noted in HPI.     Objective:     Visit Vitals  /87 (BP 1 Location: Left arm, BP Patient Position: Sitting)   Pulse 76   Temp 97.5 °F (36.4 °C) (Oral)   Resp 28   Ht 5' 9\" (1.753 m)   Wt 175 lb (79.4 kg)   SpO2 96%   BMI 25.84 kg/m²     General: alert, fatigued, cooperative, no distress   Eyes: negative   Ears: normal TM's and external ear canals AU   Sinuses: Normal paranasal sinuses without tenderness   Mouth:  Lips, mucosa, and tongue normal. Teeth and gums normal   Neck: supple, symmetrical, trachea midline and no adenopathy. Heart: S1 and S2 normal, no murmurs noted. Lungs: wheezes R anterior, L anterior   Abdomen: soft, non-tender. Bowel sounds normal. No masses,  no organomegaly      Assessment/Plan:   asthma and pneumonia  Antibiotics per orders. Encounter Diagnoses   Name Primary?  Narcotic dependence ([address] Yes    Spinal stenosis, unspecified spinal region     Arthritis     Chronic obstructive pulmonary disease, unspecified COPD type ([address]     Primary osteoarthritis of both knees     Community acquired pneumonia, unspecified laterality      Orders Placed This Encounter    benzonatate (TESSALON) 200 mg capsule    traMADol (ULTRAM) 50 mg tablet    diclofenac (VOLTAREN) 1 % gel    azithromycin (ZITHROMAX) 250 mg tablet    predniSONE (DELTASONE) 20 mg tablet    tiotropium (SPIRIVA) 18 mcg inhalation capsule   . Patient was looked up in the . There are multiple prescribers of controlled substances  no. Follow-up and Dispositions    · Return in about 4 days (around 6/25/2019) for routine follow up.

## 2019-06-21 NOTE — PROGRESS NOTES
Chief Complaint   Patient presents with    Cough     prod cough thick yellow sputum, x 4 days,  sore throat, ribcage pain from coughing, nose running, SOB, WHEEZING, R/ear pain, dizziness, body aches, headache     I have reviewed the patient's medical history in detail and updated the computerized patient record. Health Maintenance reviewed. 1. Have you been to the ER, urgent care clinic since your last visit? Hospitalized since your last visit?no    2. Have you seen or consulted any other health care providers outside of the [address] [place] [address] since your last visit? Include any pap smears or colon screening. No      Encouraged pt to discuss pt's wishes with spouse/partner/family and bring them in the next appt to follow thru with the Advanced Directive    Fall Risk Assessment, last 12 mths 3/7/2019   Able to walk? Yes   Fall in past 12 months? No   Fall with injury? -   Number of falls in past 12 months -   Fall Risk Score -       3 most recent PHQ Screens 3/7/2019   Little interest or pleasure in doing things More than half the days   Feeling down, depressed, irritable, or hopeless More than half the days   Total Score PHQ 2 4   Trouble falling or staying asleep, or sleeping too much -   Feeling tired or having little energy -   Poor appetite, weight loss, or overeating -   Feeling bad about yourself - or that you are a failure or have let yourself or your family down -   Trouble concentrating on things such as school, work, reading, or watching TV -   Moving or speaking so slowly that other people could have noticed; or the opposite being so fidgety that others notice -   Thoughts of being better off dead, or hurting yourself in some way -   How difficult have these problems made it for you to do your work, take care of your home and get along with others -       Abuse Screening Questionnaire 3/7/2019   Do you ever feel afraid of your partner?  N   Are you in a relationship with someone who physically or mentally threatens you? N   Is it safe for you to go home?  Y       ADL Assessment 3/7/2019   Feeding yourself No Help Needed   Getting from bed to chair No Help Needed   Getting dressed No Help Needed   Bathing or showering No Help Needed   Walk across the room (includes cane/walker) No Help Needed   Using the telphone No Help Needed   Taking your medications No Help Needed   Preparing meals No Help Needed   Managing money (expenses/bills) No Help Needed   Moderately strenuous housework (laundry) No Help Needed   Shopping for personal items (toiletries/medicines) No Help Needed   Shopping for groceries No Help Needed   Driving Help Needed   Climbing a flight of stairs No Help Needed   Getting to places beyond walking distances Help Needed

## 2019-06-27 ENCOUNTER — DOCUMENTATION ONLY (OUTPATIENT)
Dept: INTERNAL MEDICINE CLINIC | Age: 59
End: 2019-06-27

## 2019-07-02 ENCOUNTER — TELEPHONE (OUTPATIENT)
Dept: INTERNAL MEDICINE CLINIC | Age: 59
End: 2019-07-02

## 2019-07-02 NOTE — TELEPHONE ENCOUNTER
Patient calls and and states that a while ago Dr Ziyad Ruvalcaba ordered a bone scan for her and she never got it done and has now lost the paper for the order - she wonders if Dr Ziyad Ruvalcaba can order this Bone Density scan again for her and she will get it done this time - message sent to Dr Ziyad Ruvalcaba - also c/o pain in right groin area that makes her legs give away from under her - like a constant ache all the time but pain gets sharper when she sits down - denies having a bulge or knot in the area - happening X 3 weeks now - rates the pain at a constant 7-8 on o 0-10 pain scale - advised patient that she will likely need an appointment to discuss this with Dr Ziyad Ruvalcaba if she wants medication for this - will send this request to [employer] for new order of bone density scan  Reynaldo Castellano LPN  7/9/8928  7:19 AM

## 2019-07-03 ENCOUNTER — OFFICE VISIT (OUTPATIENT)
Dept: INTERNAL MEDICINE CLINIC | Age: 59
End: 2019-07-03

## 2019-07-03 VITALS
RESPIRATION RATE: 20 BRPM | SYSTOLIC BLOOD PRESSURE: 128 MMHG | WEIGHT: 179 LBS | HEIGHT: 69 IN | BODY MASS INDEX: 26.51 KG/M2 | TEMPERATURE: 97.7 F | HEART RATE: 74 BPM | DIASTOLIC BLOOD PRESSURE: 76 MMHG | OXYGEN SATURATION: 96 %

## 2019-07-03 DIAGNOSIS — J44.9 CHRONIC OBSTRUCTIVE PULMONARY DISEASE, UNSPECIFIED COPD TYPE (HCC): ICD-10-CM

## 2019-07-03 DIAGNOSIS — M54.40 BILATERAL LOW BACK PAIN WITH SCIATICA, SCIATICA LATERALITY UNSPECIFIED, UNSPECIFIED CHRONICITY: ICD-10-CM

## 2019-07-03 DIAGNOSIS — M48.00 SPINAL STENOSIS, UNSPECIFIED SPINAL REGION: ICD-10-CM

## 2019-07-03 DIAGNOSIS — F11.20 NARCOTIC DEPENDENCE (HCC): ICD-10-CM

## 2019-07-03 DIAGNOSIS — M25.551 HIP PAIN, ACUTE, RIGHT: Primary | ICD-10-CM

## 2019-07-03 RX ORDER — NAPROXEN 500 MG/1
500 TABLET ORAL 2 TIMES DAILY WITH MEALS
Qty: 60 TAB | Refills: 0 | Status: SHIPPED | OUTPATIENT
Start: 2019-07-03 | End: 2020-01-15

## 2019-07-03 RX ORDER — TRAMADOL HYDROCHLORIDE 50 MG/1
TABLET ORAL
Qty: 120 TAB | Refills: 1 | OUTPATIENT
Start: 2019-07-03

## 2019-07-03 RX ORDER — VITAMIN E (DL,TOCOPHERYL ACET) 45 MG/0.25
1 DROPS ORAL DAILY
Qty: 90 TAB | Refills: 1 | Status: SHIPPED | OUTPATIENT
Start: 2019-07-03 | End: 2019-09-16 | Stop reason: SDUPTHER

## 2019-07-03 RX ORDER — HYDROCODONE BITARTRATE AND ACETAMINOPHEN 5; 325 MG/1; MG/1
1 TABLET ORAL
Qty: 20 TAB | Refills: 0 | Status: SHIPPED | OUTPATIENT
Start: 2019-07-03 | End: 2019-07-08

## 2019-07-03 RX ORDER — IBUPROFEN 200 MG
1 TABLET ORAL EVERY 24 HOURS
COMMUNITY
End: 2019-10-18 | Stop reason: SDUPTHER

## 2019-07-03 NOTE — PROGRESS NOTES
Chief Complaint   Patient presents with    Leg Pain     upper R/leg/groin pain x 3 weeks, pt fell down steps two weeks ago     I have reviewed the patient's medical history in detail and updated the computerized patient record. Health Maintenance reviewed. 1. Have you been to the ER, urgent care clinic since your last visit? Hospitalized since your last visit?no    2. Have you seen or consulted any other health care providers outside of the [address] [place] [address] since your last visit? Include any pap smears or colon screening. No      Encouraged pt to discuss pt's wishes with spouse/partner/family and bring them in the next appt to follow thru with the Advanced Directive    Fall Risk Assessment, last 12 mths 3/7/2019   Able to walk? Yes   Fall in past 12 months? No   Fall with injury? -   Number of falls in past 12 months -   Fall Risk Score -       3 most recent PHQ Screens 3/7/2019   Little interest or pleasure in doing things More than half the days   Feeling down, depressed, irritable, or hopeless More than half the days   Total Score PHQ 2 4   Trouble falling or staying asleep, or sleeping too much -   Feeling tired or having little energy -   Poor appetite, weight loss, or overeating -   Feeling bad about yourself - or that you are a failure or have let yourself or your family down -   Trouble concentrating on things such as school, work, reading, or watching TV -   Moving or speaking so slowly that other people could have noticed; or the opposite being so fidgety that others notice -   Thoughts of being better off dead, or hurting yourself in some way -   How difficult have these problems made it for you to do your work, take care of your home and get along with others -       Abuse Screening Questionnaire 3/7/2019   Do you ever feel afraid of your partner? N   Are you in a relationship with someone who physically or mentally threatens you? N   Is it safe for you to go home?  Y       ADL Assessment 3/7/2019   Feeding yourself No Help Needed   Getting from bed to chair No Help Needed   Getting dressed No Help Needed   Bathing or showering No Help Needed   Walk across the room (includes cane/walker) No Help Needed   Using the telphone No Help Needed   Taking your medications No Help Needed   Preparing meals No Help Needed   Managing money (expenses/bills) No Help Needed   Moderately strenuous housework (laundry) No Help Needed   Shopping for personal items (toiletries/medicines) No Help Needed   Shopping for groceries No Help Needed   Driving Help Needed   Climbing a flight of stairs No Help Needed   Getting to places beyond walking distances Help Needed

## 2019-07-03 NOTE — PROGRESS NOTES
HISTORY OF PRESENT ILLNESS  Phi Gutierrez is a 63 y.o. female. Leg Pain    The history is provided by the patient. This is a new problem. Episode onset: 3 weeks. The problem occurs hourly. The problem has been gradually worsening. The pain is present in the right hip. The quality of the pain is described as pounding. The pain is severe. Associated symptoms include back pain. Associated symptoms comments: nl. Treatments tried: gel tramadol. The treatment provided mild relief. There has been a history of trauma (fell 2 weeks ago). Was hurting prior to fall  But inc pain since. Patient Active Problem List   Diagnosis Code    Spinal stenosis M48.00    Depression, major, recurrent, mild ([address] F33.0    Restless legs G25.81    Tobacco abuse Z72.0    Elevated liver enzymes R74.8    Heme + stool R19.5    Encounter for diagnostic colonoscopy due to change in bowel habits R19.4    Narcotic dependence ([address] F11.20    Parasites in stool B82.9     No Known Allergies      Review of Systems   Constitutional: Negative for fever. Respiratory: Positive for cough. Dec since reduced smoking   Genitourinary: Negative for dysuria and hematuria. No rash or DC   Musculoskeletal: Positive for back pain. Social History     Socioeconomic History    Marital status: LEGALLY      Spouse name: Not on file    Number of children: 2    Years of education: Not on file    Highest education level: 10th grade   Occupational History    Occupation: disabled   Social Needs    Financial resource strain: Not on file    Food insecurity:     Worry: Not on file     Inability: Not on file   [employer] needs:     Medical: Not on file     Non-medical: Not on file   Tobacco Use    Smoking status: Current Every Day Smoker     Packs/day: 0.50     Years: 35.00     Pack years: 17.50     Types: Cigarettes    Smokeless tobacco: Never Used   Substance and Sexual Activity    Alcohol use:  Yes Alcohol/week: 11.4 oz     Types: 14 Glasses of wine, 5 Cans of beer per week     Frequency: Monthly or less     Drinks per session: 1 or 2     Binge frequency: Never    Drug use: No    Sexual activity: Not Currently     Partners: Male   Lifestyle    Physical activity:     Days per week: Not on file     Minutes per session: Not on file    Stress: Not on file   Relationships    Social connections:     Talks on phone: Not on file     Gets together: Not on file     Attends Jainism service: Not on file     Active member of club or organization: Not on file     Attends meetings of clubs or organizations: Not on file     Relationship status: Not on file    Intimate partner violence:     Fear of current or ex partner: Not on file     Emotionally abused: Not on file     Physically abused: Not on file     Forced sexual activity: Not on file   Other Topics Concern     Service No    Blood Transfusions No    Caffeine Concern Not Asked    Occupational Exposure Not Asked    Hobby Hazards Not Asked    Sleep Concern Not Asked    Stress Concern Not Asked    Weight Concern Not Asked    Special Diet Not Asked    Back Care Not Asked    Exercise Not Asked    Bike Helmet Not Asked    Seat Belt Yes    Self-Exams Yes   Social History Narrative    Lives with friends     Physical Exam  Visit Vitals  /76 (BP 1 Location: Left arm, BP Patient Position: Sitting)   Pulse 74   Temp 97.7 °F (36.5 °C) (Oral)   Resp 20   Ht 5' 9\" (1.753 m)   Wt 179 lb (81.2 kg)   SpO2 96%   BMI 26.43 kg/m²     WD WN female NAD  Heart RRR without murmers clicks or rubs  Lungs CTA  Abdo soft nontender  Ext no edema\  Groin/hip grossly nl  ASSESSMENT and PLAN  Encounter Diagnoses   Name Primary?     Hip pain, acute, right Yes    Chronic obstructive pulmonary disease, unspecified COPD type ([address]     Bilateral low back pain with sciatica, sciatica laterality unspecified, unspecified chronicity     Narcotic dependence ([address]      Orders Placed This Encounter    XR HIP RT W OR WO PELV 2-3 VWS    nicotine (NICODERM CQ) 21 mg/24 hr    HYDROcodone-acetaminophen (NORCO) 5-325 mg per tablet    naproxen (NAPROSYN) 500 mg tablet    calcium combo no.2-vitamin D3 600 mg calcium- 500 unit TbER     Strain? Get x-ray since fell. We discussed this pain and the usage of controlled substances for groin hip pain relief. In general these medications are indicated for the acute symptom relief and not for chronic usage, allthough they are frequently used for chronic management  After a certain period of time they should be stopped to avoid dependence and/or addiction. I warned her about the dangers of addiction and other side affects including respiratory depression and death. Discussed how this is a controlled substance and that the prescribing of it is should be monitored very carefully. Drug usage is also monitored by our practise and the [facility], since there has been a lot of abuse and diversion of controlled substances. In general we do not refill this medication over the phone. PLease ask for enough pills to get you to your next appointment and make sure you keep your appointments. Warned not to take other medications like alcohol, other benzodiazapines marijuana or other narcotics when on this medication. The patient was counseled on the dangers of tobacco use, and was advised to quit. Reviewed strategies to maximize success, including pharmacotherapy (patch)     Follow-up and Dispositions    · Return if symptoms worsen or fail to improve. as scheduled. Patient was looked up in the . There are multiple prescribers of controlled substances  no.

## 2019-07-03 NOTE — TELEPHONE ENCOUNTER
Last office visit:  Today 07/03/2019  Last filled:  Tramadol 50mg 2 tabs daily as needed #120 X no refills 06/21/2019  No need to refill at this time

## 2019-07-22 ENCOUNTER — OFFICE VISIT (OUTPATIENT)
Dept: SURGERY | Age: 59
End: 2019-07-22

## 2019-07-22 VITALS
SYSTOLIC BLOOD PRESSURE: 150 MMHG | DIASTOLIC BLOOD PRESSURE: 77 MMHG | BODY MASS INDEX: 26.32 KG/M2 | HEART RATE: 77 BPM | HEIGHT: 69 IN | WEIGHT: 177.7 LBS

## 2019-07-22 DIAGNOSIS — B82.9 PARASITES IN STOOL: Primary | ICD-10-CM

## 2019-07-22 NOTE — PATIENT INSTRUCTIONS
High-Fiber Diet: Care Instructions  Your Care Instructions    A high-fiber diet may help you relieve constipation and feel less bloated. Your doctor and dietitian will help you make a high-fiber eating plan based on your personal needs. The plan will include the things you like to eat. It will also make sure that you get 30 grams of fiber a day. Before you make changes to the way you eat, be sure to talk with your doctor or dietitian. Follow-up care is a key part of your treatment and safety. Be sure to make and go to all appointments, and call your doctor if you are having problems. It's also a good idea to know your test results and keep a list of the medicines you take. How can you care for yourself at home? · You can increase how much fiber you get if you eat more of certain foods. These foods include:  ? Whole-grain breads and cereals. ? Fruits, such as pears, apples, and peaches. Eat the skins, peels, and seeds, if you can.  ? Vegetables, such as broccoli, cabbage, spinach, carrots, asparagus, and squash. ? Starchy vegetables. These include potatoes with skins, kidney beans, and lima beans. · Take a fiber supplement every day if your doctor recommends it. Examples are Benefiber, Citrucel, FiberCon, and Metamucil. Ask your doctor how much to take. · Drink plenty of fluids, enough so that your urine is light yellow or clear like water. If you have kidney, heart, or liver disease and have to limit fluids, talk with your doctor before you increase the amount of fluids you drink. · Get some exercise every day. Exercise helps stool move through the colon. It also helps prevent constipation. · Keep a food diary. Try to notice and write down what foods cause gas, pain, or other symptoms. Then you can avoid these foods. Where can you learn more? Go to http://ruperto-gabriela.info/. Enter F903 in the search box to learn more about \"High-Fiber Diet: Care Instructions. \"  Current as of: November 7, 2018  Content Version: 12.1  © 6366-7067 [employer]. Care instructions adapted under license by [employer] (which disclaims liability or warranty for this information). If you have questions about a medical condition or this instruction, always ask your healthcare professional. [address] any warranty or liability for your use of this information.

## 2019-07-22 NOTE — PROGRESS NOTES
Sujey Santos is a 63 y.o. female who presents today with the following:  Chief Complaint   Patient presents with    Follow-up       HPI    She presents in follow-up after having completed her praziquantel. She states her abdominal pain is completely resolved. She is having no more diarrhea. Her only complaint is of back pain which she is being seen by her primary care physician for. She is pleased with her response to the medication. Past Medical History:   Diagnosis Date    Arthritis     Asthma     COPD (chronic obstructive pulmonary disease) ([address]     Depression     Hemorrhoids     Occult blood positive stool     Spinal stenosis        Past Surgical History:   Procedure Laterality Date    ABDOMEN SURGERY PROC UNLISTED      hystrectomy    COLONOSCOPY N/A 5/7/2019    COLONOSCOPY performed by Rubio Kelly MD at [facility] [address]  09/2016    laminectomy    HX CERVICAL FUSION      HX COLONOSCOPY  05/07/2019    4 polyps- 2 tubular adenoma 2 hyperplastic polyp    HX HYSTERECTOMY         Social History     Socioeconomic History    Marital status: LEGALLY      Spouse name: Not on file    Number of children: 2    Years of education: Not on file    Highest education level: 10th grade   Occupational History    Occupation: disabled   Social Needs    Financial resource strain: Not on file    Food insecurity:     Worry: Not on file     Inability: Not on file   Eqvilibria needs:     Medical: Not on file     Non-medical: Not on file   Tobacco Use    Smoking status: Current Every Day Smoker     Packs/day: 0.50     Years: 35.00     Pack years: 17.50     Types: Cigarettes    Smokeless tobacco: Never Used   Substance and Sexual Activity    Alcohol use:  Yes     Alcohol/week: 19.0 standard drinks     Types: 14 Glasses of wine, 5 Cans of beer per week     Frequency: Monthly or less     Drinks per session: 1 or 2     Binge frequency: Never    Drug use: No    Sexual activity: Not Currently     Partners: Male   Lifestyle    Physical activity:     Days per week: Not on file     Minutes per session: Not on file    Stress: Not on file   Relationships    Social connections:     Talks on phone: Not on file     Gets together: Not on file     Attends Gnosticist service: Not on file     Active member of club or organization: Not on file     Attends meetings of clubs or organizations: Not on file     Relationship status: Not on file    Intimate partner violence:     Fear of current or ex partner: Not on file     Emotionally abused: Not on file     Physically abused: Not on file     Forced sexual activity: Not on file   Other Topics Concern     Service No    Blood Transfusions No    Caffeine Concern Not Asked    Occupational Exposure Not Asked   [address] Not Asked    Sleep Concern Not Asked    Stress Concern Not Asked    Weight Concern Not Asked    Special Diet Not Asked    Back Care Not Asked    Exercise Not Asked    Bike Helmet Not Asked   [address] [facility] Yes    Self-Exams Yes   Social History Narrative    Lives with friends       Family History   Problem Relation Age of Onset    Cancer Mother         colon    Cancer Father 79        lung    Diabetes Sister     Cancer Brother 30        lymphoma       No Known Allergies    Current Outpatient Medications   Medication Sig    nicotine (NICODERM CQ) 21 mg/24 hr 1 Patch by TransDERmal route every twenty-four (24) hours.  naproxen (NAPROSYN) 500 mg tablet Take 1 Tab by mouth two (2) times daily (with meals). As needed    calcium combo no.2-vitamin D3 600 mg calcium- 500 unit TbER Take 1 Each by mouth daily.  benzonatate (TESSALON) 200 mg capsule TAKE 1 CAPSULE BY MOUTH THREE TIMES DAILY AS NEEDED FOR COUGH FOR UP TO 7 DAYS    diclofenac (VOLTAREN) 1 % gel Apply  to affected area four (4) times daily.  tiotropium (SPIRIVA) 18 mcg inhalation capsule Take 1 Cap by inhalation daily.     albuterol (PROVENTIL HFA, VENTOLIN HFA, PROAIR HFA) 90 mcg/actuation inhaler INHALE ONE PUFF BY MOUTH EVERY 6 HOURS AS NEEDED FOR WHEEZING    fluticasone propion-salmeterol (ADVAIR/WIXELA) 500-50 mcg/dose diskus inhaler INHALE ONE DOSE BY MOUTH EVERY 12 HOURS    gabapentin (NEURONTIN) 400 mg capsule take 1 capsule by mouth three times a day    DULoxetine (CYMBALTA) 30 mg capsule Take 1 Cap by mouth daily.  buPROPion (WELLBUTRIN) 100 mg tablet Take 1 Tab by mouth three (3) times daily.  lidocaine (LIDODERM) 5 % apply 1 patch to the affected area daily. Leave on for 12 hours and then REMOVE for 12 hours.  cyclobenzaprine (FLEXERIL) 5 mg tablet TAKE ONE TABLET BY MOUTH THREE TIMES DAILY AS NEEDED FOR MUSCLE SPASM     No current facility-administered medications for this visit. The above histories, medications and allergies have been reviewed. ROS    Visit Vitals  /77 (BP 1 Location: Left arm, BP Patient Position: Sitting)   Pulse 77   Ht 5' 9\" (1.753 m)   Wt 177 lb 11.2 oz (80.6 kg)   BMI 26.24 kg/m²     Physical Exam   Constitutional: She is well-developed, well-nourished, and in no distress. Abdominal: Soft. She exhibits no distension and no mass. There is no tenderness. There is no rebound and no guarding. 1. Parasites in stool  She has responded to her treatment with medications. She is having no further problems. She should follow-up with us in about a year for repeat colonoscopy in light of the multiple polyps that she had. Follow-up and Dispositions    · Return if symptoms worsen or fail to improve.          Michael Staley MD

## 2019-07-22 NOTE — LETTER
7/22/2019 1:45 PM 
 
Patient:  Felicia Baumann YOB: 1960 Date of Visit: 7/22/2019 Dear Vladimir Banda MD 
[address] [place] [address] VIA In Basket 
 : Thank you for referring Ms. Kenyatta Andersen to me for evaluation/treatment. She underwent colonoscopy back in early May. We actually visualized parasites in her colon even though stool testing was negative and therefore we treated her with praziquantel. She presented in follow-up in the office today and she is feeling much better. She has no further abdominal pain. She had multiple small adenomatous polyps at the time of the colonoscopy and therefore I recommend a repeat colonoscopy in 1 year or sooner if she has any problems. If you have questions, please do not hesitate to call me. I look forward to following Ms. Margarita Noriega along with you.  
 
 
 
Sincerely, 
 
 
Dread Sosa MD

## 2019-07-23 ENCOUNTER — DOCUMENTATION ONLY (OUTPATIENT)
Dept: INTERNAL MEDICINE CLINIC | Age: 59
End: 2019-07-23

## 2019-07-24 ENCOUNTER — OFFICE VISIT (OUTPATIENT)
Dept: INTERNAL MEDICINE CLINIC | Age: 59
End: 2019-07-24

## 2019-07-24 VITALS
DIASTOLIC BLOOD PRESSURE: 62 MMHG | BODY MASS INDEX: 26.36 KG/M2 | WEIGHT: 178 LBS | RESPIRATION RATE: 18 BRPM | HEART RATE: 75 BPM | HEIGHT: 69 IN | TEMPERATURE: 98.1 F | OXYGEN SATURATION: 94 % | SYSTOLIC BLOOD PRESSURE: 124 MMHG

## 2019-07-24 DIAGNOSIS — F11.20 NARCOTIC DEPENDENCE (HCC): ICD-10-CM

## 2019-07-24 DIAGNOSIS — F33.0 DEPRESSION, MAJOR, RECURRENT, MILD (HCC): ICD-10-CM

## 2019-07-24 DIAGNOSIS — J44.1 COPD EXACERBATION (HCC): Primary | ICD-10-CM

## 2019-07-24 DIAGNOSIS — M48.00 SPINAL STENOSIS, UNSPECIFIED SPINAL REGION: ICD-10-CM

## 2019-07-24 DIAGNOSIS — Z72.0 TOBACCO ABUSE: ICD-10-CM

## 2019-07-24 RX ORDER — TRAMADOL HYDROCHLORIDE 50 MG/1
50 TABLET ORAL
Qty: 120 TAB | Refills: 1 | Status: SHIPPED | OUTPATIENT
Start: 2019-07-24 | End: 2019-09-16 | Stop reason: SDUPTHER

## 2019-07-24 RX ORDER — GABAPENTIN 400 MG/1
CAPSULE ORAL
Qty: 90 CAP | Refills: 5 | Status: SHIPPED | OUTPATIENT
Start: 2019-07-24 | End: 2019-11-18 | Stop reason: SDUPTHER

## 2019-07-24 RX ORDER — DULOXETIN HYDROCHLORIDE 60 MG/1
60 CAPSULE, DELAYED RELEASE ORAL DAILY
Qty: 30 CAP | Refills: 5 | Status: SHIPPED | OUTPATIENT
Start: 2019-07-24 | End: 2019-09-16 | Stop reason: SDUPTHER

## 2019-07-24 RX ORDER — PREDNISONE 20 MG/1
40 TABLET ORAL
Qty: 10 TAB | Refills: 0 | Status: SHIPPED | OUTPATIENT
Start: 2019-07-24 | End: 2019-12-06 | Stop reason: SDUPTHER

## 2019-07-24 RX ORDER — BUPROPION HYDROCHLORIDE 100 MG/1
100 TABLET ORAL 3 TIMES DAILY
Qty: 90 TAB | Refills: 5 | Status: SHIPPED | OUTPATIENT
Start: 2019-07-24 | End: 2021-06-30 | Stop reason: SDUPTHER

## 2019-07-24 RX ORDER — AZITHROMYCIN 250 MG/1
250 TABLET, FILM COATED ORAL SEE ADMIN INSTRUCTIONS
Qty: 6 TAB | Refills: 0 | Status: SHIPPED | OUTPATIENT
Start: 2019-07-24 | End: 2019-09-16 | Stop reason: ALTCHOICE

## 2019-07-24 RX ORDER — CYCLOBENZAPRINE HCL 5 MG
TABLET ORAL
Qty: 270 TAB | Refills: 1 | Status: SHIPPED | OUTPATIENT
Start: 2019-07-24 | End: 2021-01-12 | Stop reason: ALTCHOICE

## 2019-07-24 NOTE — PROGRESS NOTES
Subjective:   Jessy Luong is a 63 y.o. female who complains of congestion, sneezing, sore throat, cough described as productive of brown sputum and wheezing for few days, gradually worsening since that time. She denies a history of chest pain, chills, fevers and rash on body. More depressed and re-started tob  Evaluation to date: none. Treatment to date: OTC products. Patient does smoke cigarettes. Relevant PMH: Asthma and COPD. No Known Allergies  Finished hydrocodone for a hip injury, requests a refill off her somewhat chronic tramadol. Recent treatment for parasites seen during colonoscopy. Patient Active Problem List    Diagnosis Date Noted    Parasites in stool 05/15/2019    Narcotic dependence ([facility] 04/07/2019    Encounter for diagnostic colonoscopy due to change in bowel habits 03/04/2019    Elevated liver enzymes 02/19/2019    Heme + stool 02/19/2019    Depression, major, recurrent, mild ([facility] 12/21/2017    Restless legs 12/21/2017    Tobacco abuse 12/21/2017    Spinal stenosis 08/14/2017       No Known Allergies  Social History     Tobacco Use    Smoking status: Current Every Day Smoker     Packs/day: 0.50     Years: 35.00     Pack years: 17.50     Types: Cigarettes    Smokeless tobacco: Never Used   Substance Use Topics    Alcohol use: Yes     Alcohol/week: 19.0 standard drinks     Types: 14 Glasses of wine, 5 Cans of beer per week     Frequency: Monthly or less     Drinks per session: 1 or 2     Binge frequency: Never        Review of Systems  Pertinent items are noted in HPI.     Objective:     Visit Vitals  /62   Pulse 75   Temp 98.1 °F (36.7 °C) (Oral)   Resp 18   Ht 5' 9\" (1.753 m)   Wt 178 lb (80.7 kg)   SpO2 94%   BMI 26.29 kg/m²     General:  alert, cooperative, no distress   Eyes: negative   Ears: normal TM's and external ear canals AU   Sinuses: Normal paranasal sinuses without tenderness   Mouth:  Lips, mucosa, and tongue normal. Teeth and gums normal Neck: supple, symmetrical, trachea midline and no adenopathy. Heart: S1 and S2 normal, no murmurs noted. Lungs: clear to auscultation bilaterally   Abdomen: soft, non-tender. Bowel sounds normal. No masses,  no organomegaly      Assessment/Plan:   asthma and COPD. Suggested symptomatic OTC remedies. Antibiotics per orders. Encounter Diagnoses   Name Primary?  Narcotic dependence ([facility] Yes    Spinal stenosis, unspecified spinal region     Depression, major, recurrent, mild (HCC)     Tobacco abuse     COPD exacerbation ([facility]      Orders Placed This Encounter    DULoxetine (CYMBALTA) 60 mg capsule    buPROPion (WELLBUTRIN) 100 mg tablet    gabapentin (NEURONTIN) 400 mg capsule    traMADol (ULTRAM) 50 mg tablet    azithromycin (ZITHROMAX) 250 mg tablet    predniSONE (DELTASONE) 20 mg tablet    PSEUDOEPHEDRINE-dextromethorphan-guaiFENesin (ROBITUSSIN-CE) 30- mg/5 mL solution    cyclobenzaprine (FLEXERIL) 5 mg tablet   . Continue antidepressant, increase dosage as above. Add bupropion for smoking cessation. The patient was counseled on the dangers of tobacco use, and was advised to quit. Reviewed strategies to maximize success, including pharmacotherapy (Wellbutrin). Patient was looked up in the . There are multiple prescribers of controlled substances  no. Niko refill of tramadol. COPD exacerbation treatment as above. COPD exacerbation treatment as above. Current Outpatient Medications   Medication Sig Dispense Refill    DULoxetine (CYMBALTA) 60 mg capsule Take 1 Cap by mouth daily. 30 Cap 5    buPROPion (WELLBUTRIN) 100 mg tablet Take 1 Tab by mouth three (3) times daily. 90 Tab 5    gabapentin (NEURONTIN) 400 mg capsule take 1 capsule by mouth three times a day 90 Cap 5    traMADol (ULTRAM) 50 mg tablet Take 1 Tab by mouth every six (6) hours as needed for Pain for up to 60 days.  Max Daily Amount: 200 mg. 120 Tab 1    azithromycin (ZITHROMAX) 250 mg tablet Take 1 Tab by mouth See Admin Instructions. Take two tablets today then one tablet daily for next 4 days 6 Tab 0    predniSONE (DELTASONE) 20 mg tablet Take 40 mg by mouth daily (with breakfast) for 5 days. 10 Tab 0    PSEUDOEPHEDRINE-dextromethorphan-guaiFENesin (ROBITUSSIN-CE) 30- mg/5 mL solution Take 5 mL by mouth every four (4) hours as needed for Cough for up to 7 days. 280 mL 0    cyclobenzaprine (FLEXERIL) 5 mg tablet TAKE ONE TABLET BY MOUTH THREE TIMES DAILY AS NEEDED FOR MUSCLE SPASM 270 Tab 1    nicotine (NICODERM CQ) 21 mg/24 hr 1 Patch by TransDERmal route every twenty-four (24) hours.  naproxen (NAPROSYN) 500 mg tablet Take 1 Tab by mouth two (2) times daily (with meals). As needed 60 Tab 0    calcium combo no.2-vitamin D3 600 mg calcium- 500 unit TbER Take 1 Each by mouth daily. 90 Tab 1    benzonatate (TESSALON) 200 mg capsule TAKE 1 CAPSULE BY MOUTH THREE TIMES DAILY AS NEEDED FOR COUGH FOR UP TO 7 DAYS 60 Cap 1    diclofenac (VOLTAREN) 1 % gel Apply  to affected area four (4) times daily. 1 Each 5    tiotropium (SPIRIVA) 18 mcg inhalation capsule Take 1 Cap by inhalation daily. 30 Cap 5    albuterol (PROVENTIL HFA, VENTOLIN HFA, PROAIR HFA) 90 mcg/actuation inhaler INHALE ONE PUFF BY MOUTH EVERY 6 HOURS AS NEEDED FOR WHEEZING 1 Inhaler 5    fluticasone propion-salmeterol (ADVAIR/WIXELA) 500-50 mcg/dose diskus inhaler INHALE ONE DOSE BY MOUTH EVERY 12 HOURS 1 Inhaler 5    lidocaine (LIDODERM) 5 % apply 1 patch to the affected area daily. Leave on for 12 hours and then REMOVE for 12 hours. 30 Patch 2     Follow-up and Dispositions    · Return in about 2 months (around 9/24/2019) for routine follow up.

## 2019-07-24 NOTE — PROGRESS NOTES
Chief Complaint   Patient presents with    Cold Symptoms     chest conjestion, prod cough thick yellow/dark brown sputum, generalized aching     I have reviewed the patient's medical history in detail and updated the computerized patient record. Health Maintenance reviewed. 1. Have you been to the ER, urgent care clinic since your last visit? Hospitalized since your last visit?no    2. Have you seen or consulted any other health care providers outside of the [address] [place] [address] since your last visit? Include any pap smears or colon screening. No      Encouraged pt to discuss pt's wishes with spouse/partner/family and bring them in the next appt to follow thru with the Advanced Directive    Fall Risk Assessment, last 12 mths 3/7/2019   Able to walk? Yes   Fall in past 12 months? No   Fall with injury? -   Number of falls in past 12 months -   Fall Risk Score -       3 most recent PHQ Screens 3/7/2019   Little interest or pleasure in doing things More than half the days   Feeling down, depressed, irritable, or hopeless More than half the days   Total Score PHQ 2 4   Trouble falling or staying asleep, or sleeping too much -   Feeling tired or having little energy -   Poor appetite, weight loss, or overeating -   Feeling bad about yourself - or that you are a failure or have let yourself or your family down -   Trouble concentrating on things such as school, work, reading, or watching TV -   Moving or speaking so slowly that other people could have noticed; or the opposite being so fidgety that others notice -   Thoughts of being better off dead, or hurting yourself in some way -   How difficult have these problems made it for you to do your work, take care of your home and get along with others -       Abuse Screening Questionnaire 3/7/2019   Do you ever feel afraid of your partner? N   Are you in a relationship with someone who physically or mentally threatens you? N   Is it safe for you to go home?  Suzy Lesser ADL Assessment 3/7/2019   Feeding yourself No Help Needed   Getting from bed to chair No Help Needed   Getting dressed No Help Needed   Bathing or showering No Help Needed   Walk across the room (includes cane/walker) No Help Needed   Using the telphone No Help Needed   Taking your medications No Help Needed   Preparing meals No Help Needed   Managing money (expenses/bills) No Help Needed   Moderately strenuous housework (laundry) No Help Needed   Shopping for personal items (toiletries/medicines) No Help Needed   Shopping for groceries No Help Needed   Driving Help Needed   Climbing a flight of stairs No Help Needed   Getting to places beyond walking distances Help Needed

## 2019-08-28 DIAGNOSIS — J44.9 CHRONIC OBSTRUCTIVE PULMONARY DISEASE, UNSPECIFIED COPD TYPE (HCC): ICD-10-CM

## 2019-08-28 RX ORDER — BENZONATATE 200 MG/1
CAPSULE ORAL
Qty: 60 CAP | Refills: 1 | Status: SHIPPED | OUTPATIENT
Start: 2019-08-28 | End: 2020-01-15 | Stop reason: SDUPTHER

## 2019-08-28 RX ORDER — IBUPROFEN 200 MG
TABLET ORAL
Qty: 30 PATCH | Refills: 0 | Status: SHIPPED | OUTPATIENT
Start: 2019-08-28 | End: 2020-07-13 | Stop reason: SDUPTHER

## 2019-08-28 NOTE — TELEPHONE ENCOUNTER
Last office visit:  07/24/2019  Last filled:  Tessalon 200mg #60 X 1 refill 06/21/2019  Nicoderm CQ 1 patch every 24 hours (not listed last filled)  Follow up 09/24/2019

## 2019-09-12 ENCOUNTER — TELEPHONE (OUTPATIENT)
Dept: INTERNAL MEDICINE CLINIC | Age: 59
End: 2019-09-12

## 2019-09-12 NOTE — TELEPHONE ENCOUNTER
----- Message from Trupti Lagos sent at 9/12/2019  1:11 PM EDT -----  Regarding: /Telephone   General Message/Vendor Calls    Caller's first and last name:      Reason for call: Pt requesting an order for a \"walker\" and \"cane\" due to left side of body pain. Pt stated she needs a company that would accept [employer].       Callback required yes/no and why:Yes      Best contact number(s):[phone]      Details to clarify the request:      Trupti Lagos

## 2019-09-16 ENCOUNTER — OFFICE VISIT (OUTPATIENT)
Dept: INTERNAL MEDICINE CLINIC | Age: 59
End: 2019-09-16

## 2019-09-16 ENCOUNTER — DOCUMENTATION ONLY (OUTPATIENT)
Dept: INTERNAL MEDICINE CLINIC | Age: 59
End: 2019-09-16

## 2019-09-16 VITALS
HEART RATE: 77 BPM | OXYGEN SATURATION: 96 % | SYSTOLIC BLOOD PRESSURE: 147 MMHG | DIASTOLIC BLOOD PRESSURE: 92 MMHG | WEIGHT: 185.4 LBS | HEIGHT: 69 IN | TEMPERATURE: 97.5 F | BODY MASS INDEX: 27.46 KG/M2 | RESPIRATION RATE: 16 BRPM

## 2019-09-16 DIAGNOSIS — M17.0 PRIMARY OSTEOARTHRITIS OF BOTH KNEES: ICD-10-CM

## 2019-09-16 DIAGNOSIS — F33.0 DEPRESSION, MAJOR, RECURRENT, MILD (HCC): ICD-10-CM

## 2019-09-16 DIAGNOSIS — M48.00 SPINAL STENOSIS, UNSPECIFIED SPINAL REGION: ICD-10-CM

## 2019-09-16 DIAGNOSIS — F11.20 NARCOTIC DEPENDENCE (HCC): Primary | ICD-10-CM

## 2019-09-16 RX ORDER — DULOXETIN HYDROCHLORIDE 60 MG/1
60 CAPSULE, DELAYED RELEASE ORAL DAILY
Qty: 30 CAP | Refills: 11 | Status: SHIPPED | OUTPATIENT
Start: 2019-09-16 | End: 2020-10-06

## 2019-09-16 RX ORDER — VITAMIN E (DL,TOCOPHERYL ACET) 45 MG/0.25
1 DROPS ORAL DAILY
Qty: 90 TAB | Refills: 1 | Status: SHIPPED | OUTPATIENT
Start: 2019-09-16 | End: 2019-10-16 | Stop reason: SDUPTHER

## 2019-09-16 RX ORDER — DIPHENOXYLATE HYDROCHLORIDE AND ATROPINE SULFATE 2.5; .025 MG/1; MG/1
TABLET ORAL
COMMUNITY
End: 2019-10-18 | Stop reason: ALTCHOICE

## 2019-09-16 RX ORDER — TRIAMCINOLONE ACETONIDE 40 MG/ML
40 INJECTION, SUSPENSION INTRA-ARTICULAR; INTRAMUSCULAR ONCE
Qty: 4 VIAL | Refills: 0 | Status: SHIPPED | COMMUNITY
Start: 2019-09-16 | End: 2019-09-16

## 2019-09-16 RX ORDER — TRAMADOL HYDROCHLORIDE 50 MG/1
50 TABLET ORAL
Qty: 120 TAB | Refills: 1 | Status: SHIPPED | OUTPATIENT
Start: 2019-09-16 | End: 2019-11-18 | Stop reason: SDUPTHER

## 2019-09-16 RX ORDER — POLYETHYLENE GLYCOL 3350 17 G/17G
POWDER, FOR SOLUTION ORAL
COMMUNITY
End: 2019-10-18 | Stop reason: ALTCHOICE

## 2019-09-16 NOTE — PROGRESS NOTES
1. Have you been to the ER, urgent care clinic since your last visit? Hospitalized since your last visit? No    2. Have you seen or consulted any other health care providers outside of the [address] [place] [address] since your last visit? Include any pap smears or colon screening.  No    Health Maintenance Due   Topic Date Due    Hepatitis C Screening  1960    DTaP/Tdap/Td series (1 - Tdap) 12/10/1982    PAP AKA CERVICAL CYTOLOGY  12/10/1982    Pneumococcal 0-64 years (1 of 1 - PPSV23) 10/25/2018    Shingrix Vaccine Age 50> (2 of 2) 05/02/2019    Influenza Age 5 to Adult  08/01/2019

## 2019-09-16 NOTE — PROGRESS NOTES
PROGRESS NOTE        SUBJECTIVE:  Diagnosis/Chief Complaint: Back Pain (RADIATES TO LOWER BACK) and Foot Pain (BILATERAL FEET)    GI parasitic infection treated, no further complaints. Diarrhea resolved  Doing well with pain so-so-okay sometimes other times pain is severe  Improvement in function: yes -helps  Pain levels currently severe/10 both knees both shoulders. She is asking for bilateral knee injections which help previously. No specific trauma. Usage of benzodiazapine or other sedatives no  Symptoms low back pain, radiates into the but in both feet her  Activities: Able to do activities of daily living. yes - ok  Side affects: no  States taking medications per medicine list.yes -as prescribed   queried yes -only prescriber  Urine drug screen done no  Opiod Rx exceeds 50 MME/dayno  Hx of depression yes - on AD  Hx of drug addiction: no  Safe storage of the opiods: yes - locked  Narcotic contract reviewed and discussed: yes - re    Patient Active Problem List    Diagnosis Date Noted    Parasites in stool 05/15/2019    Narcotic dependence ([facility] 04/07/2019    Encounter for diagnostic colonoscopy due to change in bowel habits 03/04/2019    Elevated liver enzymes 02/19/2019    Heme + stool 02/19/2019    Depression, major, recurrent, mild ([address] 12/21/2017    Restless legs 12/21/2017    Tobacco abuse 12/21/2017    Spinal stenosis 08/14/2017     Current Outpatient Medications   Medication Sig Dispense Refill    polyethylene glycol (MIRALAX) 17 gram packet polyethylene glycol 3350 17 gram/dose oral powder      calcium combo no.2-vitamin D3 600 mg calcium- 500 unit TbER Take 1 Each by mouth daily. 90 Tab 1    DULoxetine (CYMBALTA) 60 mg capsule Take 1 Cap by mouth daily. 30 Cap 11    traMADol (ULTRAM) 50 mg tablet Take 1 Tab by mouth every six (6) hours as needed for Pain for up to 60 days.  Max Daily Amount: 200 mg. 120 Tab 1    nicotine (NICODERM CQ) 21 mg/24 hr APPLY 1 PATCH TOPICALLY EVERY 24 HOURS FOR 30 DAYS 30 Patch 0    benzonatate (TESSALON) 200 mg capsule TAKE 1 CAPSULE BY MOUTH THREE TIMES DAILY AS NEEDED WITH FOOD FOR  COUGH  FOR  UP  TO  7  DAYS 60 Cap 1    buPROPion (WELLBUTRIN) 100 mg tablet Take 1 Tab by mouth three (3) times daily. 90 Tab 5    gabapentin (NEURONTIN) 400 mg capsule take 1 capsule by mouth three times a day 90 Cap 5    cyclobenzaprine (FLEXERIL) 5 mg tablet TAKE ONE TABLET BY MOUTH THREE TIMES DAILY AS NEEDED FOR MUSCLE SPASM 270 Tab 1    nicotine (NICODERM CQ) 21 mg/24 hr 1 Patch by TransDERmal route every twenty-four (24) hours.  naproxen (NAPROSYN) 500 mg tablet Take 1 Tab by mouth two (2) times daily (with meals). As needed 60 Tab 0    diclofenac (VOLTAREN) 1 % gel Apply  to affected area four (4) times daily. 1 Each 5    tiotropium (SPIRIVA) 18 mcg inhalation capsule Take 1 Cap by inhalation daily. 30 Cap 5    albuterol (PROVENTIL HFA, VENTOLIN HFA, PROAIR HFA) 90 mcg/actuation inhaler INHALE ONE PUFF BY MOUTH EVERY 6 HOURS AS NEEDED FOR WHEEZING 1 Inhaler 5    fluticasone propion-salmeterol (ADVAIR/WIXELA) 500-50 mcg/dose diskus inhaler INHALE ONE DOSE BY MOUTH EVERY 12 HOURS 1 Inhaler 5    lidocaine (LIDODERM) 5 % apply 1 patch to the affected area daily. Leave on for 12 hours and then REMOVE for 12 hours. 30 Patch 2    diphenoxylate-atropine (LOMOTIL) 2.5-0.025 mg per tablet diphenoxylate-atropine 2.5 mg-0.025 mg tablet       No Known Allergies  Past Medical History:   Diagnosis Date    Arthritis     Asthma     COPD (chronic obstructive pulmonary disease) [facility] [address]     Depression     Hemorrhoids     Occult blood positive stool     Spinal stenosis      Social History     Tobacco Use    Smoking status: Current Every Day Smoker     Packs/day: 0.25     Years: 35.00     Pack years: 8.75     Types: Cigarettes    Smokeless tobacco: Never Used   Substance Use Topics    Alcohol use:  Yes     Alcohol/week: 19.0 standard drinks     Types: 14 Glasses of wine, 5 Cans of beer per week     Frequency: Monthly or less     Drinks per session: 1 or 2     Binge frequency: Never        OBJECTIVE:    .  Visit Vitals  BP (!) 147/92 (BP 1 Location: Left arm, BP Patient Position: Sitting)   Pulse 77   Temp 97.5 °F (36.4 °C) (Oral)   Resp 16   Ht 5' 9\" (1.753 m)   Wt 185 lb 6.4 oz (84.1 kg)   LMP  (LMP Unknown)   SpO2 96%   BMI 27.38 kg/m²     WDWN in NAD, mental status normal  Heart RRR, no:C/M/R  Lungs CTA No wheezes, rales or rhonchi  Abdo: soft no tenderness, rebound or guarding  Neurological exam[de-identified] 2-12 intact  Psychiatric: Normal mood, judgement    Reviewed: Medications, allergies, clinical lab test results and imaging results have been reviewed. Any abnormal findings have been addressed. ASSESSMENT:     ICD-10-CM ICD-9-CM    1. Narcotic dependence ([facility] F11.20 304.90    2. Depression, major, recurrent, mild (HCC) F33.0 296.31 DULoxetine (CYMBALTA) 60 mg capsule   3. Primary osteoarthritis of both knees M17.0 715.16 calcium combo no.2-vitamin D3 600 mg calcium- 500 unit TbER      AMB SUPPLY ORDER     Chronic Conditions Addressed Today     1. Spinal stenosis     Relevant Medications     traMADol (ULTRAM) 50 mg tablet     Other Relevant Orders     AMB SUPPLY ORDER     REFERRAL TO PAIN CLINIC    2. Depression, major, recurrent, mild (HCC)     Relevant Medications     DULoxetine (CYMBALTA) 60 mg capsule     traMADol (ULTRAM) 50 mg tablet    3.  Narcotic dependence ([facility] - Primary     Relevant Medications     DULoxetine (CYMBALTA) 60 mg capsule     traMADol (ULTRAM) 50 mg tablet      Acute Diagnoses Addressed Today     Primary osteoarthritis of both knees            Relevant Medications        calcium combo no.2-vitamin D3 600 mg calcium- 500 unit TbER        Other Relevant Orders        AMB SUPPLY ORDER        AMB SUPPLY ORDER        DRAIN/INJECT LARGE JOINT/BURSA        DRAIN/INJECT LARGE JOINT/BURSA            Patient is 63 y.o. with diagnosis of :   Patient Active Problem List Diagnosis Code    Spinal stenosis M48.00    Depression, major, recurrent, mild (HCC) F33.0    Restless legs G25.81    Tobacco abuse Z72.0    Elevated liver enzymes R74.8    Heme + stool R19.5    Encounter for diagnostic colonoscopy due to change in bowel habits R19.4    Narcotic dependence ([facility] [address] F11.20    Parasites in stool B82.9       PLAN:     Patient was looked up in the . There are multiple prescribers of controlled substances  no. Options discussed. Discussed possible side affects and benefits of the procedure and/or medications. The patient agrees to undergo the procedure. Consent obtained. Sterile procedure followed. There were no complications and the procedure was well tolerated. Instructed patient to call me if it is ineffective or if there are any complications like increasing pain, redness or swelling. The bilaterally knee was prepped and using a lateral approach a needle was inserted into the knee. no  attempt was made to obtain synovial fluid. 0synovial fluid was obtained from the joint: The knee was then injected wit yesh  2 cc of kenalog and 7  cc of lidocaine. Post injection instructions given.  she was instructed to call for increasing redness or pain in the injected knee    Orders Placed This Encounter    DRAIN/INJECT LARGE JOINT/BURSA    DRAIN/INJECT LARGE JOINT/BURSA    AMB SUPPLY ORDER     Walker/Rollator with a seat    AMB SUPPLY ORDER     [place]  [phone]  Back pain Hx of back surgery  Knee arthritis Bi knee pain    REFERRAL TO PAIN CLINIC     Referral Priority:   Routine     Referral Type:   Consultation     Referral Reason:   Specialty Services Required     Referred to Provider:   Martín Degroot MD    polyethylene glycol (MIRALAX) 17 gram packet     Sig: polyethylene glycol 3350 17 gram/dose oral powder    diphenoxylate-atropine (LOMOTIL) 2.5-0.025 mg per tablet     Sig: diphenoxylate-atropine 2.5 mg-0.025 mg tablet    calcium combo no.2-vitamin D3 600 mg calcium- 500 unit TbER     Sig: Take 1 Each by mouth daily. Dispense:  90 Tab     Refill:  1    DULoxetine (CYMBALTA) 60 mg capsule     Sig: Take 1 Cap by mouth daily. Dispense:  30 Cap     Refill:  11    traMADol (ULTRAM) 50 mg tablet     Sig: Take 1 Tab by mouth every six (6) hours as needed for Pain for up to 60 days. Max Daily Amount: 200 mg. Dispense:  120 Tab     Refill:  1    triamcinolone acetonide (KENALOG-40) 40 mg/mL injection     Si mL by IntraBURSal route once for 1 dose. Dispense:  4 Vial     Refill:  0     Order Specific Question:   Expiration Date     Answer:   2021     Order Specific Question:   Lot#     Answer:   [id number]     Order Specific Question:        Answer:   [id number]     Order Specific Question:   NDC#     Answer:   [id number]     Follow-up and Dispositions    · Return in about 2 months (around 2019) for routine follow up.

## 2019-09-23 ENCOUNTER — DOCUMENTATION ONLY (OUTPATIENT)
Dept: INTERNAL MEDICINE CLINIC | Age: 59
End: 2019-09-23

## 2019-10-16 ENCOUNTER — TELEPHONE (OUTPATIENT)
Dept: INTERNAL MEDICINE CLINIC | Age: 59
End: 2019-10-16

## 2019-10-16 DIAGNOSIS — M17.0 PRIMARY OSTEOARTHRITIS OF BOTH KNEES: ICD-10-CM

## 2019-10-16 NOTE — TELEPHONE ENCOUNTER
From [facility]    Reason for call:  Pt requesting prescriptions for Multivitamin and Calcium to be sent to [facility], [address], which is on file at office. Callback required yes/no and why:  Yes.  Pt would like to be contacted when prescriptions have been sent to pharmacy. Best contact number(s):  [phone]       Details to clarify the request: Dr. Mendel Ugalde advised pt of prescribing the vitamins at her last appointment.

## 2019-10-16 NOTE — TELEPHONE ENCOUNTER
From [facility]  Patient stated she went to the hospital and was told she had a compression fracture L1 on her spine and would need an MRI. She stated she was told she needs something stronger than the \"tramadol\" prescribed. She also needs a prescription put in for her walker with the seat due to moving. She stated the prescription are to be sent to new pharmacy: [address]. Patient best contact number is [phone]. [address] best contact number [phone].

## 2019-10-16 NOTE — TELEPHONE ENCOUNTER
From [facility]  Caller's first and last name:Ana Sierra       Reason for call: er visit       Callback required yes/no and why:No       Best contact number(s):[phone]       Details to clarify the request:Nisha advising the Dr the pt recently visit [address] [facility] emergency room and was d/c on the 10/13/19.

## 2019-10-18 ENCOUNTER — OFFICE VISIT (OUTPATIENT)
Dept: INTERNAL MEDICINE CLINIC | Age: 59
End: 2019-10-18

## 2019-10-18 VITALS
WEIGHT: 177 LBS | RESPIRATION RATE: 16 BRPM | HEIGHT: 69 IN | SYSTOLIC BLOOD PRESSURE: 117 MMHG | TEMPERATURE: 98.1 F | DIASTOLIC BLOOD PRESSURE: 79 MMHG | BODY MASS INDEX: 26.22 KG/M2 | HEART RATE: 103 BPM

## 2019-10-18 DIAGNOSIS — S41.101A OPEN WOUND OF RIGHT UPPER ARM, INITIAL ENCOUNTER: Primary | ICD-10-CM

## 2019-10-18 DIAGNOSIS — M54.9 BACK PAIN DUE TO INJURY: ICD-10-CM

## 2019-10-18 DIAGNOSIS — L03.113 CELLULITIS OF RIGHT ARM: ICD-10-CM

## 2019-10-18 DIAGNOSIS — F33.0 DEPRESSION, MAJOR, RECURRENT, MILD (HCC): ICD-10-CM

## 2019-10-18 DIAGNOSIS — M48.00 SPINAL STENOSIS, UNSPECIFIED SPINAL REGION: ICD-10-CM

## 2019-10-18 DIAGNOSIS — Z72.0 TOBACCO ABUSE: ICD-10-CM

## 2019-10-18 DIAGNOSIS — F11.20 NARCOTIC DEPENDENCE (HCC): ICD-10-CM

## 2019-10-18 RX ORDER — CEPHALEXIN 500 MG/1
1 TABLET ORAL 3 TIMES DAILY
Qty: 21 TAB | Refills: 0 | Status: SHIPPED | OUTPATIENT
Start: 2019-10-18 | End: 2019-10-25

## 2019-10-18 RX ORDER — CEPHALEXIN 500 MG/1
1 TABLET ORAL 3 TIMES DAILY
Qty: 21 TAB | Refills: 0 | Status: SHIPPED | OUTPATIENT
Start: 2019-10-18 | End: 2019-10-18 | Stop reason: SDUPTHER

## 2019-10-18 RX ORDER — HYDROCODONE BITARTRATE AND ACETAMINOPHEN 5; 325 MG/1; MG/1
1 TABLET ORAL
Qty: 20 TAB | Refills: 0 | Status: SHIPPED | OUTPATIENT
Start: 2019-10-18 | End: 2019-10-23

## 2019-10-18 NOTE — PROGRESS NOTES
HISTORY OF PRESENT ILLNESS  Aidee Trejo is a 63 y.o. female. Taking a door down. Wound Infection   The history is provided by the patient. This is a new problem. The current episode started more than 2 days ago (Mon). The problem has been gradually improving. Pertinent negatives include no chest pain and no shortness of breath. Fall   Incident onset: 5 days ago x 2. The fall occurred while walking. She fell from a height of 3 - 5 ft. She landed on grass. Point of impact: butt, back. Pain location: all over elisabeth low back. The pain is severe. She was ambulatory at the scene. There was no entrapment after the fall. There was no drug use involved in the accident. There was no alcohol use involved in the accident. Associated symptoms include extremity weakness. Pertinent negatives include no loss of consciousness. The risk factors include recurrent falls. Associated symptoms comments: Leg gave way. The symptoms are aggravated by activity. The patient's last tetanus shot was more than 10 years ago. [facility] appt Oct 24    Review of Systems   Respiratory: Negative for shortness of breath. Cardiovascular: Negative for chest pain. Musculoskeletal: Positive for extremity weakness. Neurological: Negative for loss of consciousness. Patient does not complain about: fever, weight loss, recent fecal or urine incontinence, known active cancer, focal paralysis, recent back orthopaedic or other procedure.     Social History     Socioeconomic History    Marital status: LEGALLY      Spouse name: Not on file    Number of children: 2    Years of education: Not on file    Highest education level: 10th grade   Occupational History    Occupation: disabled   Social Needs    Financial resource strain: Not on file    Food insecurity:     Worry: Not on file     Inability: Not on file   truedash needs:     Medical: Not on file     Non-medical: Not on file   Tobacco Use    Smoking status: Current Every Day Smoker     Packs/day: 0.25     Years: 35.00     Pack years: 8.75     Types: Cigarettes    Smokeless tobacco: Never Used   Substance and Sexual Activity    Alcohol use: Yes     Alcohol/week: 19.0 standard drinks     Types: 14 Glasses of wine, 5 Cans of beer per week     Frequency: Monthly or less     Drinks per session: 1 or 2     Binge frequency: Never    Drug use: No    Sexual activity: Not Currently     Partners: Male   Lifestyle    Physical activity:     Days per week: Not on file     Minutes per session: Not on file    Stress: Not on file   Relationships    Social connections:     Talks on phone: Not on file     Gets together: Not on file     Attends Rastafari service: Not on file     Active member of club or organization: Not on file     Attends meetings of clubs or organizations: Not on file     Relationship status: Not on file    Intimate partner violence:     Fear of current or ex partner: Not on file     Emotionally abused: Not on file     Physically abused: Not on file     Forced sexual activity: Not on file   Other Topics Concern     Service No    Blood Transfusions No    Caffeine Concern Not Asked    Occupational Exposure Not Asked    Hobby Hazards Not Asked    Sleep Concern Not Asked    Stress Concern Not Asked    Weight Concern Not Asked    Special Diet Not Asked    Back Care Not Asked    Exercise Not Asked    Bike Helmet Not Asked   [address] [facility] Yes    Self-Exams Yes   Social History Narrative    Lives with friends       Physical Exam  Visit Vitals  /79   Pulse (!) 103   Temp 98.1 °F (36.7 °C) (Oral)   Resp 16   Ht 5' 9\" (1.753 m)   Wt 177 lb (80.3 kg)   LMP  (LMP Unknown)   BMI 26.14 kg/m²     WD WN female NAD  Heart RRR without murmers clicks or rubs  Lungs CTA  Abdo soft nontender  Ext no edema, 1.5 cm area of redness surrounding a puncture wound right forearm  Back was examined, grossly normal, no lesions or rash. Saddle area, sensation present.   Patellar reflexes 2+ equal bilaterally. Lower leg strength 5 out of 5 bilateral.  Straight leg raises not performed    ASSESSMENT and PLAN  Encounter Diagnoses   Name Primary?  Open wound of right upper arm, initial encounter Yes    Back pain due to injury     Cellulitis of right arm     Spinal stenosis, unspecified spinal region     Narc[facility] dependence ([facility]     Depression, major, recurrent, mild (HCC)     Tobacco abuse      Orders Placed This Encounter    THER/PROPH/DIAG INJECTION, SUBCUT/IM    XR FOREARM RT AP/LAT    TETANUS, DIPHTHERIA TOXOIDS AND ACELLULAR PERTUSSIS VACCINE (TDAP), IN INDIVIDS. >=7, IM    HYDROcodone-acetaminophen (NORCO) 5-325 mg per tablet    DISCONTD: cephalexin 500 mg tab    cephalexin 500 mg tab     Patient was looked up in the . There are multiple prescribers of controlled substances  No  Follow-up and Dispositions    · Return in about 10 days (around 10/28/2019) for routine follow up. Bertrand Lyn

## 2019-10-18 NOTE — PROGRESS NOTES
Chief Complaint   Patient presents with    Wound Infection     R/forearm punctured with a eliane nail, a scab with large red ring around wound    Fall     back pain     I have reviewed the patient's medical history in detail and updated the computerized patient record. Health Maintenance reviewed. 1. Have you been to the ER, urgent care clinic since your last visit? Hospitalized since your last visit? yes    2. Have you seen or consulted any other health care providers outside of the [address] [place] [address] since your last visit? Include any pap smears or colon screening. [facility] - ER      Encouraged pt to discuss pt's wishes with spouse/partner/family and bring them in the next appt to follow thru with the Advanced Directive    Fall Risk Assessment, last 12 mths 3/7/2019   Able to walk? Yes   Fall in past 12 months? No   Fall with injury? -   Number of falls in past 12 months -   Fall Risk Score -       3 most recent PHQ Screens 3/7/2019   Little interest or pleasure in doing things More than half the days   Feeling down, depressed, irritable, or hopeless More than half the days   Total Score PHQ 2 4   Trouble falling or staying asleep, or sleeping too much -   Feeling tired or having little energy -   Poor appetite, weight loss, or overeating -   Feeling bad about yourself - or that you are a failure or have let yourself or your family down -   Trouble concentrating on things such as school, work, reading, or watching TV -   Moving or speaking so slowly that other people could have noticed; or the opposite being so fidgety that others notice -   Thoughts of being better off dead, or hurting yourself in some way -   How difficult have these problems made it for you to do your work, take care of your home and get along with others -       Abuse Screening Questionnaire 3/7/2019   Do you ever feel afraid of your partner? N   Are you in a relationship with someone who physically or mentally threatens you?  N   Is it safe for you to go home? Y       ADL Assessment 3/7/2019   Feeding yourself No Help Needed   Getting from bed to chair No Help Needed   Getting dressed No Help Needed   Bathing or showering No Help Needed   Walk across the room (includes cane/walker) No Help Needed   Using the telphone No Help Needed   Taking your medications No Help Needed   Preparing meals No Help Needed   Managing money (expenses/bills) No Help Needed   Moderately strenuous housework (laundry) No Help Needed   Shopping for personal items (toiletries/medicines) No Help Needed   Shopping for groceries No Help Needed   Driving Help Needed   Climbing a flight of stairs No Help Needed   Getting to places beyond walking distances Help Needed     As per verbal order and written order to give the TDAP today.

## 2019-10-21 ENCOUNTER — TELEPHONE (OUTPATIENT)
Dept: INTERNAL MEDICINE CLINIC | Age: 59
End: 2019-10-21

## 2019-10-22 ENCOUNTER — TELEPHONE (OUTPATIENT)
Dept: INTERNAL MEDICINE CLINIC | Age: 59
End: 2019-10-22

## 2019-10-22 RX ORDER — VITAMIN E (DL,TOCOPHERYL ACET) 45 MG/0.25
1 DROPS ORAL DAILY
Qty: 90 TAB | Refills: 1 | Status: SHIPPED | OUTPATIENT
Start: 2019-10-22

## 2019-10-22 NOTE — TELEPHONE ENCOUNTER
----- Message from Horacio Glez sent at 10/22/2019 10:23 AM EDT -----  Regarding: Dr. Merian Apley: [phone]  General Message/Vendor Calls    Caller's first and last name:Keri Forbes       Reason for call:Pt would like to speak to the nurse regarding medication approval.      Callback required yes/no and why:yes       Best contact number(s):[phone]      Details to clarify the request:      Horacio Glez

## 2019-10-22 NOTE — TELEPHONE ENCOUNTER
Could not leave a voice message, mailbox not set up - hydrocodone denied by the ins co.  Use IB Profen over the counter as per Dr. Jacques Varela

## 2019-10-22 NOTE — TELEPHONE ENCOUNTER
Requested Prescriptions     Pending Prescriptions Disp Refills    calcium combo no.2-vitamin D3 600 mg calcium- 500 unit TbER 90 Tab 1     Sig: Take 1 Each by mouth daily.      Future Appointments:  10/28/2019  9:00 AM Donita Altman MD   11/18/2019 10:45 AM Donita Altman MD   Last Appointment With Me:  9/16/2019

## 2019-10-23 NOTE — TELEPHONE ENCOUNTER
----- Message from Kendal Murray sent at 10/22/2019  5:29 PM EDT -----  Regarding: Dr. Lin West: [phone]  Pt requesting a return call after a missed call from the practice.

## 2019-11-04 DIAGNOSIS — S41.101A OPEN WOUND OF RIGHT UPPER ARM, INITIAL ENCOUNTER: ICD-10-CM

## 2019-11-05 ENCOUNTER — DOCUMENTATION ONLY (OUTPATIENT)
Dept: INTERNAL MEDICINE CLINIC | Age: 59
End: 2019-11-05

## 2019-11-05 NOTE — PROGRESS NOTES
11/5/19 PA submitted thru Cover [employer],for Guy 5-325 mg tab. PA ID: [id number],[place] # [id number]. If no response in 24 hours call [phone]

## 2019-11-06 ENCOUNTER — DOCUMENTATION ONLY (OUTPATIENT)
Dept: INTERNAL MEDICINE CLINIC | Age: 59
End: 2019-11-06

## 2019-11-06 NOTE — PROGRESS NOTES
11/6/19 Deb Armendariz Key: [id number] - PA Case ID: [id number] - Rx #: [id number]   Outcome   Denied on November 5   PA Case: [id number], Status: Denied. Notification: Completed.    DrugHYDROcodone-Acetaminophen 5-325MG tablets   [facility] Electronic PA Form   Original Claim

## 2019-11-15 ENCOUNTER — TELEPHONE (OUTPATIENT)
Dept: INTERNAL MEDICINE CLINIC | Age: 59
End: 2019-11-15

## 2019-11-15 NOTE — TELEPHONE ENCOUNTER
----- Message from Alexandru Tobias sent at 11/15/2019  9:34 AM EST -----  Regarding: Dr. Romana Junior (if not patient):      Relationship of caller (if not patient):      Best contact number(s): [phone]    Name of medication and dosage if known:    diclofenac (VOLTAREN) 1 % gel [[id number]]    Is patient out of this medication (yes/no): [address] listed in chart? (yes/no): Y  Pharmacy phone number:      Details to clarify the request:Pt wanted to remind the PCP to complete a prior authorization with the request for the Rx.        [employer]

## 2019-11-18 ENCOUNTER — TELEPHONE (OUTPATIENT)
Dept: INTERNAL MEDICINE CLINIC | Age: 59
End: 2019-11-18

## 2019-11-18 ENCOUNTER — OFFICE VISIT (OUTPATIENT)
Dept: INTERNAL MEDICINE CLINIC | Age: 59
End: 2019-11-18

## 2019-11-18 VITALS
HEART RATE: 80 BPM | WEIGHT: 170 LBS | DIASTOLIC BLOOD PRESSURE: 74 MMHG | TEMPERATURE: 97.6 F | HEIGHT: 69 IN | SYSTOLIC BLOOD PRESSURE: 148 MMHG | OXYGEN SATURATION: 93 % | BODY MASS INDEX: 25.18 KG/M2 | RESPIRATION RATE: 18 BRPM

## 2019-11-18 DIAGNOSIS — M54.41 CHRONIC LOW BACK PAIN WITH BILATERAL SCIATICA, UNSPECIFIED BACK PAIN LATERALITY: Primary | ICD-10-CM

## 2019-11-18 DIAGNOSIS — F33.0 DEPRESSION, MAJOR, RECURRENT, MILD (HCC): ICD-10-CM

## 2019-11-18 DIAGNOSIS — M54.42 CHRONIC LOW BACK PAIN WITH BILATERAL SCIATICA, UNSPECIFIED BACK PAIN LATERALITY: Primary | ICD-10-CM

## 2019-11-18 DIAGNOSIS — G89.29 CHRONIC LOW BACK PAIN WITH BILATERAL SCIATICA, UNSPECIFIED BACK PAIN LATERALITY: Primary | ICD-10-CM

## 2019-11-18 DIAGNOSIS — M19.012 OSTEOARTHRITIS OF BOTH SHOULDERS, UNSPECIFIED OSTEOARTHRITIS TYPE: ICD-10-CM

## 2019-11-18 DIAGNOSIS — M19.011 OSTEOARTHRITIS OF BOTH SHOULDERS, UNSPECIFIED OSTEOARTHRITIS TYPE: ICD-10-CM

## 2019-11-18 DIAGNOSIS — M48.00 SPINAL STENOSIS, UNSPECIFIED SPINAL REGION: ICD-10-CM

## 2019-11-18 DIAGNOSIS — F11.20 NARCOTIC DEPENDENCE (HCC): ICD-10-CM

## 2019-11-18 DIAGNOSIS — M17.0 PRIMARY OSTEOARTHRITIS OF BOTH KNEES: ICD-10-CM

## 2019-11-18 DIAGNOSIS — Z72.0 TOBACCO ABUSE: ICD-10-CM

## 2019-11-18 DIAGNOSIS — M54.2 NECK PAIN: ICD-10-CM

## 2019-11-18 DIAGNOSIS — Z23 ENCOUNTER FOR IMMUNIZATION: ICD-10-CM

## 2019-11-18 RX ORDER — GABAPENTIN 400 MG/1
CAPSULE ORAL
Qty: 90 CAP | Refills: 1 | Status: SHIPPED | OUTPATIENT
Start: 2019-11-18 | End: 2020-01-15 | Stop reason: SDUPTHER

## 2019-11-18 RX ORDER — TRAMADOL HYDROCHLORIDE 50 MG/1
50 TABLET ORAL
Qty: 120 TAB | Refills: 1 | Status: SHIPPED | OUTPATIENT
Start: 2019-11-18 | End: 2020-01-15 | Stop reason: SDUPTHER

## 2019-11-18 RX ORDER — ETHYL CHLORIDE 100 %
AEROSOL, SPRAY (ML) TOPICAL
Qty: 103.5 ML | Refills: 5 | Status: SHIPPED | OUTPATIENT
Start: 2019-11-18 | End: 2020-01-15 | Stop reason: ALTCHOICE

## 2019-11-18 RX ORDER — DICLOFENAC SODIUM 10 MG/G
GEL TOPICAL 4 TIMES DAILY
Qty: 1 EACH | Refills: 5 | Status: SHIPPED | OUTPATIENT
Start: 2019-11-18 | End: 2020-08-20 | Stop reason: SDUPTHER

## 2019-11-18 NOTE — PROGRESS NOTES
PROGRESS NOTE        SUBJECTIVE:  Diagnosis/Chief Complaint: Back Pain (med refill)    Ran out of tramadol 3 days ago, interested in brace  Doing well with pain yes - ok  Improvement in function: yes - a lot  Pain levels 8/10   Usage of benzodiazapine or other sedatives no  Symptoms recovered from fall injury getting better  Activities: Able to do activities of daily living. yes - helping in plant nursery  Side affects: no  States taking medications per medicine list.yes - didn't fill HC due to tramaol   queried yes - am only Rx  Urine drug screen done no  Opiod Rx exceeds 50 MME/dayno  Hx of depression yes  Hx of drug addiction: yrs ago not recntly  Safe storage of the opiods: yes - lock box safe  Narcotic contract reviewed and discussed: yes - in chart  Patient does not complain about: fever, weight loss, recent fecal or urine incontinence, known active cancer, focal paralysis, recent back orthopaedic or other procedure. Patient Active Problem List    Diagnosis Date Noted    Parasites in stool 05/15/2019    Narcotic dependence ([facility] 04/07/2019    Encounter for diagnostic colonoscopy due to change in bowel habits 03/04/2019    Elevated liver enzymes 02/19/2019    Heme + stool 02/19/2019    Depression, major, recurrent, mild ([address] 12/21/2017    Restless legs 12/21/2017    Tobacco abuse 12/21/2017    Spinal stenosis 08/14/2017     Current Outpatient Medications   Medication Sig Dispense Refill    diclofenac (VOLTAREN) 1 % gel Apply  to affected area four (4) times daily. 1 Each 5    traMADol (ULTRAM) 50 mg tablet Take 1 Tab by mouth every six (6) hours as needed for Pain for up to 60 days. Max Daily Amount: 200 mg. 120 Tab 1    gabapentin (NEURONTIN) 400 mg capsule take 1 capsule by mouth three times a day 90 Cap 1    ethyl chloride 100 % spray To every 6 hours as need for pain 103.5 mL 5    calcium combo no.2-vitamin D3 600 mg calcium- 500 unit TbER Take 1 Each by mouth daily.  90 Tab 1    DULoxetine (CYMBALTA) 60 mg capsule Take 1 Cap by mouth daily. 30 Cap 11    nicotine (NICODERM CQ) 21 mg/24 hr APPLY 1 PATCH TOPICALLY EVERY 24 HOURS FOR 30 DAYS 30 Patch 0    benzonatate (TESSALON) 200 mg capsule TAKE 1 CAPSULE BY MOUTH THREE TIMES DAILY AS NEEDED WITH FOOD FOR  COUGH  FOR  UP  TO  7  DAYS 60 Cap 1    buPROPion (WELLBUTRIN) 100 mg tablet Take 1 Tab by mouth three (3) times daily. 90 Tab 5    cyclobenzaprine (FLEXERIL) 5 mg tablet TAKE ONE TABLET BY MOUTH THREE TIMES DAILY AS NEEDED FOR MUSCLE SPASM 270 Tab 1    naproxen (NAPROSYN) 500 mg tablet Take 1 Tab by mouth two (2) times daily (with meals). As needed 60 Tab 0    tiotropium (SPIRIVA) 18 mcg inhalation capsule Take 1 Cap by inhalation daily. 30 Cap 5    albuterol (PROVENTIL HFA, VENTOLIN HFA, PROAIR HFA) 90 mcg/actuation inhaler INHALE ONE PUFF BY MOUTH EVERY 6 HOURS AS NEEDED FOR WHEEZING 1 Inhaler 5    fluticasone propion-salmeterol (ADVAIR/WIXELA) 500-50 mcg/dose diskus inhaler INHALE ONE DOSE BY MOUTH EVERY 12 HOURS 1 Inhaler 5    lidocaine (LIDODERM) 5 % apply 1 patch to the affected area daily. Leave on for 12 hours and then REMOVE for 12 hours. 30 Patch 2     No Known Allergies  Social History     Tobacco Use    Smoking status: Current Every Day Smoker     Packs/day: 0.25     Years: 35.00     Pack years: 8.75     Types: Cigarettes    Smokeless tobacco: Never Used   Substance Use Topics    Alcohol use:  Yes     Alcohol/week: 19.0 standard drinks     Types: 14 Glasses of wine, 5 Cans of beer per week     Frequency: Monthly or less     Drinks per session: 1 or 2     Binge frequency: Never        OBJECTIVE:    .  Visit Vitals  /74 (BP 1 Location: Left arm, BP Patient Position: Sitting)   Pulse 80   Temp 97.6 °F (36.4 °C) (Oral)   Resp 18   Ht 5' 9\" (1.753 m)   Wt 170 lb (77.1 kg)   LMP  (LMP Unknown)   SpO2 93%   BMI 25.10 kg/m²     WDWN in NAD, mental status normal  Heart RRR, no:C/M/R  Lungs CTA No wheezes, rales or rhonchi  Abdo: soft no tenderness, rebound or guarding  Neurological exam[de-identified] 2-12 intact  Psychiatric: Normal mood, judgement    Reviewed: Medications, allergies, clinical lab test results and imaging results have been reviewed. Any abnormal findings have been addressed. ASSESSMENT:       ICD-10-CM ICD-9-CM    1. Chronic low back pain with bilateral sciatica, unspecified back pain laterality M54.41 724.2 AMB SUPPLY ORDER    G89.29 724.3 TRIAMCINOLONE ACETONIDE INJ    M54.42 338.29    2. Primary osteoarthritis of both knees M17.0 715.16 diclofenac (VOLTAREN) 1 % gel      TRIAMCINOLONE ACETONIDE INJ   3. Spinal stenosis, unspecified spinal region M48.00 724.00 traMADol (ULTRAM) 50 mg tablet      gabapentin (NEURONTIN) 400 mg capsule      TRIAMCINOLONE ACETONIDE INJ   4. Neck pain M54.2 723.1 ethyl chloride 100 % spray      TRIAMCINOLONE ACETONIDE INJ   5. Osteoarthritis of both shoulders, unspecified osteoarthritis type M19.011 715.91 DRAIN/INJECT LARGE JOINT/BURSA    M19.012  DRAIN/INJECT LARGE JOINT/BURSA      TRIAMCINOLONE ACETONIDE INJ   6. Encounter for immunization Z23 V03.89 pneumococcal 23-valent (PNEUMOVAX 23) 25 mcg/0.5 mL injection      TRIAMCINOLONE ACETONIDE INJ   7. Depression, major, recurrent, mild ([facility]) F33.0 296.31    8. Tobacco abuse Z72.0 305.1    9. Narcotic dependence ([address] F11.20 304.90          Patient is 63 y.o. with diagnosis of :   Patient Active Problem List   Diagnosis Code    Spinal stenosis M48.00    Depression, major, recurrent, mild ([address] F33.0    Restless legs G25.81    Tobacco abuse Z72.0    Elevated liver enzymes R74.8    Heme + stool R19.5    Encounter for diagnostic colonoscopy due to change in bowel habits R19.4    Narcotic dependence ([address] F11.20    Parasites in stool B82.9       PLAN:     Options discussed. Discussed possible side affects and benefits of the procedure and/or medications. The patient agrees to undergo the procedure. Consent obtained.  Sterile procedure followed. There were no complications and the procedure was well tolerated. Instructed patient to call me if it is ineffective or if there are any complications like increasing pain, redness or swelling. Orders Placed This Encounter    DRAIN/INJECT LARGE JOINT/BURSA    DRAIN/INJECT LARGE JOINT/BURSA    AMB SUPPLY ORDER     Back issues on tramadol chronic back shoulder and neck pain.  TRIAMCINOLONE ACETONIDE INJ     Order Specific Question:   Charge Quantity? Answer:   8     Order Specific Question:   Dose     Answer:   80mg/2mls     Comments:   40mg/1ml to L/R shoulders     Order Specific Question:   Site     Answer:   LEFT BURSA     Comments:   L/R bursas - shoulders     Order Specific Question:   Expiration Date     Answer:   2021     Order Specific Question:   Lot#     Answer:   [id number]     Order Specific Question:        Answer:   Merlin     Order Specific Question:   Perfomed by/Witnessed by: Answer:   Dr. Mary Alice Griffith did the procedure and anne dominguez witnessed     Order Specific Question:   NDC#     Answer:   [address]-5 [[id number]]    diclofenac (VOLTAREN) 1 % gel     Sig: Apply  to affected area four (4) times daily. Dispense:  1 Each     Refill:  5    traMADol (ULTRAM) 50 mg tablet     Sig: Take 1 Tab by mouth every six (6) hours as needed for Pain for up to 60 days. Max Daily Amount: 200 mg. Dispense:  120 Tab     Refill:  1    gabapentin (NEURONTIN) 400 mg capsule     Sig: take 1 capsule by mouth three times a day     Dispense:  90 Cap     Refill:  1    ethyl chloride 100 % spray     Sig: To every 6 hours as need for pain     Dispense:  103.5 mL     Refill:  5    pneumococcal 23-valent (PNEUMOVAX 23) 25 mcg/0.5 mL injection     Si.5 mL by IntraMUSCular route once for 1 dose. Dispense:  0.5 mL     Refill:  0     OK rf of pain meds                   The bilaterally shoulder was assessed and landmarks palpated and marked.  The shoulder was prepped with alcohol. Syringe was prepared with 1 cc of kenalog and 7cc of lidocaine. The shoulder was injected using a posterior approach. Patient was notified of signs to look for in terms of post injection complications, like increasing redness or swelling or pain. Follow-up and Dispositions    · Return in about 2 months (around 1/18/2020) for routine follow up.

## 2019-11-18 NOTE — TELEPHONE ENCOUNTER
Pt called in reference to her medication not being covered by insurance. She would like to know if the spray and voltaren gel can be replaced with something else and called into [facility]. Please call her at [phone].

## 2019-11-18 NOTE — PROGRESS NOTES
Chief Complaint   Patient presents with    Back Pain     med refill     I have reviewed the patient's medical history in detail and updated the computerized patient record. Health Maintenance reviewed. 1. Have you been to the ER, urgent care clinic since your last visit? Hospitalized since your last visit?no    2. Have you seen or consulted any other health care providers outside of the [address] [place] [address] since your last visit? Include any pap smears or colon screening. No    Encouraged pt to discuss pt's wishes with spouse/partner/family and bring them in the next appt to follow thru with the Advanced Directive      Fall Risk Assessment, last 12 mths 3/7/2019   Able to walk? Yes   Fall in past 12 months? No   Fall with injury? -   Number of falls in past 12 months -   Fall Risk Score -       3 most recent PHQ Screens 3/7/2019   Little interest or pleasure in doing things More than half the days   Feeling down, depressed, irritable, or hopeless More than half the days   Total Score PHQ 2 4   Trouble falling or staying asleep, or sleeping too much -   Feeling tired or having little energy -   Poor appetite, weight loss, or overeating -   Feeling bad about yourself - or that you are a failure or have let yourself or your family down -   Trouble concentrating on things such as school, work, reading, or watching TV -   Moving or speaking so slowly that other people could have noticed; or the opposite being so fidgety that others notice -   Thoughts of being better off dead, or hurting yourself in some way -   How difficult have these problems made it for you to do your work, take care of your home and get along with others -       Abuse Screening Questionnaire 3/7/2019   Do you ever feel afraid of your partner? N   Are you in a relationship with someone who physically or mentally threatens you? N   Is it safe for you to go home?  Y       ADL Assessment 3/7/2019   Feeding yourself No Help Needed   Getting from bed to chair No Help Needed   Getting dressed No Help Needed   Bathing or showering No Help Needed   Walk across the room (includes cane/walker) No Help Needed   Using the telphone No Help Needed   Taking your medications No Help Needed   Preparing meals No Help Needed   Managing money (expenses/bills) No Help Needed   Moderately strenuous housework (laundry) No Help Needed   Shopping for personal items (toiletries/medicines) No Help Needed   Shopping for groceries No Help Needed   Driving Help Needed   Climbing a flight of stairs No Help Needed   Getting to places beyond walking distances Help Needed     [facility] PRIMARY CARE  OFFICE PROCEDURE PROGRESS NOTE        Chart reviewed for the following:   Felix LEBRON LPN, have reviewed the History, Physical and updated the Allergic reactions for [address] performed immediately prior to start of procedure:   Екатерина Bernal LPN, have performed the following reviews on [facility] prior to the start of the procedure:            * Patient was identified by name and date of birth   * Agreement on procedure being performed was verified  * Risks and Benefits explained to the patient by Dr. Farnaz Whitaker  * Procedure site verified and marked as necessary  * Patient was positioned for comfort  * Consent was signed and verified     Time: 11:55AM      Date of procedure: 11/18/2019    Procedure performed by:  Nader Rodríguez MD    Provider assisted by: Muna Mendez LPN    Patient assisted by: Muna Mendez LPN    How tolerated by patient: Well    Post Procedural Pain Scale: 8/10    Comments: None    Dr. Farnaz Whitaker did the procedure

## 2019-11-21 ENCOUNTER — TELEPHONE (OUTPATIENT)
Dept: INTERNAL MEDICINE CLINIC | Age: 59
End: 2019-11-21

## 2019-11-21 NOTE — TELEPHONE ENCOUNTER
11/21/19 Plan called @ 5 [phone] opt 2,PA department. PA initiated with PA rep Gustabo JUAN,for Diclofenac 1% Gel. Approval was given effective 11/21/19 thru 11/20/20. Case ID : [id number] . [facility] pharmacy called approval to Keira Johnson she state it went thru, no out of pocket cost.

## 2019-11-21 NOTE — TELEPHONE ENCOUNTER
PA has been competed and pharmacy has already run the claim  - patient notified  Jacob Hou LPN  97/05/9275  3:07 AM

## 2019-11-26 ENCOUNTER — TELEPHONE (OUTPATIENT)
Dept: INTERNAL MEDICINE CLINIC | Age: 59
End: 2019-11-26

## 2019-11-26 NOTE — TELEPHONE ENCOUNTER
11/26/19 Plan called @ 7  opt 2. PA rep Diomedes Nur state Ethyl Chlor Mist Aer is not covered under plan ,prescriber will need to prescribe another drug. Please note. Thanks

## 2019-12-06 DIAGNOSIS — J44.1 COPD EXACERBATION (HCC): ICD-10-CM

## 2019-12-06 RX ORDER — PREDNISONE 20 MG/1
40 TABLET ORAL
Qty: 10 TAB | Refills: 0 | Status: SHIPPED | OUTPATIENT
Start: 2019-12-06 | End: 2019-12-11

## 2019-12-06 RX ORDER — AZITHROMYCIN 250 MG/1
250 TABLET, FILM COATED ORAL SEE ADMIN INSTRUCTIONS
Qty: 6 TAB | Refills: 0 | OUTPATIENT
Start: 2019-12-06

## 2019-12-06 NOTE — TELEPHONE ENCOUNTER
Requested Prescriptions     Pending Prescriptions Disp Refills    predniSONE (DELTASONE) 20 mg tablet 10 Tab 0     Sig: Take 40 mg by mouth daily (with breakfast) for 5 days.  azithromycin (ZITHROMAX) 250 mg tablet 6 Tab 0     Sig: Take 1 Tab by mouth See Admin Instructions.  Take two tablets today then one tablet daily for next 4 days

## 2019-12-06 NOTE — TELEPHONE ENCOUNTER
----- Message from Alessandro Schmidt sent at 12/6/2019  8:56 AM EST -----  Regarding: Dr. Sana Loera refill  Medication Refill    Caller (if not patient):      Relationship of caller (if not patient):      Best contact number(s): [phone]      Name of medication and dosage if known: prednisone and a z pack       Is patient out of this medication (yes/no): yes       Pharmacy name: [facility]     Pharmacy listed in chart? (yes/no):  No pt is out of town   Pharmacy phone number:  [phone]      Details to clarify the request: Pt is having cough and runny nose and requesting these meds be sent to the [address] [place] [address]

## 2020-01-02 ENCOUNTER — TELEPHONE (OUTPATIENT)
Dept: INTERNAL MEDICINE CLINIC | Age: 60
End: 2020-01-02

## 2020-01-02 NOTE — TELEPHONE ENCOUNTER
----- Message from Jenn Lloyd sent at 12/31/2019  6:18 PM EST -----  Regarding: Dr. Kentrell Westbrook Message/Vendor Calls    Reason for call: Medication    Callback required yes/no and why: Yes, pt is needing Dr. Nieto Forward to call in a prescription for Prednisone, cough syrup, and a couple of the breathing inhalers prescribed to her previously to the [facility] in [place]. Please give pt call when the prescriptions have been called in for her.     Best contact number(s): [phone]  Details to clarify the request:

## 2020-01-03 NOTE — TELEPHONE ENCOUNTER
Talked to patient yesterday stating that she needed an appt before inhalers x2, prednisone and cough syrup were called in. Pt called back today expecting the RXs called into her pharmacy. As per Dr. Nakita Peguero and Nurse Pract Caryn pt needs an appt. Transferred pt to the [facility] for an appt time.

## 2020-01-15 ENCOUNTER — OFFICE VISIT (OUTPATIENT)
Dept: INTERNAL MEDICINE CLINIC | Age: 60
End: 2020-01-15

## 2020-01-15 VITALS
BODY MASS INDEX: 25.18 KG/M2 | SYSTOLIC BLOOD PRESSURE: 133 MMHG | RESPIRATION RATE: 16 BRPM | DIASTOLIC BLOOD PRESSURE: 85 MMHG | HEART RATE: 88 BPM | TEMPERATURE: 98.1 F | WEIGHT: 170 LBS | HEIGHT: 69 IN

## 2020-01-15 DIAGNOSIS — J44.1 COPD EXACERBATION (HCC): ICD-10-CM

## 2020-01-15 DIAGNOSIS — S49.91XS INJURY OF RIGHT SHOULDER, SEQUELA: Primary | ICD-10-CM

## 2020-01-15 DIAGNOSIS — R25.2 LEG CRAMPS: ICD-10-CM

## 2020-01-15 DIAGNOSIS — J44.9 CHRONIC OBSTRUCTIVE PULMONARY DISEASE, UNSPECIFIED COPD TYPE (HCC): ICD-10-CM

## 2020-01-15 DIAGNOSIS — F33.0 DEPRESSION, MAJOR, RECURRENT, MILD (HCC): ICD-10-CM

## 2020-01-15 DIAGNOSIS — M48.00 SPINAL STENOSIS, UNSPECIFIED SPINAL REGION: ICD-10-CM

## 2020-01-15 RX ORDER — POTASSIUM CHLORIDE 750 MG/1
10 TABLET, EXTENDED RELEASE ORAL DAILY
Qty: 30 TAB | Refills: 5 | Status: SHIPPED | OUTPATIENT
Start: 2020-01-15 | End: 2020-08-20 | Stop reason: SDUPTHER

## 2020-01-15 RX ORDER — TRAMADOL HYDROCHLORIDE 50 MG/1
50 TABLET ORAL
Qty: 120 TAB | Refills: 1 | Status: SHIPPED | OUTPATIENT
Start: 2020-01-15 | End: 2020-03-12 | Stop reason: SDUPTHER

## 2020-01-15 RX ORDER — FLUTICASONE PROPIONATE AND SALMETEROL 500; 50 UG/1; UG/1
POWDER RESPIRATORY (INHALATION)
Qty: 1 INHALER | Refills: 5 | Status: SHIPPED | OUTPATIENT
Start: 2020-01-15 | End: 2020-08-20 | Stop reason: SDUPTHER

## 2020-01-15 RX ORDER — ALBUTEROL SULFATE 90 UG/1
AEROSOL, METERED RESPIRATORY (INHALATION)
Qty: 1 INHALER | Refills: 5 | Status: SHIPPED | OUTPATIENT
Start: 2020-01-15 | End: 2020-05-12 | Stop reason: SDUPTHER

## 2020-01-15 RX ORDER — GABAPENTIN 400 MG/1
400 CAPSULE ORAL 3 TIMES DAILY
Qty: 90 CAP | Refills: 1 | Status: SHIPPED | OUTPATIENT
Start: 2020-01-15 | End: 2020-03-12 | Stop reason: SDUPTHER

## 2020-01-15 NOTE — PROGRESS NOTES
PROGRESS NOTE        SUBJECTIVE:  Diagnosis/Chief Complaint: Back Pain (pain med refill)    Doing well with pain no, had some severe lower leg cramps. Last visit had both shoulders injected with steroids the left one is better but the right one continues to hurt. Been a few weeks since the fall. Improvement in function: yes - with tramadol  Pain levels 8/10   Usage of benzodiazapine or other sedatives no  Symptoms back pain  Activities: Able to do activities of daily living. yes - still  Side affects: no  States taking medications per medicine list.yes - as Rx   queried yes - see below  Urine drug screen done no -I am the only prescriber of controlled substances. Opiod Rx exceeds 50 MME/dayno  Hx of depression yes, mood is okay on Cymbalta. Hx of drug addiction: no  Safe storage of the opiods: yes - locked  Narcotic contract reviewed and discussed: yes - re  Breathing is been okay with Spiriva. Patient Active Problem List    Diagnosis Date Noted    Parasites in stool 05/15/2019    Narcotic dependence ([facility] [address] 04/07/2019    Encounter for diagnostic colonoscopy due to change in bowel habits 03/04/2019    Elevated liver enzymes 02/19/2019    Heme + stool 02/19/2019    Depression, major, recurrent, mild ([address] 12/21/2017    Restless legs 12/21/2017    Tobacco abuse 12/21/2017    Spinal stenosis 08/14/2017     Current Outpatient Medications   Medication Sig Dispense Refill    potassium chloride (KLOR-CON) 10 mEq tablet Take 1 Tab by mouth daily. 30 Tab 5    traMADol (ULTRAM) 50 mg tablet Take 1 Tab by mouth every six (6) hours as needed for Pain for up to 60 days. Max Daily Amount: 200 mg. 120 Tab 1    gabapentin (NEURONTIN) 400 mg capsule Take 1 Cap by mouth three (3) times daily.  Max Daily Amount: 1,200 mg. take 1 capsule by mouth three times a day 90 Cap 1    fluticasone propion-salmeterol (ADVAIR/WIXELA) 500-50 mcg/dose diskus inhaler INHALE ONE DOSE BY MOUTH EVERY 12 HOURS 1 Inhaler 5    albuterol (PROVENTIL HFA, VENTOLIN HFA, PROAIR HFA) 90 mcg/actuation inhaler INHALE ONE PUFF BY MOUTH EVERY 6 HOURS AS NEEDED FOR WHEEZING 1 Inhaler 5    tiotropium (SPIRIVA) 18 mcg inhalation capsule Take 1 Cap by inhalation daily. 30 Cap 5    diclofenac (VOLTAREN) 1 % gel Apply  to affected area four (4) times daily. 1 Each 5    calcium combo no.2-vitamin D3 600 mg calcium- 500 unit TbER Take 1 Each by mouth daily. 90 Tab 1    DULoxetine (CYMBALTA) 60 mg capsule Take 1 Cap by mouth daily. 30 Cap 11    nicotine (NICODERM CQ) 21 mg/24 hr APPLY 1 PATCH TOPICALLY EVERY 24 HOURS FOR 30 DAYS 30 Patch 0    benzonatate (TESSALON) 200 mg capsule TAKE 1 CAPSULE BY MOUTH THREE TIMES DAILY AS NEEDED WITH FOOD FOR  COUGH  FOR  UP  TO  7  DAYS 60 Cap 1    buPROPion (WELLBUTRIN) 100 mg tablet Take 1 Tab by mouth three (3) times daily. 90 Tab 5    cyclobenzaprine (FLEXERIL) 5 mg tablet TAKE ONE TABLET BY MOUTH THREE TIMES DAILY AS NEEDED FOR MUSCLE SPASM 270 Tab 1    lidocaine (LIDODERM) 5 % apply 1 patch to the affected area daily. Leave on for 12 hours and then REMOVE for 12 hours. 30 Patch 2     No Known Allergies  Social History     Tobacco Use    Smoking status: Current Every Day Smoker     Packs/day: 0.25     Years: 35.00     Pack years: 8.75     Types: Cigarettes    Smokeless tobacco: Never Used   Substance Use Topics    Alcohol use: Yes     Alcohol/week: 19.0 standard drinks     Types: 14 Glasses of wine, 5 Cans of beer per week     Frequency: Monthly or less     Drinks per session: 1 or 2     Binge frequency: Never        OBJECTIVE:    .  Visit Vitals  /85 (BP 1 Location: Left arm, BP Patient Position: Sitting)   Pulse 88   Temp 98.1 °F (36.7 °C) (Temporal)   Resp 16   Ht 5' 9\" (1.753 m)   Wt 170 lb (77.1 kg)   LMP  (LMP Unknown)   BMI 25.10 kg/m²     WDWN in NAD, mental status normal  Shoulders grossly unremarkable.   Heart RRR, no:C/M/R  Lungs CTA No wheezes, rales or rhonchi  Abdo: soft no tenderness, rebound or guarding  Neurological exam[de-identified] 2-12 intact  Psychiatric: Normal mood, judgement    Reviewed: Medications, allergies, clinical lab test results and imaging results have been reviewed. Any abnormal findings have been addressed. ASSESSMENT:       ICD-10-CM ICD-9-CM    1. Injury of right shoulder, sequela S49. 91XS 908.9 REFERRAL TO ORTHOPEDIC SURGERY      XR SHOULDER RT AP/LAT MIN 2 V   2. Leg cramps R25.2 729.82 potassium chloride (KLOR-CON) 10 mEq tablet   3. Spinal stenosis, unspecified spinal region M48.00 724.00 traMADol (ULTRAM) 50 mg tablet      gabapentin (NEURONTIN) 400 mg capsule   4. COPD exacerbation ([facility] J44.1 491.21    5. Depression, major, recurrent, mild ([facility] F33.0 296.31        Patient is 63 y.o. with diagnosis of :   Patient Active Problem List   Diagnosis Code    Spinal stenosis M48.00    Depression, major, recurrent, mild ([facility] F33.0    Restless legs G25.81    Tobacco abuse Z72.0    Elevated liver enzymes R74.8    Heme + stool R19.5    Encounter for diagnostic colonoscopy due to change in bowel habits R19.4    Narcotic dependence ([facility] F11.20    Parasites in stool B82.9       PLAN:     Orders Placed This Encounter    XR SHOULDER RT AP/LAT MIN 2 V     Standing Status:   Future     Standing Expiration Date:   2/15/2021     Scheduling Instructions:      [facility] [place]     Order Specific Question:   Reason for Exam     Answer:   persistent right shoulder pain after fall    REFERRAL TO ORTHOPEDIC SURGERY     Referral Priority:   Routine     Referral Type:   Consultation     Referral Reason:   Specialty Services Required     Referred to Provider:   Michael Rodriguez MD     Number of Visits Requested:   1    potassium chloride (KLOR-CON) 10 mEq tablet     Sig: Take 1 Tab by mouth daily. Dispense:  30 Tab     Refill:  5    traMADol (ULTRAM) 50 mg tablet     Sig: Take 1 Tab by mouth every six (6) hours as needed for Pain for up to 60 days. Max Daily Amount: 200 mg.      Dispense:  120 Tab Refill:  1    gabapentin (NEURONTIN) 400 mg capsule     Sig: Take 1 Cap by mouth three (3) times daily. Max Daily Amount: 1,200 mg. take 1 capsule by mouth three times a day     Dispense:  90 Cap     Refill:  1    fluticasone propion-salmeterol (ADVAIR/WIXELA) 500-50 mcg/dose diskus inhaler     Sig: INHALE ONE DOSE BY MOUTH EVERY 12 HOURS     Dispense:  1 Inhaler     Refill:  5     Please consider 90 day supplies to promote better adherence    albuterol (PROVENTIL HFA, VENTOLIN HFA, PROAIR HFA) 90 mcg/actuation inhaler     Sig: INHALE ONE PUFF BY MOUTH EVERY 6 HOURS AS NEEDED FOR WHEEZING     Dispense:  1 Inhaler     Refill:  5     Please consider 90 day supplies to promote better adherence    tiotropium (SPIRIVA) 18 mcg inhalation capsule     Sig: Take 1 Cap by inhalation daily. Dispense:  30 Cap     Refill:  5     See patient instructions, went over them personally with the patient. Emphasized compliance. Questions answered. Patient states that they understand the plan of action and will call if there are any issues or misunderstandings. Eventually we are going to need to get her off chronic tramadol and onto some other pain medication, consider [facility] TENS unit or may be referral to pain management for shots but will defer that to the spring summer. For now continue tramadol. Would like her to see orthopedics for her right shoulder. COPD stable continue breathing treatments. Depression stable. Follow-up and Dispositions    · Return in about 2 months (around 3/15/2020) for routine follow up.

## 2020-01-15 NOTE — PROGRESS NOTES
Chief Complaint   Patient presents with    Back Pain     pain med refill     I have reviewed the patient's medical history in detail and updated the computerized patient record. Health Maintenance reviewed. 1. Have you been to the ER, urgent care clinic since your last visit? Hospitalized since your last visit?no    2. Have you seen or consulted any other health care providers outside of the [address] [place] [address] since your last visit? Include any pap smears or colon screening. No      Encouraged pt to discuss pt's wishes with spouse/partner/family and bring them in the next appt to follow thru with the Advanced Directive      Fall Risk Assessment, last 12 mths 1/15/2020   Able to walk? Yes   Fall in past 12 months? No   Fall with injury? -   Number of falls in past 12 months -   Fall Risk Score -       3 most recent PHQ Screens 1/15/2020   Little interest or pleasure in doing things Several days   Feeling down, depressed, irritable, or hopeless Several days   Total Score PHQ 2 2   Trouble falling or staying asleep, or sleeping too much -   Feeling tired or having little energy -   Poor appetite, weight loss, or overeating -   Feeling bad about yourself - or that you are a failure or have let yourself or your family down -   Trouble concentrating on things such as school, work, reading, or watching TV -   Moving or speaking so slowly that other people could have noticed; or the opposite being so fidgety that others notice -   Thoughts of being better off dead, or hurting yourself in some way -   How difficult have these problems made it for you to do your work, take care of your home and get along with others -       Abuse Screening Questionnaire 1/15/2020   Do you ever feel afraid of your partner? N   Are you in a relationship with someone who physically or mentally threatens you? N   Is it safe for you to go home?  Y       ADL Assessment 1/15/2020   Feeding yourself No Help Needed   Getting from bed to chair No Help Needed   Getting dressed No Help Needed   Bathing or showering No Help Needed   Walk across the room (includes cane/walker) No Help Needed   Using the telphone No Help Needed   Taking your medications No Help Needed   Preparing meals No Help Needed   Managing money (expenses/bills) No Help Needed   Moderately strenuous housework (laundry) No Help Needed   Shopping for personal items (toiletries/medicines) No Help Needed   Shopping for groceries No Help Needed   Driving Help Needed   Climbing a flight of stairs No Help Needed   Getting to places beyond walking distances Help Needed

## 2020-01-15 NOTE — PATIENT INSTRUCTIONS
Cramps are painful. There cause is unknown. Sometimes potassium supplements can help. Other things can be tried: quinine, which is found in tonic water can be helpful. Pickle juice mustard and bananas can also be tried.

## 2020-01-15 NOTE — TELEPHONE ENCOUNTER
Requested Prescriptions     Pending Prescriptions Disp Refills    benzonatate (TESSALON) 200 mg capsule 60 Cap 1     Sig: TAKE 1 CAPSULE BY MOUTH THREE TIMES DAILY AS NEEDED WITH FOOD FOR  COUGH  FOR  UP  TO  7  DAYS

## 2020-01-16 ENCOUNTER — TELEPHONE (OUTPATIENT)
Dept: INTERNAL MEDICINE CLINIC | Age: 60
End: 2020-01-16

## 2020-01-17 RX ORDER — BENZONATATE 200 MG/1
CAPSULE ORAL
Qty: 60 CAP | Refills: 0 | Status: SHIPPED | OUTPATIENT
Start: 2020-01-17 | End: 2020-05-12 | Stop reason: SDUPTHER

## 2020-01-17 NOTE — TELEPHONE ENCOUNTER
Requested Prescriptions     Pending Prescriptions Disp Refills    benzonatate (TESSALON) 200 mg capsule 60 Cap 0     Sig: TAKE 1 CAPSULE BY MOUTH THREE TIMES DAILY AS NEEDED WITH FOOD FOR  COUGH  FOR  UP  TO  7  DAYS     Last office visit 1/15/20  Last filled  ? ??  Changes made to medication on last visit      None

## 2020-01-28 ENCOUNTER — DOCUMENTATION ONLY (OUTPATIENT)
Dept: INTERNAL MEDICINE CLINIC | Age: 60
End: 2020-01-28

## 2020-02-11 ENCOUNTER — OFFICE VISIT (OUTPATIENT)
Dept: INTERNAL MEDICINE CLINIC | Age: 60
End: 2020-02-11

## 2020-02-11 VITALS
TEMPERATURE: 98.7 F | DIASTOLIC BLOOD PRESSURE: 75 MMHG | WEIGHT: 177 LBS | HEIGHT: 69 IN | HEART RATE: 96 BPM | RESPIRATION RATE: 20 BRPM | SYSTOLIC BLOOD PRESSURE: 121 MMHG | OXYGEN SATURATION: 94 % | BODY MASS INDEX: 26.22 KG/M2

## 2020-02-11 DIAGNOSIS — S49.91XS INJURY OF RIGHT SHOULDER, SEQUELA: ICD-10-CM

## 2020-02-11 DIAGNOSIS — R29.6 FALLS FREQUENTLY: ICD-10-CM

## 2020-02-11 DIAGNOSIS — M54.42 CHRONIC LOW BACK PAIN WITH BILATERAL SCIATICA, UNSPECIFIED BACK PAIN LATERALITY: ICD-10-CM

## 2020-02-11 DIAGNOSIS — Z72.0 TOBACCO ABUSE: ICD-10-CM

## 2020-02-11 DIAGNOSIS — M54.41 CHRONIC LOW BACK PAIN WITH BILATERAL SCIATICA, UNSPECIFIED BACK PAIN LATERALITY: ICD-10-CM

## 2020-02-11 DIAGNOSIS — G89.29 CHRONIC LOW BACK PAIN WITH BILATERAL SCIATICA, UNSPECIFIED BACK PAIN LATERALITY: ICD-10-CM

## 2020-02-11 DIAGNOSIS — J44.1 COPD EXACERBATION (HCC): Primary | ICD-10-CM

## 2020-02-11 RX ORDER — PREDNISONE 20 MG/1
40 TABLET ORAL
Qty: 10 TAB | Refills: 0 | Status: SHIPPED | OUTPATIENT
Start: 2020-02-11 | End: 2020-04-08 | Stop reason: SDUPTHER

## 2020-02-11 RX ORDER — VARENICLINE TARTRATE 25 MG
KIT ORAL
Qty: 1 DOSE PACK | Refills: 0 | Status: SHIPPED | OUTPATIENT
Start: 2020-02-11 | End: 2020-08-26 | Stop reason: ALTCHOICE

## 2020-02-11 NOTE — PROGRESS NOTES
Chief Complaint   Patient presents with    Cough     [facility] - ER 5 days ago, prod cough thick yellow sputum nose running and stuffy, headache, cough so much cannot sleep at night, ribcage pain when coughing, fever and chills     I have reviewed the patient's medical history in detail and updated the computerized patient record. Health Maintenance reviewed. 1. Have you been to the ER, urgent care clinic since your last visit? Hospitalized since your last visit? yes    2. Have you seen or consulted any other health care providers outside of the [address] [place] [address] since your last visit? Include any pap smears or colon screening. Yes      Encouraged pt to discuss pt's wishes with spouse/partner/family and bring them in the next appt to follow thru with the Advanced Directive    Fall Risk Assessment, last 12 mths 1/15/2020   Able to walk? Yes   Fall in past 12 months? No   Fall with injury? -   Number of falls in past 12 months -   Fall Risk Score -       3 most recent PHQ Screens 1/15/2020   Little interest or pleasure in doing things Several days   Feeling down, depressed, irritable, or hopeless Several days   Total Score PHQ 2 2   Trouble falling or staying asleep, or sleeping too much -   Feeling tired or having little energy -   Poor appetite, weight loss, or overeating -   Feeling bad about yourself - or that you are a failure or have let yourself or your family down -   Trouble concentrating on things such as school, work, reading, or watching TV -   Moving or speaking so slowly that other people could have noticed; or the opposite being so fidgety that others notice -   Thoughts of being better off dead, or hurting yourself in some way -   How difficult have these problems made it for you to do your work, take care of your home and get along with others -       Abuse Screening Questionnaire 1/15/2020   Do you ever feel afraid of your partner?  N   Are you in a relationship with someone who physically or mentally threatens you? N   Is it safe for you to go home?  Y       ADL Assessment 1/15/2020   Feeding yourself No Help Needed   Getting from bed to chair No Help Needed   Getting dressed No Help Needed   Bathing or showering No Help Needed   Walk across the room (includes cane/walker) No Help Needed   Using the telphone No Help Needed   Taking your medications No Help Needed   Preparing meals No Help Needed   Managing money (expenses/bills) No Help Needed   Moderately strenuous housework (laundry) No Help Needed   Shopping for personal items (toiletries/medicines) No Help Needed   Shopping for groceries No Help Needed   Driving Help Needed   Climbing a flight of stairs No Help Needed   Getting to places beyond walking distances Help Needed

## 2020-02-11 NOTE — PATIENT INSTRUCTIONS
Deciding About Using Medicines To Quit Smoking  How can you decide about using medicines to quit smoking? What are the medicines you can use? Your doctor may prescribe varenicline (Chantix) or bupropion (Zyban). These medicines can help you cope with cravings for tobacco. They are pills that don't contain nicotine. You also can use nicotine replacement products. These do contain nicotine. There are many types. · Gum and lozenges slowly release nicotine into your mouth. · Patches stick to your skin. They slowly release nicotine into your bloodstream.  · An inhaler has a long that contains nicotine. You breathe in a puff of nicotine vapor through your mouth and throat. · Nasal spray releases a mist that contains nicotine. What are key points about this decision? · Using medicines can double your chances of quitting smoking. They can ease cravings and withdrawal symptoms. · Getting counseling along with using medicine can raise your chances of quitting even more. · If you smoke fewer than 5 cigarettes a day, you may not need medicines to help you quit smoking. · These medicines have less nicotine than cigarettes. And by itself, nicotine is not nearly as harmful as smoking. The tars, carbon monoxide, and other toxic chemicals in tobacco cause the harmful effects. · The side effects of nicotine replacement products depend on the type of product. For example, a patch can make your skin red and itchy. Medicines in pill form can make you sick to your stomach. They can also cause dry mouth and trouble sleeping. For most people, the side effects are not bad enough to make them stop using the products. Why might you choose to use medicines to quit smoking? · You have tried on your own to stop smoking, but you were not able to stop. · You smoke more than 5 cigarettes a day. · You want to increase your chances of quitting smoking. · You want to reduce your cravings and withdrawal symptoms.   · You feel the benefits of medicine outweigh the side effects. Why might you choose not to use medicine? · You want to try quitting on your own by stopping all at once (\"cold turkey\"). · You want to cut back slowly on the number of cigarettes you smoke. · You smoke fewer than 5 cigarettes a day. · You do not like using medicine. · You feel the side effects of medicines outweigh the benefits. · You are worried about the cost of medicines. Your decision  Thinking about the facts and your feelings can help you make a decision that is right for you. Be sure you understand the benefits and risks of your options, and think about what else you need to do before you make the decision. Where can you learn more? Go to http://ruperto-gabriela.info/. Enter P627 in the search box to learn more about \"Deciding About Using Medicines To Quit Smoking. \"  Current as of: September 26, 2018  Content Version: 12.2  © 0313-8336 [employer]. Care instructions adapted under license by [employer] (which disclaims liability or warranty for this information). If you have questions about a medical condition or this instruction, always ask your healthcare professional. [place] [address] any warranty or liability for your use of this information.

## 2020-02-11 NOTE — PROGRESS NOTES
Subjective:   Carlos Kent is a 63 y.o. female who complains of sore throat and cough described as productive of yellow sputum for 14 days, stable since that time. Wheezes and dyspnic  She denies a history of fevers, rash on body, no ear pain  Sx are stable, still coughoing  Evaluation to date: see in [facility]. 2/5/20, CXR wa sneg  Treatment to date: antibiotics -z pack. Began taking 5 days ago., finished pill today prednisone as well  Patient does smoke cigarettes. Raedy to quit patches not sticking that well. Takes pain meds as well. Relevant PMH: COPD. Falling a lot lately elis[facility] [employer] will pay to have equipt installed but order needed  Had shoulde rinjection lots of pain after  No Known Allergies      No Known Allergies  Past Medical History:   Diagnosis Date    Arthritis     Asthma     [facility] obstructive [facility] disease) ([facility] [address]     Depression     Hemorrhoids     Occult blood positive stool     Spinal stenosis      Social History     Tobacco Use    Smoking status: Current Every Day Smoker     Packs/day: 0.25     Years: 35.00     Pack years: 8.75     Types: Cigarettes    Smokeless tobacco: Never Used   Substance Use Topics    Alcohol use: Yes     Alcohol/week: 19.0 standard drinks     Types: 14 Glasses of wine, 5 Cans of beer per week     Frequency: Monthly or less     Drinks per session: 1 or 2     Binge frequency: Never        Review of Systems  Pertinent items are noted in HPI.     Objective:     Visit Vitals  /75 (BP 1 Location: Left arm, BP Patient Position: Sitting)   Pulse 96   Temp 98.7 °F (37.1 °C) (Temporal)   Resp 20   Ht 5' 9\" (1.753 m)   Wt 177 lb (80.3 kg)   LMP  (LMP Unknown)   SpO2 94%   BMI 26.14 kg/m²     General:  alert, fatigued, cooperative, no distress   Eyes: negative   Ears: normal TM's and external ear canals AU   Sinuses: Normal paranasal sinuses without tenderness   Mouth:  Lips, mucosa, and tongue normal. Teeth and gums normal   Neck: supple, symmetrical, trachea midline and no adenopathy. Heart: S1 and S2 normal, no murmurs noted. Lungs: clear to auscultation bilaterally   Abdomen: soft, non-tender. Bowel sounds normal. No masses,  no organomegaly      Assessment/Plan:   viral upper respiratory illness and asthma/ COPD  Suggested symptomatic OTC remedies. RTC prn. Discussed the importance of avoiding unnecessary antibiotic therapy. OK RF steroid    Encounter Diagnoses   Name Primary?  COPD exacerbation ([facility] [address] Yes    Tobacco abuse     Injury of right shoulder, sequela     Chronic low back pain with bilateral sciatica, unspecified back pain laterality     Falls frequently      Orders Placed This Encounter    AMB SUPPLY ORDER    predniSONE (DELTASONE) 20 mg tablet    varenicline (CHANTIX STARTER ANN MARIE) 0.5 mg (11)- 1 mg (42) DsPk   . Discussed possible side affects, precautions, and drug interactions and possible benefits of the medication(s). Current Outpatient Medications   Medication Sig Dispense Refill    predniSONE (DELTASONE) 20 mg tablet Take 40 mg by mouth daily (with breakfast) for 5 days. 10 Tab 0    varenicline (CHANTIX STARTER ANN MARIE) 0.5 mg (11)- 1 mg (42) DsPk Use as directed 1 Dose Pack 0    predniSONE (STERAPRED DS) 10 mg dose pack Take as directed 48 Tab 0    guaiFENesin (MUCINEX) 1,200 mg Ta12 ER tablet Take 1 Tab by mouth two (2) times a day. 20 Tab 0    albuterol (PROVENTIL VENTOLIN) 2.5 mg /3 mL (0.083 %) nebu 3 mL by Nebulization route every four (4) hours as needed for Wheezing. 30 Nebule 3    Nebulizer & Compressor machine 1 Each by Does Not Apply route every four (4) hours as needed for Wheezing or Shortness of Breath. As directed 1 Each 0    benzonatate (TESSALON) 200 mg capsule TAKE 1 CAPSULE BY MOUTH THREE TIMES DAILY AS NEEDED WITH FOOD FOR  COUGH  FOR  UP  TO  7  DAYS 60 Cap 0    potassium chloride (KLOR-CON) 10 mEq tablet Take 1 Tab by mouth daily.  30 Tab 5    traMADol (ULTRAM) 50 mg tablet Take 1 Tab by mouth every six (6) hours as needed for Pain for up to 60 days. Max Daily Amount: 200 mg. 120 Tab 1    gabapentin (NEURONTIN) 400 mg capsule Take 1 Cap by mouth three (3) times daily. Max Daily Amount: 1,200 mg. take 1 capsule by mouth three times a day 90 Cap 1    fluticasone propion-salmeterol (ADVAIR/WIXELA) 500-50 mcg/dose diskus inhaler INHALE ONE DOSE BY MOUTH EVERY 12 HOURS 1 Inhaler 5    albuterol (PROVENTIL HFA, VENTOLIN HFA, PROAIR HFA) 90 mcg/actuation inhaler INHALE ONE PUFF BY MOUTH EVERY 6 HOURS AS NEEDED FOR WHEEZING 1 Inhaler 5    tiotropium (SPIRIVA) 18 mcg inhalation capsule Take 1 Cap by inhalation daily. 30 Cap 5    diclofenac (VOLTAREN) 1 % gel Apply  to affected area four (4) times daily. 1 Each 5    calcium combo no.2-vitamin D3 600 mg calcium- 500 unit TbER Take 1 Each by mouth daily. 90 Tab 1    DULoxetine (CYMBALTA) 60 mg capsule Take 1 Cap by mouth daily. 30 Cap 11    nicotine (NICODERM CQ) 21 mg/24 hr APPLY 1 PATCH TOPICALLY EVERY 24 HOURS FOR 30 DAYS 30 Patch 0    buPROPion (WELLBUTRIN) 100 mg tablet Take 1 Tab by mouth three (3) times daily. 90 Tab 5    cyclobenzaprine (FLEXERIL) 5 mg tablet TAKE ONE TABLET BY MOUTH THREE TIMES DAILY AS NEEDED FOR MUSCLE SPASM 270 Tab 1    lidocaine (LIDODERM) 5 % apply 1 patch to the affected area daily. Leave on for 12 hours and then REMOVE for 12 hours. 30 Patch 2     The patient was counseled on the dangers of tobacco use, and was advised to quit. Reviewed strategies to maximize success, including written materials and pharmacotherapy (chantix). Follow-up and Dispositions    · Return in about 4 weeks (around 3/10/2020) for routine follow up.

## 2020-02-12 ENCOUNTER — TELEPHONE (OUTPATIENT)
Dept: INTERNAL MEDICINE CLINIC | Age: 60
End: 2020-02-12

## 2020-02-12 NOTE — TELEPHONE ENCOUNTER
----- Message from Franky Guadalupe sent at 2/12/2020 10:27 AM EST -----  Regarding: Dr. Tan Ames: [phone]  Caller's first and last name: n/a  Reason for call: Doctor request  Callback required yes/no and why: Yes  Best contact number(s): [phone]  Details to clarify the request: Pt needs handicap shower order [phone]

## 2020-02-13 NOTE — TELEPHONE ENCOUNTER
Faxed order for handicapped bathroom equipment to [facility] at [phone] per request of patient and order of Dr Dino Cain - confirmation of receipt received  Seymour Freeman LPN  1/34/1433  50:82 AM

## 2020-02-19 ENCOUNTER — TELEPHONE (OUTPATIENT)
Dept: INTERNAL MEDICINE CLINIC | Age: 60
End: 2020-02-19

## 2020-02-19 NOTE — TELEPHONE ENCOUNTER
Isabella Faustin, from [employer], called in reference to pts medical equipment that was sent to them. It needs to be sent to the DME company. If you have any questions she can be reached at [phone] ext [phone].

## 2020-02-20 NOTE — TELEPHONE ENCOUNTER
Pt is requesting the following medications. She said she went to the ER 2 tmes. She also is requesting something for ear pain. Please call pt [phone].     dirtussin -10 mg 5 ml sol    Requested Prescriptions     Pending Prescriptions Disp Refills    predniSONE (STERAPRED DS) 10 mg dose pack 48 Tab 0     Sig: Take as directed

## 2020-03-05 ENCOUNTER — TELEPHONE (OUTPATIENT)
Dept: INTERNAL MEDICINE CLINIC | Age: 60
End: 2020-03-05

## 2020-03-05 NOTE — TELEPHONE ENCOUNTER
Pt called in reference to wanting to know the results of her test she had done in the ER. She said that they told her to follow up with her pcp for results. Please call her at [phone].

## 2020-03-06 ENCOUNTER — DOCUMENTATION ONLY (OUTPATIENT)
Dept: INTERNAL MEDICINE CLINIC | Age: 60
End: 2020-03-06

## 2020-03-06 RX ORDER — PREDNISONE 10 MG/1
TABLET ORAL
Qty: 48 TAB | Refills: 0 | Status: SHIPPED | OUTPATIENT
Start: 2020-03-06 | End: 2020-03-12 | Stop reason: ALTCHOICE

## 2020-03-06 NOTE — TELEPHONE ENCOUNTER
FYI - Have called several times to reach patient and her phone is not receiving calls at those times.

## 2020-03-06 NOTE — PROGRESS NOTES
Faxed with conformation [phone] to   [address] order for bathroom handicapped equipment   Snapshot, last 2 OVs

## 2020-03-12 ENCOUNTER — OFFICE VISIT (OUTPATIENT)
Dept: INTERNAL MEDICINE CLINIC | Age: 60
End: 2020-03-12

## 2020-03-12 VITALS
DIASTOLIC BLOOD PRESSURE: 89 MMHG | BODY MASS INDEX: 25.92 KG/M2 | WEIGHT: 175 LBS | HEART RATE: 84 BPM | OXYGEN SATURATION: 94 % | TEMPERATURE: 97.5 F | RESPIRATION RATE: 16 BRPM | HEIGHT: 69 IN | SYSTOLIC BLOOD PRESSURE: 138 MMHG

## 2020-03-12 DIAGNOSIS — R74.8 ELEVATED LIVER ENZYMES: Primary | ICD-10-CM

## 2020-03-12 DIAGNOSIS — R11.2 NAUSEA AND VOMITING, INTRACTABILITY OF VOMITING NOT SPECIFIED, UNSPECIFIED VOMITING TYPE: ICD-10-CM

## 2020-03-12 DIAGNOSIS — Z72.0 TOBACCO ABUSE: ICD-10-CM

## 2020-03-12 DIAGNOSIS — M48.00 SPINAL STENOSIS, UNSPECIFIED SPINAL REGION: ICD-10-CM

## 2020-03-12 RX ORDER — ONDANSETRON 4 MG/1
4 TABLET, ORALLY DISINTEGRATING ORAL
Qty: 10 TAB | Refills: 0 | Status: SHIPPED | OUTPATIENT
Start: 2020-03-12 | End: 2021-06-30 | Stop reason: SDUPTHER

## 2020-03-12 RX ORDER — VARENICLINE TARTRATE 1 MG/1
1 TABLET, FILM COATED ORAL 2 TIMES DAILY
Qty: 60 TAB | Refills: 1 | Status: SHIPPED | OUTPATIENT
Start: 2020-03-12 | End: 2020-06-10

## 2020-03-12 RX ORDER — TRAMADOL HYDROCHLORIDE 50 MG/1
50 TABLET ORAL
Qty: 120 TAB | Refills: 1 | Status: SHIPPED | OUTPATIENT
Start: 2020-03-12 | End: 2020-05-13 | Stop reason: SDUPTHER

## 2020-03-12 RX ORDER — GABAPENTIN 400 MG/1
400 CAPSULE ORAL 3 TIMES DAILY
Qty: 90 CAP | Refills: 1 | Status: SHIPPED | OUTPATIENT
Start: 2020-03-12 | End: 2020-05-13 | Stop reason: SDUPTHER

## 2020-03-12 NOTE — PROGRESS NOTES
Chief Complaint   Patient presents with    Cold Symptoms     prod cough yellow sputum, nose running, eyes red itching, throat sore, L/ear pain, nausea and diarrhea this AM, wheezing, SOB     I have reviewed the patient's medical history in detail and updated the computerized patient record. Health Maintenance reviewed. 1. Have you been to the ER, urgent care clinic since your last visit? Hospitalized since your last visit?no    2. Have you seen or consulted any other health care providers outside of the [address] [place] [address] [place] since your last visit? Include any pap smears or colon screening.  [place]. [place]      Encouraged pt to discuss pt's wishes with spouse/partner/family and bring them in the next appt to follow thru with the Advanced Directive    Fall Risk Assessment, last 12 mths 1/15/2020   Able to walk? Yes   Fall in past 12 months? No   Fall with injury? -   Number of falls in past 12 months -   Fall Risk Score -       3 most recent PHQ Screens 1/15/2020   Little interest or pleasure in doing things Several days   Feeling down, depressed, irritable, or hopeless Several days   Total Score PHQ 2 2   Trouble falling or staying asleep, or sleeping too much -   Feeling tired or having little energy -   Poor appetite, weight loss, or overeating -   Feeling bad about yourself - or that you are a failure or have let yourself or your family down -   Trouble concentrating on things such as school, work, reading, or watching TV -   Moving or speaking so slowly that other people could have noticed; or the opposite being so fidgety that others notice -   Thoughts of being better off dead, or hurting yourself in some way -   How difficult have these problems made it for you to do your work, take care of your home and get along with others -       Abuse Screening Questionnaire 1/15/2020   Do you ever feel afraid of your partner?  N   Are you in a relationship with someone who physically or mentally threatens you? N   Is it safe for you to go home?  Y       ADL Assessment 1/15/2020   Feeding yourself No Help Needed   Getting from bed to chair No Help Needed   Getting dressed No Help Needed   Bathing or showering No Help Needed   Walk across the room (includes cane/walker) No Help Needed   Using the telphone No Help Needed   Taking your medications No Help Needed   Preparing meals No Help Needed   Managing money (expenses/bills) No Help Needed   Moderately strenuous housework (laundry) No Help Needed   Shopping for personal items (toiletries/medicines) No Help Needed   Shopping for groceries No Help Needed   Driving Help Needed   Climbing a flight of stairs No Help Needed   Getting to places beyond walking distances Help Needed

## 2020-03-12 NOTE — PROGRESS NOTES
HISTORY OF PRESENT ILLNESS  Ruben Barrow is a 63 y.o. female. Vomiting    The history is provided by the patient. This is a recurrent problem. The current episode started more than 1 week ago. The problem occurs 5 to 10 times per day (a week ago). The problem has been gradually improving. The emesis has an appearance of stomach contents. There has been no fever. Associated symptoms include abdominal pain, diarrhea and arthralgias. Pertinent negatives include no fever, no cough and no URI. Risk factors include ill contacts. Her pertinent negatives include no inflammatory bowel disease and no recent abdominal surgery. Was seen March 3, 2020 in the emergency room. The day before the visit started having vomiting diarrhea nausea and her family called the rescue squad to take her to the ER. Diagnosed with gastroenteritis discharged home. Labs from today were reviewed  no, labs done previously were reviewed  yes, Labs done in ER were reviewed  yes, Additional labs are ordered  yes,  Noted to have elevated liver enzymes otherwise labs normal.  Still some nausea and vomiting but has improved as had the diarrhea  Reviewed CT scan from March 4  Requests refills of her pain medications    Patient Active Problem List   Diagnosis Code    Spinal stenosis M48.00    Depression, major, recurrent, mild (HCC) F33.0    Restless legs G25.81    Tobacco abuse Z72.0    Elevated liver enzymes R74.8    Heme + stool R19.5    Encounter for diagnostic colonoscopy due to change in bowel habits R19.4    Narcotic dependence ([facility] [address] F11.20    Parasites in stool B82.9       Review of Systems   Constitutional: Negative for fever. Respiratory: Negative for cough. Gastrointestinal: Positive for abdominal pain, diarrhea and vomiting. Musculoskeletal: Positive for arthralgias.      Past Surgical History:   Procedure Laterality Date    ABDOMEN SURGERY PROC UNLISTED      hystrectomy    COLONOSCOPY N/A 5/7/2019    COLONOSCOPY performed by Sera Dunham MD at [facility]  09/2016    laminectomy    HX CERVICAL FUSION      HX COLONOSCOPY  05/07/2019    4 polyps- 2 tubular adenoma 2 hyperplastic polyp    HX HYSTERECTOMY       Social History     Socioeconomic History    Marital status: LEGALLY      Spouse name: Not on file    Number of children: 2    Years of education: Not on file    Highest education level: 10th grade   Occupational History    Occupation: disabled   Social Needs    Financial resource strain: Not on file    Food insecurity     Worry: Not on file     Inability: Not on file   [employer] needs     Medical: Not on file     Non-medical: Not on file   Tobacco Use    Smoking status: Current Every Day Smoker     Packs/day: 0.25     Years: 35.00     Pack years: 8.75     Types: Cigarettes    Smokeless tobacco: Never Used   Substance and Sexual Activity    Alcohol use:  Yes     Alcohol/week: 19.0 standard drinks     Types: 14 Glasses of wine, 5 Cans of beer per week     Frequency: Monthly or less     Drinks per session: 1 or 2     Binge frequency: Never    Drug use: Not Currently     Types: Cocaine    Sexual activity: Not Currently     Partners: Male   Lifestyle    Physical activity     Days per week: Not on file     Minutes per session: Not on file    Stress: Not on file   Relationships    Social connections     Talks on phone: Not on file     Gets together: Not on file     Attends Buddhism service: Not on file     Active member of club or organization: Not on file     Attends meetings of clubs or organizations: Not on file     Relationship status: Not on file    Intimate partner violence     Fear of current or ex partner: Not on file     Emotionally abused: Not on file     Physically abused: Not on file     Forced sexual activity: Not on file   Other Topics Concern     Service No    Blood Transfusions No    Caffeine Concern Not Asked    Occupational Exposure Not Asked   Scotty Proud Hazards Not Asked    Sleep Concern Not Asked    Stress Concern Not Asked    Weight Concern Not Asked    Special Diet Not Asked    Back Care Not Asked    Exercise Not Asked    Bike Helmet Not Asked   [address] Yes    Self-Exams Yes   Social History Narrative    Lives with friends       Physical Exam  Visit Vitals  /89 (BP 1 Location: Left arm, BP Patient Position: Sitting)   Pulse 84   Temp 97.5 °F (36.4 °C) (Temporal)   Resp 16   Ht 5' 9\" (1.753 m)   Wt 175 lb (79.4 kg)   LMP  (LMP Unknown)   SpO2 94%   BMI 25.84 kg/m²     WD WN female NAD  Heart RRR without murmers clicks or rubs  Lungs CTA  Abdo soft nontender bowel sounds present no guarding no rebound  Ext no edema    ASSESSMENT and PLAN  Encounter Diagnoses   Name Primary?  Elevated liver enzymes Yes    Tobacco abuse     Spinal stenosis, unspecified spinal region     Nausea and vomiting, intractability of vomiting not specified, unspecified vomiting type      Orders Placed This Encounter    HEP B SURFACE AG    HIV 2 AB W/REFL IB    RPR    HEPATITIS C AB, RFLX TO QT BY PCR    HEPATIC FUNCTION PANEL    DISCONTD: codeine-guaifenesin  mg/5 mL liqd    varenicline (CHANTIX) 1 mg tablet    traMADoL (ULTRAM) 50 mg tablet    gabapentin (NEURONTIN) 400 mg capsule    ondansetron (Zofran ODT) 4 mg disintegrating tablet      Okay refill of pain medications, DC cough medicine. Patient was looked up in the . There are multiple prescribers of controlled substances  yes and just the codeine cough syrup. Lab work-up of her elevated liver enzymes, states no risk factors. Continue nausea vomiting medicines. Follow-up and Dispositions    · Return in about 2 months (around 5/12/2020) for routine follow up.

## 2020-04-08 DIAGNOSIS — J44.1 COPD EXACERBATION (HCC): ICD-10-CM

## 2020-04-08 RX ORDER — PREDNISONE 20 MG/1
40 TABLET ORAL
Qty: 10 TAB | Refills: 0 | Status: SHIPPED | OUTPATIENT
Start: 2020-04-08 | End: 2020-04-13

## 2020-04-08 NOTE — TELEPHONE ENCOUNTER
Requested Prescriptions     Pending Prescriptions Disp Refills    predniSONE (DELTASONE) 20 mg tablet 10 Tab 0     Sig: Take 40 mg by mouth daily (with breakfast) for 5 days. Last office visit 3/12/20 FUTURE ?   Last filled  3/6/20  Changes made to medication on last visit

## 2020-04-08 NOTE — TELEPHONE ENCOUNTER
Requested Prescriptions     Pending Prescriptions Disp Refills    predniSONE (DELTASONE) 20 mg tablet 10 Tab 0     Sig: Take 40 mg by mouth daily (with breakfast) for 5 days.

## 2020-04-13 ENCOUNTER — TELEPHONE (OUTPATIENT)
Dept: INTERNAL MEDICINE CLINIC | Age: 60
End: 2020-04-13

## 2020-04-13 NOTE — TELEPHONE ENCOUNTER
Pt called and said that she needs an prior auth on her tramadol. Her ins is only allowing her to get 7 days but she needs a PA to receive her full months supply.

## 2020-04-15 ENCOUNTER — DOCUMENTATION ONLY (OUTPATIENT)
Dept: INTERNAL MEDICINE CLINIC | Age: 60
End: 2020-04-15

## 2020-04-15 NOTE — PROGRESS NOTES
4/15/20 PA initiated thru [employer] for Tramadol Hcl 50 mg tabs. Please note. Short and Long Acting request will need to be fax to [facility]. If forms is not fax to office within 24 hours,please call [address] @ [phone]

## 2020-04-23 ENCOUNTER — TELEPHONE (OUTPATIENT)
Dept: INTERNAL MEDICINE CLINIC | Age: 60
End: 2020-04-23

## 2020-04-23 NOTE — TELEPHONE ENCOUNTER
Returned call and pt stated she has a hard time breathing at night, nose stuffy, coughing and COPD. Wanted to know if Faychacorta Vargas would send prednisone into [facility] in [place].

## 2020-04-23 NOTE — TELEPHONE ENCOUNTER
----- Message from Malcolm Azul sent at 4/23/2020 12:53 PM EDT -----  Regarding: Dr. Tonja Laboy  Level 1/Escalated Issue      Caller's first and last name and relationship (if not the patient):   PT      Best contact number(s):  [phone]      What are the symptoms:  Difficulty breathing - COPD      Transfer successful - yes/no (include outcome):  Yes -  No answer      Transfer declined - yes/no (include reason):  No      Was caller advised to seek appropriate level of care - yes/no: Yes      Details to clarify the request:        Malcolm Azul

## 2020-04-24 ENCOUNTER — VIRTUAL VISIT (OUTPATIENT)
Dept: INTERNAL MEDICINE CLINIC | Age: 60
End: 2020-04-24

## 2020-04-24 NOTE — PROGRESS NOTES
Chief Complaint   Patient presents with    COPD     Pt has a hard time breathing at night with her stuffy nose, cough and COPD, wanted prednisone which seems to help      Patient has not been out of the country in 45-60 days, NO diarrhea, NO cough, NO chest conjestion, NO temp. Pt has not been around anyone with these symptoms. Health Maintenance reviewed. I have reviewed the patient's medical history in detail and updated the computerized patient record. 1. Have you been to the ER, urgent care clinic since your last visit? Hospitalized since your last visit?no    2. Have you seen or consulted any other health care providers outside of the [address] [place] [address] since your last visit? Include any pap smears or colon screening. No      Encouraged pt to discuss pt's wishes with spouse/partner/family and bring them in the next appt to follow thru with the Advanced Directive    Fall Risk Assessment, last 12 mths 1/15/2020   Able to walk? Yes   Fall in past 12 months?  No   Fall with injury? -   Number of falls in past 12 months -   Fall Risk Score -       3 most recent PHQ Screens 1/15/2020   Little interest or pleasure in doing things Several days   Feeling down, depressed, irritable, or hopeless Several days   Total Score PHQ 2 2   Trouble falling or staying asleep, or sleeping too much -   Feeling tired or having little energy -   Poor appetite, weight loss, or overeating -   Feeling bad about yourself - or that you are a failure or have let yourself or your family down -   Trouble concentrating on things such as school, work, reading, or watching TV -   Moving or speaking so slowly that other people could have noticed; or the opposite being so fidgety that others notice -   Thoughts of being better off dead, or hurting yourself in some way -   How difficult have these problems made it for you to do your work, take care of your home and get along with others -       Abuse Screening Questionnaire 1/15/2020   Do you ever feel afraid of your partner? N   Are you in a relationship with someone who physically or mentally threatens you? N   Is it safe for you to go home?  Y       ADL Assessment 1/15/2020   Feeding yourself No Help Needed   Getting from bed to chair No Help Needed   Getting dressed No Help Needed   Bathing or showering No Help Needed   Walk across the room (includes cane/walker) No Help Needed   Using the telphone No Help Needed   Taking your medications No Help Needed   Preparing meals No Help Needed   Managing money (expenses/bills) No Help Needed   Moderately strenuous housework (laundry) No Help Needed   Shopping for personal items (toiletries/medicines) No Help Needed   Shopping for groceries No Help Needed   Driving Help Needed   Climbing a flight of stairs No Help Needed   Getting to places beyond walking distances Help Needed

## 2020-04-27 ENCOUNTER — DOCUMENTATION ONLY (OUTPATIENT)
Dept: INTERNAL MEDICINE CLINIC | Age: 60
End: 2020-04-27

## 2020-04-27 NOTE — PROGRESS NOTES
[facility]  Notice of Denial    Tramadol Hcl 50 mg tablet    DENIED     Call [phone] is questions.

## 2020-05-11 DIAGNOSIS — J44.9 CHRONIC OBSTRUCTIVE PULMONARY DISEASE, UNSPECIFIED COPD TYPE (HCC): ICD-10-CM

## 2020-05-11 DIAGNOSIS — M48.00 SPINAL STENOSIS, UNSPECIFIED SPINAL REGION: ICD-10-CM

## 2020-05-12 RX ORDER — BENZONATATE 200 MG/1
CAPSULE ORAL
Qty: 60 CAP | Refills: 0 | Status: SHIPPED | OUTPATIENT
Start: 2020-05-12 | End: 2020-07-15 | Stop reason: SDUPTHER

## 2020-05-12 RX ORDER — ALBUTEROL SULFATE 90 UG/1
AEROSOL, METERED RESPIRATORY (INHALATION)
Qty: 1 INHALER | Refills: 5 | Status: SHIPPED | OUTPATIENT
Start: 2020-05-12 | End: 2020-08-20 | Stop reason: SDUPTHER

## 2020-05-12 RX ORDER — TRAMADOL HYDROCHLORIDE 50 MG/1
50 TABLET ORAL
Qty: 120 TAB | Refills: 1 | OUTPATIENT
Start: 2020-05-12 | End: 2020-07-11

## 2020-05-13 ENCOUNTER — VIRTUAL VISIT (OUTPATIENT)
Dept: INTERNAL MEDICINE CLINIC | Age: 60
End: 2020-05-13

## 2020-05-13 DIAGNOSIS — F11.20 NARCOTIC DEPENDENCE (HCC): ICD-10-CM

## 2020-05-13 DIAGNOSIS — G25.81 RESTLESS LEGS: ICD-10-CM

## 2020-05-13 DIAGNOSIS — F33.0 DEPRESSION, MAJOR, RECURRENT, MILD (HCC): Primary | ICD-10-CM

## 2020-05-13 DIAGNOSIS — M48.00 SPINAL STENOSIS, UNSPECIFIED SPINAL REGION: ICD-10-CM

## 2020-05-13 RX ORDER — TRAMADOL HYDROCHLORIDE 50 MG/1
50 TABLET ORAL
Qty: 120 TAB | Refills: 1 | Status: SHIPPED | OUTPATIENT
Start: 2020-05-13 | End: 2020-07-13 | Stop reason: SDUPTHER

## 2020-05-13 RX ORDER — GABAPENTIN 400 MG/1
400 CAPSULE ORAL 3 TIMES DAILY
Qty: 90 CAP | Refills: 1 | Status: SHIPPED | OUTPATIENT
Start: 2020-05-13 | End: 2020-08-20 | Stop reason: SDUPTHER

## 2020-05-13 NOTE — PROGRESS NOTES
Consent: Malaika Schwab, who was seen by synchronous (real-time) audio-video technology, and/or her healthcare decision maker, is aware that this patient-initiated, Telehealth encounter on 5/13/2020 is a billable service, with coverage as determined by her insurance carrier. She is aware that she may receive a bill and has provided verbal consent to proceed: Yes. PROGRESS NOTE        SUBJECTIVE:  Diagnosis/Chief Complaint: LOW BACK PAIN (med refill)      Doing well with pain yes -but it has been worse lately and she is trying to see surgeon, visit is delayed due to [place]. Improvement in function: yes - helps  Pain levels 9/10   Usage of benzodiazapine or other sedatives no  Symptoms low back pain  Activities: Able to do activities of daily living. yes -independent of ADLs  Side affects: no  States taking medications per medicine list.yes -as prescribed.  queried yes - only me in 3 mocodeine rx by other  Urine drug screen done no  Opiod Rx exceeds 50 MME/dayno  Hx of depression no  Hx of drug addiction: no  Safe storage of the opiods: yes - locked  Narcotic contract reviewed and discussed: yes - in chart    Patient Active Problem List    Diagnosis Date Noted    Parasites in stool 05/15/2019    Narcotic dependence ([facility] [address] 04/07/2019    Encounter for diagnostic colonoscopy due to change in bowel habits 03/04/2019    Elevated liver enzymes 02/19/2019    Heme + stool 02/19/2019    Depression, major, recurrent, mild ([address] 12/21/2017    Restless legs 12/21/2017    Tobacco abuse 12/21/2017    Spinal stenosis 08/14/2017     Current Outpatient Medications   Medication Sig Dispense Refill    gabapentin (NEURONTIN) 400 mg capsule Take 1 Cap by mouth three (3) times daily. Max Daily Amount: 1,200 mg. take 1 capsule by mouth three times a day 90 Cap 1    traMADoL (ULTRAM) 50 mg tablet Take 1 Tab by mouth every six (6) hours as needed for Pain for up to 60 days.  Max Daily Amount: 200 mg. 120 Tab 1    benzonatate (TESSALON) 200 mg capsule TAKE 1 CAPSULE BY MOUTH THREE TIMES DAILY AS NEEDED WITH FOOD FOR  COUGH  FOR  UP  TO  7  DAYS 60 Cap 0    albuterol (PROVENTIL HFA, VENTOLIN HFA, PROAIR HFA) 90 mcg/actuation inhaler INHALE ONE PUFF BY MOUTH EVERY 6 HOURS AS NEEDED FOR WHEEZING 1 Inhaler 5    varenicline (CHANTIX) 1 mg tablet Take 1 Tab by mouth two (2) times a day for 90 days. 60 Tab 1    ondansetron (Zofran ODT) 4 mg disintegrating tablet Take 1 Tab by mouth every eight (8) hours as needed for Nausea or Vomiting for up to 10 doses. 10 Tab 0    naloxone (NARCAN) 4 mg/actuation nasal spray 1 Spray by Nasal route.  varenicline (CHANTIX STARTER ANN MARIE) 0.5 mg (11)- 1 mg (42) DsPk Use as directed 1 Dose Pack 0    guaiFENesin (MUCINEX) 1,200 mg Ta12 ER tablet Take 1 Tab by mouth two (2) times a day. 20 Tab 0    albuterol (PROVENTIL VENTOLIN) 2.5 mg /3 mL (0.083 %) nebu 3 mL by Nebulization route every four (4) hours as needed for Wheezing. 30 Nebule 3    Nebulizer & Compressor machine 1 Each by Does Not Apply route every four (4) hours as needed for Wheezing or Shortness of Breath. As directed 1 Each 0    potassium chloride (KLOR-CON) 10 mEq tablet Take 1 Tab by mouth daily. 30 Tab 5    fluticasone propion-salmeterol (ADVAIR/WIXELA) 500-50 mcg/dose diskus inhaler INHALE ONE DOSE BY MOUTH EVERY 12 HOURS 1 Inhaler 5    tiotropium (SPIRIVA) 18 mcg inhalation capsule Take 1 Cap by inhalation daily. 30 Cap 5    diclofenac (VOLTAREN) 1 % gel Apply  to affected area four (4) times daily. 1 Each 5    calcium combo no.2-vitamin D3 600 mg calcium- 500 unit TbER Take 1 Each by mouth daily. 90 Tab 1    DULoxetine (CYMBALTA) 60 mg capsule Take 1 Cap by mouth daily. 30 Cap 11    nicotine (NICODERM CQ) 21 mg/24 hr APPLY 1 PATCH TOPICALLY EVERY 24 HOURS FOR 30 DAYS 30 Patch 0    buPROPion (WELLBUTRIN) 100 mg tablet Take 1 Tab by mouth three (3) times daily.  90 Tab 5    cyclobenzaprine (FLEXERIL) 5 mg tablet TAKE ONE TABLET BY MOUTH THREE TIMES DAILY AS NEEDED FOR MUSCLE SPASM 270 Tab 1    lidocaine (LIDODERM) 5 % apply 1 patch to the affected area daily. Leave on for 12 hours and then REMOVE for 12 hours. 30 Patch 2     No Known Allergies  Past Surgical History:   Procedure Laterality Date    ABDOMEN SURGERY PROC UNLISTED      hystrectomy    COLONOSCOPY N/A 5/7/2019    COL[facility] performed by Hill Kumar MD at [facility] [address]  09/2016    laminectomy    HX CERVICAL FUSION      HX COLONOSCOPY  05/07/2019    4 polyps- 2 tubular adenoma 2 hyperplastic polyp    HX HYSTERECTOMY       Social History     Tobacco Use    Smoking status: Current Every Day Smoker     Packs/day: 0.50     Years: 35.00     Pack years: 17.50     Types: Cigarettes    Smokeless tobacco: Never Used   Substance Use Topics    Alcohol use: Yes     Alcohol/week: 19.0 standard drinks     Types: 14 Glasses of wine, 5 Cans of beer per week     Frequency: Monthly or less     Drinks per session: 1 or 2     Binge frequency: Never        OBJECTIVE:    .  Visit Vitals  LMP  (LMP Unknown)     WDWN in NAD, mental status normal  Psychiatric: Normal mood, judgement    Reviewed: Medications, allergies, clinical lab test results and imaging results have been reviewed. Any abnormal findings have been addressed. ASSESSMENT:       ICD-10-CM ICD-9-CM    1. Depression, major, recurrent, mild (HCC) F33.0 296.31    2. Spinal stenosis, unspecified spinal region M48.00 724.00 gabapentin (NEURONTIN) 400 mg capsule      traMADoL (ULTRAM) 50 mg tablet   3. Restless legs G25.81 333.94    4.  [facility] ([facility] F11.20 304.90          Patient is 63 y.o. with diagnosis of :   Patient Active Problem List   Diagnosis Code    Spinal stenosis M48.00    Depression, major, recurrent, mild (HCC) F33.0    Restless legs G25.81    Tobacco abuse Z72.0    Elevated liver enzymes R74.8    Heme + stool R19.5    Encounter for diagnostic colonoscopy due to change in bowel habits R19.4    Narcotic dependence ([facility] [address] F11.20    Parasites in stool B82.9       PLAN:     Orders Placed This Encounter    gabapentin (NEURONTIN) 400 mg capsule     Sig: Take 1 Cap by mouth three (3) times daily. Max Daily Amount: 1,200 mg. take 1 capsule by mouth three times a day     Dispense:  90 Cap     Refill:  1    traMADoL (ULTRAM) 50 mg tablet     Sig: Take 1 Tab by mouth every six (6) hours as needed for Pain for up to 60 days. Max Daily Amount: 200 mg.      Dispense:  120 Tab     Refill:  1       F/U 2 mo

## 2020-05-16 PROBLEM — B82.9 PARASITES IN STOOL: Status: RESOLVED | Noted: 2019-05-15 | Resolved: 2020-05-16

## 2020-05-16 PROBLEM — D12.6 ADENOMATOUS COLON POLYP: Status: ACTIVE | Noted: 2020-05-16

## 2020-05-19 ENCOUNTER — TELEPHONE (OUTPATIENT)
Dept: INTERNAL MEDICINE CLINIC | Age: 60
End: 2020-05-19

## 2020-05-19 NOTE — TELEPHONE ENCOUNTER
5/19/20 Plan called @ 6  Attempted PA for Tramadol 50 mg Tab,with rep Cristina BYNUM,Long/Short Opioid form will be fax to office to be completed and fax back to office once received  turn around time for decision is up to 24 hours. Claim ref # [id number], please add to form if not noted,case is time sensitive. Please call plan @ 1 [phone] ,may be able to complete claim I started, if a  form has been fax to plan.

## 2020-05-21 ENCOUNTER — TELEPHONE (OUTPATIENT)
Dept: INTERNAL MEDICINE CLINIC | Age: 60
End: 2020-05-21

## 2020-05-21 NOTE — TELEPHONE ENCOUNTER
Faxed to [address] [place] [address] with confirmation a To Whom It May Concern letter for pt's incontinent supplies and a blood pressure cuff  [phone]

## 2020-05-21 NOTE — LETTER
5/21/2020 3:54 PM 
 
Ms. [id number] [facility] [address] RE:  LETTER OF NECESSARY 
 
TO WHOM IT MAY CONCERN: 
 
Ms. Emelyn Tran. Catalina Castillo is a patient of mine at [facility]. I have ordered incontinent supplies and a blood pressure machine for Mr. Catalina Castillo due to her condition. Please do not hesitate to contact this office if you have any questions. Sincerely, Renaldo Clements MD

## 2020-05-21 NOTE — TELEPHONE ENCOUNTER
[facility] has called   Tele [phone]  Fax  [phone]    They are asking for a  medical necessary for patient who has requested inc supplies and a blood pressure machine.

## 2020-05-29 ENCOUNTER — DOCUMENTATION ONLY (OUTPATIENT)
Dept: INTERNAL MEDICINE CLINIC | Age: 60
End: 2020-05-29

## 2020-05-29 NOTE — PROGRESS NOTES
Refaxed a new letter of necessity to [facility] [phone] with confirmation with new information of order #[id number] Bladder control pads and pt has stress incontinence.

## 2020-06-12 ENCOUNTER — OFFICE VISIT (OUTPATIENT)
Dept: PRIMARY CARE CLINIC | Age: 60
End: 2020-06-12

## 2020-06-12 VITALS — OXYGEN SATURATION: 92 % | TEMPERATURE: 98.7 F | HEART RATE: 78 BPM

## 2020-06-12 DIAGNOSIS — Z20.828 EXPOSURE TO SARS-ASSOCIATED CORONAVIRUS: Primary | ICD-10-CM

## 2020-06-12 DIAGNOSIS — J06.9 UPPER RESPIRATORY TRACT INFECTION, UNSPECIFIED TYPE: ICD-10-CM

## 2020-06-12 RX ORDER — AZITHROMYCIN 250 MG/1
TABLET, FILM COATED ORAL
Qty: 6 TAB | Refills: 0 | Status: SHIPPED | OUTPATIENT
Start: 2020-06-12 | End: 2020-06-17

## 2020-06-12 NOTE — PROGRESS NOTES
Media Information      Document Information     Mobile Media Capture      06/12/2020 14:22   Attached To: Office Visit on 6/12/20 with Amado Stephens NP   Source Information     Amado Stephens NP  Flu [address]     Media Information      Document Information     Mobile Media Capture      06/12/2020 14:22   Attached To:    Office Visit on 6/12/20 with Amado Stephens NP   Source Information     Amado Stephens NP  Flu [address] [place], [place]

## 2020-06-12 NOTE — PROGRESS NOTES
Pt presents to the flu clinic with a 1 week hx of increased shortness of breath, cough and fever up to 101. Denies sore throat and muscles, no lost of taste or smell.

## 2020-06-14 LAB — SARS-COV-2, NAA: NOT DETECTED

## 2020-06-26 ENCOUNTER — DOCUMENTATION ONLY (OUTPATIENT)
Dept: INTERNAL MEDICINE CLINIC | Age: 60
End: 2020-06-26

## 2020-06-26 NOTE — PROGRESS NOTES
[facility]   LETTER OF NECESSITY     New Letter of Necessity was faxed to them [phone] with confirmation    Stating pt has stress incontinence and has loss of bladder muscle control ICD Code is N 39.3  Ordering 200 pads with 5 refills

## 2020-07-13 ENCOUNTER — VIRTUAL VISIT (OUTPATIENT)
Dept: INTERNAL MEDICINE CLINIC | Age: 60
End: 2020-07-13

## 2020-07-13 DIAGNOSIS — J44.9 CHRONIC OBSTRUCTIVE PULMONARY DISEASE, UNSPECIFIED COPD TYPE (HCC): Primary | ICD-10-CM

## 2020-07-13 DIAGNOSIS — Z72.0 TOBACCO ABUSE: ICD-10-CM

## 2020-07-13 DIAGNOSIS — M48.00 SPINAL STENOSIS, UNSPECIFIED SPINAL REGION: ICD-10-CM

## 2020-07-13 DIAGNOSIS — F11.20 NARCOTIC DEPENDENCE (HCC): ICD-10-CM

## 2020-07-13 DIAGNOSIS — F33.0 DEPRESSION, MAJOR, RECURRENT, MILD (HCC): ICD-10-CM

## 2020-07-13 RX ORDER — AZITHROMYCIN 250 MG/1
250 TABLET, FILM COATED ORAL SEE ADMIN INSTRUCTIONS
Qty: 6 TAB | Refills: 0 | Status: SHIPPED | OUTPATIENT
Start: 2020-07-13 | End: 2020-08-14 | Stop reason: SDUPTHER

## 2020-07-13 RX ORDER — PREDNISONE 20 MG/1
40 TABLET ORAL
Qty: 10 TAB | Refills: 0 | Status: SHIPPED | OUTPATIENT
Start: 2020-07-13 | End: 2020-07-18

## 2020-07-13 RX ORDER — IBUPROFEN 200 MG
TABLET ORAL
Qty: 30 PATCH | Refills: 0 | Status: SHIPPED | OUTPATIENT
Start: 2020-07-13 | End: 2021-11-17 | Stop reason: ALTCHOICE

## 2020-07-13 RX ORDER — TRAMADOL HYDROCHLORIDE 50 MG/1
50 TABLET ORAL
Qty: 120 TAB | Refills: 0 | Status: SHIPPED | OUTPATIENT
Start: 2020-07-13 | End: 2020-08-14 | Stop reason: ALTCHOICE

## 2020-07-13 NOTE — PROGRESS NOTES
Chief Complaint   Patient presents with    Fever     fever, nonprod cough, body aches    Back Pain     med refill     Health Maintenance reviewed. I have reviewed the patient's medical history in detail and updated the computerized patient record. 1. Have you been to the ER, urgent care clinic since your last visit? Hospitalized since your last visit?no    2. Have you seen or consulted any other health care providers outside of the [address] [place] [address] since your last visit? Include any pap smears or colon screening. No    Encouraged pt to discuss pt's wishes with spouse/partner/family and bring them in the next appt to follow thru with the Advanced Directive    Fall Risk Assessment, last 12 mths 7/13/2020   Able to walk? Yes   Fall in past 12 months? No   Fall with injury? -   Number of falls in past 12 months -   Fall Risk Score -       3 most recent PHQ Screens 7/13/2020   Little interest or pleasure in doing things Nearly every day   Feeling down, depressed, irritable, or hopeless Nearly every day   Total Score PHQ 2 6   Trouble falling or staying asleep, or sleeping too much -   Feeling tired or having little energy -   Poor appetite, weight loss, or overeating -   Feeling bad about yourself - or that you are a failure or have let yourself or your family down -   Trouble concentrating on things such as school, work, reading, or watching TV -   Moving or speaking so slowly that other people could have noticed; or the opposite being so fidgety that others notice -   Thoughts of being better off dead, or hurting yourself in some way -   How difficult have these problems made it for you to do your work, take care of your home and get along with others -       Abuse Screening Questionnaire 7/13/2020   Do you ever feel afraid of your partner? N   Are you in a relationship with someone who physically or mentally threatens you? N   Is it safe for you to go home?  Y       ADL Assessment 7/13/2020   Feeding yourself No Help Needed   Getting from bed to chair No Help Needed   Getting dressed No Help Needed   Bathing or showering No Help Needed   Walk across the room (includes cane/walker) No Help Needed   Using the telphone No Help Needed   Taking your medications No Help Needed   Preparing meals No Help Needed   Managing money (expenses/bills) No Help Needed   Moderately strenuous housework (laundry) No Help Needed   Shopping for personal items (toiletries/medicines) No Help Needed   Shopping for groceries No Help Needed   Driving -   Climbing a flight of stairs -   Getting to places beyond walking distances -

## 2020-07-13 NOTE — PROGRESS NOTES
Subjective:   Jasson Abreu is a 63 y.o. female who complains of dry cough, headache, fever and wheezing and dyspnea for 7 days, gradually worsening since that time. She denies a history of sputum production and loss of smell and diarrhea. Patient has no known exposures to anyone with coronavirus infection. There has been no travel to the infected countries of [place], [place], [place], [place], or [facility], recently. Patient does not live in assisted living or nursing facility. Tested for covid last mo  Evaluation to date: none. Treatment to date: OTC products. Patient does smoke cigarettes. Relevant PMH: Asthma and COPD. No Known Allergies      Patient Active Problem List    Diagnosis Date Noted    Adenomatous colon polyp 05/16/2020    Narcotic dependence ([facility] 04/07/2019    Encounter for diagnostic colonoscopy due to change in bowel habits 03/04/2019    Elevated liver enzymes 02/19/2019    Depression, major, recurrent, mild ([facility] [facility] 12/21/2017    Restless legs 12/21/2017    Tobacco abuse 12/21/2017    Spinal stenosis 08/14/2017     Current Outpatient Medications   Medication Sig Dispense Refill    gabapentin (NEURONTIN) 400 mg capsule Take 1 Cap by mouth three (3) times daily. Max Daily Amount: 1,200 mg. take 1 capsule by mouth three times a day 90 Cap 1    benzonatate (TESSALON) 200 mg capsule TAKE 1 CAPSULE BY MOUTH THREE TIMES DAILY AS NEEDED WITH FOOD FOR  COUGH  FOR  UP  TO  7  DAYS 60 Cap 0    albuterol (PROVENTIL HFA, VENTOLIN HFA, PROAIR HFA) 90 mcg/actuation inhaler INHALE ONE PUFF BY MOUTH EVERY 6 HOURS AS NEEDED FOR WHEEZING 1 Inhaler 5    ondansetron (Zofran ODT) 4 mg disintegrating tablet Take 1 Tab by mouth every eight (8) hours as needed for Nausea or Vomiting for up to 10 doses. 10 Tab 0    naloxone (NARCAN) 4 mg/actuation nasal spray 1 Spray by Nasal route.       varenicline (CHANTIX STARTER ANN MARIE) 0.5 mg (11)- 1 mg (42) DsPk Use as directed 1 Dose Pack 0    guaiFENesin (MUCINEX) 1,200 mg Ta12 ER tablet Take 1 Tab by mouth two (2) times a day. 20 Tab 0    albuterol (PROVENTIL VENTOLIN) 2.5 mg /3 mL (0.083 %) nebu 3 mL by Nebulization route every four (4) hours as needed for Wheezing. 30 Nebule 3    Nebulizer & Compressor machine 1 Each by Does Not Apply route every four (4) hours as needed for Wheezing or Shortness of Breath. As directed 1 Each 0    potassium chloride (KLOR-CON) 10 mEq tablet Take 1 Tab by mouth daily. 30 Tab 5    fluticasone propion-salmeterol (ADVAIR/WIXELA) 500-50 mcg/dose diskus inhaler INHALE ONE DOSE BY MOUTH EVERY 12 HOURS 1 Inhaler 5    tiotropium (SPIRIVA) 18 mcg inhalation capsule Take 1 Cap by inhalation daily. 30 Cap 5    diclofenac (VOLTAREN) 1 % gel Apply  to affected area four (4) times daily. 1 Each 5    calcium combo no.2-vitamin D3 600 mg calcium- 500 unit TbER Take 1 Each by mouth daily. 90 Tab 1    DULoxetine (CYMBALTA) 60 mg capsule Take 1 Cap by mouth daily. 30 Cap 11    nicotine (NICODERM CQ) 21 mg/24 hr APPLY 1 PATCH TOPICALLY EVERY 24 HOURS FOR 30 DAYS 30 Patch 0    buPROPion (WELLBUTRIN) 100 mg tablet Take 1 Tab by mouth three (3) times daily. 90 Tab 5    cyclobenzaprine (FLEXERIL) 5 mg tablet TAKE ONE TABLET BY MOUTH THREE TIMES DAILY AS NEEDED FOR MUSCLE SPASM 270 Tab 1    lidocaine (LIDODERM) 5 % apply 1 patch to the affected area daily. Leave on for 12 hours and then REMOVE for 12 hours. 30 Patch 2    traMADoL (ULTRAM) 50 mg tablet Take 1 Tab by mouth every six (6) hours as needed for Pain for up to 60 days. Max Daily Amount: 200 mg. 120 Tab 1     No Known Allergies  Social History     Tobacco Use    Smoking status: Current Every Day Smoker     Packs/day: 0.50     Years: 35.00     Pack years: 17.50     Types: Cigarettes    Smokeless tobacco: Never Used   Substance Use Topics    Alcohol use:  Yes     Alcohol/week: 19.0 standard drinks     Types: 14 Glasses of wine, 5 Cans of beer per week     Frequency: Monthly or less Drinks per session: 1 or 2     Binge frequency: Never        Review of Systems  Pertinent items are noted in HPI. Objective:     Visit Vitals  LMP  (LMP Unknown)     General:  alert, fatigued, cooperative, no distress   Eyes:    Ears:    Sinuses:    Mouth:     Neck:    Heart:    Lungs:    Abdomen:       Assessment/Plan:   viral upper respiratory illness poss covid vs COPD, suggested she get tested for COVID at the [facility] clinic  Discussed dx and tx of URIs  Antibiotics per orders. Discussed diagnosis and treatment of viral URIs. Discussed the importance of avoiding unnecessary antibiotic therapy. Encounter Diagnoses   Name Primary?  Chronic obstructive pulmonary disease, unspecified COPD type ([facility] Yes    Spinal stenosis, unspecified spinal region     Narcotic dependence ([facility] [address]     Depression, major, recurrent, mild (HCC)     Tobacco abuse      Orders Placed This Encounter    azithromycin (ZITHROMAX) 250 mg tablet    predniSONE (DELTASONE) 20 mg tablet    traMADoL (ULTRAM) 50 mg tablet    nicotine (NICODERM CQ) 21 mg/24 hr   .  Okay refill of her tramadol but the next visit will require in person office to give us a urine sample. The patient was counseled on the dangers of tobacco use, and was advised to quit. Reviewed strategies to maximize success, including pharmacotherapy (Patch).        Follow-up 1 month

## 2020-07-15 ENCOUNTER — TELEPHONE (OUTPATIENT)
Dept: INTERNAL MEDICINE CLINIC | Age: 60
End: 2020-07-15

## 2020-07-15 DIAGNOSIS — J44.9 CHRONIC OBSTRUCTIVE PULMONARY DISEASE, UNSPECIFIED COPD TYPE (HCC): ICD-10-CM

## 2020-07-15 RX ORDER — BENZONATATE 200 MG/1
CAPSULE ORAL
Qty: 60 CAP | Refills: 0 | Status: SHIPPED | OUTPATIENT
Start: 2020-07-15 | End: 2020-10-06

## 2020-07-15 NOTE — TELEPHONE ENCOUNTER
7/15/20 Plan called @ 7 [phone] for PA on Nicotine 21 mg/24 hr.PA rep Hailee CAMPOS,state no PA needed covered by plan,pharmacy [place] need [place] [facility]. Paid claim went thru by plan with [id number]. [address] called pharmacist, Savi Renee state, I cannot get [place] [facility] go thru,patient will need to buy it OTC ,not that expensive. Savi Renee advised to try other [facility], per rep. Please follow up with patient if unable to go thru on pharmacy next process.  Thanks

## 2020-07-15 NOTE — TELEPHONE ENCOUNTER
Requested Prescriptions     Pending Prescriptions Disp Refills    benzonatate (TESSALON) 200 mg capsule 60 Cap 0     Sig: TAKE 1 CAPSULE BY MOUTH THREE TIMES DAILY AS NEEDED WITH FOOD FOR  COUGH  FOR  UP  TO  7  DAYS

## 2020-07-21 PROBLEM — F17.200 TOBACCO DEPENDENCE SYNDROME: Status: ACTIVE | Noted: 2020-07-21

## 2020-07-21 PROBLEM — F41.1 ANXIETY STATE: Status: ACTIVE | Noted: 2020-07-21

## 2020-07-21 PROBLEM — E78.5 HYPERLIPIDEMIA: Status: ACTIVE | Noted: 2020-07-21

## 2020-07-21 PROBLEM — M17.9 OSTEOARTHRITIS OF KNEE: Status: ACTIVE | Noted: 2020-07-21

## 2020-07-21 PROBLEM — J44.9 CHRONIC OBSTRUCTIVE PULMONARY DISEASE (HCC): Status: ACTIVE | Noted: 2020-07-21

## 2020-07-21 PROBLEM — M54.2 NECK PAIN: Status: ACTIVE | Noted: 2020-07-21

## 2020-07-21 PROBLEM — M54.9 BACKACHE: Status: ACTIVE | Noted: 2020-07-21

## 2020-07-24 ENCOUNTER — DOCUMENTATION ONLY (OUTPATIENT)
Dept: INTERNAL MEDICINE CLINIC | Age: 60
End: 2020-07-24

## 2020-07-24 NOTE — PROGRESS NOTES
Faxed to Medical Documentation Department [phone] with confirmation a letter of a letter of medical necessity request with pt's name,  and ICD Code (N39.1) for oring incontinent supplies  Because pt has lost bladder muscle control

## 2020-07-31 ENCOUNTER — DOCUMENTATION ONLY (OUTPATIENT)
Dept: INTERNAL MEDICINE CLINIC | Age: 60
End: 2020-07-31

## 2020-07-31 NOTE — PROGRESS NOTES
Received a fax from [address] [place] [address] asking for another   Letter of Medical Necessity Request with the 300 bladder control pads per month. This was done and faxed to [phone] with confirmation.

## 2020-08-13 DIAGNOSIS — M48.00 SPINAL STENOSIS, UNSPECIFIED SPINAL REGION: ICD-10-CM

## 2020-08-13 RX ORDER — TRAMADOL HYDROCHLORIDE 50 MG/1
50 TABLET ORAL
Qty: 120 TAB | Refills: 0 | Status: CANCELLED | OUTPATIENT
Start: 2020-08-13 | End: 2020-10-12

## 2020-08-13 NOTE — TELEPHONE ENCOUNTER
Requested Prescriptions     Pending Prescriptions Disp Refills    traMADoL (ULTRAM) 50 mg tablet 120 Tab 0     Sig: Take 1 Tab by mouth every six (6) hours as needed for Pain for up to 60 days. Max Daily Amount: 200 mg.

## 2020-08-14 ENCOUNTER — OFFICE VISIT (OUTPATIENT)
Dept: PRIMARY CARE CLINIC | Age: 60
End: 2020-08-14

## 2020-08-14 ENCOUNTER — VIRTUAL VISIT (OUTPATIENT)
Dept: INTERNAL MEDICINE CLINIC | Age: 60
End: 2020-08-14
Payer: MEDICAID

## 2020-08-14 DIAGNOSIS — M54.2 NECK PAIN: ICD-10-CM

## 2020-08-14 DIAGNOSIS — Z20.828 EXPOSURE TO SARS-ASSOCIATED CORONAVIRUS: Primary | ICD-10-CM

## 2020-08-14 DIAGNOSIS — J44.9 CHRONIC OBSTRUCTIVE PULMONARY DISEASE, UNSPECIFIED COPD TYPE (HCC): ICD-10-CM

## 2020-08-14 DIAGNOSIS — A09 DIARRHEA OF INFECTIOUS ORIGIN: Primary | ICD-10-CM

## 2020-08-14 PROCEDURE — 99214 OFFICE O/P EST MOD 30 MIN: CPT | Performed by: FAMILY MEDICINE

## 2020-08-14 RX ORDER — CODEINE PHOSPHATE AND GUAIFENESIN 10; 100 MG/5ML; MG/5ML
5 SOLUTION ORAL
Qty: 120 ML | Refills: 0 | Status: SHIPPED | OUTPATIENT
Start: 2020-08-14 | End: 2020-08-20 | Stop reason: ALTCHOICE

## 2020-08-14 RX ORDER — PREDNISONE 20 MG/1
40 TABLET ORAL
Qty: 10 TAB | Refills: 0 | Status: SHIPPED | OUTPATIENT
Start: 2020-08-14 | End: 2020-08-19

## 2020-08-14 RX ORDER — AZITHROMYCIN 250 MG/1
250 TABLET, FILM COATED ORAL SEE ADMIN INSTRUCTIONS
Qty: 6 TAB | Refills: 0 | Status: SHIPPED | OUTPATIENT
Start: 2020-08-14 | End: 2020-08-26 | Stop reason: ALTCHOICE

## 2020-08-14 RX ORDER — CODEINE PHOSPHATE AND GUAIFENESIN 10; 100 MG/5ML; MG/5ML
5 SOLUTION ORAL
Qty: 120 ML | Refills: 0 | Status: SHIPPED | OUTPATIENT
Start: 2020-08-14 | End: 2020-08-14 | Stop reason: SDUPTHER

## 2020-08-14 NOTE — PROGRESS NOTES
Pt presents to the flu clinic for covid testing. Pt states that she had a virtual visit with her PCP today and he recommended that she get a covid test. Pt declined to see the doctor at the clinic today.  KT

## 2020-08-14 NOTE — PROGRESS NOTES
HISTORY OF PRESENT ILLNESS  Francois Santiago is a 63 y.o. female. Diarrhea    The history is provided by the patient. This is a recurrent problem. The current episode started more than 1 week ago. The problem occurs 2 to 4 times per day. The problem has not changed since onset. There has been a fever of 102 - 102.9 F. The stool consistency is described as watery. Associated symptoms include cough. Risk factors include recent antibiotic use. She has tried anti-motility drugs (PB) for the symptoms. Cough   The current episode started more than 1 week ago. The problem has not changed since onset. Associated symptoms include shortness of breath. Treatments tried: z pack prednisone. The treatment provided moderate (but the cough returned) relief. Review of Systems   Constitutional: Positive for fever. Respiratory: Positive for cough and shortness of breath. Gastrointestinal: Positive for diarrhea. Negative for blood in stool.      Patient Active Problem List   Diagnosis Code    Spinal stenosis M48.00    Depression, major, recurrent, mild ([address] F33.0    Restless legs G25.81    Tobacco abuse Z72.0    Elevated liver enzymes R74.8    Encounter for diagnostic colonoscopy due to change in bowel habits R19.4    Narcotic dependence ([facility] [address] F11.20    Adenomatous colon polyp D12.6    Anxiety state F41.1    Backache M54.9    Chronic obstructive pulmonary disease (HCC) J44.9    Hyperlipidemia E78.5    Knee pain M25.569    Neck pain M54.2    Osteoarthritis of knee M17.10    Tobacco dependence syndrome F17.200     Social History     Socioeconomic History    Marital status: LEGALLY      Spouse name: Not on file    Number of children: 2    Years of education: Not on file    Highest education level: 10th grade   Occupational History    Occupation: disabled   Social Needs    Financial resource strain: Not on file    Food insecurity     Worry: Not on file     Inability: Not on file   [employer] needs     Medical: Not on file     Non-medical: Not on file   Tobacco Use    Smoking status: Current Every Day Smoker     Packs/day: 0.50     Years: 35.00     Pack years: 17.50     Types: Cigarettes    Smokeless tobacco: Never Used   Substance and Sexual Activity    Alcohol use: Yes     Alcohol/week: 19.0 standard drinks     Types: 14 Glasses of wine, 5 Cans of beer per week     Frequency: Monthly or less     Drinks per session: 1 or 2     Binge frequency: Never    Drug use: Not Currently     Types: Cocaine    Sexual activity: Not Currently     Partners: Male   Lifestyle    Physical activity     Days per week: Not on file     Minutes per session: Not on file    Stress: Not on file   Relationships    Social connections     Talks on phone: Not on file     Gets together: Not on file     Attends Synagogue service: Not on file     Active member of club or organization: Not on file     Attends meetings of clubs or organizations: Not on file     Relationship status: Not on file    Intimate partner violence     Fear of current or ex partner: Not on file     Emotionally abused: Not on file     Physically abused: Not on file     Forced sexual activity: Not on file   Other Topics Concern     Service No    Blood Transfusions No    Caffeine Concern Not Asked    Occupational Exposure Not Asked   Britt Fent Hazards Not Asked    Sleep Concern Not Asked    Stress Concern Not Asked    Weight Concern Not Asked    Special Diet Not Asked    Back Care Not Asked    Exercise Not Asked    Bike Helmet Not Asked    Seat Belt Yes    Self-Exams Yes   Social History Narrative    Lives with friends       Physical Exam  Appears to be in no acute distress, grossly 2 through 12 are nonfocal.  Coughing moderatley ill appearing  ASSESSMENT and PLAN  Encounter Diagnoses   Name Primary?     Diarrhea of infectious origin Yes    [facility], [facility]     Neck pain Orders Placed This Encounter    C DIFFICILE TOXIN A & B BY EIA    predniSONE (DELTASONE) 20 mg tablet    azithromycin (ZITHROMAX) 250 mg tablet    DISCONTD: guaiFENesin-codeine (ROBITUSSIN AC) 100-10 mg/5 mL solution    guaiFENesin-codeine (ROBITUSSIN AC) 100-10 mg/5 mL solution    If neg must come in to get tramadol rf  W/u diarrhea as above  COPD should go get tested for covid  Rx as above    f/u next week  Patient was looked up in the . There are multiple prescribers of controlled substances  no.

## 2020-08-14 NOTE — PROGRESS NOTES
Chief Complaint   Patient presents with    Diarrhea     diarrhea x2 weeks    Cough     prod cough thick black sputum x1 month    rib pain from coughing   pain 9/10      Health Maintenance reviewed. I have reviewed the patient's medical history in detail and updated the computerized patient record. 1. Have you been to the ER, urgent care clinic since your last visit? Hospitalized since your last visit?no    2. Have you seen or consulted any other health care providers outside of the [address] [place] [address] since your last visit? Include any pap smears or colon screening. no  Encouraged pt to discuss pt's wishes with spouse/partner/family and bring them in the next appt to follow thru with the Advanced Directive    Fall Risk Assessment, last 12 mths 8/14/2020   Able to walk? Yes   Fall in past 12 months? No   Fall with injury? -   Number of falls in past 12 months -   Fall Risk Score -       3 most recent PHQ Screens 8/14/2020   Little interest or pleasure in doing things More than half the days   Feeling down, depressed, irritable, or hopeless More than half the days   Total Score PHQ 2 4   Trouble falling or staying asleep, or sleeping too much -   Feeling tired or having little energy -   Poor appetite, weight loss, or overeating -   Feeling bad about yourself - or that you are a failure or have let yourself or your family down -   Trouble concentrating on things such as school, work, reading, or watching TV -   Moving or speaking so slowly that other people could have noticed; or the opposite being so fidgety that others notice -   Thoughts of being better off dead, or hurting yourself in some way -   How difficult have these problems made it for you to do your work, take care of your home and get along with others -       Abuse Screening Questionnaire 8/14/2020   Do you ever feel afraid of your partner? N   Are you in a relationship with someone who physically or mentally threatens you?  N   Is it safe for you to go home?  Y       ADL Assessment 8/14/2020   Feeding yourself No Help Needed   Getting from bed to chair No Help Needed   Getting dressed No Help Needed   Bathing or showering No Help Needed   Walk across the room (includes cane/walker) No Help Needed   Using the telphone No Help Needed   Taking your medications No Help Needed   Preparing meals No Help Needed   Managing money (expenses/bills) No Help Needed   Moderately strenuous housework (laundry) No Help Needed   Shopping for personal items (toiletries/medicines) No Help Needed   Shopping for groceries No Help Needed   Driving No Help Needed   Climbing a flight of stairs No Help Needed   Getting to places beyond walking distances -

## 2020-08-15 NOTE — TELEPHONE ENCOUNTER
Called patient - no answer - LM that Tramadol medication will require a prior auth - could take 24 - 72 hours to complete or get a response - advised will start working on this today and notify when we have a response  Jesenia Pool LPN  5/32/3644  7:26 PM Skin normal color for race, warm, dry and intact. No evidence of rash.

## 2020-08-16 LAB — SARS-COV-2, NAA: NOT DETECTED

## 2020-08-20 ENCOUNTER — VIRTUAL VISIT (OUTPATIENT)
Dept: INTERNAL MEDICINE CLINIC | Age: 60
End: 2020-08-20
Payer: MEDICAID

## 2020-08-20 DIAGNOSIS — J44.9 CHRONIC OBSTRUCTIVE PULMONARY DISEASE, UNSPECIFIED COPD TYPE (HCC): Primary | ICD-10-CM

## 2020-08-20 DIAGNOSIS — M17.0 PRIMARY OSTEOARTHRITIS OF BOTH KNEES: ICD-10-CM

## 2020-08-20 DIAGNOSIS — F17.200 TOBACCO DEPENDENCE SYNDROME: ICD-10-CM

## 2020-08-20 DIAGNOSIS — M54.2 NECK PAIN: ICD-10-CM

## 2020-08-20 DIAGNOSIS — R25.2 LEG CRAMPS: ICD-10-CM

## 2020-08-20 DIAGNOSIS — F11.20 NARCOTIC DEPENDENCE (HCC): ICD-10-CM

## 2020-08-20 DIAGNOSIS — M48.00 SPINAL STENOSIS, UNSPECIFIED SPINAL REGION: ICD-10-CM

## 2020-08-20 PROCEDURE — 99214 OFFICE O/P EST MOD 30 MIN: CPT | Performed by: FAMILY MEDICINE

## 2020-08-20 RX ORDER — POTASSIUM CHLORIDE 750 MG/1
10 TABLET, EXTENDED RELEASE ORAL DAILY
Qty: 30 TAB | Refills: 5 | Status: SHIPPED | OUTPATIENT
Start: 2020-08-20 | End: 2020-10-15

## 2020-08-20 RX ORDER — ALBUTEROL SULFATE 0.83 MG/ML
2.5 SOLUTION RESPIRATORY (INHALATION)
Qty: 30 NEBULE | Refills: 3 | Status: SHIPPED | OUTPATIENT
Start: 2020-08-20 | End: 2022-01-06 | Stop reason: SDUPTHER

## 2020-08-20 RX ORDER — TRAMADOL HYDROCHLORIDE 50 MG/1
50 TABLET ORAL
Qty: 28 TAB | Refills: 0 | Status: SHIPPED | OUTPATIENT
Start: 2020-08-20 | End: 2020-08-26 | Stop reason: SDUPTHER

## 2020-08-20 RX ORDER — ALBUTEROL SULFATE 90 UG/1
AEROSOL, METERED RESPIRATORY (INHALATION)
Qty: 1 INHALER | Refills: 2 | Status: SHIPPED | OUTPATIENT
Start: 2020-08-20 | End: 2022-09-13 | Stop reason: SDUPTHER

## 2020-08-20 RX ORDER — GABAPENTIN 400 MG/1
400 CAPSULE ORAL 3 TIMES DAILY
Qty: 90 CAP | Refills: 1 | Status: SHIPPED | OUTPATIENT
Start: 2020-08-20 | End: 2020-12-02

## 2020-08-20 RX ORDER — DICLOFENAC SODIUM 10 MG/G
GEL TOPICAL 4 TIMES DAILY
Qty: 1 EACH | Refills: 5 | Status: SHIPPED | OUTPATIENT
Start: 2020-08-20 | End: 2021-03-31 | Stop reason: SDUPTHER

## 2020-08-20 RX ORDER — FLUTICASONE PROPIONATE AND SALMETEROL 500; 50 UG/1; UG/1
POWDER RESPIRATORY (INHALATION)
Qty: 1 INHALER | Refills: 5 | Status: SHIPPED | OUTPATIENT
Start: 2020-08-20 | End: 2020-09-14 | Stop reason: ALTCHOICE

## 2020-08-20 NOTE — PROGRESS NOTES
PROGRESS NOTE        SUBJECTIVE:  Diagnosis/Chief Complaint: Cough      Doing well with pain no  Improvement in function: yes - stillcoughing getting better  Pain levels 8/10   Usage of benzodiazapine or other sedatives no  Symptoms diarrhea also better still on AB  Activities: Able to do activities of daily living. yes - independent ADLs  Side affects: yes - with codeine nose itches  States taking medications per medicine list.yes - has been off tramadol but on codeine cough med   queried yes - am only Rx  Urine drug screen done no but will be done when she come in for F/u  Opiod Rx exceeds 50 MME/dayno  Hx of depression yes - OK  Hx of drug addiction: no  Safe storage of the opiods: yes - locked  Narcotic contract reviewed and discussed: yes - re    Patient Active Problem List    Diagnosis Date Noted    Anxiety state 07/21/2020    Backache 07/21/2020    Chronic obstructive pulmonary disease ([facility] [address] 07/21/2020    Hyperlipidemia 07/21/2020    Neck pain 07/21/2020    Osteoarthritis of knee 07/21/2020    Tobacco dependence syndrome 07/21/2020    Adenomatous colon polyp 05/16/2020    Narcotic dependence ([facility] [address] 04/07/2019    Encounter for diagnostic colonoscopy due to change in bowel habits 03/04/2019    Elevated liver enzymes 02/19/2019    Depression, major, recurrent, mild ([address] 12/21/2017    Restless legs 12/21/2017    Tobacco abuse 12/21/2017    Spinal stenosis 08/14/2017    Knee pain 01/24/2014     Allergies   Allergen Reactions    Zolpidem Unknown (comments)     Social History     Tobacco Use    Smoking status: Current Every Day Smoker     Packs/day: 0.50     Years: 35.00     Pack years: 17.50     Types: Cigarettes    Smokeless tobacco: Never Used   Substance Use Topics    Alcohol use:  Yes     Alcohol/week: 19.0 standard drinks     Types: 14 Glasses of wine, 5 Cans of beer per week     Frequency: Monthly or less     Drinks per session: 1 or 2     Binge frequency: Never OBJECTIVE:    .  Visit Vitals  LMP  (LMP Unknown)     WDWN in NAD, mental status normal  Heart RRR, no:C/M/R  Lungs CTA No wheezes, rales or rhonchi  Abdo: soft no tenderness, rebound or guarding  Neurological exam[de-identified] 2-12 intact  Psychiatric: Normal mood, judgement    Reviewed: Medications, allergies, clinical lab test results and imaging results have been reviewed. Any abnormal findings have been addressed. ASSESSMENT:       ICD-10-CM ICD-9-CM    1. Chronic obstructive pulmonary disease, unspecified COPD type (HCC)  J44.9 496 albuterol (PROVENTIL HFA, VENTOLIN HFA, PROAIR HFA) 90 mcg/actuation inhaler      fluticasone propion-salmeteroL (ADVAIR/WIXELA) 500-50 mcg/dose diskus inhaler      albuterol (PROVENTIL VENTOLIN) 2.5 mg /3 mL (0.083 %) nebu   2. Spinal stenosis, unspecified spinal region  M48.00 724.00 gabapentin (NEURONTIN) 400 mg capsule   3. Primary osteoarthritis of both knees  M17.0 715.16 diclofenac (VOLTAREN) 1 % gel   4. Leg cramps  R25.2 729.82 potassium chloride (KLOR-CON) 10 mEq tablet   5. Narcotic dependence ([address]  F11.20 304.90    6. Tobacco dependence syndrome  F17.200 305.1    7.  Neck pain  M54.2 723.1 traMADoL (ULTRAM) 50 mg tablet       Patient is 63 y.o. with diagnosis of :   Patient Active Problem List   Diagnosis Code    Spinal stenosis M48.00    Depression, major, recurrent, mild ([address] F33.0    Restless legs G25.81    Tobacco abuse Z72.0    Elevated liver enzymes R74.8    Encounter for diagnostic colonoscopy due to change in bowel habits R19.4    Narcotic dependence ([address] F11.20    Adenomatous colon polyp D12.6    Anxiety state F41.1    Backache M54.9    Chronic obstructive pulmonary disease (HCC) J44.9    Hyperlipidemia E78.5    Knee pain M25.569    Neck pain M54.2    Osteoarthritis of knee M17.10    Tobacco dependence syndrome F17.200       PLAN:     Orders Placed This Encounter    gabapentin (NEURONTIN) 400 mg capsule     Sig: Take 1 Cap by mouth three (3) times daily. Max Daily Amount: 1,200 mg. take 1 capsule by mouth three times a day     Dispense:  90 Cap     Refill:  1    albuterol (PROVENTIL HFA, VENTOLIN HFA, PROAIR HFA) 90 mcg/actuation inhaler     Sig: INHALE ONE PUFF BY MOUTH EVERY 6 HOURS AS NEEDED FOR WHEEZING     Dispense:  1 Inhaler     Refill:  2     Please consider 90 day supplies to promote better adherence    diclofenac (VOLTAREN) 1 % gel     Sig: Apply  to affected area four (4) times daily. Dispense:  1 Each     Refill:  5    tiotropium (SPIRIVA) 18 mcg inhalation capsule     Sig: Take 1 Cap by inhalation daily. Dispense:  30 Cap     Refill:  5    fluticasone propion-salmeteroL (ADVAIR/WIXELA) 500-50 mcg/dose diskus inhaler     Sig: INHALE ONE DOSE BY MOUTH EVERY 12 HOURS     Dispense:  1 Inhaler     Refill:  5     Please consider 90 day supplies to promote better adherence    potassium chloride (KLOR-CON) 10 mEq tablet     Sig: Take 1 Tab by mouth daily. Dispense:  30 Tab     Refill:  5    albuterol (PROVENTIL VENTOLIN) 2.5 mg /3 mL (0.083 %) nebu     Sig: 3 mL by Nebulization route every four (4) hours as needed for Wheezing. Dispense:  30 Nebule     Refill:  3    traMADoL (ULTRAM) 50 mg tablet     Sig: Take 1 Tab by mouth every six (6) hours as needed for Pain for up to 7 days. Max Daily Amount: 200 mg.      Dispense:  28 Tab     Refill:  0     OK restart tramadol  In office visit for drug screen  COnt COPD Rx      f/u 1 week

## 2020-08-20 NOTE — PROGRESS NOTES
Chief Complaint   Patient presents with    Cough     Health Maintenance reviewed. I have reviewed the patient's medical history in detail and updated the computerized patient record. Patient has not been out of the country in (5-6 months), NO diarrhea, NO cough, NO chest conjestion, NO temp. Pt has not been around anyone with these symptoms. 1. Have you been to the ER, urgent care clinic since your last visit? Hospitalized since your last visit?no    2. Have you seen or consulted any other health care providers outside of the [address] [place] [address] since your last visit? Include any pap smears or colon screening. No      Encouraged pt to discuss pt's wishes with spouse/partner/family and bring them in the next appt to follow thru with the Advanced Directive    Fall Risk Assessment, last 12 mths 8/20/2020   Able to walk? Yes   Fall in past 12 months? No   Fall with injury? -   Number of falls in past 12 months -   Fall Risk Score -       3 most recent PHQ Screens 8/20/2020   Little interest or pleasure in doing things Nearly every day   Feeling down, depressed, irritable, or hopeless Nearly every day   Total Score PHQ 2 6   Trouble falling or staying asleep, or sleeping too much -   Feeling tired or having little energy -   Poor appetite, weight loss, or overeating -   Feeling bad about yourself - or that you are a failure or have let yourself or your family down -   Trouble concentrating on things such as school, work, reading, or watching TV -   Moving or speaking so slowly that other people could have noticed; or the opposite being so fidgety that others notice -   Thoughts of being better off dead, or hurting yourself in some way -   How difficult have these problems made it for you to do your work, take care of your home and get along with others -       Abuse Screening Questionnaire 8/20/2020   Do you ever feel afraid of your partner?  N   Are you in a relationship with someone who physically or mentally threatens you? N   Is it safe for you to go home?  Y       ADL Assessment 8/20/2020   Feeding yourself No Help Needed   Getting from bed to chair No Help Needed   Getting dressed No Help Needed   Bathing or showering No Help Needed   Walk across the room (includes cane/walker) No Help Needed   Using the telphone No Help Needed   Taking your medications No Help Needed   Preparing meals No Help Needed   Managing money (expenses/bills) No Help Needed   Moderately strenuous housework (laundry) No Help Needed   Shopping for personal items (toiletries/medicines) No Help Needed   Shopping for groceries No Help Needed   Driving No Help Needed   Climbing a flight of stairs No Help Needed   Getting to places beyond walking distances No Help Needed

## 2020-08-26 ENCOUNTER — VIRTUAL VISIT (OUTPATIENT)
Dept: INTERNAL MEDICINE CLINIC | Age: 60
End: 2020-08-26
Payer: MEDICAID

## 2020-08-26 DIAGNOSIS — J44.9 CHRONIC OBSTRUCTIVE PULMONARY DISEASE, UNSPECIFIED COPD TYPE (HCC): Primary | ICD-10-CM

## 2020-08-26 DIAGNOSIS — M54.2 NECK PAIN: ICD-10-CM

## 2020-08-26 DIAGNOSIS — F11.20 NARCOTIC DEPENDENCE (HCC): ICD-10-CM

## 2020-08-26 PROCEDURE — 99214 OFFICE O/P EST MOD 30 MIN: CPT | Performed by: FAMILY MEDICINE

## 2020-08-26 RX ORDER — CEFUROXIME AXETIL 500 MG/1
500 TABLET ORAL 2 TIMES DAILY
Qty: 14 TAB | Refills: 0 | Status: SHIPPED | OUTPATIENT
Start: 2020-08-26 | End: 2020-09-02

## 2020-08-26 RX ORDER — PREDNISONE 20 MG/1
40 TABLET ORAL
Qty: 14 TAB | Refills: 0 | Status: SHIPPED | OUTPATIENT
Start: 2020-08-26 | End: 2020-09-02

## 2020-08-26 RX ORDER — TRAMADOL HYDROCHLORIDE 50 MG/1
50 TABLET ORAL
Qty: 28 TAB | Refills: 0 | Status: SHIPPED | OUTPATIENT
Start: 2020-08-26 | End: 2020-09-02

## 2020-08-26 NOTE — PROGRESS NOTES
Chief Complaint   Patient presents with    Cough     Health Maintenance reviewed. I have reviewed the patient's medical history in detail and updated the computerized patient record. 1. Have you been to the ER, urgent care clinic since your last visit? Hospitalized since your last visit?no    2. Have you seen or consulted any other health care providers outside of the [address] [place] [address] since your last visit? Include any pap smears or colon screening. No      Encouraged pt to discuss pt's wishes with spouse/partner/family and bring them in the next appt to follow thru with the Advanced Directive    Fall Risk Assessment, last 12 mths 8/26/2020   Able to walk? Yes   Fall in past 12 months? No   Fall with injury? -   Number of falls in past 12 months -   Fall Risk Score -       3 most recent PHQ Screens 8/26/2020   Little interest or pleasure in doing things Nearly every day   Feeling down, depressed, irritable, or hopeless Nearly every day   Total Score PHQ 2 6   Trouble falling or staying asleep, or sleeping too much -   Feeling tired or having little energy -   Poor appetite, weight loss, or overeating -   Feeling bad about yourself - or that you are a failure or have let yourself or your family down -   Trouble concentrating on things such as school, work, reading, or watching TV -   Moving or speaking so slowly that other people could have noticed; or the opposite being so fidgety that others notice -   Thoughts of being better off dead, or hurting yourself in some way -   How difficult have these problems made it for you to do your work, take care of your home and get along with others -       Abuse Screening Questionnaire 8/26/2020   Do you ever feel afraid of your partner? N   Are you in a relationship with someone who physically or mentally threatens you? N   Is it safe for you to go home?  Y       ADL Assessment 8/26/2020   Feeding yourself No Help Needed   Getting from bed to chair No Help Needed   Getting dressed No Help Needed   Bathing or showering No Help Needed   Walk across the room (includes cane/walker) No Help Needed   Using the telphone No Help Needed   Taking your medications No Help Needed   Preparing meals No Help Needed   Managing money (expenses/bills) No Help Needed   Moderately strenuous housework (laundry) No Help Needed   Shopping for personal items (toiletries/medicines) No Help Needed   Shopping for groceries No Help Needed   Driving No Help Needed   Climbing a flight of stairs No Help Needed   Getting to places beyond walking distances No Help Needed

## 2020-08-26 NOTE — PROGRESS NOTES
Subjective:   Shawn Sigala is a 63 y.o. female who complains of congestion, sore throat, cough described as productive of yellow sputum, fever and some dyspnea better and wheezing for 14 days, unchanged since that time. She denies a history of rash on body  and dirrhea. Patient has no known exposures to anyone with coronavirus infection. There has been no travel to the infected countries of [place], [place], [place], [place] [place], or [facility], recently. Patient does not live in assisted living or nursing facility. COVID negative on 8/16/2020  Evaluation to date: COPD . Treatment to date: antibiotics -zithro. Began taking 10 days ago. .  Patient does not smoke cigarettes. LAst cig 2 weeks ago  Relevant PMH: Asthma and COPD. Requests refills of her tramadol for chronic pain    Allergies   Allergen Reactions    Zolpidem Unknown (comments)         Patient Active Problem List    Diagnosis Date Noted    Anxiety state 07/21/2020    Backache 07/21/2020    Chronic obstructive pulmonary disease ([facility] [address] 07/21/2020    Hyperlipidemia 07/21/2020    Neck pain 07/21/2020    Osteoarthritis of knee 07/21/2020    Tobacco dependence syndrome 07/21/2020    Adenomatous colon polyp 05/16/2020    Narcotic dependence ([facility] [address] 04/07/2019    Encounter for diagnostic colonoscopy due to change in bowel habits 03/04/2019    Elevated liver enzymes 02/19/2019    Depression, major, recurrent, mild ([address] 12/21/2017    Restless legs 12/21/2017    Tobacco abuse 12/21/2017    Spinal stenosis 08/14/2017    Knee pain 01/24/2014     Allergies   Allergen Reactions    Zolpidem Unknown (comments)     Social History     Tobacco Use    Smoking status: Current Every Day Smoker     Packs/day: 0.50     Years: 35.00     Pack years: 17.50     Types: Cigarettes    Smokeless tobacco: Never Used   Substance Use Topics    Alcohol use:  Yes     Alcohol/week: 19.0 standard drinks     Types: 14 Glasses of wine, 5 Cans of beer per week Frequency: Monthly or less     Drinks per session: 1 or 2     Binge frequency: Never        Review of Systems  Pertinent items are noted in HPI. Objective:     Visit Vitals  LMP  (LMP Unknown)     General:  alert, cooperative, no distress   Eyes:    Ears:    Sinuses:    Mouth:     Neck:    Heart:    Lungs:    Abdomen:       Assessment/Plan:   viral upper respiratory illness, bronchitis and asthma  Antibiotics per orders. RTC in 1 week or PRN. Encounter Diagnoses   Name Primary?  Chronic obstructive pulmonary disease, unspecified COPD type ([address] Yes    Narcotic dependence ([address]     Neck pain      Orders Placed This Encounter    XR CHEST PA LAT    cefUROXime (CEFTIN) 500 mg tablet    predniSONE (DELTASONE) 20 mg tablet    traMADoL (ULTRAM) 50 mg tablet   . Patient was looked up in the . There are multiple prescribers of controlled substances  No  Emphasized that she will need to come in for routine drug testing before I will refill the tramadol again. Change antibiotic to Ceftin, get chest x-ray since this is been so persistent. Current Outpatient Medications   Medication Sig Dispense Refill    cefUROXime (CEFTIN) 500 mg tablet Take 1 Tab by mouth two (2) times a day for 7 days. 14 Tab 0    predniSONE (DELTASONE) 20 mg tablet Take 40 mg by mouth daily (with breakfast) for 7 days. 14 Tab 0    traMADoL (ULTRAM) 50 mg tablet Take 1 Tab by mouth every six (6) hours as needed for Pain for up to 7 days. Max Daily Amount: 200 mg. 28 Tab 0    gabapentin (NEURONTIN) 400 mg capsule Take 1 Cap by mouth three (3) times daily. Max Daily Amount: 1,200 mg. take 1 capsule by mouth three times a day 90 Cap 1    albuterol (PROVENTIL HFA, VENTOLIN HFA, PROAIR HFA) 90 mcg/actuation inhaler INHALE ONE PUFF BY MOUTH EVERY 6 HOURS AS NEEDED FOR WHEEZING 1 Inhaler 2    diclofenac (VOLTAREN) 1 % gel Apply  to affected area four (4) times daily.  1 Each 5    tiotropium (SPIRIVA) 18 mcg inhalation capsule Take 1 Cap by inhalation daily. 30 Cap 5    fluticasone propion-salmeteroL (ADVAIR/WIXELA) 500-50 mcg/dose diskus inhaler INHALE ONE DOSE BY MOUTH EVERY 12 HOURS 1 Inhaler 5    potassium chloride (KLOR-CON) 10 mEq tablet Take 1 Tab by mouth daily. 30 Tab 5    albuterol (PROVENTIL VENTOLIN) 2.5 mg /3 mL (0.083 %) nebu 3 mL by Nebulization route every four (4) hours as needed for Wheezing. 30 Nebule 3    benzonatate (TESSALON) 200 mg capsule TAKE 1 CAPSULE BY MOUTH THREE TIMES DAILY AS NEEDED WITH FOOD FOR  COUGH  FOR  UP  TO  7  DAYS 60 Cap 0    nicotine (NICODERM CQ) 21 mg/24 hr APPLY 1 PATCH TOPICALLY EVERY 24 HOURS FOR 30 DAYS 30 Patch 0    ondansetron (Zofran ODT) 4 mg disintegrating tablet Take 1 Tab by mouth every eight (8) hours as needed for Nausea or Vomiting for up to 10 doses. 10 Tab 0    naloxone (NARCAN) 4 mg/actuation nasal spray 1 Spray by Nasal route.  guaiFENesin (MUCINEX) 1,200 mg Ta12 ER tablet Take 1 Tab by mouth two (2) times a day. 20 Tab 0    Nebulizer & Compressor machine 1 Each by Does Not Apply route every four (4) hours as needed for Wheezing or Shortness of Breath. As directed 1 Each 0    calcium combo no.2-vitamin D3 600 mg calcium- 500 unit TbER Take 1 Each by mouth daily. 90 Tab 1    DULoxetine (CYMBALTA) 60 mg capsule Take 1 Cap by mouth daily. 30 Cap 11    buPROPion (WELLBUTRIN) 100 mg tablet Take 1 Tab by mouth three (3) times daily. 90 Tab 5    cyclobenzaprine (FLEXERIL) 5 mg tablet TAKE ONE TABLET BY MOUTH THREE TIMES DAILY AS NEEDED FOR MUSCLE SPASM 270 Tab 1    lidocaine (LIDODERM) 5 % apply 1 patch to the affected area daily. Leave on for 12 hours and then REMOVE for 12 hours. 30 Patch 2         Follow-up 1 week, in office  Patient was instructed on the limits of making a diagnosis at this visit. Was instructed to call us or go to the emergency room if the symptoms increased or if new symptoms appeared. Mahnaz Hernandez

## 2020-09-14 ENCOUNTER — VIRTUAL VISIT (OUTPATIENT)
Dept: INTERNAL MEDICINE CLINIC | Age: 60
End: 2020-09-14
Payer: MEDICAID

## 2020-09-14 DIAGNOSIS — Z72.0 TOBACCO ABUSE: ICD-10-CM

## 2020-09-14 DIAGNOSIS — J44.9 CHRONIC OBSTRUCTIVE PULMONARY DISEASE, UNSPECIFIED COPD TYPE (HCC): Primary | ICD-10-CM

## 2020-09-14 DIAGNOSIS — F11.20 NARCOTIC DEPENDENCE (HCC): ICD-10-CM

## 2020-09-14 DIAGNOSIS — A09 DIARRHEA OF INFECTIOUS ORIGIN: ICD-10-CM

## 2020-09-14 PROCEDURE — 99214 OFFICE O/P EST MOD 30 MIN: CPT | Performed by: FAMILY MEDICINE

## 2020-09-14 RX ORDER — PREDNISONE 20 MG/1
40 TABLET ORAL
Qty: 14 TAB | Refills: 0 | Status: SHIPPED | OUTPATIENT
Start: 2020-09-14 | End: 2020-09-23 | Stop reason: SDUPTHER

## 2020-09-14 RX ORDER — TRAMADOL HYDROCHLORIDE 50 MG/1
50 TABLET ORAL
Qty: 12 TAB | Refills: 0 | Status: SHIPPED | OUTPATIENT
Start: 2020-09-14 | End: 2020-11-04 | Stop reason: SDUPTHER

## 2020-09-14 RX ORDER — AZITHROMYCIN 250 MG/1
250 TABLET, FILM COATED ORAL SEE ADMIN INSTRUCTIONS
Qty: 8 TAB | Refills: 0 | Status: SHIPPED | OUTPATIENT
Start: 2020-09-14 | End: 2020-09-22

## 2020-09-14 NOTE — PROGRESS NOTES
Chief Complaint   Patient presents with    Back Pain     Health Maintenance reviewed. I have reviewed the patient's medical history in detail and updated the computerized patient record. Patient has not been out of the country in (6-7 months), NO diarrhea, NO cough, NO chest conjestion, NO temp. Pt has not been around anyone with these symptoms. 1. Have you been to the ER, urgent care clinic since your last visit? Hospitalized since your last visit?no    2. Have you seen or consulted any other health care providers outside of the [address] [place] [address] since your last visit? Include any pap smears or colon screening. No      Encouraged pt to discuss pt's wishes with spouse/partner/family and bring them in the next appt to follow thru with the Advanced Directive    Fall Risk Assessment, last 12 mths 9/14/2020   Able to walk? Yes   Fall in past 12 months? No   Fall with injury? -   Number of falls in past 12 months -   Fall Risk Score -       3 most recent PHQ Screens 9/14/2020   Little interest or pleasure in doing things More than half the days   Feeling down, depressed, irritable, or hopeless More than half the days   Total Score PHQ 2 4   Trouble falling or staying asleep, or sleeping too much -   Feeling tired or having little energy -   Poor appetite, weight loss, or overeating -   Feeling bad about yourself - or that you are a failure or have let yourself or your family down -   Trouble concentrating on things such as school, work, reading, or watching TV -   Moving or speaking so slowly that other people could have noticed; or the opposite being so fidgety that others notice -   Thoughts of being better off dead, or hurting yourself in some way -   How difficult have these problems made it for you to do your work, take care of your home and get along with others -       Abuse Screening Questionnaire 9/14/2020   Do you ever feel afraid of your partner?  N   Are you in a relationship with someone who physically or mentally threatens you? N   Is it safe for you to go home?  Y       ADL Assessment 9/14/2020   Feeding yourself No Help Needed   Getting from bed to chair No Help Needed   Getting dressed No Help Needed   Bathing or showering No Help Needed   Walk across the room (includes cane/walker) No Help Needed   Using the telphone No Help Needed   Taking your medications No Help Needed   Preparing meals No Help Needed   Managing money (expenses/bills) No Help Needed   Moderately strenuous housework (laundry) No Help Needed   Shopping for personal items (toiletries/medicines) No Help Needed   Shopping for groceries No Help Needed   Driving Help Needed   Climbing a flight of stairs No Help Needed   Getting to places beyond walking distances Help Needed

## 2020-09-14 NOTE — PROGRESS NOTES
Subjective:   Mercy Gillette is a 63 y.o. female who complains of congestion, sore throat, cough described as productive of clear sputum, fever and diarrhea for 90 days, unchanged since that time. Cont to have diarrhea  She denies a history of nausea, vomiting and weight loss. wheezing  Evaluation to date: seen previously and thought to have a viral URI. Covid neg chest x-ray showed no acute disease pulmonary hyperinflation. Treatment to date: antibiotics -z pack ceftin. Began taking 1 month ago. Patient does nbot smoke cigarettes. Quit 2020  Relevant PMH: COPD. Asks for refill of her tramadol, needs drug screening to get the full prescription. Allergies   Allergen Reactions    Zolpidem Unknown (comments)         Patient Active Problem List    Diagnosis Date Noted    Anxiety state 2020    Backache 2020    Chronic obstructive pulmonary disease ([facility] [address] 2020    Hyperlipidemia 2020    Neck pain 2020    Osteoarthritis of knee 2020    Tobacco dependence syndrome 2020    Adenomatous colon polyp 2020    Narcotic dependence ([facility] [address] 2019    Encounter for diagnostic colonoscopy due to change in bowel habits 2019    Elevated liver enzymes 2019    Depression, major, recurrent, mild ([address] 2017    Restless legs 2017    Tobacco abuse 2017    Spinal stenosis 2017    Knee pain 2014     Allergies   Allergen Reactions    Zolpidem Unknown (comments)     Social History     Tobacco Use    Smoking status: Former Smoker     Packs/day: 0.50     Years: 35.00     Pack years: 17.50     Types: Cigarettes     Last attempt to quit: 2020     Years since quittin.0    Smokeless tobacco: Never Used   Substance Use Topics    Alcohol use:  Yes     Alcohol/week: 19.0 standard drinks     Types: 14 Glasses of wine, 5 Cans of beer per week     Frequency: Monthly or less     Drinks per session: 1 or 2     Binge frequency: Never        Review of Systems  Pertinent items are noted in HPI. Objective:     Visit Vitals  LMP  (LMP Unknown)     General:  alert, cooperative, no distress, coughing   Eyes: negative   Ears:    Sinuses:    Mouth:     Neck:    Heart:    Lungs:    Abdomen:       Assessment/Plan:   bronchitis and asthma, COPD, consider pertussis  Antibiotics per orders. RTC prn. Encounter Diagnoses   Name Primary?  Chronic obstructive pulmonary disease, unspecified COPD type ([facility] [address] Yes    Narcotic dependence ([facility] [address]     Tobacco abuse     Diarrhea of infectious origin      Orders Placed This Encounter    C DIFFICILE TOXIN A & B BY EIA    CULTURE, STOOL    azithromycin (ZITHROMAX) 250 mg tablet    predniSONE (DELTASONE) 20 mg tablet    traMADoL (ULTRAM) 50 mg tablet    umeclidinium-vilanteroL (ANORO ELLIPTA) 62.5-25 mcg/actuation inhaler   . Diarrhea evaluation as above  Trial of Zithromax and prednisone. Follow-up and Dispositions    · Return in about 4 weeks (around 10/12/2020).

## 2020-09-18 ENCOUNTER — TELEPHONE (OUTPATIENT)
Dept: INTERNAL MEDICINE CLINIC | Age: 60
End: 2020-09-18

## 2020-09-18 NOTE — TELEPHONE ENCOUNTER
----- Message from Elin Hardyry sent at 9/18/2020 12:12 PM EDT -----  Regarding: /Telephone  Caller's first and last name: self  Reason for call: questions  Callback required yes/no and why:yes  Best contact number(s): [phone]  Details to clarify the request: Pt. needs  to write her a letter stating that she has been in quarantine since June. Pt. would like to pick letter up this afternoon if possible 9/18/2020. Pt. would like a call when letter is ready .

## 2020-09-23 DIAGNOSIS — J44.9 CHRONIC OBSTRUCTIVE PULMONARY DISEASE, UNSPECIFIED COPD TYPE (HCC): ICD-10-CM

## 2020-09-23 NOTE — TELEPHONE ENCOUNTER
Pt called in reference to needing a glucose meter kit. Please call her at [phone]. She would like it sent to [facility]. Requested Prescriptions     Pending Prescriptions Disp Refills    predniSONE (DELTASONE) 20 mg tablet 14 Tab 0     Sig: Take 40 mg by mouth daily (with breakfast) for 7 days. Use drops as directed.

## 2020-09-24 RX ORDER — PREDNISONE 20 MG/1
40 TABLET ORAL
Qty: 14 TAB | Refills: 0 | Status: SHIPPED | OUTPATIENT
Start: 2020-09-24 | End: 2020-10-06

## 2020-10-06 DIAGNOSIS — J44.9 CHRONIC OBSTRUCTIVE PULMONARY DISEASE, UNSPECIFIED COPD TYPE (HCC): ICD-10-CM

## 2020-10-06 DIAGNOSIS — F33.0 DEPRESSION, MAJOR, RECURRENT, MILD (HCC): ICD-10-CM

## 2020-10-06 RX ORDER — DULOXETIN HYDROCHLORIDE 60 MG/1
CAPSULE, DELAYED RELEASE ORAL
Qty: 30 CAP | Refills: 0 | Status: SHIPPED | OUTPATIENT
Start: 2020-10-06 | End: 2020-12-29 | Stop reason: SDUPTHER

## 2020-10-06 RX ORDER — PREDNISONE 20 MG/1
TABLET ORAL
Qty: 14 TAB | Refills: 0 | Status: SHIPPED | OUTPATIENT
Start: 2020-10-06 | End: 2020-10-12 | Stop reason: SDUPTHER

## 2020-10-06 RX ORDER — BENZONATATE 200 MG/1
CAPSULE ORAL
Qty: 60 CAP | Refills: 0 | Status: SHIPPED | OUTPATIENT
Start: 2020-10-06 | End: 2020-11-29

## 2020-10-12 DIAGNOSIS — J44.9 CHRONIC OBSTRUCTIVE PULMONARY DISEASE, UNSPECIFIED COPD TYPE (HCC): ICD-10-CM

## 2020-10-12 NOTE — TELEPHONE ENCOUNTER
Requested Prescriptions     Pending Prescriptions Disp Refills    predniSONE (DELTASONE) 20 mg tablet 14 Tab 0    PSEUDOEPHEDRINE-dextromethorphan-guaiFENesin (ROBITUSSIN-CE) 30- mg/5 mL solution 280 mL 0     Sig: Take 5 mL by mouth every four (4) hours as needed for Cough for up to 7 days.

## 2020-10-13 RX ORDER — PREDNISONE 20 MG/1
TABLET ORAL
Qty: 14 TAB | Refills: 0 | OUTPATIENT
Start: 2020-10-13 | End: 2020-10-19

## 2020-10-15 ENCOUNTER — VIRTUAL VISIT (OUTPATIENT)
Dept: INTERNAL MEDICINE CLINIC | Age: 60
End: 2020-10-15
Payer: MEDICAID

## 2020-10-15 DIAGNOSIS — Z72.0 TOBACCO ABUSE: ICD-10-CM

## 2020-10-15 DIAGNOSIS — A37.90 PERTUSSIS: Primary | ICD-10-CM

## 2020-10-15 DIAGNOSIS — J44.9 CHRONIC OBSTRUCTIVE PULMONARY DISEASE, UNSPECIFIED COPD TYPE (HCC): ICD-10-CM

## 2020-10-15 PROCEDURE — 99214 OFFICE O/P EST MOD 30 MIN: CPT | Performed by: NURSE PRACTITIONER

## 2020-10-15 RX ORDER — SULFAMETHOXAZOLE AND TRIMETHOPRIM 800; 160 MG/1; MG/1
1 TABLET ORAL 2 TIMES DAILY
Qty: 28 TAB | Refills: 0 | OUTPATIENT
Start: 2020-10-15 | End: 2020-10-19

## 2020-10-15 NOTE — PROGRESS NOTES
Chief Complaint   Patient presents with    Cough    COPD     Patient is requesting an additional medication to help her cough at night - sick since June - has had 2 Covid tests since June and both have been negative - history of bad COPD exacerbations  Jamil Gilliland LPN  00/57/1110  2:17 PM

## 2020-10-15 NOTE — PROGRESS NOTES
Tanner Dance is a 63 y.o. female who was seen by synchronous (real-time) audio-video technology on 10/15/2020 for Cough and COPD    Subjective:   Ms. Riley Engel has a history of asthma and COPD. Tested Covid negative x 2. Had been treated for sore throat, cough and diarrhea x 90 days. Took Cefuroxime, Azithromycin, Prednisone,  in September, 2020    Still complaining of a nagging cough and sinus drainage. Stated cough is worse at night. Suspect possible Pertussis? Stated she just picked up Prednisone today. Stated her oxygen sat is 90% at times. Mild dyspnea. Prior to Admission medications    Medication Sig Start Date End Date Taking? Authorizing Provider   predniSONE (DELTASONE) 20 mg tablet TAKE 2 TABLETS BY MOUTH ONCE DAILY WITH  BREAKFAST  FOR  7  DAYS 10/13/20  Yes Dawood Rodriguez MD   benzonatate (TESSALON) 200 mg capsule TAKE 1 CAPSULE BY MOUTH THREE TIMES DAILY AS NEEDED WITH FOOD FOR COUGH FOR UP TO 7 DAYS 10/6/20  Yes Dawood Rodriguez MD   DULoxetine (CYMBALTA) 60 mg capsule Take 1 capsule by mouth once daily 10/6/20  Yes Dawood Rodriguez MD   umeclidinium-vilanteroL [facility] ELLIPTA) 62.5-25 mcg/actuation inhaler Take 1 Puff by inhalation daily. 9/14/20  Yes Dawood Rodriguez MD   gabapentin (NEURONTIN) 400 mg capsule Take 1 Cap by mouth three (3) times daily. Max Daily Amount: 1,200 mg. take 1 capsule by mouth three times a day 8/20/20  Yes Dawood Rodriguez MD   albuterol (PROVENTIL HFA, VENTOLIN HFA, PROAIR HFA) 90 mcg/actuation inhaler INHALE ONE PUFF BY MOUTH EVERY 6 HOURS AS NEEDED FOR WHEEZING 8/20/20  Yes Dawood Rodriguez MD   diclofenac (VOLTAREN) 1 % gel Apply  to affected area four (4) times daily. 8/20/20  Yes Dawood Rodriguez MD   albuterol (PROVENTIL VENTOLIN) 2.5 mg /3 mL (0.083 %) nebu 3 mL by Nebulization route every four (4) hours as needed for Wheezing. 8/20/20  Yes Dawood Rodriguez MD   naloxone [facility]) 4 mg/actuation nasal spray 1 [place] by Nasal route.  2/15/20  Yes Other, MD Jose Alfredo guaiFENesin (MUCINEX) 1,200 mg Ta12 ER tablet Take 1 Tab by mouth two (2) times a day. 2/5/20  Yes Caity De Los Santos MD   Nebulizer & Compressor machine 1 Each by Does Not Apply route every four (4) hours as needed for Wheezing or Shortness of Breath. As directed 2/5/20  Yes Caity De Los Santos MD   calcium combo no.2-vitamin D3 600 mg calcium- 500 unit TbER Take 1 Each by mouth daily. 10/22/19  Yes Rita Valle MD   buPROPion [facility]) 100 mg tablet Take 1 Tab by mouth three (3) times daily. 7/24/19  Yes Rita Valle MD   cyclobenzaprine (FLEXERIL) 5 mg tablet TAKE ONE TABLET BY MOUTH THREE TIMES DAILY AS NEEDED FOR MUSCLE SPASM 7/24/19  Yes Rita Valle MD   PSEUDOEPHEDRINE-dextromethorphan-guaiFENesin (ROBITUSSIN-CE) 30- mg/5 mL solution Take 5 mL by mouth every four (4) hours as needed for Cough for up to 7 days. 10/13/20 10/20/20  Rita Valle MD   potassium chloride (KLOR-CON) 10 mEq tablet Take 1 Tab by mouth daily. 8/20/20   Rita Valle MD   nicotine (NICODERM CQ) 21 mg/24 hr APPLY 1 PATCH TOPICALLY EVERY 24 HOURS FOR 30 DAYS 7/13/20   Rita Valle MD   ondansetron (Zofran ODT) 4 mg disintegrating tablet Take 1 Tab by mouth every eight (8) hours as needed for Nausea or Vomiting for up to 10 doses. 3/12/20   Rita Valle MD   lidocaine (LIDODERM) 5 % apply 1 patch to the affected area daily. Leave on for 12 hours and then REMOVE for 12 hours.  10/18/18   Rita Valle MD     Allergies   Allergen Reactions    Zolpidem Unknown (comments)     Past Medical History:   Diagnosis Date    Arthritis     Asthma     COPD (chronic obstructive [facility] disease) [facility] [address]     Depression     Hemorrhoids     Occult blood positive stool     Spinal stenosis      Past Surgical History:   Procedure Laterality Date    ABDOMEN SURGERY PROC UNLISTED      hystrectomy    COLONOSCOPY N/A 5/7/2019    COLONOSCOPY performed by Renetta Barkley MD at [facility] [address]  09/2016    laminectomy    HX CERVICAL FUSION      HX COLONOSCOPY  2019    4 polyps- 2 tubular adenoma 2 hyperplastic polyp    HX HYSTERECTOMY       Family History   Problem Relation Age of Onset    Cancer Mother         colon    Cancer Father 79        lung    Diabetes Sister     Cancer Brother 27        lymphoma     Social History     Tobacco Use    Smoking status: Former Smoker     Packs/day: 0.50     Years: 35.00     Pack years: 17.50     Types: Cigarettes     Last attempt to quit: 2020     Years since quittin.1    Smokeless tobacco: Never Used   Substance Use Topics    Alcohol use: Yes     Alcohol/week: 19.0 standard drinks     Types: 14 Glasses of wine, 5 Cans of beer per week     Frequency: Monthly or less     Drinks per session: 1 or 2     Binge frequency: Never     Review of Systems   Constitutional: Negative for chills and fever. Respiratory: Positive for cough. Cardiovascular: Positive for chest pain (with cough). Objective:   No flowsheet data found.      [INSTRUCTIONS:  \"[x]\" Indicates a positive item  \"[]\" Indicates a negative item  -- DELETE ALL ITEMS NOT EXAMINED]    Constitutional: [x] Appears well-developed and well-nourished [x] No apparent distress      [] Abnormal -     Mental status: [x] Alert and awake  [x] Oriented to person/place/time [x] Able to follow commands    [] Abnormal -     Eyes:   EOM    []  Normal    [] Abnormal -   Sclera  [x]  Normal    [] Abnormal -          Discharge [x]  None visible   [] Abnormal -     HENT: [x] Normocephalic, atraumatic  [] Abnormal -   [x] Mouth/Throat: Mucous membranes are moist    External Ears [x] Normal  [] Abnormal -    Neck: [x] No visualized mass [] Abnormal -     Pulmonary/Chest: [x] Respiratory effort normal   [x] No visualized signs of difficulty breathing or respiratory distress        [] Abnormal -      Musculoskeletal:   [] Normal gait with no signs of ataxia         [x] Normal range of motion of neck        [] Abnormal - Neurological:        [x] No Facial Asymmetry (Cranial nerve 7 motor function) (limited exam due to video visit)          [x] No gaze palsy        [] Abnormal -          Skin:        [x] No significant exanthematous lesions or discoloration noted on facial skin         [] Abnormal -            Psychiatric:       [x] Normal Affect [] Abnormal -        [x] No Hallucinations    Assessment & Plan:       ICD-10-CM ICD-9-CM    1. Pertussis  A37.90 033.9 trimethoprim-sulfamethoxazole (BACTRIM DS, SEPTRA DS) 160-800 mg per tablet   2. Chronic obstructive pulmonary disease, unspecified COPD type ([facility]  J44.9 496    3. Tobacco abuse  Z72.0 305.1      1. Pertussis  - trimethoprim-sulfamethoxazole (BACTRIM DS, SEPTRA DS) 160-800 mg per tablet; Take 1 Tab by mouth two (2) times a day for 14 days. Dispense: 28 Tab; Refill: 0    2. Chronic obstructive pulmonary disease, unspecified COPD type ([facility])  Continue Benzonatate, Prednisone, Anoro inhaler, Albuterol and neb, Mucinex and Robitussin. 3. Tobacco Abuse  Encouraged to stop smoking. Persistent cough > 3 weeks suggestive of Pertussis. UP to Date recommended Trimethoprim-Sulfamethoxazole. Continue other meds. Fluids, rest, avoid prolonged contact with family/ friends due to the contagious nature of pertussis. Did not give cough med with codeine due to respiratory status. If symptoms do not improve within few days follow up in office. Follow up in 2 weeks. I spent at least 25 minutes on this visit with this established patient. We discussed the expected course, resolution and complications of the diagnosis(es) in detail. Medication risks, benefits, costs, interactions, and alternatives were discussed as indicated. I advised her to contact the office if her condition worsens, changes or fails to improve as anticipated. She expressed understanding with the diagnosis(es) and plan.      Serena Carcamo, who was evaluated through a patient-initiated, synchronous (real-time) audio-video encounter, and/or her healthcare decision maker, is aware that it is a billable service, with coverage as determined by her insurance carrier. She provided verbal consent to proceed: Yes, and patient identification was verified. It was conducted pursuant to the emergency declaration under the [address] [place] [address] authority and the [employer] General Act. A caregiver was present when appropriate. Ability to conduct physical exam was limited. I was in the office. The patient was at home. If symptoms worsen and  necessary, call 911 or go to nearest ER.      Chris Encinas NP

## 2020-10-20 ENCOUNTER — PATIENT OUTREACH (OUTPATIENT)
Dept: CASE MANAGEMENT | Age: 60
End: 2020-10-20

## 2020-10-20 NOTE — PROGRESS NOTES
Patient contacted regarding COVID-19 exposure. Discussed COVID-19 related testing which was pending at this time. Test results were pending. Patient informed of results, if available? result pending. Outreach made within 2 business days of discharge: Yes    Care Transition Nurse/ Ambulatory Care Manager/ LPN Care Coordinator contacted the patient by telephone to perform post discharge assessment. Verified name and  with patient as identifiers. Provided introduction to self, and explanation of the CTN/ACM/LPN role, and reason for call due to risk factors for infection and/or exposure to COVID-19. Symptoms reviewed with patient who verbalized the following symptoms: no new symptoms and no worsening symptoms. Due to no new or worsening symptoms encounter was not routed to provider for escalation. Discussed follow-up appointments. If no appointment was previously scheduled, appointment scheduling offered: yes  [facility] follow up appointment(s):   Future Appointments   Date Time Provider Khadijah Starkey   10/27/2020  9:30 AM Lindsay Laboy MD [facility]     Non-[facility] follow up appointment(s): n/a      Advance Care Planning:   Does patient have an Advance Directive: currently not on file; patient declined education    Patient has following risk factors of: COPD. CTN/ACM/LPN reviewed discharge instructions, medical action plan and red flags such as increased shortness of breath, increasing fever and signs of decompensation with patient who verbalized understanding. Discussed exposure protocols and quarantine with CDC Guidelines What to do if you are sick with coronavirus disease .  Patient was given an opportunity for questions and concerns. The patient agrees to contact the [facility] exposure line [phone], [facility] department [facility]  ([phone]) and PCP office for questions related to their healthcare. CTN/ACM provided contact information for future needs.     Reviewed and educated patient on any new and changed medications related to discharge diagnosis. Patient/family/caregiver given information for [employer] and agrees to enroll no  Patient's preferred e-mail:  n/a  Patient's preferred phone number: n/a  Based on Loop alert triggers, patient will be contacted by nurse care manager for worsening symptoms. Plan for follow-up call in 1-2 days based on severity of symptoms and risk factors.

## 2020-10-21 ENCOUNTER — TELEPHONE (OUTPATIENT)
Dept: INTERNAL MEDICINE CLINIC | Age: 60
End: 2020-10-21

## 2020-10-22 ENCOUNTER — PATIENT OUTREACH (OUTPATIENT)
Dept: CASE MANAGEMENT | Age: 60
End: 2020-10-22

## 2020-10-22 ENCOUNTER — VIRTUAL VISIT (OUTPATIENT)
Dept: INTERNAL MEDICINE CLINIC | Age: 60
End: 2020-10-22
Payer: MEDICAID

## 2020-10-22 ENCOUNTER — TELEPHONE (OUTPATIENT)
Dept: INTERNAL MEDICINE CLINIC | Age: 60
End: 2020-10-22

## 2020-10-22 DIAGNOSIS — J44.9 CHRONIC OBSTRUCTIVE PULMONARY DISEASE, UNSPECIFIED COPD TYPE (HCC): Primary | ICD-10-CM

## 2020-10-22 DIAGNOSIS — A37.90 PERTUSSIS: ICD-10-CM

## 2020-10-22 DIAGNOSIS — Z72.0 TOBACCO ABUSE: ICD-10-CM

## 2020-10-22 DIAGNOSIS — F33.0 DEPRESSION, MAJOR, RECURRENT, MILD (HCC): ICD-10-CM

## 2020-10-22 PROCEDURE — 99214 OFFICE O/P EST MOD 30 MIN: CPT | Performed by: FAMILY MEDICINE

## 2020-10-22 RX ORDER — CODEINE PHOSPHATE AND GUAIFENESIN 10; 100 MG/5ML; MG/5ML
5 SOLUTION ORAL
Qty: 60 ML | Refills: 0 | Status: SHIPPED | OUTPATIENT
Start: 2020-10-22 | End: 2020-11-04 | Stop reason: SDUPTHER

## 2020-10-22 RX ORDER — AZITHROMYCIN 250 MG/1
250 TABLET, FILM COATED ORAL SEE ADMIN INSTRUCTIONS
Qty: 6 TAB | Refills: 0 | Status: SHIPPED | OUTPATIENT
Start: 2020-10-22 | End: 2020-11-04

## 2020-10-22 NOTE — PROGRESS NOTES
Chief Complaint   Patient presents with    Medication Refill     Health Maintenance reviewed. I have reviewed the patient's medical history in detail and updated the computerized patient record. Patient has not been out of the country in (6-7 months), NO diarrhea, NO cough, NO chest conjestion, NO temp. Pt has not been around anyone with these symptoms. 1. Have you been to the ER, urgent care clinic since your last visit? Hospitalized since your last visit?no    2. Have you seen or consulted any other health care providers outside of the [address] [place] [address] since your last visit? Include any pap smears or colon screening. No      Encouraged pt to discuss pt's wishes with spouse/partner/family and bring them in the next appt to follow thru with the Advanced Directive        Fall Risk Assessment, last 12 mths 10/22/2020   Able to walk? Yes   Fall in past 12 months? No   Fall with injury? -   Number of falls in past 12 months -   Fall Risk Score -       3 most recent PHQ Screens 10/22/2020   Little interest or pleasure in doing things More than half the days   Feeling down, depressed, irritable, or hopeless More than half the days   Total Score PHQ 2 4   Trouble falling or staying asleep, or sleeping too much -   Feeling tired or having little energy -   Poor appetite, weight loss, or overeating -   Feeling bad about yourself - or that you are a failure or have let yourself or your family down -   Trouble concentrating on things such as school, work, reading, or watching TV -   Moving or speaking so slowly that other people could have noticed; or the opposite being so fidgety that others notice -   Thoughts of being better off dead, or hurting yourself in some way -   How difficult have these problems made it for you to do your work, take care of your home and get along with others -       Abuse Screening Questionnaire 10/22/2020   Do you ever feel afraid of your partner?  N   Are you in a relationship with someone who physically or mentally threatens you? N   Is it safe for you to go home?  Y       ADL Assessment 10/22/2020   Feeding yourself No Help Needed   Getting from bed to chair No Help Needed   Getting dressed No Help Needed   Bathing or showering No Help Needed   Walk across the room (includes cane/walker) No Help Needed   Using the telphone No Help Needed   Taking your medications No Help Needed   Preparing meals No Help Needed   Managing money (expenses/bills) No Help Needed   Moderately strenuous housework (laundry) No Help Needed   Shopping for personal items (toiletries/medicines) No Help Needed   Shopping for groceries No Help Needed   Driving Help Needed   Climbing a flight of stairs No Help Needed   Getting to places beyond walking distances Help Needed

## 2020-10-22 NOTE — PROGRESS NOTES
Subjective:   Fanny Watts is a 63 y.o. female who complains of congestion, sore throat, cough described as productive of brown sputum, myalgias, fever and left ear pain for 50 days, gradually worsening since that time. Temp 101, was covid tested neg. Has had above Sx off on. She denies a history of rash on body and diarrhea loss of smell and taste. Recent pertusis Dx  Evaluation to date: [facility] [place] was neg for covid. Labs from today were reviewed  no, labs done previously were reviewed  yes, Labs done in ER were reviewed  yes, Additional labs are ordered  no,  Mid sadness    Treatment to date: cough suppressants,HC antibiotics -doxy. Began taking 2- 3 days ago. .Off tramadol. Patient does smoke cigarettes. Trying to quit  Relevant PMH: COPD.   Allergies   Allergen Reactions    Zolpidem Unknown (comments)         Patient Active Problem List    Diagnosis Date Noted    Anxiety state 07/21/2020    Backache 07/21/2020    Chronic obstructive pulmonary disease ([facility] [address] 07/21/2020    Hyperlipidemia 07/21/2020    Neck pain 07/21/2020    Osteoarthritis of knee 07/21/2020    Tobacco dependence syndrome 07/21/2020    Adenomatous colon polyp 05/16/2020    Narcotic dependence ([address] 04/07/2019    Encounter for diagnostic colonoscopy due to change in bowel habits 03/04/2019    Elevated liver enzymes 02/19/2019    Depression, major, recurrent, mild ([address] 12/21/2017    Restless legs 12/21/2017    Tobacco abuse 12/21/2017    Spinal stenosis 08/14/2017    Knee pain 01/24/2014     Allergies   Allergen Reactions    Zolpidem Unknown (comments)     Past Medical History:   Diagnosis Date    Arthritis     Asthma     COPD (chronic obstructive pulmonary disease) ([address]     Depression     Hemorrhoids     Occult blood positive stool     Spinal stenosis      Social History     Tobacco Use    Smoking status: Former Smoker     Packs/day: 0.50     Years: 35.00     Pack years: 17.50     Types: Cigarettes     Last attempt to quit: 2020     Years since quittin.1    Smokeless tobacco: Never Used   Substance Use Topics    Alcohol use: Yes     Alcohol/week: 19.0 standard drinks     Types: 14 Glasses of wine, 5 Cans of beer per week     Frequency: Monthly or less     Drinks per session: 1 or 2     Binge frequency: Never        Review of Systems  Pertinent items are noted in HPI. Objective:     Visit Vitals  LMP  (LMP Unknown)     General:  fatigued, cooperative, no distress   Eyes: negative   Ears:    Sinuses:    Mouth:     Neck:    Heart:    Lungs:    Abdomen:       Assessment/Plan:   Asthma/COPD  If not better by Mon see below, cont steroids  Antibiotics per orders. Encounter Diagnoses   Name Primary?  Chronic obstructive pulmonary disease, unspecified COPD type ([facility] [address] Yes    Pertussis     Tobacco abuse     Depression, major, recurrent, mild ([facility] [address]      Orders Placed This Encounter    guaiFENesin-codeine (ROBITUSSIN AC) 100-10 mg/5 mL solution    azithromycin (ZITHROMAX) 250 mg tablet   . Patient was looked up in the . There are multiple prescribers of controlled substances  yes. We discussed this pain and the usage of controlled substances for cough/pain relief. In general these medications are indicated for the acute symptom relief and not for chronic usage, allthough they are frequently used for chronic management  After a certain period of time they should be stopped to avoid dependence and/or addiction. I warned her about the dangers of addiction and other side affects including respiratory depression and death. Discussed how this is a controlled substance and that the prescribing of it is should be monitored very carefully. Drug usage is also monitored by our practise and the [facility], since there has been a lot of abuse and diversion of controlled substances. In general we do not refill this medication over the phone.  PLease ask for enough pills to get you to your next appointment and make sure you keep your appointments. Warned not to take other medications like alcohol, other benzodiazapines marijuana or other narcotics when on this medication. Follow-up and Dispositions    · Return in about 2 weeks (around 11/5/2020) for routine follow up. Need to discuss smoking cessation  , chronic pain issues  DC tramadol, leave off, may change to alternative pain med when she returns.

## 2020-10-26 ENCOUNTER — VIRTUAL VISIT (OUTPATIENT)
Dept: INTERNAL MEDICINE CLINIC | Age: 60
End: 2020-10-26
Payer: MEDICAID

## 2020-10-26 DIAGNOSIS — R05.3 CHRONIC COUGH: ICD-10-CM

## 2020-10-26 DIAGNOSIS — J44.1 COPD EXACERBATION (HCC): ICD-10-CM

## 2020-10-26 DIAGNOSIS — Z72.0 TOBACCO ABUSE: Primary | ICD-10-CM

## 2020-10-26 PROCEDURE — 99214 OFFICE O/P EST MOD 30 MIN: CPT | Performed by: NURSE PRACTITIONER

## 2020-10-26 RX ORDER — IPRATROPIUM BROMIDE AND ALBUTEROL SULFATE 2.5; .5 MG/3ML; MG/3ML
3 SOLUTION RESPIRATORY (INHALATION)
Qty: 30 NEBULE | Refills: 1 | Status: SHIPPED | OUTPATIENT
Start: 2020-10-26 | End: 2021-06-30 | Stop reason: SDUPTHER

## 2020-10-26 RX ORDER — NEBULIZER AND COMPRESSOR
1 EACH MISCELLANEOUS
Qty: 1 EACH | Refills: 0 | Status: SHIPPED | OUTPATIENT
Start: 2020-10-26

## 2020-10-26 NOTE — PROGRESS NOTES
Chief Complaint   Patient presents with    Cough     follow up after ER - negative Covid test - codeine cough syrup at night which helps - [address]    Shortness of Breath

## 2020-10-26 NOTE — PROGRESS NOTES
Nohelia Barrera is a 63 y.o. female, evaluated via audio-only technology on 10/26/2020 for Cough (follow up after ER - negative Covid test - codeine cough syrup at night which helps [facility]) and Shortness of Breath    Subjective:   Nohelia Barrera is a 63 y.o. female who went to the ER for exacerbation of COPD on 10/19/2020. Tested negative for COVID. Had strep throat. On Doxy and Prednisone. Starting to feel better.  ordered Azithromycin on 10/22/2020. Pt is currently still taking Prednisone. Codeine guaifenesin TID PRN. Has an Albuterol and  Anoro inhaler. Prior to Admission medications    Medication Sig Start Date End Date Taking? Authorizing Provider   guaiFENesin-codeine (ROBITUSSIN AC) 100-10 mg/5 mL solution Take 5 mL by mouth three (3) times daily as needed for Cough for up to 5 days. Max Daily Amount: 15 mL. 10/22/20 10/27/20 Yes Genet Clay MD   azithromycin (ZITHROMAX) 250 mg tablet Take 1 Tab by mouth See Admin Instructions. Take two tablets today then one tablet daily for next 4 days 10/22/20  Yes Genet Clay MD   predniSONE [facility]) 10 mg dose pack Take as directed 10/19/20  Yes Lakeisha Naranjo DO   doxycycline (VIBRA-TABS) 100 mg tablet Take 1 Tab by mouth two (2) times a day for 10 days. 10/19/20 10/29/20 Yes Stephen Fine DO   benzonatate (TESSALON) 200 mg capsule TAKE 1 CAPSULE BY MOUTH THREE TIMES DAILY AS NEEDED WITH FOOD FOR COUGH FOR UP TO 7 DAYS 10/6/20  Yes Genet Clay MD   DULoxetine (CYMBALTA) 60 mg capsule Take 1 capsule by mouth once daily 10/6/20  Yes Genet Clay MD   umeclidinium-vilanteroL [facility] ELLIPTA) 62.5-25 mcg/actuation inhaler Take 1 Puff by inhalation daily. 9/14/20  Yes Genet Clay MD   gabapentin (NEURONTIN) 400 mg capsule Take 1 Cap by mouth three (3) times daily.  Max Daily Amount: 1,200 mg. take 1 capsule by mouth three times a day 8/20/20  Yes Genet lCay MD   albuterol (PROVENTIL HFA, VENTOLIN HFA, PROAIR HFA) 90 mcg/actuation inhaler INHALE ONE PUFF BY MOUTH EVERY 6 HOURS AS NEEDED FOR WHEEZING 8/20/20  Yes Koko Gan MD   diclofenac (VOLTAREN) 1 % gel Apply  to affected area four (4) times daily. 8/20/20  Yes Koko Gan MD   nicotine (NICODERM CQ) 21 mg/24 hr APPLY 1 PATCH TOPICALLY EVERY 24 HOURS FOR 30 DAYS 7/13/20  Yes Koko Gan MD   ondansetron (Zofran ODT) 4 mg disintegrating tablet Take 1 Tab by mouth every eight (8) hours as needed for Nausea or Vomiting for up to 10 doses. 3/12/20  Yes Koko Gan MD   naloxone [facility]) 4 mg/actuation nasal spray 1 [place] by Nasal route. 2/15/20  Yes Jose Alfredo French MD   calcium combo no.2-vitamin D3 600 mg calcium- 500 unit TbER Take 1 Each by mouth daily. 10/22/19  Yes Koko Gan MD   buPROPion [facility]) 100 mg tablet Take 1 Tab by mouth three (3) times daily. 7/24/19  Yes Koko Gan MD   cyclobenzaprine (FLEXERIL) 5 mg tablet TAKE ONE TABLET BY MOUTH THREE TIMES DAILY AS NEEDED FOR MUSCLE SPASM 7/24/19  Yes Koko Gan MD   Nebulizer & Compressor machine 1 Each by Does Not Apply route every four (4) hours as needed for Wheezing or Shortness of Breath. 10/19/20   Yemi Amaya DO   albuterol (PROVENTIL VENTOLIN) 2.5 mg /3 mL (0.083 %) nebu 3 mL by Nebulization route every four (4) hours as needed for Wheezing. 8/20/20   Koko Gan MD   guaiFENesin (MUCINEX) 1,200 mg Ta12 ER tablet Take 1 Tab by mouth two (2) times a day. 2/5/20   France Tatum MD   lidocaine (LIDODERM) 5 % apply 1 patch to the affected area daily. Leave on for 12 hours and then REMOVE for 12 hours.  10/18/18   Koko Gan MD     Allergies   Allergen Reactions    Zolpidem Unknown (comments)     Past Medical History:   Diagnosis Date    Arthritis     Asthma     COPD [facility]chronic obstructive pulmonary disease) [facility]     Depression     Hemorrhoids     Occult blood positive stool     Spinal stenosis      Past Surgical History:   Procedure Laterality Date    ABDOMEN SURGERY PROC UNLISTED      hystrectomy    COLONOSCOPY N/A 2019    COLONOSCOPY performed by Renetta Barkley MD at [facility] [address]  2016    laminectomy    HX CERVICAL FUSION      HX COLONOSCOPY  2019    4 polyps- 2 tubular adenoma 2 hyperplastic polyp    HX HYSTERECTOMY       Family History   Problem Relation Age of Onset    Cancer Mother         colon    Cancer Father 79        lung    Diabetes Sister     Cancer Brother 27        lymphoma     Social History     Tobacco Use    Smoking status: Current Every Day Smoker     Packs/day: 0.50     Years: 35.00     Pack years: 17.50     Types: Cigarettes     Last attempt to quit: 2020     Years since quittin.1    Smokeless tobacco: Never Used   Substance Use Topics    Alcohol use: Yes     Alcohol/week: 19.0 standard drinks     Types: 14 Glasses of wine, 5 Cans of beer per week     Frequency: Monthly or less     Drinks per session: 1 or 2     Binge frequency: Never     Review of Systems   Constitutional: Negative for chills and fever. Respiratory: Positive for cough and shortness of breath (occasional). Cardiovascular: Negative for chest pain. Neurological: Negative for dizziness. Assessment & Plan:       ICD-10-CM ICD-9-CM    1. Tobacco abuse  Z72.0 305.1    2. COPD exacerbation (HCC)  J44.1 491.21 Nebulizer & Compressor machine      albuterol-ipratropium (DUO-NEB) 2.5 mg-0.5 mg/3 ml nebu     1. COPD exacerbation (HCC)  - Nebulizer & Compressor machine; 1 Each by Does Not Apply route every four (4) hours as needed for Wheezing or Shortness of Breath. Dispense: 1 Each; Refill: 0  - albuterol-ipratropium (DUO-NEB) 2.5 mg-0.5 mg/3 ml nebu; 3 mL by Nebulization route every four (4) hours as needed for Wheezing, Shortness of Breath or Cough. Use with a flare up of COPD. Indications: bronchi muscle spasm resulting from COPD  Dispense: 30 Nebule; Refill: 1    2. Tobacco abuse  Work on smoking cessation.     Reordered neb and machine. Use inhalers as directed. Finish course of antibiotics and steroids. If symptoms worsen go to the ER. Follow up in 2 weeks. No flowsheet data found. Amaury Lilly, who was evaluated through a patient-initiated, synchronous (real-time) audio only encounter, and/or her healthcare decision maker, is aware that it is a billable service, with coverage as determined by her insurance carrier. She provided verbal consent to proceed: Yes. She has not had a related appointment within my department in the past 7 days or scheduled within the next 24 hours. If symptoms worsen and absolutely necessary, call 911 or go to nearest ER.      Total Time: minutes: 11-20 minutes    Caryn Worthington NP

## 2020-10-29 PROBLEM — R05.3 CHRONIC COUGH: Status: ACTIVE | Noted: 2020-10-29

## 2020-10-30 ENCOUNTER — TELEPHONE (OUTPATIENT)
Dept: INTERNAL MEDICINE CLINIC | Age: 60
End: 2020-10-30

## 2020-10-30 DIAGNOSIS — J44.9 CHRONIC OBSTRUCTIVE PULMONARY DISEASE, UNSPECIFIED COPD TYPE (HCC): Primary | ICD-10-CM

## 2020-11-03 ENCOUNTER — PATIENT OUTREACH (OUTPATIENT)
Dept: CASE MANAGEMENT | Age: 60
End: 2020-11-03

## 2020-11-03 NOTE — PROGRESS NOTES
Patient resolved from Transition of Care episode on 11/3/20. ACM/CTN was unsuccessful at contacting this patient today. Patient/family was provided the following resources and education related to COVID-19 during the initial call:                         Signs, symptoms and red flags related to COVID-19            CDC exposure and quarantine guidelines            Conduit exposure contact - [phone]            Contact for their local Department of Health               Patient has not had any additional ED or hospital visits. No further outreach scheduled with this CTN/ACM. Episode of Care resolved. Patient has this CTN/ACM contact information if future needs arise.

## 2020-11-04 ENCOUNTER — VIRTUAL VISIT (OUTPATIENT)
Dept: INTERNAL MEDICINE CLINIC | Age: 60
End: 2020-11-04
Payer: MEDICAID

## 2020-11-04 DIAGNOSIS — M54.50 LOW BACK PAIN, UNSPECIFIED BACK PAIN LATERALITY, UNSPECIFIED CHRONICITY, UNSPECIFIED WHETHER SCIATICA PRESENT: Primary | ICD-10-CM

## 2020-11-04 DIAGNOSIS — J44.9 CHRONIC OBSTRUCTIVE PULMONARY DISEASE, UNSPECIFIED COPD TYPE (HCC): ICD-10-CM

## 2020-11-04 DIAGNOSIS — J32.9 SINUSITIS, UNSPECIFIED CHRONICITY, UNSPECIFIED LOCATION: ICD-10-CM

## 2020-11-04 PROCEDURE — 99213 OFFICE O/P EST LOW 20 MIN: CPT | Performed by: NURSE PRACTITIONER

## 2020-11-04 RX ORDER — CODEINE PHOSPHATE AND GUAIFENESIN 10; 100 MG/5ML; MG/5ML
5 SOLUTION ORAL
Qty: 60 ML | Refills: 0 | Status: SHIPPED | OUTPATIENT
Start: 2020-11-04 | End: 2020-11-09

## 2020-11-04 RX ORDER — LEVOFLOXACIN 500 MG/1
500 TABLET, FILM COATED ORAL DAILY
Qty: 7 TAB | Refills: 0 | Status: SHIPPED | OUTPATIENT
Start: 2020-11-04 | End: 2020-11-11

## 2020-11-04 RX ORDER — TRAMADOL HYDROCHLORIDE 50 MG/1
50 TABLET ORAL
Qty: 12 TAB | Refills: 0 | Status: SHIPPED | OUTPATIENT
Start: 2020-11-04 | End: 2020-11-07

## 2020-11-04 RX ORDER — PREDNISONE 20 MG/1
40 TABLET ORAL
Qty: 10 TAB | Refills: 0 | Status: SHIPPED | OUTPATIENT
Start: 2020-11-04 | End: 2020-11-09

## 2020-11-04 NOTE — PROGRESS NOTES
Rogelio Mclaughlin is a 63 y.o. female who was seen by synchronous (real-time) audio technology on 11/4/2020 for:  Chief Complaint   Patient presents with    Cough     and congestion - ran out of medication - needs more codeine cough medicine     Subjective:   HISTORY OF PRESENT ILLNESS  Excerpt from 10/26/2020:  Rogelio Mclaughlin is a 63 y.o. female who went to the ER for exacerbation of COPD on 10/19/2020. Tested negative for COVID on 10/21/20. Had strep throat. On Doxy and Prednisone. Starting to feel better.  ordered Azithromycin on 10/22/2020. Pt is currently still taking Prednisone. Codeine guaifenesin TID PRN. Has an Albuterol and  Anoro inhaler. Interim note 11/4/2020  Got nebulizer machine. Complains of ongoing nasal and chest congestion x 3 weeks. CXR 10/19 2020 clear  COVID tests x 2 negative- latest one 10/19/2020  Prednisone course almost complete. Stated she has a few tablets left. Finished Z-pack. Smoking 2-3 cigs day X 30 years. Stated she is trying to quit. Was on Percocet for back pain in the past.   Asking for Tramadol for lower back pain. Discussed that we do not treat chronic back pain with chronic narcotics. Are limited on how much we can prescribe without seeing the pt in person.  270    Prior to Admission medications    Medication Sig Start Date End Date Taking? Authorizing Provider   Nebulizer & Compressor machine 1 Each by Does Not Apply route every four (4) hours as needed for Wheezing or Shortness of Breath. 10/26/20  Yes Caryn Meza NP   albuterol-ipratropium (DUO-NEB) 2.5 mg-0.5 mg/3 ml nebu 3 mL by Nebulization route every four (4) hours as needed for Wheezing, Shortness of Breath or Cough. Use with a flare up of COPD.   Indications: bronchi muscle spasm resulting from COPD 10/26/20  Yes Caryn Meza NP   predniSONE (STERAPRED DS) 10 mg dose pack Take as directed 10/19/20  Yes Stephen Llanos, DO   benzonatate (TESSALON) 200 mg capsule TAKE 1 CAPSULE BY MOUTH THREE TIMES DAILY AS NEEDED WITH FOOD FOR COUGH FOR UP TO 7 DAYS 10/6/20  Yes Rita Valle MD   DULoxetine (CYMBALTA) 60 mg capsule Take 1 capsule by mouth once daily 10/6/20  Yes Rita Valle MD   umeclidinium-vilanteroL [facility] ELLIPTA) 62.5-25 mcg/actuation inhaler Take 1 Puff by inhalation daily. 9/14/20  Yes Rita Valle MD   gabapentin (NEURONTIN) 400 mg capsule Take 1 Cap by mouth three (3) times daily. Max Daily Amount: 1,200 mg. take 1 capsule by mouth three times a day 8/20/20  Yes Rita Valle MD   albuterol (PROVENTIL HFA, VENTOLIN HFA, PROAIR HFA) 90 mcg/actuation inhaler INHALE ONE PUFF BY MOUTH EVERY 6 HOURS AS NEEDED FOR WHEEZING 8/20/20  Yes Rita Valle MD   diclofenac (VOLTAREN) 1 % gel Apply  to affected area four (4) times daily. 8/20/20  Yes Rita Valle MD   albuterol (PROVENTIL VENTOLIN) 2.5 mg /3 mL (0.083 %) nebu 3 mL by Nebulization route every four (4) hours as needed for Wheezing. 8/20/20  Yes Rita Valle MD   nicotine (NICODERM CQ) 21 mg/24 hr APPLY 1 PATCH TOPICALLY EVERY 24 HOURS FOR 30 DAYS 7/13/20  Yes Rita Valle MD   ondansetron (Zofran ODT) 4 mg disintegrating tablet Take 1 Tab by mouth every eight (8) hours as needed for Nausea or Vomiting for up to 10 doses. 3/12/20  Yes Rita Valle MD   naloxone [facility]) 4 mg/actuation nasal spray 1 [place] by Nasal route. 2/15/20  Yes Other, MD Jose Alfredo   calcium combo no.2-vitamin D3 600 mg calcium- 500 unit TbER Take 1 Each by mouth daily. 10/22/19  Yes Rita Valle MD   buPROPion [facility]) 100 mg tablet Take 1 Tab by mouth three (3) times daily. 7/24/19  Yes Rita Valle MD   cyclobenzaprine (FLEXERIL) 5 mg tablet TAKE ONE TABLET BY MOUTH THREE TIMES DAILY AS NEEDED FOR MUSCLE SPASM 7/24/19  Yes Rita Valle MD   azithromycin [facility]) 250 mg tablet Take 1 Tab by mouth See Admin Instructions.  Take two tablets today then one tablet daily for next 4 days 10/22/20   Rita Valle MD     Allergies Allergen Reactions    Zolpidem Unknown (comments)     Past Medical History:   Diagnosis Date    Arthritis     Asthma     COPD [facility]ch[facility] obstructive [facility] disease) ([facility] [address] [facility]     Depression     Hemorrhoids     Occult blood positive stool     Spinal stenosis      Past Surgical History:   Procedure Laterality Date    ABDOMEN SURGERY PROC UNLISTED      hystrectomy    COLONOSCOPY N/A 2019    [facility] [address] BACK SURGERY  2016    laminectomy    HX CERVICAL FUSION      HX COLONOSCOPY  2019    4 polyps- 2 tubular adenoma 2 hyperplastic polyp    HX HYSTERECTOMY       Family History   Problem Relation Age of Onset    Cancer Mother         colon    Cancer Father 79        lung    Diabetes Sister     Cancer Brother 27        lymphoma     Social History     Tobacco Use    Smoking status: Current Every Day Smoker     Packs/day: 0.50     Years: 35.00     Pack years: 17.50     Types: Cigarettes     Last attempt to quit: 2020     Years since quittin.2    Smokeless tobacco: Never Used   Substance Use Topics    Alcohol use: Yes     Alcohol/week: 19.0 standard drinks     Types: 14 Glasses of wine, 5 Cans of beer per week     Frequency: Monthly or less     Drinks per session: 1 or 2     Binge frequency: Never     Review of Systems   Constitutional: Negative for chills and fever. Respiratory: Positive for cough (black specks), sputum production, shortness of breath and wheezing. Cardiovascular: Negative for chest pain. Gastrointestinal: Negative for nausea and vomiting. Neurological: Negative for dizziness and headaches. Objective:   No flowsheet data found. Pulmonary/Chest: [x] Respiratory effort normal   [x] No visualized signs of difficulty breathing or respiratory distress        [] Abnormal -                Psychiatric:       [x] Normal Affect [] Abnormal -        [x] No Hallucinations    Assessment & Plan:    The complexity of medical decision making for this visit is moderate   DDDX: COPD exacerbation vs pneumonia vs bronchitis. ICD-10-CM ICD-9-CM    1. Low back pain, unspecified back pain laterality, unspecified chronicity, unspecified whether sciatica present  M54.5 724.2 traMADoL (ULTRAM) 50 mg tablet   2. Chronic obstructive pulmonary disease, unspecified COPD type ([facility]  J44.9 496 guaiFENesin-codeine (ROBITUSSIN AC) 100-10 mg/5 mL solution   3. Narcotic dependence (HCC)  F11.20 304.90 traMADoL (ULTRAM) 50 mg tablet   4. Sinusitis, unspecified chronicity, unspecified location  J32.9 473.9 levoFLOXacin (Levaquin) 500 mg tablet      predniSONE (DELTASONE) 20 mg tablet     1. Chronic obstructive pulmonary disease, unspecified COPD type ([facility]  - guaiFENesin-codeine (ROBITUSSIN AC) 100-10 mg/5 mL solution; Take 5 mL by mouth three (3) times daily as needed for Cough or Congestion for up to 5 days. Max Daily Amount: 15 mL. Caution using with Gabapentin and Cyclobenzaprine. May cause drowsiness. Dispense: 60 mL; Refill: 0    2. Low back pain, unspecified back pain laterality, unspecified chronicity, unspecified whether sciatica present  - traMADoL (ULTRAM) 50 mg tablet; Take 1 Tab by mouth every six (6) hours as needed for Pain for up to 3 days. Max Daily Amount: 200 mg. Caution using with Cyclobenzaprine. May cause drowsiness. Dispense: 12 Tab; Refill: 0    3. Sinusitis, unspecified chronicity, unspecified location  - levoFLOXacin (Levaquin) 500 mg tablet; Take 1 Tab by mouth daily for 7 days. Take within 4 hrs of Calcium/ Vit D. Dispense: 7 Tab; Refill: 0  - predniSONE (DELTASONE) 20 mg tablet; Take 40 mg by mouth daily (with breakfast) for 5 days. Dispense: 10 Tab; Refill: 0    Discussed limitations of phone visit. Patient has been treated with multiple antibiotics in the past few weeks and still having shortness of breath, cough, and wheezing.   Will switch antibiotic to levofloxacin in ordered another round of prednisone and cough medicine. Patient complained of back pain. Difficult to assess over the phone. We will give a couple days of pain medicine and reiterated that we do not treat chronic pain with chronic narcotics. Pt may continue to use her nebulizer at home as needed and directed. Follow up in 1 week for evaluation. I spent at least 25 minutes on this visit with this established patient. We discussed the expected course, resolution and complications of the diagnosis(es) in detail. Medication risks, benefits, costs, interactions, and alternatives were discussed as indicated. I advised her to contact the office if her condition worsens, changes or fails to improve as anticipated. She expressed understanding with the diagnosis(es) and plan. Lloyd Jay, who was evaluated through a patient-initiated, synchronous (real-time) audio-video encounter, and/or her healthcare decision maker, is aware that it is a billable service, with coverage as determined by her insurance carrier. She provided verbal consent to proceed: Yes, and patient identification was verified. It was conducted pursuant to the emergency declaration under the [address] [place] [address], [address] [place] [address] authority and the [employer] and [employer] Act. A caregiver was present when appropriate. Ability to conduct physical exam was limited. I was in the office. The patient was at home. If symptoms worsen and necessary, call 911 or go to nearest ER.      Andres Gauthier NP

## 2020-11-04 NOTE — PROGRESS NOTES
Chief Complaint   Patient presents with    Cough     and congestion - ran out of medication - needs more codeine cough medicine     [facility]

## 2020-11-25 ENCOUNTER — NURSE TRIAGE (OUTPATIENT)
Dept: OTHER | Facility: CLINIC | Age: 60
End: 2020-11-25

## 2020-11-25 NOTE — TELEPHONE ENCOUNTER
Reason for Disposition   Wheezing is present    Answer Assessment - Initial Assessment Questions  1. ONSET: \"When did the cough begin? \"       4 days ago    2. SEVERITY: \"How bad is the cough today? \"       Dry and nagging cough , coughing spells are severe, causes her ribs to hurt    3. RESPIRATORY DISTRESS: \"Describe your breathing. \"       She uses her breathing machine 4 or 5 times per day. No c/o sob right now    4. FEVER: \"Do you have a fever? \" If so, ask: \"What is your temperature, how was it measured, and when did it start? \"      No    5. HEMOPTYSIS: \"Are you coughing up any blood? \" If so ask: \"How much? \" (flecks, streaks, tablespoons, etc.)      No    6. TREATMENT: \"What have you done so far to treat the cough? \" (e.g., meds, fluids, humidifier)      Delsym and advil and breathing treatments  7. CARDIAC HISTORY: \"Do you have any history of heart disease? \" (e.g., heart attack, congestive heart failure)       No    8. LUNG HISTORY: \"Do you have any history of lung disease? \"  (e.g., pulmonary embolus, asthma, emphysema)      codp    9. PE RISK FACTORS: \"Do you have a history of blood clots? \" (or: recent major surgery, recent prolonged travel, bedridden)      No    10. OTHER SYMPTOMS: \"Do you have any other symptoms? (e.g., runny nose, wheezing, chest pain)        See travel screen for all symptoms    11. PREGNANCY: \"Is there any chance you are pregnant? \" \"When was your last menstrual period? \"        No    12. TRAVEL: \"Have you traveled out of the country in the last month? \" (e.g., travel history, exposures)        no    Protocols used: COUGH-ADULT-OH    Pod 7    Pt with symptoms as documented above. Pt informed of disposition. Care advice as documented. Soft to cuevas (sharon) for further assist.    Please do not respond to the triage nurse through this encounter. Any subsequent communication should be directly with the patient.

## 2020-11-28 DIAGNOSIS — J44.9 CHRONIC OBSTRUCTIVE PULMONARY DISEASE, UNSPECIFIED COPD TYPE (HCC): ICD-10-CM

## 2020-11-29 RX ORDER — BENZONATATE 200 MG/1
CAPSULE ORAL
Qty: 60 CAP | Refills: 0 | Status: SHIPPED | OUTPATIENT
Start: 2020-11-29 | End: 2022-01-27 | Stop reason: SDUPTHER

## 2020-12-01 DIAGNOSIS — M48.00 SPINAL STENOSIS, UNSPECIFIED SPINAL REGION: ICD-10-CM

## 2020-12-01 DIAGNOSIS — J44.9 CHRONIC OBSTRUCTIVE PULMONARY DISEASE, UNSPECIFIED COPD TYPE (HCC): ICD-10-CM

## 2020-12-02 RX ORDER — PREDNISONE 20 MG/1
TABLET ORAL
Qty: 14 TAB | Refills: 0 | Status: SHIPPED | OUTPATIENT
Start: 2020-12-02 | End: 2021-01-12 | Stop reason: ALTCHOICE

## 2020-12-02 RX ORDER — GABAPENTIN 400 MG/1
CAPSULE ORAL
Qty: 90 CAP | Refills: 0 | Status: SHIPPED | OUTPATIENT
Start: 2020-12-02 | End: 2021-02-16 | Stop reason: DRUGHIGH

## 2020-12-09 ENCOUNTER — VIRTUAL VISIT (OUTPATIENT)
Dept: INTERNAL MEDICINE CLINIC | Age: 60
End: 2020-12-09
Payer: MEDICAID

## 2020-12-09 DIAGNOSIS — R05.9 COUGH: Primary | ICD-10-CM

## 2020-12-09 DIAGNOSIS — J44.9 CHRONIC OBSTRUCTIVE PULMONARY DISEASE, UNSPECIFIED COPD TYPE (HCC): ICD-10-CM

## 2020-12-09 PROCEDURE — 99213 OFFICE O/P EST LOW 20 MIN: CPT | Performed by: FAMILY MEDICINE

## 2020-12-09 RX ORDER — AZITHROMYCIN 250 MG/1
250 TABLET, FILM COATED ORAL SEE ADMIN INSTRUCTIONS
Qty: 6 TAB | Refills: 0 | Status: SHIPPED | OUTPATIENT
Start: 2020-12-09 | End: 2020-12-21 | Stop reason: ALTCHOICE

## 2020-12-09 NOTE — PROGRESS NOTES
Subjective:   Sheeba Hall is a 63 y.o. female who complains of sore throat, cough described as productive of black sputum, myalgias, headache, bilateral ear pain, fevers up to 101.4 degrees and wheezing dyspnea for 90 days, gradually worsening x 4 days since that time. She denies a history of rash on body. Evaluation to date: seen previously and thought to have a viral URI. Treatment to date: doxy pred and z pack. Patient does smoke cigarettes. Relevant PMH: Asthma and COPD. Allergies   Allergen Reactions    Zolpidem Unknown (comments)         Patient Active Problem List    Diagnosis Date Noted    Chronic cough 10/29/2020    Anxiety state 2020    Backache 2020    Chronic obstructive pulmonary disease ([facility] 2020    Hyperlipidemia 2020    Neck pain 2020    Osteoarthritis of knee 2020    Tobacco dependence syndrome 2020    Adenomatous colon polyp 2020    Narcotic dependence ([facility] 2019    Encounter for diagnostic colonoscopy due to change in bowel habits 2019    Elevated liver enzymes 2019    Depression, major, recurrent, mild ([place] [address] 2017    Restless legs 2017    Tobacco abuse 2017    Spinal stenosis 2017    Knee pain 2014     Allergies   Allergen Reactions    Zolpidem Unknown (comments)     Social History     Tobacco Use    Smoking status: Current Every Day Smoker     Packs/day: 0.50     Years: 35.00     Pack years: 17.50     Types: Cigarettes     Last attempt to quit: 2020     Years since quittin.3    Smokeless tobacco: Never Used   Substance Use Topics    Alcohol use: Yes     Alcohol/week: 19.0 standard drinks     Types: 14 Glasses of wine, 5 Cans of beer per week     Frequency: Monthly or less     Drinks per session: 1 or 2     Binge frequency: Never        Review of Systems  Pertinent items are noted in HPI.     Objective:     Visit Vitals  LMP  (LMP Unknown) General:  fatigued, cooperative, no distress   Eyes: negative   Ears:    Sinuses:    Mouth:     Neck:    Heart:    Lungs:    Abdomen:       Assessment/Plan:   viral upper respiratory illness and pneumonia  Antibiotics per orders. Encounter Diagnoses   Name Primary?  Cough Yes    [facility] disease, unspecified [facility]      Orders Placed This Encounter    NOVEL CORONAVIRUS (COVID-19)    azithromycin (ZITHROMAX) 250 mg tablet     If no better call for steroids.

## 2020-12-09 NOTE — PROGRESS NOTES
Chief Complaint   Patient presents with    Concern For COVID-19 (Coronavirus)     Health Maintenance reviewed. I have reviewed the patient's medical history in detail and updated the computerized patient record. 1. Have you been to the ER, urgent care clinic since your last visit? Hospitalized since your last visit?no    2. Have you seen or consulted any other health care providers outside of the [address] [place] [address] since your last visit? Include any pap smears or colon screening. No      Encouraged pt to discuss pt's wishes with spouse/partner/family and bring them in the next appt to follow thru with the Advanced Directive    Fall Risk Assessment, last 12 mths 12/9/2020   Able to walk? Yes   Fall in past 12 months? No   Fall with injury? -   Number of falls in past 12 months -   Fall Risk Score -       3 most recent PHQ Screens 12/9/2020   Little interest or pleasure in doing things Nearly every day   Feeling down, depressed, irritable, or hopeless Nearly every day   Total Score PHQ 2 6   Trouble falling or staying asleep, or sleeping too much -   Feeling tired or having little energy -   Poor appetite, weight loss, or overeating -   Feeling bad about yourself - or that you are a failure or have let yourself or your family down -   Trouble concentrating on things such as school, work, reading, or watching TV -   Moving or speaking so slowly that other people could have noticed; or the opposite being so fidgety that others notice -   Thoughts of being better off dead, or hurting yourself in some way -   How difficult have these problems made it for you to do your work, take care of your home and get along with others -       Abuse Screening Questionnaire 12/9/2020   Do you ever feel afraid of your partner? N   Are you in a relationship with someone who physically or mentally threatens you? N   Is it safe for you to go home?  Y       ADL Assessment 12/9/2020   Feeding yourself No Help Needed   Getting from bed to chair No Help Needed   Getting dressed No Help Needed   Bathing or showering No Help Needed   Walk across the room (includes cane/walker) No Help Needed   Using the telphone No Help Needed   Taking your medications No Help Needed   Preparing meals No Help Needed   Managing money (expenses/bills) No Help Needed   Moderately strenuous housework (laundry) No Help Needed   Shopping for personal items (toiletries/medicines) No Help Needed   Shopping for groceries No Help Needed   Driving No Help Needed   Climbing a flight of stairs No Help Needed   Getting to places beyond walking distances No Help Needed

## 2020-12-10 ENCOUNTER — CLINICAL SUPPORT (OUTPATIENT)
Dept: INTERNAL MEDICINE CLINIC | Age: 60
End: 2020-12-10
Payer: MEDICAID

## 2020-12-10 DIAGNOSIS — R05.3 CHRONIC COUGH: Primary | ICD-10-CM

## 2020-12-10 PROCEDURE — 99000 SPECIMEN HANDLING OFFICE-LAB: CPT | Performed by: FAMILY MEDICINE

## 2020-12-10 NOTE — LETTER
12/10/2020 3:09 PM 
 
Ms. Angy Ford [address] [facility] [address] [id number] Patient had a COVID test performed at our clinic today. Results pending within 2 - 3 working days. Sincerely, Madeline Santos MD

## 2020-12-10 NOTE — PROGRESS NOTES
Patient in parking for self administered COVID test - results pending in 2 -3 days  [facility] [place], N  [phone]  9:82 PM

## 2020-12-12 LAB — SARS-COV-2, NAA: NOT DETECTED

## 2020-12-14 ENCOUNTER — TELEPHONE (OUTPATIENT)
Dept: INTERNAL MEDICINE CLINIC | Age: 60
End: 2020-12-14

## 2020-12-14 DIAGNOSIS — H60.319 DIFFUSE OTITIS EXTERNA, UNSPECIFIED CHRONICITY, UNSPECIFIED LATERALITY: Primary | ICD-10-CM

## 2020-12-14 RX ORDER — NEOMYCIN SULFATE, POLYMYXIN B SULFATE AND HYDROCORTISONE 10; 3.5; 1 MG/ML; MG/ML; [USP'U]/ML
3 SUSPENSION/ DROPS AURICULAR (OTIC) 3 TIMES DAILY
Qty: 10 ML | Refills: 0 | Status: SHIPPED | OUTPATIENT
Start: 2020-12-14 | End: 2020-12-21

## 2020-12-14 NOTE — TELEPHONE ENCOUNTER
Mail box is full and cannot accept messages - called patient - unable to get through line  Mague Saez LPN  [phone]/[phone]  1:59 PM

## 2020-12-14 NOTE — PROGRESS NOTES
Send normal/stable results letter. Your results are normal/stable. If not signed up, consider getting my chart to get your results on-line. We can help you to sign up.   No covid

## 2020-12-14 NOTE — TELEPHONE ENCOUNTER
From Chelsey Barraza [facility] Front Office Pool               Caller's first and last name: PT   Reason for call: PT wants to know if you have the results from her Covid test that she took on Thursday.    Callback required yes/no and why: Yes to get her results   Best contact number(s): [phone]   Details to clarify the request: N/A

## 2020-12-15 ENCOUNTER — TELEPHONE (OUTPATIENT)
Dept: INTERNAL MEDICINE CLINIC | Age: 60
End: 2020-12-15

## 2020-12-15 DIAGNOSIS — F11.20 NARCOTIC DEPENDENCE (HCC): Primary | ICD-10-CM

## 2020-12-15 NOTE — TELEPHONE ENCOUNTER
----- Message from Milena Quiroz sent at 12/15/2020 11:45 AM EST -----  Regarding: Dr. Posadas Sports / Telephone  Caller's first and last name: N/A  Reason for call: Pt is requesting letter of negative COVID test results for work purposes. Callback required yes/no and why: yes, to schedule urine stream test  Best contact number(s): [phone]  Details to clarify the request: Pt also requests a urine stream test for pain meds to be refilled; she said that if this could be done in the next two days, she can also  the [place] letter in person.

## 2020-12-21 ENCOUNTER — VIRTUAL VISIT (OUTPATIENT)
Dept: INTERNAL MEDICINE CLINIC | Age: 60
End: 2020-12-21

## 2020-12-21 ENCOUNTER — VIRTUAL VISIT (OUTPATIENT)
Dept: INTERNAL MEDICINE CLINIC | Age: 60
End: 2020-12-21
Payer: MEDICAID

## 2020-12-21 DIAGNOSIS — J44.9 CHRONIC OBSTRUCTIVE PULMONARY DISEASE, UNSPECIFIED COPD TYPE (HCC): ICD-10-CM

## 2020-12-21 DIAGNOSIS — H65.03 BILATERAL ACUTE SEROUS OTITIS MEDIA, RECURRENCE NOT SPECIFIED: Primary | ICD-10-CM

## 2020-12-21 PROCEDURE — 99213 OFFICE O/P EST LOW 20 MIN: CPT | Performed by: FAMILY MEDICINE

## 2020-12-21 RX ORDER — CEFUROXIME AXETIL 500 MG/1
500 TABLET ORAL 2 TIMES DAILY
Qty: 14 TAB | Refills: 0 | Status: SHIPPED | OUTPATIENT
Start: 2020-12-21 | End: 2020-12-28

## 2020-12-21 NOTE — PROGRESS NOTES
Chief Complaint   Patient presents with    Ear Pain     Patient has not been out of the country in (8-9 months), NO diarrhea, NO cough, NO chest conjestion, NO temp. Pt has not been around anyone with these symptoms. Health Maintenance reviewed. I have reviewed the patient's medical history in detail and updated the computerized patient record. 1. Have you been to the ER, urgent care clinic since your last visit? Hospitalized since your last visit? yes    2. Have you seen or consulted any other health care providers outside of the [address] [place] [address] since your last visit? Include any pap smears or colon screening. Yes      Encouraged pt to discuss pt's wishes with spouse/partner/family and bring them in the next appt to follow thru with the Advanced Directive    Fall Risk Assessment, last 12 mths 12/9/2020   Able to walk? Yes   Fall in past 12 months? No   Fall with injury? -   Number of falls in past 12 months -   Fall Risk Score -       3 most recent PHQ Screens 12/9/2020   Little interest or pleasure in doing things Nearly every day   Feeling down, depressed, irritable, or hopeless Nearly every day   Total Score PHQ 2 6   Trouble falling or staying asleep, or sleeping too much -   Feeling tired or having little energy -   Poor appetite, weight loss, or overeating -   Feeling bad about yourself - or that you are a failure or have let yourself or your family down -   Trouble concentrating on things such as school, work, reading, or watching TV -   Moving or speaking so slowly that other people could have noticed; or the opposite being so fidgety that others notice -   Thoughts of being better off dead, or hurting yourself in some way -   How difficult have these problems made it for you to do your work, take care of your home and get along with others -       Abuse Screening Questionnaire 12/9/2020   Do you ever feel afraid of your partner?  N   Are you in a relationship with someone who physically or mentally threatens you? N   Is it safe for you to go home?  Y       ADL Assessment 12/9/2020   Feeding yourself No Help Needed   Getting from bed to chair No Help Needed   Getting dressed No Help Needed   Bathing or showering No Help Needed   Walk across the room (includes cane/walker) No Help Needed   Using the telphone No Help Needed   Taking your medications No Help Needed   Preparing meals No Help Needed   Managing money (expenses/bills) No Help Needed   Moderately strenuous housework (laundry) No Help Needed   Shopping for personal items (toiletries/medicines) No Help Needed   Shopping for groceries No Help Needed   Driving No Help Needed   Climbing a flight of stairs No Help Needed   Getting to places beyond walking distances No Help Needed

## 2020-12-21 NOTE — PROGRESS NOTES
Subjective:   Gia Scott is a 62 y.o. female who complains of congestion, cough described as productive of black specks sputum, fever, bilateral ear pain and back pain for 7 days, stable since that time. She denies a history of rash on body and shortness of breath. Still wheezing  Evaluation to date: seen previously and thought to have a viral URI. Treatment to date: antibiotics -ear drops. Began taking 10 days ago. .  Patient does smoke cigarettes. Relevant PMH: Asthma and COPD. Allergies   Allergen Reactions    Zolpidem Unknown (comments)         Patient Active Problem List    Diagnosis Date Noted    Chronic cough 10/29/2020    Anxiety state 2020    Backache 2020    Chronic obstructive pulmonary disease ([facility] 2020    Hyperlipidemia 2020    Neck pain 2020    Osteoarthritis of knee 2020    Tobacco dependence syndrome 2020    Adenomatous colon polyp 2020    Narcotic dependence ([facility] [facility] 2019    Encounter for diagnostic colonoscopy due to change in bowel habits 2019    Elevated liver enzymes 2019    Depression, major, recurrent, mild ([facility] [facility] 2017    Restless legs 2017    Tobacco abuse 2017    Spinal stenosis 2017    Knee pain 2014     Allergies   Allergen Reactions    Zolpidem Unknown (comments)     Social History     Tobacco Use    Smoking status: Current Every Day Smoker     Packs/day: 0.50     Years: 35.00     Pack years: 17.50     Types: Cigarettes     Last attempt to quit: 2020     Years since quittin.3    Smokeless tobacco: Never Used   Substance Use Topics    Alcohol use: Yes     Alcohol/week: 19.0 standard drinks     Types: 14 Glasses of wine, 5 Cans of beer per week     Frequency: Monthly or less     Drinks per session: 1 or 2     Binge frequency: Never        Review of Systems  Pertinent items are noted in HPI.     Objective:     Visit Vitals  LMP  (LMP Unknown) General:  fatigued, cooperative, no distress   Eyes: negative   Ears:    Sinuses:    Mouth:     Neck:    Heart:    Lungs:    Abdomen:       Assessment/Plan:   otitis media  Antibiotics per orders. RTC in 2 days or PRN. Encounter Diagnoses   Name Primary?  Bilateral acute serous otitis media, recurrence not specified Yes    Chronic obstructive pulmonary disease, unspecified COPD type ([facility] [address]      Orders Placed This Encounter    cefUROXime (CEFTIN) 500 mg tablet   . Schedule Appt for pain eval in order to get narcs. prn     Sanjeev Matos, who was evaluated through a synchronous (real-time) audio-video encounter, and/or her healthcare decision maker, is aware that it is a billable service, with coverage as determined by her insurance carrier. She provided verbal consent to proceed: Yes, and patient identification was verified. It was conducted pursuant to the emergency declaration under the [facility], [address] [place] [address] authority and the [employer] Act. A caregiver was present when appropriate. Ability to conduct physical exam was limited. I was in the office. The patient was at home.

## 2020-12-21 NOTE — PROGRESS NOTES
Chief Complaint   Patient presents with    Ear Pain     ear pain     Health Maintenance reviewed. I have reviewed the patient's medical history in detail and updated the computerized patient record. Patient has not been out of the country in (8-9 months), NO diarrhea, NO cough, NO chest conjestion, NO temp. Pt has not been around anyone with these symptoms. 1. Have you been to the ER, urgent care clinic since your last visit? Hospitalized since your last visit? yes    2. Have you seen or consulted any other health care providers outside of the [address] [place] [address] since your last visit? Include any pap smears or colon screening. Yes      Encouraged pt to discuss pt's wishes with spouse/partner/family and bring them in the next appt to follow thru with the Advanced Directive    Fall Risk Assessment, last 12 mths 12/21/2020   Able to walk? Yes   Fall in past 12 months? No   Fall with injury? -   Number of falls in past 12 months -   Fall Risk Score -       3 most recent PHQ Screens 12/21/2020   Little interest or pleasure in doing things Nearly every day   Feeling down, depressed, irritable, or hopeless Nearly every day   Total Score PHQ 2 6   Trouble falling or staying asleep, or sleeping too much -   Feeling tired or having little energy -   Poor appetite, weight loss, or overeating -   Feeling bad about yourself - or that you are a failure or have let yourself or your family down -   Trouble concentrating on things such as school, work, reading, or watching TV -   Moving or speaking so slowly that other people could have noticed; or the opposite being so fidgety that others notice -   Thoughts of being better off dead, or hurting yourself in some way -   How difficult have these problems made it for you to do your work, take care of your home and get along with others -       Abuse Screening Questionnaire 12/21/2020   Do you ever feel afraid of your partner?  N   Are you in a relationship with someone who physically or mentally threatens you? N   Is it safe for you to go home?  Y       ADL Assessment 12/21/2020   Feeding yourself No Help Needed   Getting from bed to chair No Help Needed   Getting dressed No Help Needed   Bathing or showering No Help Needed   Walk across the room (includes cane/walker) No Help Needed   Using the telphone No Help Needed   Taking your medications No Help Needed   Preparing meals No Help Needed   Managing money (expenses/bills) No Help Needed   Moderately strenuous housework (laundry) No Help Needed   Shopping for personal items (toiletries/medicines) No Help Needed   Shopping for groceries No Help Needed   Driving No Help Needed   Climbing a flight of stairs No Help Needed   Getting to places beyond walking distances No Help Needed

## 2020-12-23 ENCOUNTER — OFFICE VISIT (OUTPATIENT)
Dept: INTERNAL MEDICINE CLINIC | Age: 60
End: 2020-12-23
Payer: MEDICAID

## 2020-12-23 VITALS
OXYGEN SATURATION: 96 % | BODY MASS INDEX: 27.62 KG/M2 | RESPIRATION RATE: 16 BRPM | DIASTOLIC BLOOD PRESSURE: 96 MMHG | HEART RATE: 84 BPM | SYSTOLIC BLOOD PRESSURE: 164 MMHG | WEIGHT: 176 LBS | TEMPERATURE: 97.5 F | HEIGHT: 67 IN

## 2020-12-23 DIAGNOSIS — M17.12 PRIMARY OSTEOARTHRITIS OF LEFT KNEE: ICD-10-CM

## 2020-12-23 DIAGNOSIS — F11.20 NARCOTIC DEPENDENCE (HCC): Primary | ICD-10-CM

## 2020-12-23 DIAGNOSIS — J44.9 CHRONIC OBSTRUCTIVE PULMONARY DISEASE, UNSPECIFIED COPD TYPE (HCC): ICD-10-CM

## 2020-12-23 DIAGNOSIS — Z72.0 TOBACCO ABUSE: ICD-10-CM

## 2020-12-23 PROCEDURE — 20610 DRAIN/INJ JOINT/BURSA W/O US: CPT | Performed by: FAMILY MEDICINE

## 2020-12-23 PROCEDURE — 99214 OFFICE O/P EST MOD 30 MIN: CPT | Performed by: FAMILY MEDICINE

## 2020-12-23 RX ORDER — TRAMADOL HYDROCHLORIDE 50 MG/1
50 TABLET ORAL
Qty: 120 TAB | Refills: 1 | Status: SHIPPED | OUTPATIENT
Start: 2020-12-23 | End: 2020-12-26

## 2020-12-23 NOTE — PROGRESS NOTES
Chief Complaint   Patient presents with    Urinary Burning     x2 weeks urine burning     Health Maintenance reviewed. I have reviewed the patient's medical history in detail and updated the computerized patient record. 1. Have you been to the ER, urgent care clinic since your last visit? Hospitalized since your last visit? yes    2. Have you seen or consulted any other health care providers outside of the [address] [place] [address] since your last visit? Include any pap smears or colon screening. Yes      Encouraged pt to discuss pt's wishes with spouse/partner/family and bring them in the next appt to follow thru with the Advanced Directive    Patient has not been out of the country in (8-9 months), NO diarrhea, NO cough, NO chest conjestion, NO temp. Pt has not been around anyone with these symptoms. Fall Risk Assessment, last 12 mths 12/21/2020   Able to walk? Yes   Fall in past 12 months? No   Number of falls in past 12 months -   Fall with injury? -       3 most recent PHQ Screens 12/21/2020   Little interest or pleasure in doing things Nearly every day   Feeling down, depressed, irritable, or hopeless Nearly every day   Total Score PHQ 2 6   Trouble falling or staying asleep, or sleeping too much -   Feeling tired or having little energy -   Poor appetite, weight loss, or overeating -   Feeling bad about yourself - or that you are a failure or have let yourself or your family down -   Trouble concentrating on things such as school, work, reading, or watching TV -   Moving or speaking so slowly that other people could have noticed; or the opposite being so fidgety that others notice -   Thoughts of being better off dead, or hurting yourself in some way -   How difficult have these problems made it for you to do your work, take care of your home and get along with others -       Abuse Screening Questionnaire 12/21/2020   Do you ever feel afraid of your partner?  N   Are you in a relationship with someone who physically or mentally threatens you? N   Is it safe for you to go home? Y       ADL Assessment 12/21/2020   Feeding yourself No Help Needed   Getting from bed to chair No Help Needed   Getting dressed No Help Needed   Bathing or showering No Help Needed   Walk across the room (includes cane/walker) No Help Needed   Using the telphone No Help Needed   Taking your medications No Help Needed   Preparing meals No Help Needed   Managing money (expenses/bills) No Help Needed   Moderately strenuous housework (laundry) No Help Needed   Shopping for personal items (toiletries/medicines) No Help Needed   Shopping for groceries No Help Needed   Driving No Help Needed   Climbing a flight of stairs No Help Needed   Getting to places beyond walking distances No Help Needed       [facility]  OFFICE PROCEDURE PROGRESS NOTE        Chart reviewed for the following:   Izabel LEBRON LPN, have reviewed the History, Physical and updated the Allergic reactions for [address] performed immediately prior to start of procedure:   Margo Dickinson LPN, have performed the following reviews on Jess Hernandez prior to the start of the procedure:            * Patient was identified by name and date of birth   * Agreement on procedure being performed was verified  * Risks and Benefits explained to the patient Dr. Fredy Lugo  * Procedure site verified and marked as necessary  * Patient was positioned for comfort  * Consent was signed and verified     Time: 1:00PM      Date of procedure: 12/23/2020    Procedure performed by:  Nasim Browne MD    Provider assisted by: Cesar Treadwell. COLEMAN Moreno    Patient assisted by: Madhav Moreno LPN    How tolerated by patient: Well    Post Procedural Pain Scale:     Comments: None    Dr. Fredy Lugo did the procedure

## 2020-12-23 NOTE — PROGRESS NOTES
PROGRESS NOTE        SUBJECTIVE:  Diagnosis/Chief Complaint: No chief complaint on file. Pain c/o, dysuria otalgia resolved finishing ABs  Asks for left knee injection  Doing well with pain no  Improvement in function: yes - helps her mobility  Pain levels 9/10   Usage of benzodiazapine or other sedatives no  Symptoms left knee pain, back and legs neck shoulder  Activities: Able to do activities of daily living. yes - still  Side affects: no  States taking medications per medicine list.yes - been out of tramadol   queried yes - re  Urine drug screen done yes - ordered  Opiod Rx exceeds 50 MME/dayno  Hx of depression no  Hx of drug addiction: no  Safe storage of the opiods: yes - locked  Narcotic contract reviewed and discussed: yes - re 2017    Patient Active Problem List    Diagnosis Date Noted    Chronic cough 10/29/2020    Anxiety state 07/21/2020    Backache 07/21/2020    Chronic obstructive pulmonary disease ([facility] [address] 07/21/2020    Hyperlipidemia 07/21/2020    Neck pain 07/21/2020    Osteoarthritis of knee 07/21/2020    Tobacco dependence syndrome 07/21/2020    Adenomatous colon polyp 05/16/2020    Narcotic dependence ([facility] [address] 04/07/2019    Encounter for diagnostic colonoscopy due to change in bowel habits 03/04/2019    Elevated liver enzymes 02/19/2019    Depression, major, recurrent, mild ([address] 12/21/2017    Restless legs 12/21/2017    Tobacco abuse 12/21/2017    Spinal stenosis 08/14/2017    Knee pain 01/24/2014     Current Outpatient Medications   Medication Sig Dispense Refill    traMADoL (ULTRAM) 50 mg tablet Take 1 Tab by mouth every twelve (12) hours as needed for Pain for up to 7 days. Max Daily Amount: 100 mg. 14 Tab 0    cefUROXime (CEFTIN) 500 mg tablet Take 1 Tab by mouth two (2) times a day for 7 days.  14 Tab 0    predniSONE (DELTASONE) 20 mg tablet TAKE 2 TABLETS BY MOUTH ONCE DAILY WITH BREAKFAST FOR 7 DAYS 14 Tab 0    gabapentin (NEURONTIN) 400 mg capsule TAKE 1 CAPSULE BY MOUTH THREE TIMES DAILY 90 Cap 0    benzonatate (TESSALON) 200 mg capsule TAKE 1 CAPSULE BY MOUTH THREE TIMES DAILY AS NEEDED WITH FOOD FOR COUGH FOR UP TO 7 DAYS 60 Cap 0    Nebulizer & Compressor machine 1 Each by Does Not Apply route every four (4) hours as needed for Wheezing or Shortness of Breath. 1 Each 0    albuterol-ipratropium (DUO-NEB) 2.5 mg-0.5 mg/3 ml nebu 3 mL by Nebulization route every four (4) hours as needed for Wheezing, Shortness of Breath or Cough. Use with a flare up of COPD. Indications: bronchi muscle spasm resulting from COPD 30 Nebule 1    DULoxetine (CYMBALTA) 60 mg capsule Take 1 capsule by mouth once daily 30 Cap 0    umeclidinium-vilanteroL (ANORO ELLIPTA) 62.5-25 mcg/actuation inhaler Take 1 Puff by inhalation daily. 1 Inhaler 5    albuterol (PROVENTIL HFA, VENTOLIN HFA, PROAIR HFA) 90 mcg/actuation inhaler INHALE ONE PUFF BY MOUTH EVERY 6 HOURS AS NEEDED FOR WHEEZING 1 Inhaler 2    diclofenac (VOLTAREN) 1 % gel Apply  to affected area four (4) times daily. 1 Each 5    albuterol (PROVENTIL VENTOLIN) 2.5 mg /3 mL (0.083 %) nebu 3 mL by Nebulization route every four (4) hours as needed for Wheezing. 30 Nebule 3    nicotine (NICODERM CQ) 21 mg/24 hr APPLY 1 PATCH TOPICALLY EVERY 24 HOURS FOR 30 DAYS 30 Patch 0    ondansetron (Zofran ODT) 4 mg disintegrating tablet Take 1 Tab by mouth every eight (8) hours as needed for Nausea or Vomiting for up to 10 doses. 10 Tab 0    naloxone (NARCAN) 4 mg/actuation nasal spray 1 Spray by Nasal route.  calcium combo no.2-vitamin D3 600 mg calcium- 500 unit TbER Take 1 Each by mouth daily. 90 Tab 1    buPROPion (WELLBUTRIN) 100 mg tablet Take 1 Tab by mouth three (3) times daily.  90 Tab 5    cyclobenzaprine (FLEXERIL) 5 mg tablet TAKE ONE TABLET BY MOUTH THREE TIMES DAILY AS NEEDED FOR MUSCLE SPASM 270 Tab 1     Allergies   Allergen Reactions    Zolpidem Unknown (comments)     Social History     Tobacco Use    Smoking status: Current Every Day Smoker     Packs/day: 0.50     Years: 35.00     Pack years: 17.50     Types: Cigarettes     Last attempt to quit: 2020     Years since quittin.3    Smokeless tobacco: Never Used   Substance Use Topics    Alcohol use: Yes     Alcohol/week: 19.0 standard drinks     Types: 14 Glasses of wine, 5 Cans of beer per week     Frequency: Monthly or less     Drinks per session: 1 or 2     Binge frequency: Never        OBJECTIVE:    .  Visit Vitals  BP (!) 164/96 (BP 1 Location: Left arm, BP Patient Position: Sitting)   Pulse 84   Temp 97.5 °F (36.4 °C) (Temporal)   Resp 16   Ht 5' 7\" (1.702 m)   Wt 176 lb (79.8 kg)   LMP  (LMP Unknown)   SpO2 96%   BMI 27.57 kg/m²     WDWN in NAD, mental status normal  Heart RRR, no:C/M/R  Lungs CTA No wheezes, rales or rhonchi  Abdo: soft no tenderness, rebound or guarding  Neurological exam[de-identified] 2-12 intact  Psychiatric: Normal mood, judgement  Knee grossly nl    Reviewed: Medications, allergies, clinical lab test results and imaging results have been reviewed. Any abnormal findings have been addressed. ASSESSMENT:       ICD-10-CM ICD-9-CM    1. Narcotic dependence ([facility] [address]  F11.20 304.90 COMPLIANCE DRUG SCREEN/PRESCRIPTION MONITORING      traMADoL (ULTRAM) 50 mg tablet      TRIAMCINOLONE ACETONIDE INJ   2. Primary osteoarthritis of left knee  M17.12 715.16 DRAIN/INJECT LARGE JOINT/BURSA      TRIAMCINOLONE ACETONIDE INJ   3. Chronic obstructive pulmonary disease, unspecified COPD type ([address]  J44.9 496    4.  Tobacco abuse  Z72.0 305.1        Patient is 62 y.o. with diagnosis of :   Patient Active Problem List   Diagnosis Code    Spinal stenosis M48.00    Depression, major, recurrent, mild ([address] F33.0    Restless legs G25.81    Tobacco abuse Z72.0    Elevated liver enzymes R74.8    Encounter for diagnostic colonoscopy due to change in bowel habits R19.4    Narcotic dependence ([address] F11.20    Adenomatous colon polyp D12.6    Anxiety state F41.1    Backache M54.9    Chronic obstructive pulmonary disease (HCC) J44.9    Hyperlipidemia E78.5    Knee pain M25.569    Neck pain M54.2    Osteoarthritis of knee M17.10    Tobacco dependence syndrome F17.200    Chronic cough R05       PLAN:       Orders Placed This Encounter    DRAIN/INJECT LARGE JOINT/BURSA    COMPLIANCE DRUG SCREEN/PRESCRIPTION MONITORING    TRIAMCINOLONE ACETONIDE INJ     Order Specific Question:   Charge Quantity? Answer:   8     Order Specific Question:   Dose     Answer:   80mg/2mgs     Order Specific Question:   Site     Answer:   LEFT BURSA     Comments:   left knee     Order Specific Question:   Expiration Date     Answer:   3/30/2022     Order Specific Question:   Lot#     Answer:   [id number]     Order Specific Question:        Answer:   [employer]     Order Specific Question:   Perfomed by/Witnessed by: Answer:   Dr. Flores Arambula did the procedure and anne villafuerte witnessed     Order Specific Question:   NDC#     Answer:   [address]-8 [[id number]]    traMADoL (ULTRAM) 50 mg tablet     Sig: Take 1 Tab by mouth every six (6) hours as needed for Pain for up to 3 days. Max Daily Amount: 200 mg. Dispense:  120 Tab     Refill:  1     Note phone messages later    Options discussed. Discussed possible side affects and benefits of the procedure and/or medications. The patient agrees to undergo the procedure. Consent obtained. Sterile procedure followed. There were no complications and the procedure was well tolerated. Instructed patient to call me if it is ineffective or if there are any complications like increasing pain, redness or swelling. The left knee was prepped and using a lateral approach a needle was inserted into the knee. no  attempt was made to obtain synovial fluid. 0  synovial fluid was obtained from the joint: The knee was then injected wit yesh  2 cc of kenalog and 7  cc of lidocaine. Post injection instructions given.  she was instructed to call for increasing redness or pain in the injected knee       . Follow-up and Dispositions    · Return in about 2 months (around 2/23/2021) for routine follow up.

## 2020-12-24 ENCOUNTER — TELEPHONE (OUTPATIENT)
Dept: INTERNAL MEDICINE CLINIC | Age: 60
End: 2020-12-24

## 2020-12-24 DIAGNOSIS — F11.20 NARCOTIC DEPENDENCE (HCC): Primary | ICD-10-CM

## 2020-12-24 RX ORDER — TRAMADOL HYDROCHLORIDE 50 MG/1
50 TABLET ORAL
Qty: 14 TAB | Refills: 0 | Status: SHIPPED | OUTPATIENT
Start: 2020-12-24 | End: 2020-12-31

## 2020-12-24 NOTE — TELEPHONE ENCOUNTER
Called Rx was changed to 12. OK leave at that.  Can get another 1 week when done with current Rx with RF at 1-2 daily prn, pt notified

## 2020-12-29 LAB — DRUGS UR: NORMAL

## 2021-01-04 ENCOUNTER — DOCUMENTATION ONLY (OUTPATIENT)
Dept: INTERNAL MEDICINE CLINIC | Age: 61
End: 2021-01-04

## 2021-01-04 ENCOUNTER — TELEPHONE (OUTPATIENT)
Dept: INTERNAL MEDICINE CLINIC | Age: 61
End: 2021-01-04

## 2021-01-04 NOTE — LETTER
1/5/2021 5:37 PM 
 
Ms. Radha Allen [address] [facility] [phone] [id number] Dear Ms. Ford Avila: Your recent lab showed the following abnomality see result. Compliance screen abnormal. We will not be able to refill tramadol anymore Sincerely, Naya Farmer MD

## 2021-01-04 NOTE — TELEPHONE ENCOUNTER
Pt informed. Send results letter as below:  Your recent lab showed the following abnomality see result. Compliance screen abnormal. We will not be able to refill tramadol anymore.

## 2021-01-12 ENCOUNTER — VIRTUAL VISIT (OUTPATIENT)
Dept: INTERNAL MEDICINE CLINIC | Age: 61
End: 2021-01-12
Payer: MEDICAID

## 2021-01-12 DIAGNOSIS — B89 PARASITE INFECTION: Primary | ICD-10-CM

## 2021-01-12 DIAGNOSIS — J44.9 CHRONIC OBSTRUCTIVE PULMONARY DISEASE, UNSPECIFIED COPD TYPE (HCC): ICD-10-CM

## 2021-01-12 DIAGNOSIS — F43.21 MOURNING: ICD-10-CM

## 2021-01-12 DIAGNOSIS — F33.0 DEPRESSION, MAJOR, RECURRENT, MILD (HCC): ICD-10-CM

## 2021-01-12 DIAGNOSIS — K62.5 RECTAL BLEEDING: ICD-10-CM

## 2021-01-12 PROCEDURE — 99214 OFFICE O/P EST MOD 30 MIN: CPT | Performed by: FAMILY MEDICINE

## 2021-01-12 RX ORDER — DIAZEPAM 5 MG/1
5 TABLET ORAL
Qty: 21 TAB | Refills: 0 | Status: SHIPPED | OUTPATIENT
Start: 2021-01-12 | End: 2021-02-08 | Stop reason: SDUPTHER

## 2021-01-12 NOTE — LETTER
1/12/2021 8:45 AM 
 
Ms. Jay Cisneros [address] [facility] 5 [id number] RE:  REFERRAL TO SURGERY Dear Ms. Elizabeth Galvez: Enclosed you will find the above named referral and your next Virtual Visit appointment on February 23, 2021 @1:00 PM.   Please do not hesitate to contact this office if you have any questions. Sincerely, Mindy Meza MD

## 2021-01-12 NOTE — PROGRESS NOTES
Chief Complaint   Patient presents with    Rectal Bleeding     x3 days       back pain 8/10    Anxiety     depression and anxiety         Patient has not been out of the country in (10-11 months), NO diarrhea, NO cough, NO chest conjestion, NO temp. Pt has not been around anyone with these symptoms. Health Maintenance reviewed. I have reviewed the patient's medical history in detail and updated the computerized patient record. Encouraged pt to discuss pt's wishes with spouse/partner/family and bring them in the next appt to follow thru with the Advanced Directive    Fall Risk Assessment, last 12 mths 1/12/2021   Able to walk? Yes   Fall in past 12 months? 0   Do you feel unsteady? 0   Are you worried about falling 0   Number of falls in past 12 months -   Fall with injury? -       3 most recent PHQ Screens 1/12/2021   Little interest or pleasure in doing things Nearly every day   Feeling down, depressed, irritable, or hopeless Nearly every day   Total Score PHQ 2 6   Trouble falling or staying asleep, or sleeping too much -   Feeling tired or having little energy -   Poor appetite, weight loss, or overeating -   Feeling bad about yourself - or that you are a failure or have let yourself or your family down -   Trouble concentrating on things such as school, work, reading, or watching TV -   Moving or speaking so slowly that other people could have noticed; or the opposite being so fidgety that others notice -   Thoughts of being better off dead, or hurting yourself in some way -   How difficult have these problems made it for you to do your work, take care of your home and get along with others -       Abuse Screening Questionnaire 1/12/2021   Do you ever feel afraid of your partner? N   Are you in a relationship with someone who physically or mentally threatens you? N   Is it safe for you to go home?  Y       ADL Assessment 1/12/2021   Feeding yourself No Help Needed   Getting from bed to chair No Help Needed   Getting dressed No Help Needed   Bathing or showering No Help Needed   Walk across the room (includes cane/walker) No Help Needed   Using the telphone No Help Needed   Taking your medications No Help Needed   Preparing meals No Help Needed   Managing money (expenses/bills) No Help Needed   Moderately strenuous housework (laundry) No Help Needed   Shopping for personal items (toiletries/medicines) No Help Needed   Shopping for groceries No Help Needed   Driving No Help Needed   Climbing a flight of stairs No Help Needed   Getting to places beyond walking distances No Help Needed

## 2021-01-12 NOTE — PROGRESS NOTES
HISTORY OF PRESENT ILLNESS  Ankit Summers is a 62 y.o. female. Rectal Bleeding  The history is provided by the patient. This is a recurrent problem. The current episode started more than 1 week ago. The problem occurs hourly. The problem has not changed since onset. Pertinent negatives include no abdominal pain. Associated symptoms comments: Black stools. Anxiety  Pertinent negatives include no abdominal pain. brother dx with stage 3 liver  She is been very sad due to the above diagnosis her other sister has stage IV cancer as well. Review of Systems   Gastrointestinal: Positive for anal bleeding, blood in stool and diarrhea. Negative for abdominal pain. Musculoskeletal: Positive for back pain and joint pain. Psychiatric/Behavioral: Positive for depression. Negative for substance abuse. The patient has insomnia. colonoscopy 2020 found a worm and Rx.     Patient Active Problem List   Diagnosis Code    Spinal stenosis M48.00    Depression, major, recurrent, mild ([address] F33.0    Restless legs G25.81    Tobacco abuse Z72.0    Elevated liver enzymes R74.8    Encounter for diagnostic colonoscopy due to change in bowel habits R19.4    Narcotic dependence ([address] F11.20    Adenomatous colon polyp D12.6    Anxiety state F41.1    Backache M54.9    Chronic obstructive pulmonary disease (HCC) J44.9    Hyperlipidemia E78.5    Knee pain M25.569    Neck pain M54.2    Osteoarthritis of knee M17.10    Tobacco dependence syndrome F17.200    Chronic cough R05     Past Surgical History:   Procedure Laterality Date    COLONOSCOPY N/A 5/7/2019    COLONOSCOPY performed by Crystal Silveira MD at [facility]  09/2016    laminectomy    HX CERVICAL FUSION      HX COLONOSCOPY  05/07/2019    4 polyps- 2 tubular adenoma 2 hyperplastic polyp    HX HYSTERECTOMY      NV ABDOMEN SURGERY PROC UNLISTED      hystrectomy     Social History     Socioeconomic History    Marital status: LEGALLY      Spouse name: Not on file    Number of children: 2    Years of education: Not on file    Highest education level: 10th grade   Occupational History    Occupation: disabled   Social Needs    Financial resource strain: Not on file    Food insecurity     Worry: Not on file     Inability: Not on file   [employer] needs     Medical: Not on file     Non-medical: Not on file   Tobacco Use    Smoking status: Current Every Day Smoker     Packs/day: 0.50     Years: 35.00     Pack years: 17.50     Types: Cigarettes     Last attempt to quit: 2020     Years since quittin.3    Smokeless tobacco: Never Used   Substance and Sexual Activity    Alcohol use:  Yes     Alcohol/week: 19.0 standard drinks     Types: 14 Glasses of wine, 5 Cans of beer per week     Frequency: Monthly or less     Drinks per session: 1 or 2     Binge frequency: Never    Drug use: Not Currently     Types: Cocaine    Sexual activity: Not Currently     Partners: Male   Lifestyle    Physical activity     Days per week: Not on file     Minutes per session: Not on file    Stress: Not on file   Relationships    Social connections     Talks on phone: Not on file     Gets together: Not on file     Attends Oriental orthodox service: Not on file     Active member of club or organization: Not on file     Attends meetings of clubs or organizations: Not on file     Relationship status: Not on file    Intimate partner violence     Fear of current or ex partner: Not on file     Emotionally abused: Not on file     Physically abused: Not on file     Forced sexual activity: Not on file   Other Topics Concern     Service No    Blood Transfusions No    Caffeine Concern Not Asked    Occupational Exposure Not Asked   [place] Hazards Not Asked    Sleep Concern Not Asked    Stress Concern Not Asked    Weight Concern Not Asked    Special Diet Not Asked    Back Care Not Asked    Exercise Not Asked    Bike Helmet Not Asked    Seat Belt Yes    Self-Exams Yes   Social History Narrative    Lives with friends       Physical Exam  Appears to be in no acute distress, grossly 2 through 12 are nonfocal.    ASSESSMENT and PLAN  Encounter Diagnoses   Name Primary?  Parasite infection Yes    Rectal bleeding     Mourning     Depression, major, recurrent, mild (HCC)     Chronic obstructive pulmonary disease, unspecified COPD type ([facility]      Orders Placed This Encounter    REFERRAL TO SURGERY    diazePAM (VALIUM) 5 mg tablet         We discussed this pain and the usage of controlled substances for mourning relief. In general these medications are indicated for the acute symptom relief and not for chronic usage, allthough they are frequently used for chronic management  After a certain period of time they should be stopped to avoid dependence and/or addiction. I warned her about the dangers of addiction and other side affects including respiratory depression and death. Discussed how this is a controlled substance and that the prescribing of it is should be monitored very carefully. Drug usage is also monitored by our practise and the [facility], since there has been a lot of abuse and diversion of controlled substances. In general we do not refill this medication over the phone. PLease ask for enough pills to get you to your next appointment and make sure you keep your appointments. Warned not to take other medications like alcohol, other benzodiazapines marijuana or other narcotics when on this medication. Follow-up 2 months    Chen Mercado, who was evaluated through a synchronous (real-time) audio-video encounter, and/or her healthcare decision maker, is aware that it is a billable service, with coverage as determined by her insurance carrier. She provided verbal consent to proceed: Yes, and patient identification was verified.  It was conducted pursuant to the emergency declaration under the [facility], [facility] authority and the [employer] Act. A caregiver was present when appropriate. Ability to conduct physical exam was limited. I was in office. The patient was at home.

## 2021-01-18 ENCOUNTER — TELEPHONE (OUTPATIENT)
Dept: INTERNAL MEDICINE CLINIC | Age: 61
End: 2021-01-18

## 2021-01-18 DIAGNOSIS — A09 DIARRHEA OF INFECTIOUS ORIGIN: Primary | ICD-10-CM

## 2021-01-18 RX ORDER — DIPHENOXYLATE HYDROCHLORIDE AND ATROPINE SULFATE 2.5; .025 MG/1; MG/1
1 TABLET ORAL
Qty: 12 TAB | Refills: 0 | Status: SHIPPED | OUTPATIENT
Start: 2021-01-18 | End: 2021-04-13

## 2021-01-18 NOTE — TELEPHONE ENCOUNTER
From Jennifer Yang [facility] Front Office Pool             General Message/Vendor Calls     Caller's first and last name: NA       Reason for call: Rx for Diarrhea         Callback required yes/no and why: Yes       Best contact number(s): [phone]       Details to clarify the request: had diarrhea for a week, getting colonoscopy scheduled .  Please sent to [facility] in [place] (pharm on file)       [employer]

## 2021-01-18 NOTE — TELEPHONE ENCOUNTER
Need something for diarrhea sent to [facility] in [place].

## 2021-01-19 ENCOUNTER — TELEPHONE (OUTPATIENT)
Dept: INTERNAL MEDICINE CLINIC | Age: 61
End: 2021-01-19

## 2021-01-19 NOTE — TELEPHONE ENCOUNTER
[address] [place] [address]  [employer] Number:  [phone] (Call me)             General Message/Vendor Calls     Caller's first and last name:pt       Reason for call:med request to [address] required yes/no and why:yes to discuss med request       Best contact number(s):[phone]       Details to clarify the request:pt is requesting \"cesuroxine axetil 500mg\" for earaches, please advise       Simmie Goltz

## 2021-02-10 ENCOUNTER — TELEPHONE (OUTPATIENT)
Dept: INTERNAL MEDICINE CLINIC | Age: 61
End: 2021-02-10

## 2021-02-10 DIAGNOSIS — M54.40 LOW BACK PAIN WITH SCIATICA, SCIATICA LATERALITY UNSPECIFIED, UNSPECIFIED BACK PAIN LATERALITY, UNSPECIFIED CHRONICITY: Primary | ICD-10-CM

## 2021-02-10 NOTE — TELEPHONE ENCOUNTER
Message fr cuevas        Best contact number(s): [phone]     Details to clarify the request: patient called regarding neck and back pain, would like injection. Yanet Penny       Wasn't sure if we did injections in office or not? Thankyou!

## 2021-02-11 RX ORDER — DICLOFENAC SODIUM 50 MG/1
50 TABLET, DELAYED RELEASE ORAL 2 TIMES DAILY
Qty: 60 TAB | Refills: 1 | Status: SHIPPED | OUTPATIENT
Start: 2021-02-11 | End: 2022-01-27

## 2021-02-11 NOTE — TELEPHONE ENCOUNTER
Tried to contact patient by phone but Zeus Porter is full and could not leave a message that Dr. Abrahan Torres does not inject neck or spine.

## 2021-02-12 ENCOUNTER — TELEPHONE (OUTPATIENT)
Dept: INTERNAL MEDICINE CLINIC | Age: 61
End: 2021-02-12

## 2021-02-12 DIAGNOSIS — M48.00 SPINAL STENOSIS, UNSPECIFIED SPINAL REGION: ICD-10-CM

## 2021-02-12 NOTE — TELEPHONE ENCOUNTER
Message fr Envera        General Message/Vendor Calls     Caller's first and last name:n/a     Reason for call: Medication questions     Callback required yes/no and why: yes     Best contact number(s): [phone]     Details to clarify the request: n/a     Ana Vick

## 2021-02-15 NOTE — TELEPHONE ENCOUNTER
Returned call and pt would like her gabapentin increased, pt is aching and also needs a cream that Medicaid will pay for, for her joint pain

## 2021-02-16 RX ORDER — GABAPENTIN 600 MG/1
600 TABLET ORAL 3 TIMES DAILY
Qty: 90 TAB | Refills: 0 | Status: SHIPPED | OUTPATIENT
Start: 2021-02-16 | End: 2021-03-31 | Stop reason: SDUPTHER

## 2021-02-16 NOTE — TELEPHONE ENCOUNTER
[employer] gabapentin to 600 mg tid  Appointment needed to refill this medication again, virtual or phone visit.

## 2021-02-26 ENCOUNTER — VIRTUAL VISIT (OUTPATIENT)
Dept: INTERNAL MEDICINE CLINIC | Age: 61
End: 2021-02-26
Payer: MEDICAID

## 2021-02-26 DIAGNOSIS — J44.9 CHRONIC OBSTRUCTIVE PULMONARY DISEASE, UNSPECIFIED COPD TYPE (HCC): ICD-10-CM

## 2021-02-26 DIAGNOSIS — F33.0 DEPRESSION, MAJOR, RECURRENT, MILD (HCC): ICD-10-CM

## 2021-02-26 DIAGNOSIS — Z72.0 TOBACCO ABUSE: ICD-10-CM

## 2021-02-26 DIAGNOSIS — R50.9 FEVER, UNSPECIFIED FEVER CAUSE: Primary | ICD-10-CM

## 2021-02-26 DIAGNOSIS — R74.8 ELEVATED LIVER ENZYMES: ICD-10-CM

## 2021-02-26 DIAGNOSIS — J44.1 COPD EXACERBATION (HCC): ICD-10-CM

## 2021-02-26 DIAGNOSIS — R19.7 DIARRHEA, UNSPECIFIED TYPE: ICD-10-CM

## 2021-02-26 DIAGNOSIS — Z12.11 SCREENING FOR COLON CANCER: ICD-10-CM

## 2021-02-26 PROCEDURE — 99214 OFFICE O/P EST MOD 30 MIN: CPT | Performed by: FAMILY MEDICINE

## 2021-02-26 RX ORDER — AZITHROMYCIN 250 MG/1
250 TABLET, FILM COATED ORAL SEE ADMIN INSTRUCTIONS
Qty: 6 TAB | Refills: 0 | Status: SHIPPED | OUTPATIENT
Start: 2021-02-26 | End: 2021-03-31 | Stop reason: ALTCHOICE

## 2021-02-26 RX ORDER — PREDNISONE 20 MG/1
40 TABLET ORAL
Qty: 10 TAB | Refills: 0 | Status: SHIPPED | OUTPATIENT
Start: 2021-02-26 | End: 2021-03-03

## 2021-02-26 NOTE — PROGRESS NOTES
Chief Complaint   Patient presents with    Cold Symptoms     cough, fever, aching all over       Patient has not been out of the country in (12 months), NO diarrhea, NO cough, NO chest conjestion, NO temp. Pt has not been around anyone with these symptoms. Health Maintenance reviewed. I have reviewed the patient's medical history in detail and updated the computerized patient record. 1. Have you been to the ER, urgent care clinic since your last visit? Hospitalized since your last visit?no    2. Have you seen or consulted any other health care providers outside of the [address] [place] [address] since your last visit? Include any pap smears or colon screening. No      Encouraged pt to discuss pt's wishes with spouse/partner/family and bring them in the next appt to follow thru with the Advanced Directive    Fall Risk Assessment, last 12 mths 2/26/2021   Able to walk? Yes   Fall in past 12 months? 0   Do you feel unsteady? 0   Are you worried about falling 0   Number of falls in past 12 months -   Fall with injury?  -       3 most recent PHQ Screens 2/26/2021   Little interest or pleasure in doing things Nearly every day   Feeling down, depressed, irritable, or hopeless Nearly every day   Total Score PHQ 2 6   Trouble falling or staying asleep, or sleeping too much -   Feeling tired or having little energy -   Poor appetite, weight loss, or overeating -   Feeling bad about yourself - or that you are a failure or have let yourself or your family down -   Trouble concentrating on things such as school, work, reading, or watching TV -   Moving or speaking so slowly that other people could have noticed; or the opposite being so fidgety that others notice -   Thoughts of being better off dead, or hurting yourself in some way -   How difficult have these problems made it for you to do your work, take care of your home and get along with others -       Abuse Screening Questionnaire 2/26/2021   Do you ever feel afraid of your partner? N   Are you in a relationship with someone who physically or mentally threatens you? N   Is it safe for you to go home?  Y       ADL Assessment 2/26/2021   Feeding yourself No Help Needed   Getting from bed to chair No Help Needed   Getting dressed No Help Needed   Bathing or showering No Help Needed   Walk across the room (includes cane/walker) No Help Needed   Using the telphone No Help Needed   Taking your medications No Help Needed   Preparing meals No Help Needed   Managing money (expenses/bills) No Help Needed   Moderately strenuous housework (laundry) No Help Needed   Shopping for personal items (toiletries/medicines) No Help Needed   Shopping for groceries No Help Needed   Driving No Help Needed   Climbing a flight of stairs No Help Needed   Getting to places beyond walking distances No Help Needed

## 2021-02-26 NOTE — PROGRESS NOTES
Subjective:   Abebe Wing is a 62 y.o. female who complains of fevers up to 102 degrees for 2 days, cough and dyspnea diarrhea  Ear painstable since that time. Diarrhea chronic. Daily times months. She denies a history of rash on body and loss of taste. Patient has no known exposures to anyone with coronavirus infection. There has been no travel to the infected countries of [place], [place], [place], [place], or [facility], recently. Patient does not live in assisted living or nursing facility. Daughter works in hospital.  Evaluation to date: none. Treatment to date: OTC products. Patient does smoke cigarettes. Relevant PMH: Asthma and COPD. Allergies   Allergen Reactions    Zolpidem Unknown (comments)         Patient Active Problem List    Diagnosis Date Noted    Chronic cough 10/29/2020    Anxiety state 2020    Backache 2020    Chronic obstructive pulmonary disease ([facility] 2020    Hyperlipidemia 2020    Neck pain 2020    Osteoarthritis of knee 2020    Tobacco dependence syndrome 2020    Adenomatous colon polyp 2020    Narcotic dependence ([facility] 2019    Encounter for diagnostic colonoscopy due to change in bowel habits 2019    Elevated liver enzymes 2019    Depression, major, recurrent, mild ([address] 2017    Restless legs 2017    Tobacco abuse 2017    Spinal stenosis 2017    Knee pain 2014     Allergies   Allergen Reactions    Zolpidem Unknown (comments)     Social History     Tobacco Use    Smoking status: Current Every Day Smoker     Packs/day: 0.50     Years: 35.00     Pack years: 17.50     Types: Cigarettes     Last attempt to quit: 2020     Years since quittin.5    Smokeless tobacco: Never Used   Substance Use Topics    Alcohol use:  Yes     Alcohol/week: 19.0 standard drinks     Types: 14 Glasses of wine, 5 Cans of beer per week     Frequency: Monthly or less     Drinks per session: 1 or 2     Binge frequency: Never        Review of Systems  Pertinent items are noted in HPI. Objective:     Visit Vitals  LMP  (LMP Unknown)     General:  fatigued, no distress, NA   Eyes: negative     Assessment/Plan:   asthma and pneumonia, diarrhea  Antibiotics per orders. RTC in 1 week or PRN. Encounter Diagnoses   Name Primary?  Fever, unspecified fever cause Yes    Diarrhea, unspecified type     Screening for colon cancer     COPD exacerbation (HCC)     Chronic obstructive pulmonary disease, unspecified COPD type (HCC)     Depression, major, recurrent, mild (HCC)     Elevated liver enzymes     Tobacco abuse      Orders Placed This Encounter    NOVEL CORONAVIRUS (COVID-19)    REFERRAL TO GASTROENTEROLOGY    azithromycin (ZITHROMAX) 250 mg tablet    predniSONE (DELTASONE) 20 mg tablet   . Marya Hutson is a 62 y.o. female who was phone evaluated on 2/26/2021. Consent:  She and/or her healthcare decision maker is aware that this patient-initiated Telehealth encounter is a billable service, with coverage as determined by her insurance carrier. She is aware that she may receive a bill and has provided verbal consent to proceed: Yes    I was in the office while conducting this encounter. Assessment & Plan:   Diagnoses and all orders for this visit:    1. Fever, unspecified fever cause  -     NOVEL CORONAVIRUS (COVID-19)    2. Diarrhea, unspecified type  -     REFERRAL TO GASTROENTEROLOGY    3. Screening for colon cancer  -     REFERRAL TO GASTROENTEROLOGY    4. COPD exacerbation (HCC)  -     azithromycin (ZITHROMAX) 250 mg tablet; Take 1 Tab by mouth See Admin Instructions. Take two tablets today then one tablet daily for next 4 days  -     predniSONE (DELTASONE) 20 mg tablet; Take 40 mg by mouth daily (with breakfast) for 5 days.       Orders Placed This Encounter    NOVEL CORONAVIRUS (COVID-19)     Scheduling Instructions:      1) Due to current limited availability of the COVID-19 PCR test, tests will be prioritized and may not be completed.              2) Order only if the test result will change clinical management or necessary for a return to mission-critical employment decision.              3) Print and instruct patient to adhere to CDC home isolation program. (Link Above)              4) Set up or refer patient for a monitoring program.              5) Have patient sign up for and leverage MyChart (if not previously done). Order Specific Question:   Is this test for diagnosis or screening? Answer:   Diagnosis of ill patient     Order Specific Question:   Symptomatic for COVID-19 as defined by CDC? Answer:   Yes     Order Specific Question:   Date of Symptom Onset     Answer:   2/24/2021     Order Specific Question:   Hospitalized for COVID-19? Answer:   No     Order Specific Question:   Admitted to ICU for COVID-19? Answer:   No     Order Specific Question:   Employed in healthcare setting? Answer:   No     Order Specific Question:   Resident in a congregate (group) care setting? Answer:   No     Order Specific Question:   Pregnant? Answer:   No     Order Specific Question:   Previously tested for COVID-19? Answer: Yes    REFERRAL TO GASTROENTEROLOGY     Referral Priority:   Routine     Referral Type:   Consultation     Referral Reason:   Specialty Services Required     Referral Location:   [facility]     Referred to Provider:   Dominick Givens MD    azithromycin [facility]) 250 mg tablet     Sig: Take 1 Tab by mouth See Admin Instructions. Take two tablets today then one tablet daily for next 4 days     Dispense:  6 Tab     Refill:  0    predniSONE (DELTASONE) 20 mg tablet     Sig: Take 40 mg by mouth daily (with breakfast) for 5 days. Dispense:  10 Tab     Refill:  0           712  Subjective:        As above    We discussed the expected course, resolution and complications of the diagnosis(es) in detail. Medication risks, benefits, costs, interactions, and alternatives were discussed as indicated. I advised her to contact the office if her condition worsens, changes or fails to improve as anticipated. She expressed understanding with the diagnosis(es) and plan. Pursuant to the emergency declaration under the [facility] waiver authority and the [employer] Act, this Virtual  Visit was conducted, with patient's consent, to reduce the patient's risk of exposure to COVID-19 and provide continuity of care for an established patient. Services were provided through a video synchronous discussion virtually to substitute for in-person clinic visit.     Marisa Call MD

## 2021-03-01 ENCOUNTER — CLINICAL SUPPORT (OUTPATIENT)
Dept: INTERNAL MEDICINE CLINIC | Age: 61
End: 2021-03-01

## 2021-03-01 DIAGNOSIS — U07.1 COVID-19: Primary | ICD-10-CM

## 2021-03-01 NOTE — LETTER
3/1/2021 3:39 PM 
 
Ms. Lupe Huynh [address] [facility] [address] [id number] To Whom It May Concern: Ms. Hernández Calliin. Allan Claudio got tested for COVID -19 this afternoon. Sincerely, Joanne Link MD

## 2021-03-01 NOTE — PROGRESS NOTES
Chief Complaint   Patient presents with    Concern For COVID-19 (Coronavirus)     covid test done     AS per [facility] from Dr. Ziyad Ruvalcaba    COVID-19 test was taken to the parking lot and pt did not have any difficulties swabbing themselves. Pt tolerated well and all protective standard protocols were taken.

## 2021-03-03 ENCOUNTER — TELEPHONE (OUTPATIENT)
Dept: INTERNAL MEDICINE CLINIC | Age: 61
End: 2021-03-03

## 2021-03-03 LAB — SARS-COV-2, NAA: NOT DETECTED

## 2021-03-03 NOTE — TELEPHONE ENCOUNTER
----- Message from Sky Shelby MD sent at 3/3/2021 11:52 AM EST -----  Notify her, negative for [facility] at  ----- Message -----  From: Glenna Godinez [facility] Lab Results In  Sent: 3/3/2021   5:38 AM EST  To: Sky Shelby MD

## 2021-03-21 ENCOUNTER — HOSPITAL ENCOUNTER (OUTPATIENT)
Dept: PREADMISSION TESTING | Age: 61
Discharge: HOME | End: 2021-03-21

## 2021-03-21 NOTE — PROGRESS NOTES
Placed call to patient when they did not arrive for COVID test during testing hours, unable to leave VM

## 2021-03-22 ENCOUNTER — TELEPHONE (OUTPATIENT)
Dept: INTERNAL MEDICINE CLINIC | Age: 61
End: 2021-03-22

## 2021-03-22 DIAGNOSIS — D12.6 ADENOMATOUS POLYP OF COLON, UNSPECIFIED PART OF COLON: Primary | ICD-10-CM

## 2021-03-22 NOTE — TELEPHONE ENCOUNTER
Message fr envera        Reason for call:pt  would like to know if Dr Levy Watkins or the nurse can make an appt for her to see the other Gastroenterologist she can not remember the MD name said it starts with  the letter (J) and is located in [address] [facility] [address] [place] [address] [facility] MD name should be in her file she went there in the past, said the other location was to far for her to go to     [facility] required yes/no and why:   Yes for reason given above     Best contact number(s):[phone]     Details to clarify the request:         Kathleen Yi

## 2021-03-25 DIAGNOSIS — F43.21 MOURNING: ICD-10-CM

## 2021-03-25 RX ORDER — DIAZEPAM 5 MG/1
5 TABLET ORAL
Qty: 21 TAB | Refills: 0 | Status: CANCELLED | OUTPATIENT
Start: 2021-03-25

## 2021-03-25 NOTE — TELEPHONE ENCOUNTER
Message fr cuevas      Reason for call: She would like her colonoscopy to be schedule in [facility]. [place] too far for her to go. She has the prep already. She is also requesting a refill on her Diazapam to be sent to [facility] in [facility]. Callback required yes/no and why: Yes     Best contact number(s):[phone]     Details to clarify the request: N/A     Denver Sor         Requested Prescriptions     Pending Prescriptions Disp Refills    diazePAM (VALIUM) 5 mg tablet 21 Tab 0     Sig: Take 1 Tab by mouth every eight (8) hours as needed for Anxiety. Max Daily Amount: 15 mg.

## 2021-03-31 ENCOUNTER — VIRTUAL VISIT (OUTPATIENT)
Dept: INTERNAL MEDICINE CLINIC | Age: 61
End: 2021-03-31
Payer: MEDICAID

## 2021-03-31 DIAGNOSIS — M48.00 SPINAL STENOSIS, UNSPECIFIED SPINAL REGION: ICD-10-CM

## 2021-03-31 DIAGNOSIS — M17.0 PRIMARY OSTEOARTHRITIS OF BOTH KNEES: ICD-10-CM

## 2021-03-31 DIAGNOSIS — F41.9 ANXIETY: Primary | ICD-10-CM

## 2021-03-31 DIAGNOSIS — J44.9 CHRONIC OBSTRUCTIVE PULMONARY DISEASE, UNSPECIFIED COPD TYPE (HCC): ICD-10-CM

## 2021-03-31 DIAGNOSIS — Z72.0 TOBACCO ABUSE: ICD-10-CM

## 2021-03-31 DIAGNOSIS — R19.7 DIARRHEA, UNSPECIFIED TYPE: ICD-10-CM

## 2021-03-31 PROCEDURE — 99214 OFFICE O/P EST MOD 30 MIN: CPT | Performed by: FAMILY MEDICINE

## 2021-03-31 RX ORDER — DIAZEPAM 5 MG/1
5 TABLET ORAL
Qty: 21 TAB | Refills: 1 | Status: SHIPPED | OUTPATIENT
Start: 2021-03-31 | End: 2021-04-14 | Stop reason: ALTCHOICE

## 2021-03-31 RX ORDER — GABAPENTIN 600 MG/1
600 TABLET ORAL 3 TIMES DAILY
Qty: 90 TAB | Refills: 1 | Status: SHIPPED | OUTPATIENT
Start: 2021-03-31 | End: 2021-06-30 | Stop reason: SDUPTHER

## 2021-03-31 RX ORDER — PREDNISONE 20 MG/1
40 TABLET ORAL
Qty: 10 TAB | Refills: 1 | Status: SHIPPED | OUTPATIENT
Start: 2021-03-31 | End: 2021-04-28

## 2021-03-31 RX ORDER — DICLOFENAC SODIUM 10 MG/G
GEL TOPICAL
Qty: 100 G | Refills: 0 | Status: SHIPPED | OUTPATIENT
Start: 2021-03-31 | End: 2021-04-12

## 2021-03-31 NOTE — PROGRESS NOTES
PROGRESS NOTE        SUBJECTIVE:  Diagnosis/Chief Complaint: Medication Refill (med refill)      Doing well with mood yes - ok with diazepam, WANTS rf  Symptoms still coughing some wants prednisone  Suicidal: no  Side affects: no  States taking medications per medicine list.yes - wants to get back on tramadol stopped after +cocaine drug screen  Still with diarrhea but better getting colonoscopy in [place]    Patient Active Problem List    Diagnosis Date Noted    Chronic cough 10/29/2020    Anxiety state 2020    Backache 2020    Chronic obstructive pulmonary disease ([facility] 2020    Hyperlipidemia 2020    Neck pain 2020    Osteoarthritis of knee 2020    Tobacco dependence syndrome 2020    Adenomatous colon polyp 2020    Narcotic dependence ([facility] 2019    Encounter for diagnostic colonoscopy due to change in bowel habits 2019    Elevated liver enzymes 2019    Depression, major, recurrent, mild ([facility] 2017    Restless legs 2017    Tobacco abuse 2017    Spinal stenosis 2017    Knee pain 2014       Allergies   Allergen Reactions    Zolpidem Unknown (comments)     Social History     Tobacco Use    Smoking status: Current Every Day Smoker     Packs/day: 0.50     Years: 35.00     Pack years: 17.50     Types: Cigarettes     Last attempt to quit: 2020     Years since quittin.6    Smokeless tobacco: Never Used   Substance Use Topics    Alcohol use: Yes     Alcohol/week: 19.0 standard drinks     Types: 14 Glasses of wine, 5 Cans of beer per week     Frequency: Monthly or less     Drinks per session: 1 or 2     Binge frequency: Never        OBJECTIVE:    .  Visit Vitals  LMP  (LMP Unknown)     WDWN in NAD  Neurological exam[de-identified] 2-12 intact  Psychiatric: Normal mood, judgement    Reviewed: Medications, allergies, clinical lab test results and imaging results have been reviewed.  Any abnormal findings have been addressed. ASSESSMENT:       ICD-10-CM ICD-9-CM    1. Anxiety  F41.9 300.00 diazePAM (VALIUM) 5 mg tablet   2. Spinal stenosis, unspecified spinal region  M48.00 724.00 gabapentin (NEURONTIN) 600 mg tablet   3. Chronic obstructive pulmonary disease, unspecified COPD type ([facility]  J44.9 496    4. Tobacco abuse  Z72.0 305.1    5. Diarrhea, unspecified type  R19.7 787.91          PLAN    Orders Placed This Encounter    gabapentin (NEURONTIN) 600 mg tablet     Sig: Take 1 Tab by mouth three (3) times daily. Max Daily Amount: 1,800 mg. Dispense:  90 Tab     Refill:  1    diazePAM (VALIUM) 5 mg tablet     Sig: Take 1 Tab by mouth every eight (8) hours as needed for Anxiety. Max Daily Amount: 15 mg. Dispense:  21 Tab     Refill:  1    predniSONE (DELTASONE) 20 mg tablet     Sig: Take 40 mg by mouth daily (with breakfast) for 5 days. As needed for wheezing     Dispense:  10 Tab     Refill:  1     Follow-up and Dispositions    · Return in about 2 months (around 5/31/2021) for routine follow up. Elisabet Wilkerson, who was evaluated through a synchronous (real-time) audio-video encounter, and/or her healthcare decision maker, is aware that it is a billable service, with coverage as determined by her insurance carrier. She provided verbal consent to proceed: Yes, and patient identification was verified. This visit was conducted pursuant to the emergency declaration under the [address] [place] [address], [address] [place] [address] and the [employer] General Act. A caregiver was present when appropriate. Ability to conduct physical exam was limited. The patient was located in a state where the provider was credentialed to provide care.      --Alecia Lincoln MD on 3/31/2021 at 2:15 PM

## 2021-03-31 NOTE — PROGRESS NOTES
Chief Complaint   Patient presents with    Medication Refill     med refill     Patient is aware that this is a Virtual Visit or Phone Call Only doctor's visit. Patient has not been out of the country in (14 months), NO diarrhea, NO cough, NO chest conjestion, NO temp. Pt has not been around anyone with these symptoms. Health Maintenance reviewed. I have reviewed the patient's medical history in detail and updated the computerized patient record. 1. Have you been to the ER, urgent care clinic since your last visit? Hospitalized since your last visit?no    2. Have you seen or consulted any other health care providers outside of the [address] [place] [address] since your last visit? Include any pap smears or colon screening. no    Encouraged pt to discuss pt's wishes with spouse/partner/family and bring them in the next appt to follow thru with the Advanced Directive      Fall Risk Assessment, last 12 mths 2/26/2021   Able to walk? Yes   Fall in past 12 months? 0   Do you feel unsteady? 0   Are you worried about falling 0   Number of falls in past 12 months -   Fall with injury?  -       3 most recent PHQ Screens 3/31/2021   Little interest or pleasure in doing things Nearly every day   Feeling down, depressed, irritable, or hopeless Nearly every day   Total Score PHQ 2 6   Trouble falling or staying asleep, or sleeping too much -   Feeling tired or having little energy -   Poor appetite, weight loss, or overeating -   Feeling bad about yourself - or that you are a failure or have let yourself or your family down -   Trouble concentrating on things such as school, work, reading, or watching TV -   Moving or speaking so slowly that other people could have noticed; or the opposite being so fidgety that others notice -   Thoughts of being better off dead, or hurting yourself in some way -   How difficult have these problems made it for you to do your work, take care of your home and get along with others - Abuse Screening Questionnaire 3/31/2021   Do you ever feel afraid of your partner? N   Are you in a relationship with someone who physically or mentally threatens you? N   Is it safe for you to go home? Y       ADL Assessment 3/31/2021   Feeding yourself No Help Needed   Getting from bed to chair No Help Needed   Getting dressed No Help Needed   Bathing or showering No Help Needed   Walk across the room (includes cane/walker) No Help Needed   Using the telphone No Help Needed   Taking your medications No Help Needed   Preparing meals No Help Needed   Managing money (expenses/bills) No Help Needed   Moderately strenuous housework (laundry) No Help Needed   Shopping for personal items (toiletries/medicines) No Help Needed   Shopping for groceries No Help Needed   Driving No Help Needed   Climbing a flight of stairs No Help Needed   Getting to places beyond walking distances -                           Chief Complaint   Patient presents with    Medication Refill     med refill     Patient is aware that this is a Virtual Visit or Phone Call Only doctor's visit. Patient has not been out of the country in (14 months), NO diarrhea, NO cough, NO chest conjestion, NO temp. Pt has not been around anyone with these symptoms. Health Maintenance reviewed. I have reviewed the patient's medical history in detail and updated the computerized patient record. 2. Have you been to the ER, urgent care clinic since your last visit? Hospitalized since your last visit?no    2. Have you seen or consulted any other health care providers outside of the [address] [place] [address] since your last visit? Include any pap smears or colon screening. no    Encouraged pt to discuss pt's wishes with spouse/partner/family and bring them in the next appt to follow thru with the Advanced Directive      Fall Risk Assessment, last 12 mths 2/26/2021   Able to walk? Yes   Fall in past 12 months? 0   Do you feel unsteady?  0   Are you worried about falling 0   Number of falls in past 12 months -   Fall with injury? -       3 most recent PHQ Screens 3/31/2021   Little interest or pleasure in doing things Nearly every day   Feeling down, depressed, irritable, or hopeless Nearly every day   Total Score PHQ 2 6   Trouble falling or staying asleep, or sleeping too much -   Feeling tired or having little energy -   Poor appetite, weight loss, or overeating -   Feeling bad about yourself - or that you are a failure or have let yourself or your family down -   Trouble concentrating on things such as school, work, reading, or watching TV -   Moving or speaking so slowly that other people could have noticed; or the opposite being so fidgety that others notice -   Thoughts of being better off dead, or hurting yourself in some way -   How difficult have these problems made it for you to do your work, take care of your home and get along with others -       Abuse Screening Questionnaire 3/31/2021   Do you ever feel afraid of your partner? N   Are you in a relationship with someone who physically or mentally threatens you? N   Is it safe for you to go home?  Y       ADL Assessment 3/31/2021   Feeding yourself No Help Needed   Getting from bed to chair No Help Needed   Getting dressed No Help Needed   Bathing or showering No Help Needed   Walk across the room (includes cane/walker) No Help Needed   Using the telphone No Help Needed   Taking your medications No Help Needed   Preparing meals No Help Needed   Managing money (expenses/bills) No Help Needed   Moderately strenuous housework (laundry) No Help Needed   Shopping for personal items (toiletries/medicines) No Help Needed   Shopping for groceries No Help Needed   Driving No Help Needed   Climbing a flight of stairs No Help Needed   Getting to places beyond walking distances -

## 2021-04-02 ENCOUNTER — VIRTUAL VISIT (OUTPATIENT)
Dept: INTERNAL MEDICINE CLINIC | Age: 61
End: 2021-04-02
Payer: MEDICAID

## 2021-04-02 DIAGNOSIS — M54.40 LOW BACK PAIN WITH SCIATICA, SCIATICA LATERALITY UNSPECIFIED, UNSPECIFIED BACK PAIN LATERALITY, UNSPECIFIED CHRONICITY: Primary | ICD-10-CM

## 2021-04-02 PROCEDURE — 99213 OFFICE O/P EST LOW 20 MIN: CPT | Performed by: FAMILY MEDICINE

## 2021-04-02 RX ORDER — LIDOCAINE 50 MG/G
2 PATCH TOPICAL EVERY 12 HOURS
Qty: 1 EACH | Refills: 5 | Status: SHIPPED | OUTPATIENT
Start: 2021-04-02 | End: 2022-01-27 | Stop reason: ALTCHOICE

## 2021-04-02 NOTE — PROGRESS NOTES
Andre Franco, who was evaluated through a synchronous (real-time) audio-video encounter, and/or her healthcare decision maker, is aware that it is a billable service, with coverage as determined by her insurance carrier. She provided verbal consent to proceed: Yes, and patient identification was verified. This visit was conducted pursuant to the emergency declaration under the [facility], [facility] authority and the [employer] General Act. A caregiver was present when appropriate. Ability to conduct physical exam was limited. The patient was located in a state where the provider was credentialed to provide care. --Bridgette Berger MD on 4/2/2021 at 12:49 PM        PROGRESS NOTE        SUBJECTIVE:  Diagnosis/Chief Complaint: Medication Refill (pt wants lidocain patches)      Doing well with pain no  Improvement in function: yes - has been off patches x a while wants to restart  Symptoms back pain with sciatica and Bi Knee pain  Activities: Able to do activities of daily living. yes - still  Side affects: yes - no  Patient does not complain about: fever, weight loss, recent fecal or urine incontinence, known active cancer, focal paralysis, recent back orthopaedic or other procedure.       Patient Active Problem List    Diagnosis Date Noted    Chronic cough 10/29/2020    Anxiety state 07/21/2020    Backache 07/21/2020    Chronic obstructive pulmonary disease ([address] 07/21/2020    Hyperlipidemia 07/21/2020    Neck pain 07/21/2020    Osteoarthritis of knee 07/21/2020    Tobacco dependence syndrome 07/21/2020    Adenomatous colon polyp 05/16/2020    Narcotic dependence ([address] 04/07/2019    Encounter for diagnostic colonoscopy due to change in bowel habits 03/04/2019    Elevated liver enzymes 02/19/2019    Depression, major, recurrent, mild ([address] 12/21/2017    Restless legs 12/21/2017    Tobacco abuse 12/21/2017    Spinal stenosis 2017    Knee pain 2014     Allergies   Allergen Reactions    Zolpidem Unknown (comments)     Social History     Tobacco Use    Smoking status: Current Every Day Smoker     Packs/day: 0.50     Years: 35.00     Pack years: 17.50     Types: Cigarettes     Last attempt to quit: 2020     Years since quittin.6    Smokeless tobacco: Never Used   Substance Use Topics    Alcohol use: Yes     Alcohol/week: 19.0 standard drinks     Types: 14 Glasses of wine, 5 Cans of beer per week     Frequency: Monthly or less     Drinks per session: 1 or 2     Binge frequency: Never        OBJECTIVE:    .  Visit Vitals  LMP  (LMP Unknown)     WDWN in NAD, mental status normal  Neurological exam[de-identified] 2-12 intact  Psychiatric: Normal mood, judgement    Reviewed: Medications, allergies, clinical lab test results and imaging results have been reviewed. Any abnormal findings have been addressed. ASSESSMENT:     ICD-10-CM ICD-9-CM    1. Low back pain with sciatica, sciatica laterality unspecified, unspecified back pain laterality, unspecified chronicity  M54.40 724.3 lidocaine (LIDODERM) 5 %       Patient is 62 y.o. with diagnosis of :   Patient Active Problem List   Diagnosis Code    Spinal stenosis M48.00    Depression, major, recurrent, mild ([facility] F33.0    Restless legs G25.81    Tobacco abuse Z72.0    Elevated liver enzymes R74.8    Encounter for diagnostic colonoscopy due to change in bowel habits R19.4    Narcotic dependence ([facility] F11.20    Adenomatous colon polyp D12.6    Anxiety state F41.1    Backache M54.9    Chronic obstructive pulmonary disease (HCC) J44.9    Hyperlipidemia E78.5    Knee pain M25.569    Neck pain M54.2    Osteoarthritis of knee M17.10    Tobacco dependence syndrome F17.200    Chronic cough R05       PLAN:     Orders Placed This Encounter    lidocaine (LIDODERM) 5 %     Si Patches by TransDERmal route every twelve (12) hours.  Apply patch to the affected area for 12 hours a day and remove for 12 hours a day.      Dispense:  1 Each     Refill:  5     F/u PRN

## 2021-04-02 NOTE — PROGRESS NOTES
Chief Complaint   Patient presents with    Medication Refill     Patient is aware that this is a Virtual Visit or Phone Call Only doctor's visit. Patient has not been out of the country in (14 months), NO diarrhea, NO cough, NO chest conjestion, NO temp. Pt has not been around anyone with these symptoms. Health Maintenance reviewed. I have reviewed the patient's medical history in detail and updated the computerized patient record. 1. Have you been to the ER, urgent care clinic since your last visit? Hospitalized since your last visit?no    2. Have you seen or consulted any other health care providers outside of the [address] [place] [address] since your last visit? Include any pap smears or colon screening. no    Encouraged pt to discuss pt's wishes with spouse/partner/family and bring them in the next appt to follow thru with the Advanced Directive      Fall Risk Assessment, last 12 mths 4/2/2021   Able to walk? Yes   Fall in past 12 months? 0   Do you feel unsteady? 1   Are you worried about falling 0   Number of falls in past 12 months -   Fall with injury?  -       3 most recent PHQ Screens 4/2/2021   Little interest or pleasure in doing things Nearly every day   Feeling down, depressed, irritable, or hopeless Nearly every day   Total Score PHQ 2 6   Trouble falling or staying asleep, or sleeping too much -   Feeling tired or having little energy -   Poor appetite, weight loss, or overeating -   Feeling bad about yourself - or that you are a failure or have let yourself or your family down -   Trouble concentrating on things such as school, work, reading, or watching TV -   Moving or speaking so slowly that other people could have noticed; or the opposite being so fidgety that others notice -   Thoughts of being better off dead, or hurting yourself in some way -   How difficult have these problems made it for you to do your work, take care of your home and get along with others -       Abuse Screening Questionnaire 4/2/2021   Do you ever feel afraid of your partner? N   Are you in a relationship with someone who physically or mentally threatens you? N   Is it safe for you to go home?  Y       ADL Assessment 4/2/2021   Feeding yourself No Help Needed   Getting from bed to chair No Help Needed   Getting dressed No Help Needed   Bathing or showering No Help Needed   Walk across the room (includes cane/walker) No Help Needed   Using the telphone No Help Needed   Taking your medications No Help Needed   Preparing meals No Help Needed   Managing money (expenses/bills) No Help Needed   Moderately strenuous housework (laundry) Help Needed   Shopping for personal items (toiletries/medicines) Help Needed   Shopping for groceries Help Needed   Driving Help Needed   Climbing a flight of stairs No Help Needed   Getting to places beyond walking distances Help Needed

## 2021-04-08 ENCOUNTER — OFFICE VISIT (OUTPATIENT)
Dept: INTERNAL MEDICINE CLINIC | Age: 61
End: 2021-04-08
Payer: MEDICAID

## 2021-04-08 VITALS
HEIGHT: 67 IN | WEIGHT: 181 LBS | RESPIRATION RATE: 22 BRPM | TEMPERATURE: 97.4 F | SYSTOLIC BLOOD PRESSURE: 146 MMHG | BODY MASS INDEX: 28.41 KG/M2 | DIASTOLIC BLOOD PRESSURE: 74 MMHG | HEART RATE: 93 BPM | OXYGEN SATURATION: 94 %

## 2021-04-08 DIAGNOSIS — F11.90 CHRONIC NARCOTIC USE: ICD-10-CM

## 2021-04-08 DIAGNOSIS — M19.90 ARTHRITIS: ICD-10-CM

## 2021-04-08 DIAGNOSIS — G89.29 OTHER CHRONIC PAIN: ICD-10-CM

## 2021-04-08 DIAGNOSIS — E78.5 HYPERLIPIDEMIA, UNSPECIFIED HYPERLIPIDEMIA TYPE: ICD-10-CM

## 2021-04-08 DIAGNOSIS — Z91.81 AT RISK FOR FALLS: Primary | ICD-10-CM

## 2021-04-08 DIAGNOSIS — M17.12 PRIMARY OSTEOARTHRITIS OF LEFT KNEE: ICD-10-CM

## 2021-04-08 PROCEDURE — 99214 OFFICE O/P EST MOD 30 MIN: CPT | Performed by: NURSE PRACTITIONER

## 2021-04-08 RX ORDER — CYCLOBENZAPRINE HCL 5 MG
5 TABLET ORAL
Qty: 30 TAB | Refills: 2 | Status: SHIPPED | OUTPATIENT
Start: 2021-04-08 | End: 2022-09-13 | Stop reason: ALTCHOICE

## 2021-04-08 NOTE — PROGRESS NOTES
Subjective: (As above and below)     Chief Complaint   Patient presents with    Fall     fell yesterday onto her cane and broke her cane yesterday - has bruising to her right side    Pain (Chronic)     refill pain medications     She is a 62y.o. year old female who presents for evaluation after a fall and chronic pain. Hx spinal stenosis, COPD, asthma, and arthritis, and chronic pain.  340    HPI  Josseline Yeimi yesterday onto her cane and broke it. Has an apt to get steroid injections next week. Need another drug screen. Cannot refill Tramadol until drug screen complete. Dec 2020- Drug screen + cocaine. O:x 1 day  L:generalized pain/ ecchymosis right side. D:yes  C:generalized joint pain  A:  R:pain meds  T:constant  S:10/10  Has a bone spur  Wears flip flops. Advised to wear different shoes to prevent falls. Rare alcohol.    /74 not at goal.    Reviewed PmHx, RxHx, FmHx, SocHx, AllgHx and updated in chart.   Patient Active Problem List   Diagnosis Code    Spinal stenosis M48.00    Depression, major, recurrent, mild ([address] F33.0    Restless legs G25.81    Tobacco abuse Z72.0    Elevated liver enzymes R74.8    Encounter for diagnostic colonoscopy due to change in bowel habits R19.4    Narcotic dependence ([facility] [address] F11.20    Adenomatous colon polyp D12.6    Anxiety state F41.1    Backache M54.9    Chronic obstructive pulmonary disease (HCC) J44.9    Hyperlipidemia E78.5    Knee pain M25.569    Neck pain M54.2    Osteoarthritis of knee M17.10    Tobacco dependence syndrome F17.200    Chronic cough R05     Review of Systems:  Gen: no fatigue, fever, chills  Eyes: no excessive tearing, itching, or discharge  Nose: no rhinorrhea, no sinus pain  Mouth: no oral lesions, no sore throat  Resp: no shortness of breath, no wheezing, no cough  CV: no chest pain, no paroxysmal nocturnal dyspnea  Abd: no nausea, no heartburn, no diarrhea, no constipation, no abdominal pain  Neuro: + headaches, no syncope or presyncopal episodes  Endo: no polyuria, no polydipsia  Heme: no lymphadenopathy, + easy bruising or bleeding    Allergies   Allergen Reactions    Zolpidem Unknown (comments)     Current Outpatient Medications   Medication Sig    lidocaine (LIDODERM) 5 % 2 Patches by TransDERmal route every twelve (12) hours. Apply patch to the affected area for 12 hours a day and remove for 12 hours a day.  gabapentin (NEURONTIN) 600 mg tablet Take 1 Tab by mouth three (3) times daily. Max Daily Amount: 1,800 mg.    diazePAM (VALIUM) 5 mg tablet Take 1 Tab by mouth every eight (8) hours as needed for Anxiety. Max Daily Amount: 15 mg.    diclofenac (VOLTAREN) 1 % gel APPLY   TOPICALLY TO AFFECTED AREA 4 TIMES DAILY    diclofenac EC (VOLTAREN) 50 mg EC tablet Take 1 Tab by mouth two (2) times a day. As needed    diphenoxylate-atropine (LomotiL) 2.5-0.025 mg per tablet Take 1 Tab by mouth four (4) times daily as needed for Diarrhea. Max Daily Amount: 4 Tabs.  DULoxetine (CYMBALTA) 60 mg capsule Take 1 capsule by mouth once daily    benzonatate (TESSALON) 200 mg capsule TAKE 1 CAPSULE BY MOUTH THREE TIMES DAILY AS NEEDED WITH FOOD FOR COUGH FOR UP TO 7 DAYS    Nebulizer & Compressor machine 1 Each by Does Not Apply route every four (4) hours as needed for Wheezing or Shortness of Breath.  albuterol-ipratropium (DUO-NEB) 2.5 mg-0.5 mg/3 ml nebu 3 mL by Nebulization route every four (4) hours as needed for Wheezing, Shortness of Breath or Cough. Use with a flare up of COPD. Indications: bronchi muscle spasm resulting from COPD    umeclidinium-vilanteroL (ANORO ELLIPTA) 62.5-25 mcg/actuation inhaler Take 1 Puff by inhalation daily.  albuterol (PROVENTIL HFA, VENTOLIN HFA, PROAIR HFA) 90 mcg/actuation inhaler INHALE ONE PUFF BY MOUTH EVERY 6 HOURS AS NEEDED FOR WHEEZING    albuterol (PROVENTIL VENTOLIN) 2.5 mg /3 mL (0.083 %) nebu 3 mL by Nebulization route every four (4) hours as needed for Wheezing.     nicotine (NICODERM CQ) 21 mg/24 hr APPLY 1 PATCH TOPICALLY EVERY 24 HOURS FOR 30 DAYS    ondansetron (Zofran ODT) 4 mg disintegrating tablet Take 1 Tab by mouth every eight (8) hours as needed for Nausea or Vomiting for up to 10 doses.  naloxone (NARCAN) 4 mg/actuation nasal spray 1 Spray by Nasal route.  calcium combo no.2-vitamin D3 600 mg calcium- 500 unit TbER Take 1 Each by mouth daily.  buPROPion (WELLBUTRIN) 100 mg tablet Take 1 Tab by mouth three (3) times daily. No current facility-administered medications for this visit. Objective:     Visit Vitals  BP (!) 146/74 (BP 1 Location: Left upper arm, BP Patient Position: Sitting, BP Cuff Size: Large adult)   Pulse 93   Temp 97.4 °F (36.3 °C) (Oral)   Resp 22   Ht 5' 7\" (1.702 m)   Wt 181 lb (82.1 kg)   LMP  (LMP Unknown)   SpO2 94%   BMI 28.35 kg/m²      Physical Examination:   Gen: alert, oriented, no acute distress  Resp: no increased work of breathing, lungs clear to ausculation bilaterally  CV: S1, S2 normal, no murmurs, rubs, or gallops. Abd: soft, not tender, not distended. No hepatosplenomegaly. Normal bowel sounds. No hernias. Skin: no lesion or rash/ ecchymosis noted right side. Assessment/ Plan:       ICD-10-CM ICD-9-CM    1. At risk for falls  Z91.81 V15.88    2. Arthritis  M19.90 716.90 cyclobenzaprine (FLEXERIL) 5 mg tablet      SED RATE (ESR)      CK      TSH 3RD GENERATION      MICROALBUMIN, UR, RAND W/ MICROALB/CREAT RATIO      RA + CCP ABS      C REACTIVE PROTEIN, QT      LAYA COMPREHENSIVE PANEL      SED RATE (ESR)      CK      TSH 3RD GENERATION      RA + CCP ABS      C REACTIVE PROTEIN, QT      LAYA COMPREHENSIVE PANEL      CANCELED: MICROALBUMIN, UR, RAND W/ MICROALB/CREAT RATIO   3.  Hyperlipidemia, unspecified hyperlipidemia type  Z06.8 637.4 METABOLIC PANEL, COMPREHENSIVE      CBC WITH AUTOMATED DIFF      HEMOGLOBIN A1C WITH EAG      LIPID PANEL      METABOLIC PANEL, COMPREHENSIVE      CBC WITH AUTOMATED DIFF HEMOGLOBIN A1C WITH EAG      LIPID PANEL   4. Primary osteoarthritis of left knee  M17.12 715.16 cyclobenzaprine (FLEXERIL) 5 mg tablet   5. Chronic narcotic use  F11.90 305.50 COMPLIANCE DRUG SCREEN/PRESCRIPTION MONITORING      COMPLIANCE DRUG SCREEN/PRESCRIPTION MONITORING      CANCELED: COMPLIANCE DRUG SCREEN/PRESCRIPTION MONITORING   6. Other chronic pain  G89.29 338.29      1. Arthritis  - cyclobenzaprine (FLEXERIL) 5 mg tablet; Take 1 Tab by mouth nightly as needed for Muscle Spasm(s). Indications: muscle spasm  Dispense: 30 Tab; Refill: 2  - SED RATE (ESR); Future  - CK; Future  - TSH 3RD GENERATION; Future  - MICROALBUMIN, UR, RAND W/ MICROALB/CREAT RATIO; Future  - RA + CCP ABS; Future  - C REACTIVE PROTEIN, QT; Future  - LAYA COMPREHENSIVE PANEL; Future  - SED RATE (ESR)  - CK  - TSH 3RD GENERATION  - RA + CCP ABS  - C REACTIVE PROTEIN, QT  - LAYA COMPREHENSIVE PANEL    2. Hyperlipidemia, unspecified hyperlipidemia type  - METABOLIC PANEL, COMPREHENSIVE; Future  - CBC WITH AUTOMATED DIFF; Future  - HEMOGLOBIN A1C WITH EAG; Future  - LIPID PANEL; Future  - METABOLIC PANEL, COMPREHENSIVE  - CBC WITH AUTOMATED DIFF  - HEMOGLOBIN A1C WITH EAG  - LIPID PANEL    3. Primary osteoarthritis of left knee  - cyclobenzaprine (FLEXERIL) 5 mg tablet; Take 1 Tab by mouth nightly as needed for Muscle Spasm(s). Indications: muscle spasm  Dispense: 30 Tab; Refill: 2    4. Chronic narcotic use  - COMPLIANCE DRUG SCREEN/PRESCRIPTION MONITORING; Future  - COMPLIANCE DRUG SCREEN/PRESCRIPTION MONITORING    5. At risk for falls    6. Other chronic pain    Complains of joint pain. Labs ordered along with RA panel. Cyclobenzaprine for pain/ muscle stiffness. Follow up 1 week. Get labs done fasting  Will send drug screen  Use Flexaril at night  Follow up in 5 business days. Tylenol arthritis 1 tab every 8 hrs as needed for pain.      I have discussed the diagnosis with the patient and the intended plan as seen in the above orders. The patient has received an after-visit summary and questions were answered concerning future plans. If symptoms worsen, go to the ER.     Medication Side Effects and Warnings were discussed with patient: yes  Patient Labs were reviewed: yes  Patient Past Records were reviewed:  yes    Dieter Knott NP

## 2021-04-08 NOTE — PATIENT INSTRUCTIONS
Arthritis: Care Instructions  Overview     Arthritis, also called osteoarthritis, is a breakdown of the cartilage that cushions your joints. When the cartilage wears down, your bones rub against each other. This causes pain and stiffness. Many people have some arthritis as they age. Arthritis most often affects the joints of the spine, hands, hips, knees, or feet. Arthritis never goes away completely. But medicine and home treatment can help reduce pain and help you stay active. Follow-up care is a key part of your treatment and safety. Be sure to make and go to all appointments, and call your doctor if you are having problems. It's also a good idea to know your test results and keep a list of the medicines you take. How can you care for yourself at home? · Stay at a healthy weight. Being overweight puts extra strain on your joints. · Talk to your doctor or physical therapist about exercises that will help ease joint pain. ? Stretch. You may enjoy gentle forms of yoga to help keep your joints and muscles flexible. ? Walk instead of jog. Other types of exercise that are less stressful on the joints include riding a bike, swimming, tierra chi, or water exercise. ? Lift weights. Strong muscles help reduce stress on your joints. Stronger thigh muscles, for example, take some of the stress off of the knees and hips. Learn the right way to lift weights so you don't make joint pain worse. · Take your medicines exactly as prescribed. Call your doctor if you think you are having a problem with your medicine. · Take pain medicines exactly as directed. ? If the doctor gave you a prescription medicine for pain, take it as prescribed. ? If you are not taking a prescription pain medicine, ask your doctor if you can take an over-the-counter medicine. · Use a cane, crutch, walker, or another device if you need help to get around. These can help rest your joints.  You also can use other things to make life easier, such as a higher toilet seat and padded handles on kitchen utensils. · Do not sit in low chairs. They can make it hard to get up. · Put heat or cold on your sore joints as needed. Use whichever helps you most. You can also switch between hot and cold packs. ? Apply heat 2 or 3 times a day for 20 to 30 minutes--using a heating pad, hot shower, or hot pack--to relieve pain and stiffness. But don't use heat on a swollen joint. ? Put ice or a cold pack on your sore joint for 10 to 20 minutes at a time. Put a thin cloth between the ice and your skin. When should you call for help? Call your doctor now or seek immediate medical care if:    · You have sudden swelling, warmth, or pain in any joint.     · You have joint pain and a fever or rash.     · You have such bad pain that you cannot use a joint. Watch closely for changes in your health, and be sure to contact your doctor if:    · You have mild joint symptoms that continue even with more than 6 weeks of care at home.     · You have stomach pain or other problems with your medicine. Where can you learn more? Go to http://www.gray.com/  Enter A092 in the search box to learn more about \"Arthritis: Care Instructions. \"  Current as of: August 5, 2020               Content Version: 12.8  © 2006-2021 [employer]. Care instructions adapted under license by [employer] (which disclaims liability or warranty for this information). If you have questions about a medical condition or this instruction, always ask your healthcare professional. [place] [address] any warranty or liability for your use of this information. Learning About Opioids and Acute Pain  What is acute pain? Pain that starts quickly and lasts for a short time is called acute pain. Examples include pain from an injury or childbirth and pain right after surgery. Acute pain is a normal part of injury and healing.   Why are opioids used for acute pain? Opioid medicines can treat pain. For acute pain after an injury or a surgery, your doctor may prescribe an opioid to be used for a short time. But opioids can be dangerous. And they may not do a better job of treating pain than non-opioids, like acetaminophen and NSAIDs. What are the risks? Opioids are powerful medicines. Taking them for even a short time has risks. Here are some of the risks. Physical side effects. Opioids can cause constipation, nausea, and vomiting. Problems thinking clearly. They can affect judgment and decision making. You may not be able to drive or work while taking them. Increased tolerance. This happens when your body gets used to the medicine. Over time you need a higher dose to get pain relief. Physical dependence. This means that your body starts to need the medicine to feel normal. You may have withdrawal symptoms if you stop taking it or take less of it. Opioid use disorder. This means that someone uses opioids even though it causes harm to themselves or others. Moderate to severe opioid use disorder is sometimes called addiction. Overdose, and even death. Opioid medicines can cause serious problems if they're misused. You could take too much and have an overdose, and even die. What should you tell your doctor? Tell your doctor about medicines, supplements, and any drugs or alcohol you use. Taking opioids with other substances can cause an overdose. And make sure to tell your doctor if you've ever had problems with alcohol, legal medicines, or illegal drugs. It can increase your risk for more problems. How can you work with your doctor to manage your pain? You are the most important part of your healing. You can work with your doctor to manage your pain safely. For example, you can:  · Set realistic goals with your doctor for pain control. · Ask your doctor about taking non-opioids for pain.  These may include acetaminophen or NSAIDs. · Try things other than medicine. Examples include massage, physical therapy, heat or cold, and acupuncture. If you are prescribed an opioid for acute pain, you can expect that your doctor will be careful to help keep you safe. Your doctor may:  · Prescribe the smallest dose needed to control pain, and for the shortest amount of time possible. · Limit the prescription to 3 to 5 days. · Require a urine drug test before you start (or while you take) the medicine. · Stop the medicine if it's not working as it should. Where can you learn more? Go to http://www.gray.com/  Enter A180 in the search box to learn more about \"Learning About Opioids and Acute Pain. \"  Current as of: August 4, 2020               Content Version: 12.8  © 2006-2021 [employer]. Care instructions adapted under license by [employer] (which disclaims liability or warranty for this information). If you have questions about a medical condition or this instruction, always ask your healthcare professional. [place] [address] any warranty or liability for your use of this information. Safe Use of Opioid Pain Medicine: Care Instructions  Your Care Instructions  Pain is your body's way of warning you that something is wrong. Pain feels different for everybody. Only you can describe your pain. A doctor can suggest or prescribe many types of medicines for pain. These range from over-the-counter medicines like acetaminophen (Tylenol) to powerful medicines called opioids. Examples of opioids are fentanyl, hydrocodone, morphine, and oxycodone. Heroin is an illegal opioid  Opioids are strong medicines. They can help you manage pain when you use them the right way. But if you misuse them, they can cause serious harm and even death. For these reasons, doctors are very careful about how they prescribe opioids.   If you decide to take opioids, here are some things to remember. · Keep your doctor informed. You can develop opioid use disorder. Moderate to severe opioid use disorder is sometimes called addiction. The risk is higher if you have a history of substance use. Your doctor will monitor you closely for signs of opioid use disorder and to figure out when you no longer need to take opioids. · Make a treatment plan. The goal of your plan is to be able to function and do the things you need to do, even if you still have some pain. You might be able to manage your pain with other non-opioid options like physical therapy, relaxation, or over-the-counter pain medicines. · Be aware of the side effects. Opioids can cause serious side effects, such as constipation, dry mouth, and nausea. And over time, you may need a higher dose to get pain relief. This is called tolerance. Your body also gets used to opioids. This is called physical dependence. If you suddenly stop taking them, you may have withdrawal symptoms. The doctor carefully considered what pain medicine is right for you. You may not have received opioids if your doctor was concerned about drug interactions or your safety, or if he or she had other concerns. It is best to have one doctor or clinic treat your pain. This way you will get the pain medicine that will help you the most. And a doctor will be able to watch for any problems that the medicine might cause. The doctor has checked you carefully, but problems can develop later. If you notice any problems or new symptoms,  get medical treatment right away. Follow-up care is a key part of your treatment and safety. Be sure to make and go to all appointments, and call your doctor if you are having problems. It's also a good idea to know your test results and keep a list of the medicines you take. How can you care for yourself at home? If you need to take opioids to manage your pain, remember these safety tips. · Follow directions carefully.  It's easy to misuse opioids if you take a dose other than what's prescribed by your doctor. This can lead to overdose and even death. Even sharing them with someone they weren't meant for is misuse. · Be cautious. Opioids may affect your judgment and decision making. Do not drive or operate machinery until you can think clearly. Talk with your doctor about when it is safe to drive. · Reduce the risk of drug interactions. Opioids can be dangerous if you take them with alcohol or with certain drugs like sleeping pills and muscle relaxers. Make sure your doctor knows about all the other medicines you take, including over-the-counter medicines. Don't start any new medicines before you talk to your doctor or pharmacist.  · Safely store and dispose of opioids. Store opioids in a safe and secure place. Make sure that pets, children, friends, and family can't get to them. When you're done using opioids, make sure to dispose of them safely and as quickly as possible. The U.S. Food and Drug Administration (FDA) recommends these disposal options. ? The best option is to take your medicine to a drop-off box or take-back program that is authorized by the [facility] ([facility]). ? If these programs aren't available in your area and your medicine doesn't have specific disposal instructions (such as flushing), you can throw them into your household trash if you follow the FDA's instructions. Visit fda.gov and search for \"unused medicine disposal.\"  ? If you have opioid patches (used or unused), your options are to take them to a DIO-authorized site or flush them down the toilet. Do not throw them in the trash. ? Only flush your medicine down the toilet if you can't get to a DIO-approved site or your medicine instructions state clearly to flush them. · Reduce the risk of overdose. Misuse of opioids can be very dangerous. Protect yourself by asking your doctor about a naloxone rescue kit.  It can help you--and even save your life--if you take too much of an opioid. Try other ways to reduce pain. · Relax, and reduce stress. Relaxation techniques such as deep breathing or meditation can help. · Keep moving. Gentle, daily exercise can help reduce pain over the long run. Try low- or no-impact exercises such as walking, swimming, and stationary biking. Do stretches to stay flexible. · Try heat, cold packs, and massage. · Get enough sleep. Pain can make you tired and drain your energy. Talk with your doctor if you have trouble sleeping because of pain. · Think positive. Your thoughts can affect your pain level. Do things that you enjoy to distract yourself when you have pain instead of focusing on the pain. See a movie, read a book, listen to music, or spend time with a friend. If you are not taking a prescription pain medicine, ask your doctor if you can take an over-the-counter medicine. When should you call for help? Call 911 anytime you think you may need emergency care. For example, call if:  · You have symptoms of a severe allergic reaction. These may include:  ? Sudden raised, red areas (hives) all over your body. ? Swelling of the throat, mouth, lips, or tongue. ? Trouble breathing. ? Passing out (losing consciousness). Or you may feel very lightheaded or suddenly feel weak, confused, or restless. · You have signs of an overdose. These include:  ? Cold, clammy skin. ? Confusion. ? Severe nervousness or restlessness. ? Severe dizziness, drowsiness, or weakness. ? Slow breathing. ? Seizures. Call your doctor now or seek immediate medical care if:  · You have symptoms of an allergic reaction, such as:  ? A rash or hives (raised, red areas on the skin). ? Itching. ? Swelling. ? Belly pain, nausea, or vomiting. Watch closely for changes in your health, and be sure to contact your doctor if:  · You think you might be taking too much pain medicine, and you need help to take less or stop.   · Your medicine is not helping with the pain. · You are having side effects, such as constipation. Where can you learn more? Go to http://www.gray.com/  Enter R108 in the search box to learn more about \"Safe Use of Opioid Pain Medicine: Care Instructions. \"  Current as of: August 4, 2020               Content Version: 12.8  © 2006-2021 [employer]. Care instructions adapted under license by [employer] (which disclaims liability or warranty for this information). If you have questions about a medical condition or this instruction, always ask your healthcare professional. [place] [address] any warranty or liability for your use of this information. Pain Medicine: Care Instructions  Your Care Instructions     Pain can keep you from doing the things you want to do. Medicine may help you feel better. There are many kinds of pain medicine. One type you can buy over the counter is acetaminophen (Tylenol). Other medicines help both pain and swelling. These are called nonsteroidal anti-inflammatory drugs (NSAIDs). They include aspirin, ibuprofen (Advil, Motrin), and naproxen (Aleve). All of these drugs can cause side effects. Take them just as the package label tells you to. The most common side effects are stomach upset, heartburn, and nausea. NSAIDs may irritate the stomach lining. If the medicine upsets your stomach, you can try taking it with food. But if that doesn't help, talk with your doctor to make sure it's not a more serious problem. If you take NSAIDs often, you could get stomach ulcers or kidney problems. This can also happen if you take them for a long time. NSAIDs rarely cause a bad allergic reaction. Many pain medicines need to be prescribed by a doctor. Some of these drugs are called opioids. Examples are hydrocodone, morphine, fentanyl, and codeine. Taking opioids can lead to opioid use disorder.  Moderate to severe opioid use disorder is sometimes called addiction. These medicines often can be used safely if you are under a doctor's care. Follow-up care is a key part of your treatment and safety. Be sure to make and go to all appointments, and call your doctor if you are having problems. It's also a good idea to know your test results and keep a list of the medicines you take. How can you care for yourself at home? · Be safe with medicines. If you take an over-the-counter pain medicine, such as acetaminophen (Tylenol), ibuprofen (Advil, Motrin), or naproxen (Aleve), read and follow all instructions on the label. · Do not give aspirin to anyone younger than 20. It has been linked to Reye syndrome, a serious illness. · Be careful when taking over-the-counter cold or flu medicines and Tylenol at the same time. Many of these medicines contain acetaminophen, which is Tylenol. Read the labels to make sure that you are not taking more than the recommended dose. Too much Tylenol can be harmful. · Do not take two or more pain medicines at the same time unless the doctor told you to. · Do not take pain medicines (like an opioid) with alcohol or with certain drugs like sleeping pills and muscle relaxers. · If your pain pills make you constipated:  ? Talk to your doctor about a laxative. If a laxative doesn't work, your doctor might suggest a prescription medicine. ? Drink plenty of fluids. Drink water, fruit juice, or other drinks that do not contain caffeine or alcohol. If you have kidney, heart, or liver disease and have to limit fluids, talk with your doctor before you increase the amount of fluids you drink. ? Take fiber, such as Citrucel or Metamucil, daily if needed. Read and follow all instructions on the label. If you take pain medicine for more than a few days, talk to your doctor before you take fiber. When should you call for help?    Call your doctor now or seek immediate medical care if:    · Your pain medicine is not easing your pain.     · You have stomach pain, an upset stomach, or heartburn that lasts or comes back.     · You can't sleep because of the pain. Watch closely for changes in your health, and be sure to contact your doctor if:    · You do not get better as expected. Where can you learn more? Go to http://www.gray.com/  Enter Z375 in the search box to learn more about \"Pain Medicine: Care Instructions. \"  Current as of: August 4, 2020               Content Version: 12.8  © 2006-2021 [employer]. Care instructions adapted under license by [employer] (which disclaims liability or warranty for this information). If you have questions about a medical condition or this instruction, always ask your healthcare professional. [address] any warranty or liability for your use of this information. Learning About Pain Control When You Have a History of Opioid Use Disorder  How is pain managed when you have a history of opioid use disorder? It can be really hard to be in severe pain and at the same time be afraid to take medicine for it because you could have a relapse. It's important to see a doctor when you're in severe pain. If you try to manage the pain yourself, you could fall back into your old habits with opioids. An important part of preventing a relapse is to make sure the doctor knows about your history of opioid use disorder. Even when you see a doctor for some other reason than pain, make sure he or she knows. You may feel embarrassed or ashamed to talk about it. But don't let these feelings  your way. Your doctor is there to help you. He or she needs to know about your history in order to get you the right treatment. It may be easier to bring it up if you write down a sentence or two about it ahead of time. For example, you could write what you were taking and for how long. You could say how long you have been in recovery.  And you could say how important it is to you to avoid using opioids. You may also take someone with you who could help you explain your history. This could be a friend, a family member, or your Narcotics Anonymous sponsor. Can severe pain be treated without opioids? Your doctor can tell you about many other ways to manage pain. One option is non-opioid medicines, such as:  · Acetaminophen. · NSAIDs. · Certain antidepressants. · Creams or oils applied to the skin. Pain treatment may include other things besides medicine. You may find relief from treatments such as ice or heat, meditation, and making sure you get enough sleep. Work with your doctor to explore different options for pain relief. It's also possible that you could take an opioid for pain for a short time. In that case, it will be very important to work closely with your doctor. Follow-up care is a key part of your treatment and safety. Be sure to make and go to all appointments, and call your doctor if you are having problems. It's also a good idea to know your test results and keep a list of the medicines you take. Where can you learn more? Go to http://www.gray.com/  Enter W714 in the search box to learn more about \"Learning About Pain Control When You Have a History of Opioid Use Disorder. \"  Current as of: August 4, 2020               Content Version: 12.8  © 2006-2021 [employer]. Care instructions adapted under license by [employer] (which disclaims liability or warranty for this information). If you have questions about a medical condition or this instruction, always ask your healthcare professional. [place] [address] any warranty or liability for your use of this information. Get labs done fasting  Will send drug screen  Use Flexaril at night  Follow up in 5 business days. Tylenol arthritis 1 tab every 8 hrs as needed for pain.

## 2021-04-08 NOTE — PROGRESS NOTES
Chief Complaint   Patient presents with   Luis Felipe Calderon     fell yesterday onto her cane and broke her cane yesterday - has bruising to her right side    Pain (Chronic)     refill pain medications     Fall Risk Assessment, last 12 mths 4/2/2021   Able to walk? Yes   Fall in past 12 months? 0   Do you feel unsteady? 1   Are you worried about falling 0   Number of falls in past 12 months -   Fall with injury? -       3 most recent PHQ Screens 4/2/2021   Little interest or pleasure in doing things Nearly every day   Feeling down, depressed, irritable, or hopeless Nearly every day   Total Score PHQ 2 6   Trouble falling or staying asleep, or sleeping too much -   Feeling tired or having little energy -   Poor appetite, weight loss, or overeating -   Feeling bad about yourself - or that you are a failure or have let yourself or your family down -   Trouble concentrating on things such as school, work, reading, or watching TV -   Moving or speaking so slowly that other people could have noticed; or the opposite being so fidgety that others notice -   Thoughts of being better off dead, or hurting yourself in some way -   How difficult have these problems made it for you to do your work, take care of your home and get along with others -       Abuse Screening Questionnaire 4/2/2021   Do you ever feel afraid of your partner? N   Are you in a relationship with someone who physically or mentally threatens you? N   Is it safe for you to go home?  Y       ADL Assessment 4/2/2021   Feeding yourself No Help Needed   Getting from bed to chair No Help Needed   Getting dressed No Help Needed   Bathing or showering No Help Needed   Walk across the room (includes cane/walker) No Help Needed   Using the telphone No Help Needed   Taking your medications No Help Needed   Preparing meals No Help Needed   Managing money (expenses/bills) No Help Needed   Moderately strenuous housework (laundry) Help Needed   Shopping for personal items (toiletries/medicines) Help Needed   Shopping for groceries Help Needed   Driving Help Needed   Climbing a flight of stairs No Help Needed   Getting to places beyond walking distances Help Needed

## 2021-04-09 ENCOUNTER — HOSPITAL ENCOUNTER (OUTPATIENT)
Dept: LAB | Age: 61
Discharge: HOME | End: 2021-04-09

## 2021-04-12 ENCOUNTER — TELEPHONE (OUTPATIENT)
Dept: INTERNAL MEDICINE CLINIC | Age: 61
End: 2021-04-12

## 2021-04-12 DIAGNOSIS — A09 DIARRHEA OF INFECTIOUS ORIGIN: ICD-10-CM

## 2021-04-12 NOTE — TELEPHONE ENCOUNTER
MESSAGE FR ENVERA          Reason for call: Pt calling for UA results from 4/7/21. Results needed for refill approval for tramadol, goes to [facility].      Callback required yes/no and why: yes     Best contact number(s): [phone]     Details to clarify the request: n/a       Tammy Aquino

## 2021-04-12 NOTE — TELEPHONE ENCOUNTER
Results from Lab flower test drug screen still pending -  Melissa Washington, SUZYN  8/41/3447  06:39 AM

## 2021-04-13 ENCOUNTER — TELEPHONE (OUTPATIENT)
Dept: INTERNAL MEDICINE CLINIC | Age: 61
End: 2021-04-13

## 2021-04-13 DIAGNOSIS — R76.8 HEPATITIS C ANTIBODY TEST POSITIVE: Primary | ICD-10-CM

## 2021-04-13 LAB — DRUGS UR: NORMAL

## 2021-04-13 RX ORDER — DIPHENOXYLATE HYDROCHLORIDE AND ATROPINE SULFATE 2.5; .025 MG/1; MG/1
TABLET ORAL
Qty: 12 TAB | Refills: 0 | Status: SHIPPED | OUTPATIENT
Start: 2021-04-13 | End: 2022-05-11 | Stop reason: SDUPTHER

## 2021-04-13 NOTE — TELEPHONE ENCOUNTER
Per Fermín Renteria NP - patient will discuss Tramadol refill with Dr Berenice Ford at her visit with him tomorrow  Cheo Jaramillo, COLEMAN  7/24/3427  6:37 PM

## 2021-04-14 ENCOUNTER — OFFICE VISIT (OUTPATIENT)
Dept: INTERNAL MEDICINE CLINIC | Age: 61
End: 2021-04-14
Payer: MEDICAID

## 2021-04-14 VITALS
HEART RATE: 93 BPM | TEMPERATURE: 98.3 F | HEIGHT: 67 IN | RESPIRATION RATE: 18 BRPM | DIASTOLIC BLOOD PRESSURE: 69 MMHG | SYSTOLIC BLOOD PRESSURE: 139 MMHG | BODY MASS INDEX: 28.72 KG/M2 | OXYGEN SATURATION: 95 % | WEIGHT: 183 LBS

## 2021-04-14 DIAGNOSIS — L03.115 CELLULITIS OF RIGHT LOWER EXTREMITY: ICD-10-CM

## 2021-04-14 DIAGNOSIS — R74.8 ELEVATED LIVER ENZYMES: ICD-10-CM

## 2021-04-14 DIAGNOSIS — E11.65 TYPE 2 DIABETES MELLITUS WITH HYPERGLYCEMIA, WITHOUT LONG-TERM CURRENT USE OF INSULIN (HCC): ICD-10-CM

## 2021-04-14 DIAGNOSIS — L02.415 CELLULITIS AND ABSCESS OF RIGHT LEG: ICD-10-CM

## 2021-04-14 DIAGNOSIS — L03.115 CELLULITIS AND ABSCESS OF RIGHT LEG: ICD-10-CM

## 2021-04-14 DIAGNOSIS — M19.90 ARTHRITIS: Primary | ICD-10-CM

## 2021-04-14 DIAGNOSIS — J44.9 CHRONIC OBSTRUCTIVE PULMONARY DISEASE, UNSPECIFIED COPD TYPE (HCC): ICD-10-CM

## 2021-04-14 DIAGNOSIS — F33.0 DEPRESSION, MAJOR, RECURRENT, MILD (HCC): ICD-10-CM

## 2021-04-14 PROCEDURE — 99214 OFFICE O/P EST MOD 30 MIN: CPT | Performed by: FAMILY MEDICINE

## 2021-04-14 RX ORDER — TRAMADOL HYDROCHLORIDE 50 MG/1
50 TABLET ORAL
Qty: 30 TAB | Refills: 0 | Status: SHIPPED | OUTPATIENT
Start: 2021-04-14 | End: 2021-04-24

## 2021-04-14 RX ORDER — CEPHALEXIN 500 MG/1
500 CAPSULE ORAL 3 TIMES DAILY
Qty: 21 CAP | Refills: 0 | Status: SHIPPED | OUTPATIENT
Start: 2021-04-14 | End: 2021-04-21

## 2021-04-14 NOTE — PATIENT INSTRUCTIONS
Learning About Meal Planning for Diabetes Why plan your meals? Meal planning can be a key part of managing diabetes. Planning meals and snacks with the right balance of carbohydrate, protein, and fat can help you keep your blood sugar at the target level you set with your doctor. You don't have to eat special foods. You can eat what your family eats, including sweets once in a while. But you do have to pay attention to how often you eat and how much you eat of certain foods. You may want to work with a dietitian or a certified diabetes educator. He or she can give you tips and meal ideas and can answer your questions about meal planning. This health professional can also help you reach a healthy weight if that is one of your goals. What plan is right for you? Your dietitian or diabetes educator may suggest that you start with the plate format or carbohydrate counting. The plate format The plate format is a simple way to help you manage how you eat. You plan meals by learning how much space each food should take on a plate. Using the plate format helps you spread carbohydrate throughout the day. It can make it easier to keep your blood sugar level within your target range. It also helps you see if you're eating healthy portion sizes. To use the plate format, you put non-starchy vegetables on half your plate. Add meat or meat substitutes on one-quarter of the plate. Put a grain or starchy vegetable (such as brown rice or a potato) on the final quarter of the plate. You can add a small piece of fruit and some low-fat or fat-free milk or yogurt, depending on your carbohydrate goal for each meal. 
Here are some tips for using the plate format: · Make sure that you are not using an oversized plate. A 9-inch plate is best. Many restaurants use larger plates. · Get used to using the plate format at home. Then you can use it when you eat out. · Write down your questions about using the plate format.  Talk to your doctor, a dietitian, or a diabetes educator about your concerns. Carbohydrate counting With carbohydrate counting, you plan meals based on the amount of carbohydrate in each food. Carbohydrate raises blood sugar higher and more quickly than any other nutrient. It is found in desserts, breads and cereals, and fruit. It's also found in starchy vegetables such as potatoes and corn, grains such as rice and pasta, and milk and yogurt. Spreading carbohydrate throughout the day helps keep your blood sugar levels within your target range. Your daily amount depends on several things, including your weight, how active you are, which diabetes medicines you take, and what your goals are for your blood sugar levels. A registered dietitian or diabetes educator can help you plan how much carbohydrate to include in each meal and snack. A guideline for your daily amount of carbohydrate is: · 45 to 60 grams at each meal. That's about the same as 3 to 4 carbohydrate servings. · 15 to 20 grams at each snack. That's about the same as 1 carbohydrate serving. The Nutrition Facts label on packaged foods tells you how much carbohydrate is in a serving of the food. First, look at the serving size on the food label. Is that the amount you eat in a serving? All of the nutrition information on a food label is based on that serving size. So if you eat more or less than that, you'll need to adjust the other numbers. Total carbohydrate is the next thing you need to look for on the label. If you count carbohydrate servings, one serving of carbohydrate is 15 grams. For foods that don't come with labels, such as fresh fruits and vegetables, you'll need a guide that lists carbohydrate in these foods. Ask your doctor, dietitian, or diabetes educator about books or other nutrition guides you can use.  
If you take insulin, you need to know how many grams of carbohydrate are in a meal. This lets you know how much rapid-acting insulin to take before you eat. If you use an insulin pump, you get a constant rate of insulin during the day. So the pump must be programmed at meals to give you extra insulin to cover the rise in blood sugar after meals. When you know how much carbohydrate you will eat, you can take the right amount of insulin. Or, if you always use the same amount of insulin, you need to make sure that you eat the same amount of carbohydrate at meals. If you need more help to understand carbohydrate counting and food labels, ask your doctor, dietitian, or diabetes educator. How can you plan healthy meals? Here are some tips to get started: 
· Plan your meals a week at a time. Don't forget to include snacks too. · Use cookbooks or online recipes to plan several main meals. Plan some quick meals for busy nights. You also can double some recipes that freeze well. Then you can save half for other busy nights when you don't have time to cook. · Make sure you have the ingredients you need for your recipes. If you're running low on basic items, put these items on your shopping list too. · List foods that you use to make breakfasts, lunches, and snacks. List plenty of fruits and vegetables. · Post this list on the refrigerator. Add to it as you think of more things you need. · Take the list to the store to do your weekly shopping. Follow-up care is a key part of your treatment and safety. Be sure to make and go to all appointments, and call your doctor if you are having problems. It's also a good idea to know your test results and keep a list of the medicines you take. Where can you learn more? Go to http://www.CohBarcom/ Dionna Lacey in the search box to learn more about \"Learning About Meal Planning for Diabetes. \" Current as of: August 31, 2020               Content Version: 12.8 © 4044-3217 Healthwise, Incorporated.   
Care instructions adapted under license by [employer] (which disclaims liability or warranty for this information). If you have questions about a medical condition or this instruction, always ask your healthcare professional. [address] any warranty or liability for your use of this information. Diabetes and Alcohol: Care Instructions Your Care Instructions People who have diabetes need to be more careful with alcohol. Before you drink, consider a few things: Is your diabetes well controlled? Do you know how drinking alcohol can affect you? Do you have high blood pressure, nerve damage, or eye problems from your diabetes? If you take insulin or another medicine for diabetes, drinking alcohol may cause low blood sugar. This could cause dangerous low blood sugar levels. Too much alcohol can also affect your ability to know your blood sugar is low and to treat it. Drinking alcohol can make you lightheaded at first and drowsy as you drink more, both of which may be similar to the symptoms of low blood sugar. Drinking a lot of alcohol over a long period of time can damage your liver (cirrhosis). If this happens, your body may lose its natural response to protect itself from low blood sugar. If you are controlling your diabetes and do not have other health issues, it may be okay to have a drink once in a while. Learning how alcohol affects your body can help you make the right choices. Follow-up care is a key part of your treatment and safety. Be sure to make and go to all appointments, and call your doctor if you are having problems. It's also a good idea to know your test results and keep a list of the medicines you take. How can you care for yourself at home? If you drink · Work with your doctor or other diabetes expert to find what is best for you. Make sure you know whether it is safe to drink if you are taking insulin or another medicine for diabetes. · In general, limit alcohol to 1 drink a day with a meal if you are a woman.  If you are a man, limit alcohol to 2 drinks a day with a meal. The following is considered a standard drink: 
? One 12-ounce bottle of beer or wine cooler ? One 5-ounce glass of wine ? One mixed drink with 1.5 ounces of 80-proof hard liquor, such as gin, whiskey, or rum · Choose alcoholic drinks wisely. With hard alcohol, use sugar-free mixers, such as diet tonic, water, or club soda. Pick drinks that have less alcohol, including light beer or dry wine. Or add club soda to wine to dilute it. Also remember that most alcoholic drinks have a lot of calories. · When you drink, check your blood sugar before you go to bed. Have a snack before bed so your blood sugar does not drop while you sleep. When not to drink · Never drink on an empty stomach. If you do drink alcohol, drink it only with a meal or snack. Having as little as 2 drinks on an empty stomach could lead to low blood sugar. · Do not drink alcohol if you have problems recognizing the signs of low blood sugar until they become severe. · Do not drink alcohol after you exercise. The exercise itself lowers blood sugar. · Do not drink if you have nerve damage. Drinking can make it worse and increase the pain, numbness, and other symptoms. · Do not drink if you have high blood pressure. · Do not drink if you have diabetic eye disease. · Do not drink if you have high triglycerides, a type of fat in your blood. Drinking can raise triglycerides. · Do not drink if you are trying to lose weight. Alcohol provides empty calories that do not give you any nutrients. · Do not drink and drive. The effects of alcohol are greater if you have low blood sugar. When should you call for help? Call 911 anytime you think you may need emergency care. For example, call if: 
  · You passed out (lost consciousness).  
  · You are confused or cannot think clearly.  
  · Your blood sugar is very high or very low.   
Watch closely for changes in your health, and be sure to contact your doctor if: 
  · Your blood sugar stays outside the level your doctor set for you.  
  · You have any problems. Where can you learn more? Go to http://www.gray.com/ Enter T236 in the search box to learn more about \"Diabetes and Alcohol: Care Instructions. \" Current as of: August 31, 2020               Content Version: 12.8 © 2006-2021 [employer]. Care instructions adapted under license by [employer] (which disclaims liability or warranty for this information). If you have questions about a medical condition or this instruction, always ask your healthcare professional. Reji Russo any warranty or liability for your use of this information. Nutrition Tips for Diabetes: After Your Visit Your Care Instructions A healthy diet is important to manage diabetes. It helps you lose weight (if you need to) and keep it off. It gives you the nutrition and energy your body needs and helps prevent heart disease. But a diet for diabetes does not mean that you have to eat special foods. You can eat what your family eats, including occasional sweets and other favorites. But you do have to pay attention to how often you eat and how much you eat of certain foods. The right plan for you will give you meals that help you keep your blood sugar at healthy levels. Try to eat a variety of foods and to spread carbohydrate throughout the day. Carbohydrate raises blood sugar higher and more quickly than any other nutrient does. Carbohydrate is found in sugar, breads and cereals, fruit, starchy vegetables such as potatoes and corn, and milk and yogurt. You may want to work with a dietitian or diabetes educator to help you plan meals and snacks. A dietitian or diabetes educator also can help you lose weight if that is one of your goals. The following tips can help you enjoy your meals and stay healthy. Follow-up care is a key part of your treatment and safety.  Be sure to make and go to all appointments, and call your doctor if you are having problems. Its also a good idea to know your test results and keep a list of the medicines you take. How can you care for yourself at home? · Learn which foods have carbohydrate and how much carbohydrate to eat. A dietitian or diabetes educator can help you learn to keep track of how much carbohydrate you eat. · Spread carbohydrate throughout the day. Eat some carbohydrate at all meals, but do not eat too much at any one time. · Plan meals to include food from all the food groups. These are the food groups and some example portion sizes: ¨ Grains: 1 slice of bread (1 ounce), ½ cup of cooked cereal, and 1/3 cup of cooked pasta or rice. These have about 15 grams of carbohydrate in a serving. Choose whole grains such as whole wheat bread or crackers, oatmeal, and brown rice more often than refined grains. ¨ Fruit: 1 small fresh fruit, such as an apple or orange; ½ of a banana; ½ cup of chopped, cooked, or canned fruit; ½ cup of fruit juice; 1 cup of melon or raspberries; and 2 tablespoons of dried fruit. These have about 15 grams of carbohydrate in a serving. ¨ Dairy: 1 cup of nonfat or low-fat milk and 2/3 cup of plain yogurt. These have about 15 grams of carbohydrate in a serving. ¨ Protein foods: Beef, chicken, turkey, fish, eggs, tofu, cheese, cottage cheese, and peanut butter. A serving size of meat is 3 ounces, which is about the size of a deck of cards. Examples of meat substitute serving sizes (equal to 1 ounce of meat) are 1/4 cup of cottage cheese, 1 egg, 1 tablespoon of peanut butter, and ½ cup of tofu. These have very little or no carbohydrate per serving. ¨ Vegetables: Starchy vegetables such as ½ cup of cooked dried beans, peas, potatoes, or corn have about 15 grams of carbohydrate.  Nonstarchy vegetables have very little carbohydrate, such as 1 cup of raw leafy vegetables (such as spinach), ½ cup of other vegetables (cooked or chopped), and 3/4 cup of vegetable juice. · Use the plate format to plan meals. It is a good, quick way to make sure that you have a balanced meal. It also helps you spread carbohydrate throughout the day. You divide your plate by types of foods. Put vegetables on half the plate, meat or meat substitutes on one-quarter of the plate, and a grain or starchy vegetable (such as brown rice or a potato) in the final quarter of the plate. To this you can add a small piece of fruit and 1 cup of milk or yogurt, depending on how much carbohydrate you are supposed to eat at a meal. 
· Talk to your dietitian or diabetes educator about ways to add limited amounts of sweets into your meal plan. You can eat these foods now and then, as long as you include the amount of carbohydrate they have in your daily carbohydrate allowance. · If you drink alcohol, limit it to no more than 1 drink a day for women and 2 drinks a day for men. If you are pregnant, no amount of alcohol is known to be safe. · Protein, fat, and fiber do not raise blood sugar as much as carbohydrate does. If you eat a lot of these nutrients in a meal, your blood sugar will rise more slowly than it would otherwise. · Limit saturated fats, such as those from meat and dairy products. Try to replace it with monounsaturated fat, such as olive oil. This is a healthier choice because people who have diabetes are at higher-than-average risk of heart disease. But use a modest amount of olive oil. A tablespoon of olive oil has 14 grams of fat and 120 calories. · Exercise lowers blood sugar. If you take insulin by shots or pump, you can use less than you would if you were not exercising. Keep in mind that timing matters. If you exercise within 1 hour after a meal, your body may need less insulin for that meal than it would if you exercised 3 hours after the meal. Test your blood sugar to find out how exercise affects your need for insulin.  
· Exercise on most days of the week. Aim for at least 30 minutes. Exercise helps you stay at a healthy weight and helps your body use insulin. Walking is an easy way to get exercise. Gradually increase the amount you walk every day. You also may want to swim, bike, or do other activities. When you eat out · Learn to estimate the serving sizes of foods that have carbohydrate. If you measure food at home, it will be easier to estimate the amount in a serving of restaurant food. · If the meal you order has too much carbohydrate (such as potatoes, corn, or baked beans), ask to have a low-carbohydrate food instead. Ask for a salad or green vegetables. · If you use insulin, check your blood sugar before and after eating out to help you plan how much to eat in the future. · If you eat more carbohydrate at a meal than you had planned, take a walk or do other exercise. This will help lower your blood sugar. Where can you learn more? Go to [employer].be Enter U047 in the search box to learn more about \"Nutrition Tips for Diabetes: After Your Visit. \"  
© 7503-1644 Healthwise, Incorporated. Care instructions adapted under license by [facility] (which disclaims liability or warranty for this information). This care instruction is for use with your licensed healthcare professional. If you have questions about a medical condition or this instruction, always ask your healthcare professional. [address] any warranty or liability for your use of this information. Content Version: 66.7.681033; Current as of: June 4, 2014

## 2021-04-14 NOTE — LETTER
4/14/2021 Ms. Kev Laboy [address] [facility] [address] [id number] Dear Kev Laboy: Please find your most recent results below. Resulted Orders METABOLIC PANEL, COMPREHENSIVE Result Value Ref Range Glucose 105 (H) 65 - 99 mg/dL BUN 9 6 - 24 mg/dL Creatinine 0.69 0.57 - 1.00 mg/dL GFR est non-AA 96 >59 mL/min/1.73 GFR est  >59 mL/min/1.73  
 BUN/Creatinine ratio 13 9 - 23 Sodium 142 134 - 144 mmol/L Potassium 3.9 3.5 - 5.2 mmol/L Chloride 104 96 - 106 mmol/L  
 CO2 26 20 - 29 mmol/L Calcium 8.5 (L) 8.7 - 10.2 mg/dL Protein, total 6.6 6.0 - 8.5 g/dL Albumin 4.1 3.8 - 4.9 g/dL GLOBULIN, TOTAL 2.5 1.5 - 4.5 g/dL A-G Ratio 1.6 1.2 - 2.2 Bilirubin, total 0.3 0.0 - 1.2 mg/dL Alk. phosphatase 98 39 - 117 IU/L  
 AST (SGOT) 111 (H) 0 - 40 IU/L  
 ALT (SGPT) 188 (H) 0 - 32 IU/L Narrative Performed at:  [address]  [id number] : Rena Celaya MD, Phone:  [phone] CBC WITH AUTOMATED DIFF Result Value Ref Range WBC 8.7 3.4 - 10.8 x10E3/uL  
 RBC 4.66 3.77 - 5.28 x10E6/uL HGB 15.2 11.1 - 15.9 g/dL HCT 45.4 34.0 - 46.6 % MCV 97 79 - 97 fL  
 MCH 32.6 26.6 - 33.0 pg  
 MCHC 33.5 31.5 - 35.7 g/dL  
 RDW 13.2 11.7 - 15.4 % PLATELET 641 131 - 051 x10E3/uL NEUTROPHILS 62 Not Estab. % Lymphocytes 24 Not Estab. % MONOCYTES 9 Not Estab. % EOSINOPHILS 4 Not Estab. % BASOPHILS 1 Not Estab. %  
 ABS. NEUTROPHILS 5.5 1.4 - 7.0 x10E3/uL Abs Lymphocytes 2.1 0.7 - 3.1 x10E3/uL  
 ABS. MONOCYTES 0.8 0.1 - 0.9 x10E3/uL  
 ABS. EOSINOPHILS 0.3 0.0 - 0.4 x10E3/uL  
 ABS. BASOPHILS 0.0 0.0 - 0.2 x10E3/uL IMMATURE GRANULOCYTES 0 Not Estab. %  
 ABS. IMM. GRANS. 0.0 0.0 - 0.1 x10E3/uL Narrative Performed at:  [address]  [id number] : Rena Celaya MD, Phone:  [phone] SED RATE (ESR) Result Value Ref Range Sed rate (ESR) 10 0 - 40 mm/hr Narrative Performed at:  [address]  [id number] : Josselin Ramos MD, Phone:  [phone] CK Result Value Ref Range Creatine Kinase,Total 73 32 - 182 U/L Narrative Performed at:  [address]  [id number] : Josselin Ramos MD, Phone:  [phone] TSH 3RD GENERATION Result Value Ref Range TSH 0.730 0.450 - 4.500 uIU/mL Narrative Performed at:  [address]  [id number] : Josselin Ramos MD, Phone:  [phone] RA + CCP ABS Result Value Ref Range Rheumatoid factor 72.8 (H) 0.0 - 13.9 IU/mL  
 CCP Antibodies IgG/IgA 4 0 - 19 units Narrative Performed at:  [address]  [id number] : Josselin Ramos MD, Phone:  [phone] C REACTIVE PROTEIN, QT Result Value Ref Range C-Reactive Protein, Qt 2 0 - 10 mg/L Narrative Performed at:  [address]  [id number] : Josselin Ramos MD, Phone:  [phone] LAYA COMPREHENSIVE PANEL Result Value Ref Range Anti-DNA (DS) Ab, QT <1 0 - 9 IU/mL  
 RNP Abs <0.2 0.0 - 0.9 AI Ogden Abs <0.2 0.0 - 0.9 AI Scleroderma-70 Ab <0.2 0.0 - 0.9 AI Sjogren's Anti-SS-A <0.2 0.0 - 0.9 AI Sjogren's Anti-SS-B <0.2 0.0 - 0.9 AI Antichromatin Ab <0.2 0.0 - 0.9 AI Anti-Aury-1 <0.2 0.0 - 0.9 AI Centromere B Ab <0.2 0.0 - 0.9 AI See below Comment Narrative Performed at:  [address]  [id number] : Josselin Ramos MD, Phone:  [phone] HEMOGLOBIN A1C WITH EAG Result Value Ref Range Hemoglobin A1c 7.0 (H) 4.8 - 5.6 % Estimated average glucose 154 mg/dL Narrative Performed at:  [address] [id number] : Eliot Price MD, Phone:  [phone] LIPID PANEL Result Value Ref Range Cholesterol, total 192 100 - 199 mg/dL Triglyceride 184 (H) 0 - 149 mg/dL HDL Cholesterol 47 >39 mg/dL VLDL, calculated 32 5 - 40 mg/dL LDL, calculated 113 (H) 0 - 99 mg/dL Narrative Performed at:  [address]  [id number] : Eliot Price MD, Phone:  [phone] COMPLIANCE DRUG SCREEN/PRESCRIPTION MONITORING Result Value Ref Range Summary FINAL Narrative Performed at:  [address] [place] [address], [address] [facility], [address] : Mariza Clay [facility], Phone:  [phone] CVD REPORT Result Value Ref Range INTERPRETATION Note Narrative Performed at:  [address] [place] [address]  [id number] : Sebastián John MD, Phone:  [phone] RECOMMENDATIONS: 
See rheumatology Please call me if you have any questions: [phone] Sincerely, Susy Schmidt MD

## 2021-04-14 NOTE — PROGRESS NOTES
Subjective:     Azalia Hutchinson is a 62 y.o. female seen for follow-up of diabetes. This is a somewhat new diagnosis sugars have been elevated, Caryn did an A1c on her and its elevated at 7. She is not on any diabetic medications. She has had hypoglycemic attacks. .no  Blood sugar control has been mildly elevated    Lab Results   Component Value Date/Time    Hemoglobin A1c 7.0 (H) 04/09/2021 12:00 AM    Glucose 105 (H) 04/09/2021 12:00 AM    LDL, calculated 113 (H) 04/09/2021 12:00 AM    Creatinine 0.69 04/09/2021 12:00 AM       She has diabetes and hyperlipidemia. Azalia Hutchinson has the additional concern of lots of pain complaints, previously on chronic tramadol which was helpful but that was stopped due to urine drug testing showing evidence of cocaine usage. Has been repeated with no further evidence of recreational drug use. Requests restarting tramadol we do not prescribe chronic narcotics for chronic pain. Labs from today were reviewed  no, labs done previously were reviewed  yes, Labs done in ER were reviewed  no, Additional labs are ordered  yes,  Planes of some right leg pain and redness. That is been a week or so. Reports taking blood pressure medications without side affects. No complaints of exertional chest pain, excessive shortness of breath or focal weakness. Minimal swelling in lower legs or dizziness with standing. Diet and Lifestyle: smoker Half a pack a day.     Patient Active Problem List    Diagnosis Date Noted    Hyperglycemia due to type 2 diabetes mellitus ([facility] 04/14/2021    Chronic cough 10/29/2020    Anxiety state 07/21/2020    Backache 07/21/2020    Chronic obstructive pulmonary disease ([address] 07/21/2020    Hyperlipidemia 07/21/2020    Neck pain 07/21/2020    Osteoarthritis of knee 07/21/2020    Tobacco dependence syndrome 07/21/2020    Adenomatous colon polyp 05/16/2020    Narcotic dependence ([facility] 04/07/2019    Encounter for diagnostic colonoscopy due to change in bowel habits 2019    Elevated liver enzymes 2019    Depression, major, recurrent, mild ([facility] 2017    Restless legs 2017    Tobacco abuse 2017    Spinal stenosis 2017    Knee pain 2014     Allergies   Allergen Reactions    Zolpidem Unknown (comments)     Past Medical History:   Diagnosis Date    Arthritis     Asthma     COPD (chronic obstructive pulmonary disease) ([facility]     Depression     Hemorrhoids     Occult blood positive stool     Spinal stenosis      Past Surgical History:   Procedure Laterality Date    COLONOSCOPY N/A 2019    COLONOSCOPY performed by Ashley Jack MD at [facility] [address]  2016    laminectomy    HX CERVICAL FUSION      HX COLONOSCOPY  2019    4 polyps- 2 tubular adenoma 2 hyperplastic polyp    HX HYSTERECTOMY      MS ABDOMEN SURGERY PROC UNLISTED      hystrectomy     Social History     Tobacco Use    Smoking status: Current Every Day Smoker     Packs/day: 0.50     Years: 35.00     Pack years: 17.50     Types: Cigarettes     Last attempt to quit: 2020     Years since quittin.6    Smokeless tobacco: Never Used   Substance Use Topics    Alcohol use: Yes     Alcohol/week: 19.0 standard drinks     Types: 14 Glasses of wine, 5 Cans of beer per week     Frequency: Monthly or less     Drinks per session: 1 or 2     Binge frequency: Never             Review of Systems  Pertinent items are noted in HPI.     Objective:     Significant for the following: Right leg redness  Visit Vitals  /69 (BP 1 Location: Left arm, BP Patient Position: Sitting, BP Cuff Size: Adult)   Pulse 93   Temp 98.3 °F (36.8 °C) (Temporal)   Resp 18   Ht 5' 7\" (1.702 m)   Wt 183 lb (83 kg)   LMP  (LMP Unknown)   SpO2 95%   BMI 28.66 kg/m²     WD WN female NAD  Heart RRR without murmers clicks or rubs  Lungs CTA  Abdo soft nontender  Ext some redness and swelling in the right leg edema bilateral      Lab review: orders written for new lab studies as appropriate; see orders. Assessment/Plan:     Follow-up diabetes stable, reasonably well controlled, needs to quit smoking. Diabetic issues reviewed with her: all medications, side effects and compliance discussed carefully, glycohemoglobin and other lab monitoring discussed, patient urged in the strongest terms to quit smoking and labs immediately prior to next visit. ICD-10-CM ICD-9-CM    1. Arthritis  M19.90 716.90 REFERRAL TO RHEUMATOLOGY      traMADoL (ULTRAM) 50 mg tablet   2. Cellulitis of right lower extremity  L03.115 682.6    3. Depression, major, recurrent, mild (HCC)  F33.0 296.31    4. Chronic obstructive pulmonary disease, unspecified COPD type ([facility]  J44.9 496    5. Cellulitis and abscess of right leg  L03.115 682.6 cephALEXin (KEFLEX) 500 mg capsule    L02.415     6. Elevated liver enzymes  R74.8 790.5 HEP B SURFACE AG      HEPATITIS C AB, RFLX TO QT BY PCR      RPR      HIV 1/2 AG/AB, 4TH GENERATION,W RFLX CONFIRM      HEP B SURFACE AG      HEPATITIS C AB, RFLX TO QT BY PCR      RPR      HIV 1/2 AG/AB, 4TH GENERATION,W RFLX CONFIRM   7.  Type 2 diabetes mellitus with hyperglycemia, without long-term current use of insulin (HCC)  E11.65 250.00 MICROALBUMIN, UR, RAND W/ MICROALB/CREAT RATIO     790.29 AMB POC URINALYSIS DIP STICK MANUAL W/O MICRO      MICROALBUMIN, UR, RAND W/ MICROALB/CREAT RATIO       Orders Placed This Encounter    HEP B SURFACE AG     Standing Status:   Future     Number of Occurrences:   1     Standing Expiration Date:   10/13/2021    HEPATITIS C AB, RFLX TO QT BY PCR     Standing Status:   Future     Number of Occurrences:   1     Standing Expiration Date:   10/14/2021    RPR     Standing Status:   Future     Number of Occurrences:   1     Standing Expiration Date:   10/13/2021    HIV 1/2 AG/AB, 4TH GENERATION,W RFLX CONFIRM     Standing Status:   Future     Number of Occurrences:   1     Standing Expiration Date: 4/14/2022    MICROALBUMIN, UR, RAND W/ MICROALB/CREAT RATIO     Standing Status:   Future     Number of Occurrences:   1     Standing Expiration Date:   4/14/2022   Elena Ramirez Arthritis [facility]     Referral Priority:   Routine     Referral Type:   Consultation     Referral Reason:   Specialty Services Required     Referred to Provider:   Pari Silveira MD     Number of Visits Requested:   1    AMB POC URINALYSIS DIP STICK MANUAL W/O MICRO    traMADoL (ULTRAM) 50 mg tablet     Sig: Take 1 Tab by mouth every eight (8) hours as needed for Pain for up to 10 days. Max Daily Amount: 150 mg. Dispense:  30 Tab     Refill:  0    cephALEXin (KEFLEX) 500 mg capsule     Sig: Take 1 Cap by mouth three (3) times daily for 7 days. Dispense:  21 Cap     Refill:  0     Okay short-term treatment with tramadol take antibiotic as above  Given positive rheumatoid factor we will have her see rheumatology  Work-up of elevated liver enzymes as above  Continue diet for newly diagnosed diabetes    Chronic Conditions Addressed Today     1. Hyperglycemia due to type 2 diabetes mellitus ([facility]     Relevant Orders     MICROALBUMIN, UR, RAND W/ MICROALB/CREAT RATIO (Completed)    2. Elevated liver enzymes     Relevant Orders     HEP B SURFACE AG     HEPATITIS C AB, RFLX TO QT BY PCR     RPR     HIV 1/2 AG/AB, 4TH GENERATION,W RFLX CONFIRM    3. Depression, major, recurrent, mild (HCC)     Relevant Medications     traMADoL (ULTRAM) 50 mg tablet    4.  Chronic obstructive pulmonary disease (HCC)      Acute Diagnoses Addressed Today     Arthritis    -  Primary        Relevant Medications        traMADoL (ULTRAM) 50 mg tablet        Other Relevant Orders        REFERRAL TO RHEUMATOLOGY    Cellulitis of right lower extremity        Cellulitis and abscess of right leg            Relevant Medications        cephALEXin (KEFLEX) 500 mg capsule        Current Outpatient Medications   Medication Sig Dispense Refill    traMADoL (ULTRAM) 50 mg tablet Take 1 Tab by mouth every eight (8) hours as needed for Pain for up to 10 days. Max Daily Amount: 150 mg. 30 Tab 0    cephALEXin (KEFLEX) 500 mg capsule Take 1 Cap by mouth three (3) times daily for 7 days. 21 Cap 0    diphenoxylate-atropine (LOMOTIL) 2.5-0.025 mg per tablet TAKE 1 TABLET BY MOUTH 4 TIMES DAILY AS NEEDED FOR DIARRHEA . DO NOT EXCEED 4 PER 24 HOURS 12 Tab 0    diclofenac (VOLTAREN) 1 % gel APPLY   TOPICALLY TO AFFECTED AREA 4 TIMES DAILY 100 g 5    cyclobenzaprine (FLEXERIL) 5 mg tablet Take 1 Tab by mouth nightly as needed for Muscle Spasm(s). Indications: muscle spasm 30 Tab 2    lidocaine (LIDODERM) 5 % 2 Patches by TransDERmal route every twelve (12) hours. Apply patch to the affected area for 12 hours a day and remove for 12 hours a day. 1 Each 5    gabapentin (NEURONTIN) 600 mg tablet Take 1 Tab by mouth three (3) times daily. Max Daily Amount: 1,800 mg. 90 Tab 1    diclofenac EC (VOLTAREN) 50 mg EC tablet Take 1 Tab by mouth two (2) times a day. As needed 60 Tab 1    DULoxetine (CYMBALTA) 60 mg capsule Take 1 capsule by mouth once daily 30 Cap 5    benzonatate (TESSALON) 200 mg capsule TAKE 1 CAPSULE BY MOUTH THREE TIMES DAILY AS NEEDED WITH FOOD FOR COUGH FOR UP TO 7 DAYS 60 Cap 0    Nebulizer & Compressor machine 1 Each by Does Not Apply route every four (4) hours as needed for Wheezing or Shortness of Breath. 1 Each 0    albuterol-ipratropium (DUO-NEB) 2.5 mg-0.5 mg/3 ml nebu 3 mL by Nebulization route every four (4) hours as needed for Wheezing, Shortness of Breath or Cough. Use with a flare up of COPD. Indications: bronchi muscle spasm resulting from COPD 30 Nebule 1    umeclidinium-vilanteroL (ANORO ELLIPTA) 62.5-25 mcg/actuation inhaler Take 1 Puff by inhalation daily.  1 Inhaler 5    albuterol (PROVENTIL HFA, VENTOLIN HFA, PROAIR HFA) 90 mcg/actuation inhaler INHALE ONE PUFF BY MOUTH EVERY 6 HOURS AS NEEDED FOR WHEEZING 1 Inhaler 2    albuterol (PROVENTIL VENTOLIN) 2.5 mg /3 mL (0.083 %) nebu 3 mL by Nebulization route every four (4) hours as needed for Wheezing. 30 Nebule 3    nicotine (NICODERM CQ) 21 mg/24 hr APPLY 1 PATCH TOPICALLY EVERY 24 HOURS FOR 30 DAYS 30 Patch 0    ondansetron (Zofran ODT) 4 mg disintegrating tablet Take 1 Tab by mouth every eight (8) hours as needed for Nausea or Vomiting for up to 10 doses. 10 Tab 0    naloxone (NARCAN) 4 mg/actuation nasal spray 1 Spray by Nasal route.  calcium combo no.2-vitamin D3 600 mg calcium- 500 unit TbER Take 1 Each by mouth daily. 90 Tab 1    buPROPion (WELLBUTRIN) 100 mg tablet Take 1 Tab by mouth three (3) times daily.  90 Tab 5       Follow-up 1 month

## 2021-04-14 NOTE — PROGRESS NOTES
Chief Complaint   Patient presents with   [facility] Knee Pain     pain in both knees and shoulders - wants injections     Patient has not been out of the country in (14 months), NO diarrhea, NO cough, NO chest conjestion, NO temp. Pt has not been around anyone with these symptoms. Health Maintenance reviewed. I have reviewed the patient's medical history in detail and updated the computerized patient record. 1. Have you been to the ER, urgent care clinic since your last visit? Hospitalized since your last visit? 2. Have you seen or consulted any other health care providers outside of the [address] [place] [address] since your last visit? Include any pap smears or colon screening. Encouraged pt to discuss pt's wishes with spouse/partner/family and bring them in the next appt to follow thru with the Advanced Directive    @  [address], last 12 mths 4/2/2021   Able to walk? Yes   Fall in past 12 months? 0   Do you feel unsteady? 1   Are you worried about falling 0   Number of falls in past 12 months -   Fall with injury?  -       3 most recent PHQ Screens 4/14/2021   Little interest or pleasure in doing things Nearly every day   Feeling down, depressed, irritable, or hopeless Nearly every day   Total Score PHQ 2 6   Trouble falling or staying asleep, or sleeping too much -   Feeling tired or having little energy -   Poor appetite, weight loss, or overeating -   Feeling bad about yourself - or that you are a failure or have let yourself or your family down -   Trouble concentrating on things such as school, work, reading, or watching TV -   Moving or speaking so slowly that other people could have noticed; or the opposite being so fidgety that others notice -   Thoughts of being better off dead, or hurting yourself in some way -   How difficult have these problems made it for you to do your work, take care of your home and get along with others -       Abuse Screening Questionnaire 4/14/2021 Do you ever feel afraid of your partner? N   Are you in a relationship with someone who physically or mentally threatens you? N   Is it safe for you to go home?  Y       ADL Assessment 4/14/2021   Feeding yourself No Help Needed   Getting from bed to chair No Help Needed   Getting dressed No Help Needed   Bathing or showering No Help Needed   Walk across the room (includes cane/walker) No Help Needed   Using the telphone No Help Needed   Taking your medications No Help Needed   Preparing meals No Help Needed   Managing money (expenses/bills) No Help Needed   Moderately strenuous housework (laundry) No Help Needed   Shopping for personal items (toiletries/medicines) Help Needed   Shopping for groceries Help Needed   Driving Help Needed   Climbing a flight of stairs No Help Needed   Getting to places beyond walking distances Help Needed

## 2021-04-15 LAB
ALBUMIN SERPL-MCNC: 4.1 G/DL (ref 3.8–4.9)
ALBUMIN/CREAT UR: 6 MG/G CREAT (ref 0–29)
ALBUMIN/GLOB SERPL: 1.6 {RATIO} (ref 1.2–2.2)
ALP SERPL-CCNC: 98 IU/L (ref 39–117)
ALT SERPL-CCNC: 188 IU/L (ref 0–32)
AST SERPL-CCNC: 111 IU/L (ref 0–40)
BASOPHILS # BLD AUTO: 0 X10E3/UL (ref 0–0.2)
BASOPHILS NFR BLD AUTO: 1 %
BILIRUB SERPL-MCNC: 0.3 MG/DL (ref 0–1.2)
BUN SERPL-MCNC: 9 MG/DL (ref 6–24)
BUN/CREAT SERPL: 13 (ref 9–23)
CALCIUM SERPL-MCNC: 8.5 MG/DL (ref 8.7–10.2)
CCP IGA+IGG SERPL IA-ACNC: 4 UNITS (ref 0–19)
CENTROMERE B AB SER-ACNC: <0.2 AI (ref 0–0.9)
CHLORIDE SERPL-SCNC: 104 MMOL/L (ref 96–106)
CHOLEST SERPL-MCNC: 192 MG/DL (ref 100–199)
CHROMATIN AB SERPL-ACNC: <0.2 AI (ref 0–0.9)
CK SERPL-CCNC: 73 U/L (ref 32–182)
CO2 SERPL-SCNC: 26 MMOL/L (ref 20–29)
CREAT SERPL-MCNC: 0.69 MG/DL (ref 0.57–1)
CREAT UR-MCNC: 118.9 MG/DL
CRP SERPL-MCNC: 2 MG/L (ref 0–10)
DSDNA AB SER-ACNC: <1 IU/ML (ref 0–9)
ENA JO1 AB SER-ACNC: <0.2 AI (ref 0–0.9)
ENA RNP AB SER-ACNC: <0.2 AI (ref 0–0.9)
ENA SCL70 AB SER-ACNC: <0.2 AI (ref 0–0.9)
ENA SM AB SER-ACNC: <0.2 AI (ref 0–0.9)
ENA SS-A AB SER-ACNC: <0.2 AI (ref 0–0.9)
ENA SS-B AB SER-ACNC: <0.2 AI (ref 0–0.9)
EOSINOPHIL # BLD AUTO: 0.3 X10E3/UL (ref 0–0.4)
EOSINOPHIL NFR BLD AUTO: 4 %
ERYTHROCYTE [DISTWIDTH] IN BLOOD BY AUTOMATED COUNT: 13.2 % (ref 11.7–15.4)
ERYTHROCYTE [SEDIMENTATION RATE] IN BLOOD BY WESTERGREN METHOD: 10 MM/HR (ref 0–40)
EST. AVERAGE GLUCOSE BLD GHB EST-MCNC: 154 MG/DL
GLOBULIN SER CALC-MCNC: 2.5 G/DL (ref 1.5–4.5)
GLUCOSE SERPL-MCNC: 105 MG/DL (ref 65–99)
HBA1C MFR BLD: 7 % (ref 4.8–5.6)
HCT VFR BLD AUTO: 45.4 % (ref 34–46.6)
HDLC SERPL-MCNC: 47 MG/DL
HGB BLD-MCNC: 15.2 G/DL (ref 11.1–15.9)
IMM GRANULOCYTES # BLD AUTO: 0 X10E3/UL (ref 0–0.1)
IMM GRANULOCYTES NFR BLD AUTO: 0 %
IMP & REVIEW OF LAB RESULTS: NORMAL
LDLC SERPL CALC-MCNC: 113 MG/DL (ref 0–99)
LYMPHOCYTES # BLD AUTO: 2.1 X10E3/UL (ref 0.7–3.1)
LYMPHOCYTES NFR BLD AUTO: 24 %
MCH RBC QN AUTO: 32.6 PG (ref 26.6–33)
MCHC RBC AUTO-ENTMCNC: 33.5 G/DL (ref 31.5–35.7)
MCV RBC AUTO: 97 FL (ref 79–97)
MICROALBUMIN UR-MCNC: 6.9 UG/ML
MONOCYTES # BLD AUTO: 0.8 X10E3/UL (ref 0.1–0.9)
MONOCYTES NFR BLD AUTO: 9 %
NEUTROPHILS # BLD AUTO: 5.5 X10E3/UL (ref 1.4–7)
NEUTROPHILS NFR BLD AUTO: 62 %
PLATELET # BLD AUTO: 191 X10E3/UL (ref 150–450)
POTASSIUM SERPL-SCNC: 3.9 MMOL/L (ref 3.5–5.2)
PROT SERPL-MCNC: 6.6 G/DL (ref 6–8.5)
RBC # BLD AUTO: 4.66 X10E6/UL (ref 3.77–5.28)
RHEUMATOID FACT SERPL-ACNC: 72.8 IU/ML (ref 0–13.9)
SEE BELOW, 164869: NORMAL
SODIUM SERPL-SCNC: 142 MMOL/L (ref 134–144)
TRIGL SERPL-MCNC: 184 MG/DL (ref 0–149)
TSH SERPL DL<=0.005 MIU/L-ACNC: 0.73 UIU/ML (ref 0.45–4.5)
VLDLC SERPL CALC-MCNC: 32 MG/DL (ref 5–40)
WBC # BLD AUTO: 8.7 X10E3/UL (ref 3.4–10.8)

## 2021-04-16 ENCOUNTER — HOSPITAL ENCOUNTER (OUTPATIENT)
Dept: LAB | Age: 61
Discharge: HOME | End: 2021-04-16

## 2021-04-19 LAB
DIAGNOSTIC IMP SPEC-IMP: NORMAL
HBV SURFACE AG SERPL QL IA: NEGATIVE
HCV AB S/CO SERPL IA: >11 S/CO RATIO (ref 0–0.9)
HCV RNA SERPL NAA+PROBE-ACNC: NORMAL IU/ML
HIV 1+2 AB+HIV1 P24 AG SERPL QL IA: NON REACTIVE
REF LAB TEST REF RANGE: NORMAL
RPR SER QL: NON REACTIVE

## 2021-04-21 NOTE — TELEPHONE ENCOUNTER
+ antibody test will get PCR  Pt's BF who passed away had Hep C, she has not been tested  She will get the test to see if she has it.

## 2021-04-21 NOTE — PROGRESS NOTES
Send results letter as below:  Your recent lab showed the following abnomality hepatitis C exposure but not necessarily hepatitis C infection. We need you to return to re-check the laboratory value again in the next week or so. The test that I have ordered will tell us whether you have hepatitis C infection, as we discussed.

## 2021-04-22 ENCOUNTER — HOSPITAL ENCOUNTER (OUTPATIENT)
Dept: LAB | Age: 61
Discharge: HOME | End: 2021-04-22

## 2021-04-23 ENCOUNTER — TELEPHONE (OUTPATIENT)
Dept: INTERNAL MEDICINE CLINIC | Age: 61
End: 2021-04-23

## 2021-04-23 DIAGNOSIS — B18.2 CHRONIC HEPATITIS C WITHOUT HEPATIC COMA (HCC): Primary | ICD-10-CM

## 2021-04-23 LAB
HCV RNA SERPL NAA+PROBE-ACNC: NORMAL IU/ML
HCV RNA SERPL NAA+PROBE-LOG IU: 5.21 LOG10 IU/ML
TEST INFORMATION: NORMAL

## 2021-04-23 NOTE — TELEPHONE ENCOUNTER
Message fr cuevas        Reason for call: She is following up rx refill and glucometer machine. She would like an update. She called yesterday.      Callback required yes/no and why: Yes     Best contact number(s):[phone]

## 2021-04-26 ENCOUNTER — VIRTUAL VISIT (OUTPATIENT)
Dept: INTERNAL MEDICINE CLINIC | Age: 61
End: 2021-04-26
Payer: MEDICAID

## 2021-04-26 DIAGNOSIS — F11.90 CHRONIC NARCOTIC USE: ICD-10-CM

## 2021-04-26 DIAGNOSIS — F41.9 ANXIETY: ICD-10-CM

## 2021-04-26 DIAGNOSIS — J44.9 CHRONIC OBSTRUCTIVE PULMONARY DISEASE, UNSPECIFIED COPD TYPE (HCC): ICD-10-CM

## 2021-04-26 DIAGNOSIS — B18.2 HEPATITIS C VIRUS CARRIER STATE (HCC): Primary | ICD-10-CM

## 2021-04-26 PROCEDURE — 99214 OFFICE O/P EST MOD 30 MIN: CPT | Performed by: FAMILY MEDICINE

## 2021-04-28 RX ORDER — PREDNISONE 20 MG/1
TABLET ORAL
Qty: 10 TAB | Refills: 0 | Status: SHIPPED | OUTPATIENT
Start: 2021-04-28 | End: 2021-05-20

## 2021-04-30 NOTE — PROGRESS NOTES
Your liver enzymes remain elevated. Rheumatoid factor elevated. Follow up with rheumatologist.  A1C 7.0 ( last 3 mths blood sugar). Diabetes. Per discussion with Dr. Edgardo Barreto. Decrease sugars and carbohydrates in diet. Please send letter and follow up as scheduled.

## 2021-05-07 ENCOUNTER — VIRTUAL VISIT (OUTPATIENT)
Dept: INTERNAL MEDICINE CLINIC | Age: 61
End: 2021-05-07

## 2021-05-07 DIAGNOSIS — Z72.0 TOBACCO ABUSE: ICD-10-CM

## 2021-05-07 DIAGNOSIS — L21.9 SEBORRHEIC DERMATITIS OF SCALP: Primary | ICD-10-CM

## 2021-05-07 NOTE — PROGRESS NOTES
Chief Complaint   Patient presents with    Abdominal Pain     diarrhea and severe abd cramps     Patient has not been out of the country in (14 months), NO diarrhea, NO cough, NO chest conjestion, NO temp. Pt has not been around anyone with these symptoms. Health Maintenance reviewed. I have reviewed the patient's medical history in detail and updated the computerized patient record. 1. Have you been to the ER, urgent care clinic since your last visit? Hospitalized since your last visit? 2. Have you seen or consulted any other health care providers outside of the [address] [place] [address] since your last visit? Include any pap smears or colon screening. Encouraged pt to discuss pt's wishes with spouse/partner/family and bring them in the next appt to follow thru with the Advanced Directive    @  [address], last 12 mths 4/2/2021   Able to walk? Yes   Fall in past 12 months? 0   Do you feel unsteady? 1   Are you worried about falling 0   Number of falls in past 12 months -   Fall with injury?  -       3 most recent PHQ Screens 5/7/2021   Little interest or pleasure in doing things More than half the days   Feeling down, depressed, irritable, or hopeless More than half the days   Total Score PHQ 2 4   Trouble falling or staying asleep, or sleeping too much -   Feeling tired or having little energy -   Poor appetite, weight loss, or overeating -   Feeling bad about yourself - or that you are a failure or have let yourself or your family down -   Trouble concentrating on things such as school, work, reading, or watching TV -   Moving or speaking so slowly that other people could have noticed; or the opposite being so fidgety that others notice -   Thoughts of being better off dead, or hurting yourself in some way -   How difficult have these problems made it for you to do your work, take care of your home and get along with others -       Abuse Screening Questionnaire 5/7/2021   Do you ever feel afraid of your partner? N   Are you in a relationship with someone who physically or mentally threatens you? N   Is it safe for you to go home?  Y       ADL Assessment 5/7/2021   Feeding yourself No Help Needed   Getting from bed to chair No Help Needed   Getting dressed No Help Needed   Bathing or showering No Help Needed   Walk across the room (includes cane/walker) No Help Needed   Using the telphone No Help Needed   Taking your medications No Help Needed   Preparing meals No Help Needed   Managing money (expenses/bills) No Help Needed   Moderately strenuous housework (laundry) Help Needed   Shopping for personal items (toiletries/medicines) Help Needed   Shopping for groceries Help Needed   Driving Help Needed   Climbing a flight of stairs No Help Needed   Getting to places beyond walking distances Help Needed

## 2021-05-20 RX ORDER — PREDNISONE 20 MG/1
TABLET ORAL
Qty: 10 TABLET | Refills: 0 | Status: SHIPPED | OUTPATIENT
Start: 2021-05-20 | End: 2021-06-30 | Stop reason: SDUPTHER

## 2021-06-30 ENCOUNTER — VIRTUAL VISIT (OUTPATIENT)
Dept: INTERNAL MEDICINE CLINIC | Age: 61
End: 2021-06-30
Payer: MEDICAID

## 2021-06-30 DIAGNOSIS — M48.00 SPINAL STENOSIS, UNSPECIFIED SPINAL REGION: ICD-10-CM

## 2021-06-30 DIAGNOSIS — Z12.39 ENCOUNTER FOR SCREENING FOR MALIGNANT NEOPLASM OF BREAST, UNSPECIFIED SCREENING MODALITY: ICD-10-CM

## 2021-06-30 DIAGNOSIS — J44.9 CHRONIC OBSTRUCTIVE PULMONARY DISEASE, UNSPECIFIED COPD TYPE (HCC): ICD-10-CM

## 2021-06-30 DIAGNOSIS — R11.2 NAUSEA AND VOMITING, INTRACTABILITY OF VOMITING NOT SPECIFIED, UNSPECIFIED VOMITING TYPE: ICD-10-CM

## 2021-06-30 DIAGNOSIS — R11.0 NAUSEA: Primary | ICD-10-CM

## 2021-06-30 DIAGNOSIS — E11.65 TYPE 2 DIABETES MELLITUS WITH HYPERGLYCEMIA, WITHOUT LONG-TERM CURRENT USE OF INSULIN (HCC): ICD-10-CM

## 2021-06-30 DIAGNOSIS — J44.1 COPD EXACERBATION (HCC): ICD-10-CM

## 2021-06-30 DIAGNOSIS — Z72.0 TOBACCO ABUSE: ICD-10-CM

## 2021-06-30 PROCEDURE — 99214 OFFICE O/P EST MOD 30 MIN: CPT | Performed by: FAMILY MEDICINE

## 2021-06-30 RX ORDER — BUPROPION HYDROCHLORIDE 100 MG/1
100 TABLET ORAL 3 TIMES DAILY
Qty: 90 TABLET | Refills: 5 | Status: SHIPPED | OUTPATIENT
Start: 2021-06-30 | End: 2022-01-27 | Stop reason: SDUPTHER

## 2021-06-30 RX ORDER — PREDNISONE 20 MG/1
40 TABLET ORAL DAILY
Qty: 10 TABLET | Refills: 0 | Status: SHIPPED | OUTPATIENT
Start: 2021-06-30 | End: 2021-07-05

## 2021-06-30 RX ORDER — GABAPENTIN 600 MG/1
600 TABLET ORAL 3 TIMES DAILY
Qty: 90 TABLET | Refills: 1 | Status: SHIPPED | OUTPATIENT
Start: 2021-06-30 | End: 2021-11-10

## 2021-06-30 RX ORDER — ONDANSETRON 4 MG/1
4 TABLET, ORALLY DISINTEGRATING ORAL
Qty: 20 TABLET | Refills: 0 | Status: SHIPPED | OUTPATIENT
Start: 2021-06-30 | End: 2021-08-30

## 2021-06-30 RX ORDER — IPRATROPIUM BROMIDE AND ALBUTEROL SULFATE 2.5; .5 MG/3ML; MG/3ML
3 SOLUTION RESPIRATORY (INHALATION)
Qty: 50 NEBULE | Refills: 5 | Status: SHIPPED | OUTPATIENT
Start: 2021-06-30 | End: 2022-01-27 | Stop reason: SDUPTHER

## 2021-06-30 NOTE — PROGRESS NOTES
Chief Complaint   Patient presents with    Hepatitis B     Discuss Hepatitis referral     Patient has not been out of the country in (14 months), NO diarrhea, NO cough, NO chest conjestion, NO temp. Pt has not been around anyone with these symptoms. Health Maintenance reviewed. I have reviewed the patient's medical history in detail and updated the computerized patient record. 1. Have you been to the ER, urgent care clinic since your last visit?  noHospitalized since your last visit?  no    2. Have you seen or consulted any other health care providers outside of the [address] [place] [address] since your last visit? No  Include any pap smears or colon screening. Encouraged pt to discuss pt's wishes with spouse/partner/family and bring them in the next appt to follow thru with the Advanced Directive    @  [address], last 12 mths 4/2/2021   Able to walk? Yes   Fall in past 12 months? 0   Do you feel unsteady? 1   Are you worried about falling 0   Number of falls in past 12 months -   Fall with injury?  -       3 most recent PHQ Screens 6/30/2021   Little interest or pleasure in doing things Several days   Feeling down, depressed, irritable, or hopeless Several days   Total Score PHQ 2 2   Trouble falling or staying asleep, or sleeping too much -   Feeling tired or having little energy -   Poor appetite, weight loss, or overeating -   Feeling bad about yourself - or that you are a failure or have let yourself or your family down -   Trouble concentrating on things such as school, work, reading, or watching TV -   Moving or speaking so slowly that other people could have noticed; or the opposite being so fidgety that others notice -   Thoughts of being better off dead, or hurting yourself in some way -   How difficult have these problems made it for you to do your work, take care of your home and get along with others -       Abuse Screening Questionnaire 6/30/2021   Do you ever feel afraid of your partner? N   Are you in a relationship with someone who physically or mentally threatens you? N   Is it safe for you to go home?  Y       ADL Assessment 6/30/2021   Feeding yourself No Help Needed   Getting from bed to chair No Help Needed   Getting dressed No Help Needed   Bathing or showering No Help Needed   Walk across the room (includes cane/walker) No Help Needed   Using the telphone No Help Needed   Taking your medications No Help Needed   Preparing meals No Help Needed   Managing money (expenses/bills) No Help Needed   Moderately strenuous housework (laundry) Help Needed   Shopping for personal items (toiletries/medicines) Help Needed   Shopping for groceries Help Needed   Driving Help Needed   Climbing a flight of stairs No Help Needed   Getting to places beyond walking distances Help Needed

## 2021-07-02 NOTE — PROGRESS NOTES
Subjective:     Chief Complaint   Patient presents with    Hepatitis B     Discuss Hepatitis referral        She  is a 62y.o. year old female who presents for evaluation of as above, she is already been referred to hepatology. Gave her the number to call to get treated for hepatitis    ROS:  Needs refills of her gabapentin. Is working okay pain is not too bad. She has been wheezing a little bit more lately, requests refills of her inhalers. She is still smoking. Gets nauseous frequently requests something for that. Allergies   Allergen Reactions    Zolpidem Unknown (comments)      Social History     Socioeconomic History    Marital status: LEGALLY      Spouse name: Not on file    Number of children: 2    Years of education: Not on file    Highest education level: 10th grade   Occupational History    Occupation: disabled   Tobacco Use    Smoking status: Current Every Day Smoker     Packs/day: 0.50     Years: 35.00     Pack years: 17.50     Types: Cigarettes     Last attempt to quit: 2020     Years since quittin.8    Smokeless tobacco: Never Used   Substance and Sexual Activity    Alcohol use: Yes     Alcohol/week: 19.0 standard drinks     Types: 14 Glasses of wine, 5 Cans of beer per week    Drug use: Not Currently     Types: Cocaine    Sexual activity: Not Currently     Partners: Male   Other Topics Concern     Service No    Blood Transfusions No    Seat Belt Yes    Self-Exams Yes   Social History Narrative    Lives with friends     Social Determinants of Health     Financial Resource Strain:     Difficulty of Paying Living Expenses:    Food Insecurity:     Worried About Running Out of Food in the Last Year:     Ran Out of Food in the Last Year:    Transportation Needs:     Lack of Transportation (Medical):      Lack of Transportation (Non-Medical):    Physical Activity:     Days of Exercise per Week:     Minutes of Exercise per Session:    Stress:     Feeling of Stress :    Social Connections:     Frequency of Communication with Friends and Family:     Frequency of Social Gatherings with Friends and Family:     Attends Baptism Services:     Active Member of Clubs or Organizations:     Attends Club or Organization Meetings:     Marital Status:       Family History   Problem Relation Age of Onset    Cancer Mother         colon    Cancer Father 79        lung    Diabetes Sister     Cancer Brother 27        lymphoma      Past Surgical History:   Procedure Laterality Date    COLONOSCOPY N/A 5/7/2019    COLONOSCOPY performed by Brenda Vanessa MD at [address]  09/2016    laminectomy    HX CERVICAL FUSION      HX COLONOSCOPY  05/07/2019    4 polyps- 2 tubular adenoma 2 hyperplastic polyp    HX HYSTERECTOMY      CA ABDOMEN SURGERY PROC UNLISTED      hystrectomy      Past Medical History:   Diagnosis Date    Arthritis     Asthma     COPD [facility]chronic obstructive pulmonary disease) [facility] [address]     Depression     Hemorrhoids     Occult blood positive stool     Spinal stenosis             Objective:     Physical Examination:  Visit Vitals  LMP  (LMP Unknown)   -    - General: pleasant, no distress, good eye contact  - Mental status:  Normal mood, behavior, speech, dress, motor activity and thought processes  - Psychiatric: normal mood and affect  -       Labs/Procedures:    No results found for any visits on 06/30/21. Assessment/ Plan:       ICD-10-CM ICD-9-CM    1. Nausea  R11.0 787.02    2. Spinal stenosis, unspecified spinal region  M48.00 724.00 gabapentin (NEURONTIN) 600 mg tablet   3. Tobacco abuse  Z72.0 305.1 buPROPion (WELLBUTRIN) 100 mg tablet   4. Nausea and vomiting, intractability of vomiting not specified, unspecified vomiting type  R11.2 787.01 ondansetron (Zofran ODT) 4 mg disintegrating tablet   5.  Type 2 diabetes mellitus with hyperglycemia, without long-term current use of insulin (HCC)  E11.65 250.00      790.29 6. Chronic obstructive pulmonary disease, unspecified COPD type ([facility])  J44.9 496 predniSONE (DELTASONE) 20 mg tablet      umeclidinium-vilanteroL (ANORO ELLIPTA) 62.5-25 mcg/actuation inhaler   7. COPD exacerbation ([facility])  J44.1 491.21 albuterol-ipratropium (DUO-NEB) 2.5 mg-0.5 mg/3 ml nebu   8. Encounter for screening for malignant neoplasm of breast, unspecified screening modality  Z12.39 V76.10 [facility] MAMMO BI SCREENING INCL CAD       I have discussed the diagnosis with the patient and the intended plan as seen in the above orders. The patient has received an after-visit summary and questions were answered concerning future plans. I have discussed medication side effects and warnings with the patient as well. Orders Placed This Encounter    [facility] MAMMO BI SCREENING INCL CAD     Standing Status:   Future     Standing Expiration Date:   7/31/2022     Scheduling Instructions:      [facility] [place]     Order Specific Question:   Reason for Exam     Answer:   screening breast can    gabapentin (NEURONTIN) 600 mg tablet     Sig: Take 1 Tablet by mouth three (3) times daily. Max Daily Amount: 1,800 mg. Dispense:  90 Tablet     Refill:  1    buPROPion (WELLBUTRIN) 100 mg tablet     Sig: Take 1 Tablet by mouth three (3) times daily. quit     Dispense:  90 Tablet     Refill:  5    ondansetron (Zofran ODT) 4 mg disintegrating tablet     Sig: Take 1 Tablet by mouth every eight (8) hours as needed for Nausea or Vomiting for up to 10 doses. Dispense:  20 Tablet     Refill:  0    predniSONE (DELTASONE) 20 mg tablet     Sig: Take 40 mg by mouth daily for 5 days. If needed for wheezing     Dispense:  10 Tablet     Refill:  0    umeclidinium-vilanteroL (ANORO ELLIPTA) 62.5-25 mcg/actuation inhaler     Sig: Take 1 Puff by inhalation daily.      Dispense:  1 Inhaler     Refill:  5    albuterol-ipratropium (DUO-NEB) 2.5 mg-0.5 mg/3 ml nebu     Sig: 3 mL by Nebulization route every six (6) hours as needed for Wheezing, Shortness of Breath or Cough. Use with a flare up of COPD. Indications: bronchi muscle spasm resulting from COPD     Dispense:  50 Nebule     Refill:  5     Current Outpatient Medications   Medication Sig Dispense Refill    gabapentin (NEURONTIN) 600 mg tablet Take 1 Tablet by mouth three (3) times daily. Max Daily Amount: 1,800 mg. 90 Tablet 1    buPROPion (WELLBUTRIN) 100 mg tablet Take 1 Tablet by mouth three (3) times daily. quit 90 Tablet 5    ondansetron (Zofran ODT) 4 mg disintegrating tablet Take 1 Tablet by mouth every eight (8) hours as needed for Nausea or Vomiting for up to 10 doses. 20 Tablet 0    predniSONE (DELTASONE) 20 mg tablet Take 40 mg by mouth daily for 5 days. If needed for wheezing 10 Tablet 0    umeclidinium-vilanteroL (ANORO ELLIPTA) 62.5-25 mcg/actuation inhaler Take 1 Puff by inhalation daily. 1 Inhaler 5    albuterol-ipratropium (DUO-NEB) 2.5 mg-0.5 mg/3 ml nebu 3 mL by Nebulization route every six (6) hours as needed for Wheezing, Shortness of Breath or Cough. Use with a flare up of COPD. Indications: bronchi muscle spasm resulting from COPD 50 Nebule 5    diphenoxylate-atropine (LOMOTIL) 2.5-0.025 mg per tablet TAKE 1 TABLET BY MOUTH 4 TIMES DAILY AS NEEDED FOR DIARRHEA . DO NOT EXCEED 4 PER 24 HOURS 12 Tab 0    diclofenac (VOLTAREN) 1 % gel APPLY   TOPICALLY TO AFFECTED AREA 4 TIMES DAILY 100 g 5    cyclobenzaprine (FLEXERIL) 5 mg tablet Take 1 Tab by mouth nightly as needed for Muscle Spasm(s). Indications: muscle spasm 30 Tab 2    lidocaine (LIDODERM) 5 % 2 Patches by TransDERmal route every twelve (12) hours. Apply patch to the affected area for 12 hours a day and remove for 12 hours a day. 1 Each 5    diclofenac EC (VOLTAREN) 50 mg EC tablet Take 1 Tab by mouth two (2) times a day.  As needed 60 Tab 1    DULoxetine (CYMBALTA) 60 mg capsule Take 1 capsule by mouth once daily 30 Cap 5    benzonatate (TESSALON) 200 mg capsule TAKE 1 CAPSULE BY MOUTH THREE TIMES DAILY AS NEEDED WITH FOOD FOR COUGH FOR UP TO 7 DAYS 60 Cap 0    Nebulizer & Compressor machine 1 Each by Does Not Apply route every four (4) hours as needed for Wheezing or Shortness of Breath. 1 Each 0    albuterol (PROVENTIL HFA, VENTOLIN HFA, PROAIR HFA) 90 mcg/actuation inhaler INHALE ONE PUFF BY MOUTH EVERY 6 HOURS AS NEEDED FOR WHEEZING 1 Inhaler 2    albuterol (PROVENTIL VENTOLIN) 2.5 mg /3 mL (0.083 %) nebu 3 mL by Nebulization route every four (4) hours as needed for Wheezing. 30 Nebule 3    nicotine (NICODERM CQ) 21 mg/24 hr APPLY 1 PATCH TOPICALLY EVERY 24 HOURS FOR 30 DAYS 30 Patch 0    naloxone (NARCAN) 4 mg/actuation nasal spray 1 Spray by Nasal route.  calcium combo no.2-vitamin D3 600 mg calcium- 500 unit TbER Take 1 Each by mouth daily. 90 Tab 1     The patient was counseled on the dangers of tobacco use, and was advised to quit. Reviewed strategies to maximize success, including pharmacotherapy (Wellbutrin). Follow-up 6 weeks     Anya Erickson, who was evaluated through a synchronous (real-time) audio-video encounter, and/or her healthcare decision maker, is aware that it is a billable service, with coverage as determined by her insurance carrier. She provided verbal consent to proceed: Yes, and patient identification was verified. This visit was conducted pursuant to the emergency declaration under the [address] [place] [address] and the [employer] Act. A caregiver was present when appropriate. Ability to conduct physical exam was limited. The patient was located in a state where the provider was credentialed to provide care.      --Sam Garrison MD on 7/2/2021 at 12:31 PM

## 2021-07-15 ENCOUNTER — TELEPHONE (OUTPATIENT)
Dept: INTERNAL MEDICINE CLINIC | Age: 61
End: 2021-07-15

## 2021-07-15 NOTE — TELEPHONE ENCOUNTER
Message fr cuevas        Caller's first and last name: Silvia Soriano [id number]     Reason for call: Status of fax. Callback required yes/no and why: yes     Best contact number(s): [phone]       Details to clarify the request: Caller stated this paperwork was for medical necessity for incontinence supplies. Faxed to the office 6/22/21.

## 2021-07-22 ENCOUNTER — TELEPHONE (OUTPATIENT)
Dept: INTERNAL MEDICINE CLINIC | Age: 61
End: 2021-07-22

## 2021-08-16 ENCOUNTER — TELEPHONE (OUTPATIENT)
Dept: INTERNAL MEDICINE CLINIC | Age: 61
End: 2021-08-16

## 2021-08-16 NOTE — TELEPHONE ENCOUNTER
Message fr cuevas          Reason for call: Please fax the approval and/or letter of medical necessity for incontinence care to her home health provider. They are scheduled to come this Wednesday, 8/18/21, and must receive this letter before the appt, or pt will have to be rescheduled. Callback required yes/no and why: No, please fax. Best contact number(s): [phone]     Details to clarify the request: Pt does not have the fax number on hand, but states you will have it on record.

## 2021-08-17 NOTE — TELEPHONE ENCOUNTER
JUANITAMN for incontinence supplies faxed to [address] [place] [address] at [phone] - confirmation of receipt received  Josselyn Arellano LPN  0/01/8443  2:23 AM

## 2021-08-29 DIAGNOSIS — R11.2 NAUSEA AND VOMITING, INTRACTABILITY OF VOMITING NOT SPECIFIED, UNSPECIFIED VOMITING TYPE: ICD-10-CM

## 2021-08-30 RX ORDER — ONDANSETRON 4 MG/1
TABLET, ORALLY DISINTEGRATING ORAL
Qty: 20 TABLET | Refills: 0 | Status: SHIPPED | OUTPATIENT
Start: 2021-08-30 | End: 2021-09-16

## 2021-09-14 ENCOUNTER — TELEPHONE (OUTPATIENT)
Dept: INTERNAL MEDICINE CLINIC | Age: 61
End: 2021-09-14

## 2021-09-14 ENCOUNTER — VIRTUAL VISIT (OUTPATIENT)
Dept: INTERNAL MEDICINE CLINIC | Age: 61
End: 2021-09-14
Payer: MEDICAID

## 2021-09-14 DIAGNOSIS — F41.8 SITUATIONAL ANXIETY: ICD-10-CM

## 2021-09-14 DIAGNOSIS — Z72.0 TOBACCO ABUSE: Primary | ICD-10-CM

## 2021-09-14 DIAGNOSIS — J44.1 COPD EXACERBATION (HCC): ICD-10-CM

## 2021-09-14 DIAGNOSIS — E11.65 TYPE 2 DIABETES MELLITUS WITH HYPERGLYCEMIA, WITHOUT LONG-TERM CURRENT USE OF INSULIN (HCC): ICD-10-CM

## 2021-09-14 PROCEDURE — 99214 OFFICE O/P EST MOD 30 MIN: CPT | Performed by: FAMILY MEDICINE

## 2021-09-14 RX ORDER — DIAZEPAM 2 MG/1
2 TABLET ORAL
Qty: 30 TABLET | Refills: 0 | Status: SHIPPED | OUTPATIENT
Start: 2021-09-14 | End: 2021-10-22

## 2021-09-14 RX ORDER — PREDNISONE 20 MG/1
40 TABLET ORAL
Qty: 10 TABLET | Refills: 0 | Status: SHIPPED | OUTPATIENT
Start: 2021-09-14 | End: 2021-10-27

## 2021-09-14 RX ORDER — BUDESONIDE 0.5 MG/2ML
500 INHALANT ORAL 3 TIMES DAILY
Qty: 50 EACH | Refills: 0 | Status: SHIPPED | OUTPATIENT
Start: 2021-09-14 | End: 2021-11-10

## 2021-09-14 RX ORDER — AZITHROMYCIN 250 MG/1
250 TABLET, FILM COATED ORAL SEE ADMIN INSTRUCTIONS
Qty: 6 TABLET | Refills: 0 | Status: SHIPPED | OUTPATIENT
Start: 2021-09-14 | End: 2021-11-17 | Stop reason: ALTCHOICE

## 2021-09-14 NOTE — PROGRESS NOTES
Damaso Farah, who was evaluated through a synchronous (real-time) audio-video encounter, and/or her healthcare decision maker, is aware that it is a billable service, with coverage as determined by her insurance carrier. She provided verbal consent to proceed: Yes, and patient identification was verified. This visit was conducted pursuant to the emergency declaration under the [facility], [address] [place] [address] and the [employer] Act. A caregiver was present when appropriate. Ability to conduct physical exam was limited. The patient was located in a state where the provider was credentialed to provide care. --Sang Stein MD on 9/14/2021 at 2:32 PM        Subjective:   Damaso Farah is a 62 y.o. female who complains of congestion, sore throat, cough described as productive of dark sputum and hoarseness for 7 days, stable since that time. She denies a history of fevers and loss of smell ear pain. Diarrhea resolved. Evaluation to date: none. Treatment to date: OTC products. Patient does and has dec smoke cigarettes. Relevant PMH: Asthma and COPD. And DM  Lab Results   Component Value Date/Time    Hemoglobin A1c 7.0 (H) 04/09/2021 12:00 AM    Glucose 105 (H) 04/09/2021 12:00 AM    Microalb/Creat ratio (ug/mg creat.) 6 04/14/2021 12:00 AM    LDL, calculated 113 (H) 04/09/2021 12:00 AM    Creatinine 0.69 04/09/2021 12:00 AM       Allergies   Allergen Reactions    Zolpidem Unknown (comments)     Had both covid vaccine, daughter and GS has covid saw them few weeks ago.     Patient Active Problem List    Diagnosis Date Noted    Hepatitis C virus carrier state ([facility] [address] 04/26/2021    Hyperglycemia due to type 2 diabetes mellitus ([facility] [address] 04/14/2021    Chronic cough 10/29/2020    Anxiety state 07/21/2020    Backache 07/21/2020    Chronic obstructive pulmonary disease ([facility] [address] 07/21/2020    Hyperlipidemia 07/21/2020  Neck pain 2020    Osteoarthritis of knee 2020    Tobacco dependence syndrome 2020    Adenomatous colon polyp 2020    Narcotic dependence ([facility] [address] 2019    Encounter for diagnostic colonoscopy due to change in bowel habits 2019    Elevated liver enzymes 2019    Depression, major, recurrent, mild ([facility] [address] 2017    Restless legs 2017    Tobacco abuse 2017    Spinal stenosis 2017    Knee pain 2014       Allergies   Allergen Reactions    Zolpidem Unknown (comments)     Past Medical History:   Diagnosis Date    Arthritis     Asthma     COPD (chronic obstructive pulmonary disease) ([facility] [address]     Depression     Hemorrhoids     Occult blood positive stool     Spinal stenosis      Social History     Tobacco Use    Smoking status: Current Every Day Smoker     Packs/day: 0.50     Years: 35.00     Pack years: 17.50     Types: Cigarettes     Last attempt to quit: 2020     Years since quittin.0    Smokeless tobacco: Never Used   Substance Use Topics    Alcohol use: Yes     Alcohol/week: 19.0 standard drinks     Types: 14 Glasses of wine, 5 Cans of beer per week        Review of Systems  Pertinent items are noted in HPI. Objective:     Visit Vitals  LMP  (LMP Unknown)     General:  alert, fatigued, no distress, coughing   Eyes:    Ears:    Sinuses:    Mouth:     Neck:    Heart:    Lungs:    Abdomen:       Assessment/Plan:   asthma  Antibiotics per orders. RTC in 1 day or PRN. Discussed diagnosis and treatment of viral URIs. Discussed the importance of avoiding unnecessary antibiotic therapy. Encounter Diagnoses   Name Primary?     Tobacco abuse Yes    COPD exacerbation ([facility] [address]     Situational anxiety     Type 2 diabetes mellitus with hyperglycemia, without long-term current use of insulin ([facility])      Orders Placed This Encounter    NOVEL CORONAVIRUS (COVID-19) ([facility])    azithromycin (ZITHROMAX) 250 mg tablet    predniSONE (DELTASONE) 20 mg tablet    budesonide (PULMICORT) 0.5 mg/2 mL nbsp    diazePAM (VALIUM) 2 mg tablet   . If she has Covid disease despite the vaccination probably should get a monoclonal antibodies so needs to come in tomorrow to be tested. Treat her with COPD as above  Okay anxiety medications  Diabetes stable  Follow-up tomorrow to get tested  Follow-up 1 month, routine  The patient was counseled on the dangers of tobacco use, and was advised to quit. Reviewed strategies to maximize success, including pharmacotherapy (med). Current Outpatient Medications   Medication Sig Dispense Refill    azithromycin (ZITHROMAX) 250 mg tablet Take 1 Tablet by mouth See Admin Instructions. Take two tablets today then one tablet daily for next 4 days 6 Tablet 0    predniSONE (DELTASONE) 20 mg tablet Take 40 mg by mouth daily (with breakfast) for 5 days. 10 Tablet 0    budesonide (PULMICORT) 0.5 mg/2 mL nbsp 2 mL by Nebulization route three (3) times daily. 50 Each 0    diazePAM (VALIUM) 2 mg tablet Take 1 Tablet by mouth every six (6) hours as needed for Anxiety. Max Daily Amount: 8 mg. 30 Tablet 0    ondansetron (ZOFRAN ODT) 4 mg disintegrating tablet DISSOLVE 1 TABLET IN MOUTH EVERY 8 HOURS AS NEEDED FOR NAUSEA AND VOMITING FOR  UP  TO  10  DOSES 20 Tablet 0    gabapentin (NEURONTIN) 600 mg tablet Take 1 Tablet by mouth three (3) times daily. Max Daily Amount: 1,800 mg. 90 Tablet 1    buPROPion (WELLBUTRIN) 100 mg tablet Take 1 Tablet by mouth three (3) times daily. quit 90 Tablet 5    umeclidinium-vilanteroL (ANORO ELLIPTA) 62.5-25 mcg/actuation inhaler Take 1 Puff by inhalation daily. 1 Inhaler 5    albuterol-ipratropium (DUO-NEB) 2.5 mg-0.5 mg/3 ml nebu 3 mL by Nebulization route every six (6) hours as needed for Wheezing, Shortness of Breath or Cough. Use with a flare up of COPD.   Indications: bronchi muscle spasm resulting from COPD 50 Nebule 5    diphenoxylate-atropine (LOMOTIL) 2.5-0.025 mg per tablet TAKE 1 TABLET BY MOUTH 4 TIMES DAILY AS NEEDED FOR DIARRHEA . DO NOT EXCEED 4 PER 24 HOURS 12 Tab 0    diclofenac (VOLTAREN) 1 % gel APPLY   TOPICALLY TO AFFECTED AREA 4 TIMES DAILY 100 g 5    cyclobenzaprine (FLEXERIL) 5 mg tablet Take 1 Tab by mouth nightly as needed for Muscle Spasm(s). Indications: muscle spasm 30 Tab 2    lidocaine (LIDODERM) 5 % 2 Patches by TransDERmal route every twelve (12) hours. Apply patch to the affected area for 12 hours a day and remove for 12 hours a day. 1 Each 5    diclofenac EC (VOLTAREN) 50 mg EC tablet Take 1 Tab by mouth two (2) times a day. As needed 60 Tab 1    DULoxetine (CYMBALTA) 60 mg capsule Take 1 capsule by mouth once daily 30 Cap 5    benzonatate (TESSALON) 200 mg capsule TAKE 1 CAPSULE BY MOUTH THREE TIMES DAILY AS NEEDED WITH FOOD FOR COUGH FOR UP TO 7 DAYS 60 Cap 0    Nebulizer & Compressor machine 1 Each by Does Not Apply route every four (4) hours as needed for Wheezing or Shortness of Breath. 1 Each 0    albuterol (PROVENTIL HFA, VENTOLIN HFA, PROAIR HFA) 90 mcg/actuation inhaler INHALE ONE PUFF BY MOUTH EVERY 6 HOURS AS NEEDED FOR WHEEZING 1 Inhaler 2    albuterol (PROVENTIL VENTOLIN) 2.5 mg /3 mL (0.083 %) nebu 3 mL by Nebulization route every four (4) hours as needed for Wheezing. 30 Nebule 3    nicotine (NICODERM CQ) 21 mg/24 hr APPLY 1 PATCH TOPICALLY EVERY 24 HOURS FOR 30 DAYS 30 Patch 0    naloxone (NARCAN) 4 mg/actuation nasal spray 1 Spray by Nasal route.  calcium combo no.2-vitamin D3 600 mg calcium- 500 unit TbER Take 1 Each by mouth daily.  90 Tab 1       Follow-up 1 month

## 2021-09-14 NOTE — PROGRESS NOTES
Chief Complaint   Patient presents with    Cold Symptoms     refills     Patient is aware that this is a Virtual Visit or Phone Call Only doctor's visit. Patient has not been out of the country in (14 months), NO diarrhea, NO cough, NO chest conjestion, NO temp. Pt has not been around anyone with these symptoms. Health Maintenance reviewed. I have reviewed the patient's medical history in detail and updated the computerized patient record. 1. Have you been to the ER, urgent care clinic since your last visit? no  Hospitalized since your last visit?  no    2. Have you seen or consulted any other health care providers outside of the [address] [place] [address] since your last visit? no Include any pap smears or colon screening. Encouraged pt to discuss pt's wishes with spouse/partner/family and bring them in the next appt to follow thru with the Advanced Directive      Fall Risk Assessment, last 12 mths 4/2/2021   Able to walk? Yes   Fall in past 12 months? 0   Do you feel unsteady? 1   Are you worried about falling 0   Number of falls in past 12 months -   Fall with injury?  -       3 most recent PHQ Screens 9/14/2021   Little interest or pleasure in doing things Several days   Feeling down, depressed, irritable, or hopeless Several days   Total Score PHQ 2 2   Trouble falling or staying asleep, or sleeping too much -   Feeling tired or having little energy -   Poor appetite, weight loss, or overeating -   Feeling bad about yourself - or that you are a failure or have let yourself or your family down -   Trouble concentrating on things such as school, work, reading, or watching TV -   Moving or speaking so slowly that other people could have noticed; or the opposite being so fidgety that others notice -   Thoughts of being better off dead, or hurting yourself in some way -   How difficult have these problems made it for you to do your work, take care of your home and get along with others - Abuse Screening Questionnaire 9/14/2021   Do you ever feel afraid of your partner? N   Are you in a relationship with someone who physically or mentally threatens you? N   Is it safe for you to go home?  Y       ADL Assessment 9/14/2021   Feeding yourself No Help Needed   Getting from bed to chair No Help Needed   Getting dressed No Help Needed   Bathing or showering No Help Needed   Walk across the room (includes cane/walker) No Help Needed   Using the telphone No Help Needed   Taking your medications No Help Needed   Preparing meals No Help Needed   Managing money (expenses/bills) No Help Needed   Moderately strenuous housework (laundry) No Help Needed   Shopping for personal items (toiletries/medicines) Help Needed   Shopping for groceries Help Needed   Driving Help Needed   Climbing a flight of stairs No Help Needed   Getting to places beyond walking distances Help Needed

## 2021-10-27 ENCOUNTER — TELEPHONE (OUTPATIENT)
Dept: INTERNAL MEDICINE CLINIC | Age: 61
End: 2021-10-27

## 2021-10-27 RX ORDER — PREDNISONE 20 MG/1
TABLET ORAL
Qty: 10 TABLET | Refills: 0 | Status: SHIPPED | OUTPATIENT
Start: 2021-10-27 | End: 2021-11-17 | Stop reason: ALTCHOICE

## 2021-10-27 NOTE — TELEPHONE ENCOUNTER
----- Message from Colette Paniagua sent at 10/26/2021  1:24 PM EDT -----  Subject: Refill Request    QUESTIONS  Name of Medication? ondansetron (ZOFRAN ODT) 4 mg disintegrating tablet  Patient-reported dosage and instructions? 1 tab every 8 hours   How many days do you have left? 0  Preferred Pharmacy? [address]  Pharmacy phone number (if available)? [phone]  ---------------------------------------------------------------------------  --------------  CALL BACK INFO  What is the best way for the office to contact you? OK to leave message on   voicemail  Preferred Call Back Phone Number?  [phone]

## 2021-10-28 DIAGNOSIS — R11.2 NAUSEA AND VOMITING, INTRACTABILITY OF VOMITING NOT SPECIFIED, UNSPECIFIED VOMITING TYPE: ICD-10-CM

## 2021-10-28 RX ORDER — ONDANSETRON 4 MG/1
TABLET, ORALLY DISINTEGRATING ORAL
Qty: 20 TABLET | Refills: 1 | Status: SHIPPED | OUTPATIENT
Start: 2021-10-28 | End: 2022-07-15

## 2021-11-10 DIAGNOSIS — M48.00 SPINAL STENOSIS, UNSPECIFIED SPINAL REGION: ICD-10-CM

## 2021-11-10 RX ORDER — GABAPENTIN 600 MG/1
TABLET ORAL
Qty: 90 TABLET | Refills: 0 | Status: SHIPPED | OUTPATIENT
Start: 2021-11-10 | End: 2022-01-27 | Stop reason: SDUPTHER

## 2021-11-10 RX ORDER — BUDESONIDE 0.5 MG/2ML
INHALANT ORAL
Qty: 120 ML | Refills: 0 | Status: SHIPPED | OUTPATIENT
Start: 2021-11-10 | End: 2022-01-27 | Stop reason: ALTCHOICE

## 2021-11-17 ENCOUNTER — OFFICE VISIT (OUTPATIENT)
Dept: INTERNAL MEDICINE CLINIC | Age: 61
End: 2021-11-17
Payer: MEDICAID

## 2021-11-17 VITALS
SYSTOLIC BLOOD PRESSURE: 130 MMHG | DIASTOLIC BLOOD PRESSURE: 64 MMHG | HEIGHT: 67 IN | OXYGEN SATURATION: 97 % | HEART RATE: 76 BPM | RESPIRATION RATE: 16 BRPM | WEIGHT: 176 LBS | TEMPERATURE: 98.5 F | BODY MASS INDEX: 27.62 KG/M2

## 2021-11-17 DIAGNOSIS — F33.0 DEPRESSION, MAJOR, RECURRENT, MILD (HCC): ICD-10-CM

## 2021-11-17 DIAGNOSIS — M25.512 PAIN IN JOINT OF LEFT SHOULDER: ICD-10-CM

## 2021-11-17 DIAGNOSIS — E11.65 TYPE 2 DIABETES MELLITUS WITH HYPERGLYCEMIA, WITHOUT LONG-TERM CURRENT USE OF INSULIN (HCC): Primary | ICD-10-CM

## 2021-11-17 DIAGNOSIS — B18.2 HEPATITIS C VIRUS CARRIER STATE (HCC): ICD-10-CM

## 2021-11-17 DIAGNOSIS — F17.200 TOBACCO DEPENDENCE SYNDROME: ICD-10-CM

## 2021-11-17 DIAGNOSIS — J44.9 CHRONIC OBSTRUCTIVE PULMONARY DISEASE, UNSPECIFIED COPD TYPE (HCC): ICD-10-CM

## 2021-11-17 DIAGNOSIS — S39.012A BACK STRAIN, INITIAL ENCOUNTER: ICD-10-CM

## 2021-11-17 DIAGNOSIS — M17.12 ARTHRITIS OF KNEE, LEFT: ICD-10-CM

## 2021-11-17 PROBLEM — M54.9 BACKACHE: Status: RESOLVED | Noted: 2020-07-21 | Resolved: 2021-11-17

## 2021-11-17 PROBLEM — R05.3 CHRONIC COUGH: Status: RESOLVED | Noted: 2020-10-29 | Resolved: 2021-11-17

## 2021-11-17 PROCEDURE — 20610 DRAIN/INJ JOINT/BURSA W/O US: CPT | Performed by: FAMILY MEDICINE

## 2021-11-17 PROCEDURE — 99214 OFFICE O/P EST MOD 30 MIN: CPT | Performed by: FAMILY MEDICINE

## 2021-11-17 RX ORDER — TRIAMCINOLONE ACETONIDE 40 MG/ML
80 INJECTION, SUSPENSION INTRA-ARTICULAR; INTRAMUSCULAR ONCE
Status: COMPLETED | OUTPATIENT
Start: 2021-11-17 | End: 2021-11-17

## 2021-11-17 RX ORDER — TRAMADOL HYDROCHLORIDE 50 MG/1
50 TABLET ORAL
Qty: 30 TABLET | Refills: 0 | Status: SHIPPED | OUTPATIENT
Start: 2021-11-17 | End: 2021-11-24

## 2021-11-17 RX ADMIN — TRIAMCINOLONE ACETONIDE 80 MG: 40 INJECTION, SUSPENSION INTRA-ARTICULAR; INTRAMUSCULAR at 13:52

## 2021-11-17 NOTE — PROGRESS NOTES
Chief Complaint   Patient presents with    Back Pain     Patient has not been out of the country in (14 months), NO diarrhea, NO cough, NO chest conjestion, NO temp. Pt has not been around anyone with these symptoms. Health Maintenance reviewed. I have reviewed the patient's medical history in detail and updated the computerized patient record. 1. Have you been to the ER, urgent care clinic since your last visit? No  Hospitalized since your last visit?  no    2. Have you seen or consulted any other health care providers outside of the [address] [place] [address] since your last visit? No  Include any pap smears or colon screening. Encouraged pt to discuss pt's wishes with spouse/partner/family and bring them in the next appt to follow thru with the Advanced Directive    @  [address], last 12 mths 4/2/2021   Able to walk? Yes   Fall in past 12 months? 0   Do you feel unsteady? 1   Are you worried about falling 0   Number of falls in past 12 months -   Fall with injury? -       3 most recent PHQ Screens 11/17/2021   Little interest or pleasure in doing things Several days   Feeling down, depressed, irritable, or hopeless Several days   Total Score PHQ 2 2   Trouble falling or staying asleep, or sleeping too much -   Feeling tired or having little energy -   Poor appetite, weight loss, or overeating -   Feeling bad about yourself - or that you are a failure or have let yourself or your family down -   Trouble concentrating on things such as school, work, reading, or watching TV -   Moving or speaking so slowly that other people could have noticed; or the opposite being so fidgety that others notice -   Thoughts of being better off dead, or hurting yourself in some way -   How difficult have these problems made it for you to do your work, take care of your home and get along with others -       Abuse Screening Questionnaire 9/14/2021   Do you ever feel afraid of your partner?  N   Are you in a relationship with someone who physically or mentally threatens you? N   Is it safe for you to go home? Y       ADL Assessment 9/14/2021   Feeding yourself No Help Needed   Getting from bed to chair No Help Needed   Getting dressed No Help Needed   Bathing or showering No Help Needed   Walk across the room (includes cane/walker) No Help Needed   Using the telphone No Help Needed   Taking your medications No Help Needed   Preparing meals No Help Needed   Managing money (expenses/bills) No Help Needed   Moderately strenuous housework (laundry) No Help Needed   Shopping for personal items (toiletries/medicines) Help Needed   Shopping for groceries Help Needed   Driving Help Needed   Climbing a flight of stairs No Help Needed   Getting to places beyond walking distances Help Needed         [id number] [facility] PRIMARY CARE  OFFICE PROCEDURE PROGRESS NOTE        Chart reviewed for the following:   Jorge LEBRON LPN, have reviewed the History, Physical and updated the Allergic reactions for [address] performed immediately prior to start of procedure:   Jorge LEBRON LPN, have performed the following reviews on Татьяна Guevara prior to the start of the procedure:            * Patient was identified by name and date of birth   * Agreement on procedure being performed was verified  * Risks and Benefits explained to the patient by Silvia Best. Hanna Teran MD  * Procedure site verified and marked as necessary  * Patient was positioned for comfort  * Consent was signed and verified     Time: 1:55PM      Date of procedure: 11/17/2021    Procedure performed by:  Fred Rayo MD    Provider assisted by: Saran Moreno LPN    Patient assisted by: Cheryle Moreno LPN    How tolerated by patient: Well    Post Procedural Pain Scale: 8/10    Comments: None    Dr. Hanna Teran did the procedure

## 2021-11-17 NOTE — PROGRESS NOTES
HISTORY OF PRESENT ILLNESS  Chen Mercado is a 62 y.o. female. Back Pain   The history is provided by the patient. This is a recurrent problem. The current episode started more than 1 week ago. The problem has been gradually worsening. The problem occurs constantly. Patient reports not work related (fell lifting dog 5 d ago) injury. The pain is associated with lifting. The pain is present in the upper back (neck). The pain is severe. The patient's surgical history includes spinal fusion. Waiting to get Rx for her Hep C  Off chronic narcs but inc pain since fall. Asks for shot since left knee and shoulder hurting more. Review of Systems   Musculoskeletal: Positive for back pain. Patient does not complain about: fever, weight loss, recent fecal or urine incontinence, known active cancer, focal paralysis, recent back orthopaedic or other procedure. Social History     Socioeconomic History    Marital status: LEGALLY      Spouse name: Not on file    Number of children: 2    Years of education: Not on file    Highest education level: 10th grade   Occupational History    Occupation: disabled   Tobacco Use    Smoking status: Current Every Day Smoker     Packs/day: 0.50     Years: 35.00     Pack years: 17.50     Types: Cigarettes     Last attempt to quit: 2020     Years since quittin.2    Smokeless tobacco: Never Used   Substance and Sexual Activity    Alcohol use:  Yes     Alcohol/week: 19.0 standard drinks     Types: 14 Glasses of wine, 5 Cans of beer per week    Drug use: Not Currently     Types: Cocaine    Sexual activity: Not Currently     Partners: Male   Other Topics Concern     Service No    Blood Transfusions No    Caffeine Concern Not Asked    Occupational Exposure Not Asked    Hobby Hazards Not Asked    Sleep Concern Not Asked    Stress Concern Not Asked    Weight Concern Not Asked    Special Diet Not Asked    Back Care Not Asked    Exercise Not Asked  Bike Helmet Not Asked   [address] Yes    Self-Exams Yes   Social History Narrative    Lives with friends     Social Determinants of Health     Financial Resource Strain:     Difficulty of Paying Living Expenses: Not on file   Food Insecurity:     Worried About Running Out of Food in the Last Year: Not on file    [place] of Food in the Last Year: Not on file   Transportation Needs:     Lack of Transportation (Medical): Not on file    Lack of Transportation (Non-Medical): Not on file   Physical Activity:     Days of Exercise per Week: Not on file    Minutes of Exercise per Session: Not on file   Stress:     Feeling of Stress : Not on file   Social Connections:     Frequency of Communication with Friends and Family: Not on file    Frequency of Social Gatherings with Friends and Family: Not on file    Attends Hindu Services: Not on file    Active Member of [address] [place] [address] [employer] or Organizations: Not on file    Attends Club or Organization Meetings: Not on file    Marital Status: Not on file   Intimate Partner Violence:     Fear of Current or Ex-Partner: Not on file    Emotionally Abused: Not on file    Physically Abused: Not on file    Sexually Abused: Not on file   Housing Stability:     Unable to Pay for Housing in the Last Year: Not on file    Number of [place] in the Last Year: Not on file    Unstable Housing in the Last Year: Not on file         Physical Exam  Visit Vitals  /64 (BP 1 Location: Left arm, BP Patient Position: Sitting, BP Cuff Size: Adult)   Pulse 76   Temp 98.5 °F (36.9 °C) (Temporal)   Resp 16   Ht 5' 7\" (1.702 m)   Wt 176 lb (79.8 kg)   LMP  (LMP Unknown)   SpO2 97%   BMI 27.57 kg/m²     WD WN female NAD  Heart RRR without murmers clicks or rubs  Lungs CTA  Abdo soft nontender  Ext no edema  Diabetic foot exam was performed. No obvious sores or red lesions. Sensation checked by monofilament exam which was normal.  Dorsalis pedis pulse waspresent.     ASSESSMENT and PLAN  Encounter Diagnoses   Name Primary?  Type 2 diabetes mellitus with hyperglycemia, without long-term current use of insulin (HCC) Yes    Tobacco dependence syndrome     Depression, major, recurrent, mild (HCC)     Chronic obstructive pulmonary disease, unspecified COPD type ([facility]     Hepatitis C virus carrier state ([facility]     Back strain, initial encounter     Arthritis of knee, left     Pain in joint of left shoulder      Orders Placed This Encounter    DRAIN/INJECT LARGE JOINT/BURSA    DRAIN/INJECT LARGE JOINT/BURSA    METABOLIC PANEL, BASIC    HEMOGLOBIN A1C WITH EAG    REFERRAL TO OPHTHALMOLOGY    traMADoL (ULTRAM) 50 mg tablet    triamcinolone acetonide (KENALOG-40) 40 mg/mL injection 80 mg     Patient was looked up in the . There are multiple prescribers of controlled substances  Re      We discussed this pain and the usage of controlled substances for injury pain relief. In general these medications are indicated for the acute symptom relief and not for chronic usage, allthough they are frequently used for chronic management  After a certain period of time they should be stopped to avoid dependence and/or addiction. I warned her about the dangers of addiction and other side affects including respiratory depression and death. Discussed how this is a controlled substance and that the prescribing of it is should be monitored very carefully. Drug usage is also monitored by our practise and the [facility], since there has been a lot of abuse and diversion of controlled substances. In general we do not refill this medication over the phone. PLease ask for enough pills to get you to your next appointment and make sure you keep your appointments. Warned not to take other medications like alcohol, other benzodiazapines marijuana or other narcotics when on this medication. Options discussed. Discussed possible side affects and benefits of the procedure and/or medications.  The patient agrees to undergo the procedure. Consent obtained. Sterile procedure followed. There were no complications and the procedure was well tolerated. Instructed patient to call me if it is ineffective or if there are any complications like increasing pain, redness or swelling. .The left shoulder was assessed and landmarks palpated and marked. The shoulder was prepped with alcohol. Syringe was prepared with 1 cc of kenalog and 8cc of lidocaine. The shoulder was injected using a posterior approach. Patient was notified of signs to look for in terms of post injection complications, like increasing redness or swelling or pain. The left knee was prepped and using a lateral approach a needle was inserted into the knee. no  attempt was made to obtain synovial fluid. 0  synovial fluid was obtained from the joint: The knee was then injected wit yesh  1 cc of kenalog and 8  cc of lidocaine. Post injection instructions given.  she was instructed to call for increasing redness or pain in the injected knee    1 x for her pain with narc  F/u 3 mo

## 2021-11-18 NOTE — TELEPHONE ENCOUNTER
Appointment needed to refill this medication again, virtual or phone visit.
Duplicate
Requested Prescriptions     Pending Prescriptions Disp Refills    traMADoL (ULTRAM) 50 mg tablet 120 Tab 1     Sig: Take 1 Tab by mouth every six (6) hours as needed for Pain for up to 60 days.  Max Daily Amount: 200 mg.    benzonatate (TESSALON) 200 mg capsule 60 Cap 0     Sig: TAKE 1 CAPSULE BY MOUTH THREE TIMES DAILY AS NEEDED WITH FOOD FOR  COUGH  FOR  UP  TO  7  DAYS    albuterol (PROVENTIL HFA, VENTOLIN HFA, PROAIR HFA) 90 mcg/actuation inhaler 1 Inhaler 5     Sig: INHALE ONE PUFF BY MOUTH EVERY 6 HOURS AS NEEDED FOR WHEEZING
Abdominal Pain, N/V/D (Pediatric)

## 2021-11-19 ENCOUNTER — TELEPHONE (OUTPATIENT)
Dept: INTERNAL MEDICINE CLINIC | Age: 61
End: 2021-11-19

## 2021-11-19 NOTE — TELEPHONE ENCOUNTER
----- Message from VOYAA sent at 11/19/2021  9:50 AM EST -----  Subject: Referral Request    QUESTIONS   Reason for referral request? ADHD Testing   Has the physician seen you for this condition before? No   Preferred Specialist (if applicable)? Do you already have an appointment scheduled? Yes  Select Scheduled Date? 2022-01-14  Select Scheduled Physician? Bailee Knows   Additional Information for Provider? Patient requests a test today   11/19/2021 while at hospital but understands if that can not be done   today. Patient states h/o having a hard time to focus and stay on task. States that her children have have same sx and are dx with ADHD Please   contact patient   ---------------------------------------------------------------------------  --------------  CALL BACK INFO  What is the best way for the office to contact you? OK to leave message on   voicemail  Preferred Call Back Phone Number?  [phone]

## 2021-11-23 ENCOUNTER — VIRTUAL VISIT (OUTPATIENT)
Dept: INTERNAL MEDICINE CLINIC | Age: 61
End: 2021-11-23
Payer: MEDICAID

## 2021-11-23 DIAGNOSIS — S39.012D BACK STRAIN, SUBSEQUENT ENCOUNTER: Primary | ICD-10-CM

## 2021-11-23 PROCEDURE — 99212 OFFICE O/P EST SF 10 MIN: CPT | Performed by: FAMILY MEDICINE

## 2021-11-23 NOTE — PROGRESS NOTES
Chief Complaint   Patient presents with    Pain (Chronic)     med refill     Patient is aware that this is a Virtual Visit or Phone Call Only doctor's visit. Patient has not been out of the country in (14 months), NO diarrhea, NO cough, NO chest conjestion, NO temp. Pt has not been around anyone with these symptoms. Health Maintenance reviewed. I have reviewed the patient's medical history in detail and updated the computerized patient record. 1. Have you been to the ER, urgent care clinic since your last visit? no Hospitalized since your last visit?  no    2. Have you seen or consulted any other health care providers outside of the [address] [place] [address] since your last visit? No  Include any pap smears or colon screening. Encouraged pt to discuss pt's wishes with spouse/partner/family and bring them in the next appt to follow thru with the Advanced Directive      Fall Risk Assessment, last 12 mths 4/2/2021   Able to walk? Yes   Fall in past 12 months? 0   Do you feel unsteady? 1   Are you worried about falling 0   Number of falls in past 12 months -   Fall with injury?  -       3 most recent PHQ Screens 11/23/2021   Little interest or pleasure in doing things Several days   Feeling down, depressed, irritable, or hopeless Several days   Total Score PHQ 2 2   Trouble falling or staying asleep, or sleeping too much -   Feeling tired or having little energy -   Poor appetite, weight loss, or overeating -   Feeling bad about yourself - or that you are a failure or have let yourself or your family down -   Trouble concentrating on things such as school, work, reading, or watching TV -   Moving or speaking so slowly that other people could have noticed; or the opposite being so fidgety that others notice -   Thoughts of being better off dead, or hurting yourself in some way -   How difficult have these problems made it for you to do your work, take care of your home and get along with others - Abuse Screening Questionnaire 9/14/2021   Do you ever feel afraid of your partner? N   Are you in a relationship with someone who physically or mentally threatens you? N   Is it safe for you to go home?  Y       ADL Assessment 9/14/2021   Feeding yourself No Help Needed   Getting from bed to chair No Help Needed   Getting dressed No Help Needed   Bathing or showering No Help Needed   Walk across the room (includes cane/walker) No Help Needed   Using the telphone No Help Needed   Taking your medications No Help Needed   Preparing meals No Help Needed   Managing money (expenses/bills) No Help Needed   Moderately strenuous housework (laundry) No Help Needed   Shopping for personal items (toiletries/medicines) Help Needed   Shopping for groceries Help Needed   Driving Help Needed   Climbing a flight of stairs No Help Needed   Getting to places beyond walking distances Help Needed

## 2021-11-23 NOTE — PROGRESS NOTES
Malaika Schwab is a 62 y.o. female who was phone evaluated on 11/23/2021. Consent:  She and/or her healthcare decision maker is aware that this patient-initiated Telehealth encounter is a billable service, with coverage as determined by her insurance carrier. She is aware that she may receive a bill and has provided verbal consent to proceed: Yes    I was in the office while conducting this encounter. Assessment & Plan:       ICD-10-CM ICD-9-CM    1. Back strain, subsequent encounter  S39.012D V58.89      847.9      712  Subjective:      Asking for refill of her tramadol which she is taking for her back. She was given 30 pills 6 days ago, has 6 left. Told her I would be reluctant to refill this and she just needs to make the 6 pills last as long as she can. We also do not refill narcotics over a video visit or phone visit. She understands and will make her pills last as long as she can. We discussed the expected course, resolution and complications of the diagnosis(es) in detail. Medication risks, benefits, costs, interactions, and alternatives were discussed as indicated. I advised her to contact the office if her condition worsens, changes or fails to improve as anticipated. She expressed understanding with the diagnosis(es) and plan. Pursuant to the emergency declaration under the [facility] waiver authority and the [employer] Act, this Virtual  Visit was conducted, with patient's consent, to reduce the patient's risk of exposure to COVID-19 and provide continuity of care for an established patient. Services were provided through a video synchronous discussion virtually to substitute for in-person clinic visit.     Sari Aquino MD

## 2021-12-02 RX ORDER — PREDNISONE 20 MG/1
TABLET ORAL
Qty: 10 TABLET | Refills: 0 | Status: SHIPPED | OUTPATIENT
Start: 2021-12-02 | End: 2022-01-27 | Stop reason: ALTCHOICE

## 2021-12-10 ENCOUNTER — HOSPITAL ENCOUNTER (OUTPATIENT)
Dept: MAMMOGRAPHY | Age: 61
Discharge: HOME | End: 2021-12-10
Attending: FAMILY MEDICINE
Payer: MEDICAID

## 2021-12-10 DIAGNOSIS — Z12.31 VISIT FOR SCREENING MAMMOGRAM: ICD-10-CM

## 2021-12-10 PROCEDURE — 77067 SCR MAMMO BI INCL CAD: CPT

## 2021-12-13 ENCOUNTER — APPOINTMENT (OUTPATIENT)
Dept: GENERAL RADIOLOGY | Age: 61
End: 2021-12-13
Attending: EMERGENCY MEDICINE
Payer: MEDICAID

## 2021-12-13 ENCOUNTER — HOSPITAL ENCOUNTER (EMERGENCY)
Age: 61
Discharge: HOME | End: 2021-12-13
Attending: EMERGENCY MEDICINE
Payer: MEDICAID

## 2021-12-13 ENCOUNTER — APPOINTMENT (OUTPATIENT)
Dept: CT IMAGING | Age: 61
End: 2021-12-13
Attending: EMERGENCY MEDICINE
Payer: MEDICAID

## 2021-12-13 VITALS
HEART RATE: 85 BPM | OXYGEN SATURATION: 99 % | BODY MASS INDEX: 29.82 KG/M2 | TEMPERATURE: 98.6 F | RESPIRATION RATE: 17 BRPM | WEIGHT: 190 LBS | HEIGHT: 67 IN | DIASTOLIC BLOOD PRESSURE: 70 MMHG | SYSTOLIC BLOOD PRESSURE: 131 MMHG

## 2021-12-13 DIAGNOSIS — M25.462 KNEE EFFUSION, LEFT: Primary | ICD-10-CM

## 2021-12-13 PROCEDURE — 74011250637 HC RX REV CODE- 250/637: Performed by: EMERGENCY MEDICINE

## 2021-12-13 PROCEDURE — 99283 EMERGENCY DEPT VISIT LOW MDM: CPT

## 2021-12-13 PROCEDURE — 73700 CT LOWER EXTREMITY W/O DYE: CPT

## 2021-12-13 PROCEDURE — 73564 X-RAY EXAM KNEE 4 OR MORE: CPT

## 2021-12-13 RX ORDER — HYDROCODONE BITARTRATE AND ACETAMINOPHEN 5; 325 MG/1; MG/1
1 TABLET ORAL
Qty: 4 TABLET | Refills: 0 | Status: SHIPPED | OUTPATIENT
Start: 2021-12-13 | End: 2021-12-16

## 2021-12-13 RX ORDER — IBUPROFEN 600 MG/1
600 TABLET ORAL
Status: COMPLETED | OUTPATIENT
Start: 2021-12-13 | End: 2021-12-13

## 2021-12-13 RX ORDER — HYDROCODONE BITARTRATE AND ACETAMINOPHEN 5; 325 MG/1; MG/1
1 TABLET ORAL
Status: COMPLETED | OUTPATIENT
Start: 2021-12-13 | End: 2021-12-13

## 2021-12-13 RX ADMIN — HYDROCODONE BITARTRATE AND ACETAMINOPHEN 1 TABLET: 5; 325 TABLET ORAL at 15:32

## 2021-12-13 RX ADMIN — IBUPROFEN 600 MG: 600 TABLET ORAL at 15:32

## 2021-12-13 NOTE — Clinical Note
[address] [place] [address] EMERGENCY DEP  [address] [facility] [address] [place] [address]  [phone]    Work/School Note    Date: 12/13/2021    To Whom It May concern:    Abebe Pain was seen and treated today in the emergency room by the following provider(s):  Attending Provider: Pastor Brothers MD.      Abebe Pain is excused from work/school on 12/13/21 and 12/14/21. She is medically clear to return to work/school on 12/15/2021. Sincerely,          [employer].  MD Fernando

## 2021-12-14 NOTE — ED PROVIDER NOTES
EMERGENCY DEPARTMENT HISTORY AND PHYSICAL EXAM          Date: 12/13/2021  Patient Name: Brenton Ruby    History of Presenting Illness     Chief Complaint   Patient presents with    Fall     left knee pain following a fall       History Provided By: Patient    HPI: Brenton Ruby is a 62 y.o. female, pmhx COPD, spinal stenosis, depression, who presents ambulatory to the ED c/o left knee pain    Patient has known history of osteoarthritis. She just had a steroid injection in her knee and was doing okay until she had a trip and fall today landing on her left knee. She states her foot went behind her and she landed on her left leg but denies hitting her head, losing consciousness as well as any pain in her neck, ribs and back. She also denies any shortness of breath or recent nausea vomiting or infections. Patient notes pain is pretty significant and she has not taken any medications for symptoms that she came directly to the emergency room. PCP: Julee Chamorro MD    Allergies: Zolpidem  Social Hx: +tobacco, -vaping, +EtOH, -Illicit Drugs; There are no other complaints, changes, or physical findings at this time. Current Outpatient Medications   Medication Sig Dispense Refill    HYDROcodone-acetaminophen (Norco) 5-325 mg per tablet Take 1 Tablet by mouth every six (6) hours as needed for Pain for up to 3 days. Max Daily Amount: 4 Tablets.  4 Tablet 0    predniSONE (DELTASONE) 20 mg tablet TAKE 2 TABLETS BY MOUTH ONCE DAILY WITH BREAKFAST FOR 5 DAYS 10 Tablet 0    diclofenac (VOLTAREN) 1 % gel APPLY   TOPICALLY TO AFFECTED AREA 4 TIMES DAILY 100 g 5    gabapentin (NEURONTIN) 600 mg tablet TAKE 1 TABLET BY MOUTH THREE TIMES DAILY (MAX  DAILY  AMOUNT-1800MG) 90 Tablet 0    budesonide (PULMICORT) 0.5 mg/2 mL nbsp USE 1 VIAL IN NEBULIZER THREE TIMES DAILY 120 mL 0    ondansetron (ZOFRAN ODT) 4 mg disintegrating tablet DISSOLVE 1 TABLET IN MOUTH EVERY 8 HOURS AS NEEDED FOR NAUSEA AND VOMITING FOR  UP  TO  10  DOSES 20 Tablet 1    buPROPion (WELLBUTRIN) 100 mg tablet Take 1 Tablet by mouth three (3) times daily. quit 90 Tablet 5    umeclidinium-vilanteroL (ANORO ELLIPTA) 62.5-25 mcg/actuation inhaler Take 1 Puff by inhalation daily. 1 Inhaler 5    albuterol-ipratropium (DUO-NEB) 2.5 mg-0.5 mg/3 ml nebu 3 mL by Nebulization route every six (6) hours as needed for Wheezing, Shortness of Breath or Cough. Use with a flare up of COPD. Indications: bronchi muscle spasm resulting from COPD 50 Nebule 5    diphenoxylate-atropine (LOMOTIL) 2.5-0.025 mg per tablet TAKE 1 TABLET BY MOUTH 4 TIMES DAILY AS NEEDED FOR DIARRHEA . DO NOT EXCEED 4 PER 24 HOURS 12 Tab 0    cyclobenzaprine (FLEXERIL) 5 mg tablet Take 1 Tab by mouth nightly as needed for Muscle Spasm(s). Indications: muscle spasm 30 Tab 2    lidocaine (LIDODERM) 5 % 2 Patches by TransDERmal route every twelve (12) hours. Apply patch to the affected area for 12 hours a day and remove for 12 hours a day. 1 Each 5    diclofenac EC (VOLTAREN) 50 mg EC tablet Take 1 Tab by mouth two (2) times a day. As needed 60 Tab 1    DULoxetine (CYMBALTA) 60 mg capsule Take 1 capsule by mouth once daily 30 Cap 5    benzonatate (TESSALON) 200 mg capsule TAKE 1 CAPSULE BY MOUTH THREE TIMES DAILY AS NEEDED WITH FOOD FOR COUGH FOR UP TO 7 DAYS 60 Cap 0    Nebulizer & Compressor machine 1 Each by Does Not Apply route every four (4) hours as needed for Wheezing or Shortness of Breath. 1 Each 0    albuterol (PROVENTIL HFA, VENTOLIN HFA, PROAIR HFA) 90 mcg/actuation inhaler INHALE ONE PUFF BY MOUTH EVERY 6 HOURS AS NEEDED FOR WHEEZING 1 Inhaler 2    albuterol (PROVENTIL VENTOLIN) 2.5 mg /3 mL (0.083 %) nebu 3 mL by Nebulization route every four (4) hours as needed for Wheezing. 30 Nebule 3    naloxone (NARCAN) 4 mg/actuation nasal spray 1 Spray by Nasal route.  calcium combo no.2-vitamin D3 600 mg calcium- 500 unit TbER Take 1 Each by mouth daily.  90 Tab 1 Past History     Past Medical History:  Past Medical History:   Diagnosis Date    Arthritis     Asthma     COPD (chronic obstructive pulmonary disease) ([facility] [address]     Depression     Hemorrhoids     Occult blood positive stool     Spinal stenosis        Past Surgical History:  Past Surgical History:   Procedure Laterality Date    COLONOSCOPY N/A 2019    COLONOSCOPY performed by Gab[facility]ella [facility] at [facility] [address] HX BACK SURGERY  2016    laminectomy    HX CERVICAL FUSION      HX COLONOSCOPY  2019    4 polyps- 2 tubular adenoma 2 hyperplastic polyp    HX HYSTERECTOMY      FL ABDOMEN SURGERY PROC UNLISTED      hystrectomy       Family History:  Family History   Problem Relation Age of Onset    Cancer Mother         colon    Cancer Father 79        lung    Diabetes Sister     Cancer Sister     Breast Cancer Sister         age 43    Cancer Brother 27        lymphoma       Social History:  Social History     Tobacco Use    Smoking status: Current Every Day Smoker     Packs/day: 0.50     Years: 35.00     Pack years: 17.50     Types: Cigarettes     Last attempt to quit: 2020     Years since quittin.3    Smokeless tobacco: Never Used   Substance Use Topics    Alcohol use: Yes     Alcohol/week: 19.0 standard drinks     Types: 14 Glasses of wine, 5 Cans of beer per week    Drug use: Not Currently     Types: Cocaine       Allergies: Allergies   Allergen Reactions    Zolpidem Unknown (comments)         Review of Systems   Review of Systems   Constitutional: Negative for activity change, appetite change, chills, fever and unexpected weight change. HENT: Negative for congestion. Eyes: Negative for pain and visual disturbance. Respiratory: Negative for cough and shortness of breath. Cardiovascular: Negative for chest pain. Gastrointestinal: Negative for abdominal pain, diarrhea, nausea and vomiting. Genitourinary: Negative for dysuria.    Musculoskeletal: Positive for arthralgias, gait problem and joint swelling. Negative for back pain. Skin: Negative for rash. Neurological: Negative for headaches. Physical Exam   Physical Exam  Vitals and nursing note reviewed. Constitutional:       Appearance: She is well-developed. She is not diaphoretic. HENT:      Head: Normocephalic and atraumatic. Eyes:      General:         Right eye: No discharge. Left eye: No discharge. Conjunctiva/sclera: Conjunctivae normal.      Pupils: Pupils are equal, round, and reactive to light. Cardiovascular:      Rate and Rhythm: Normal rate and regular rhythm. Heart sounds: Normal heart sounds. No murmur heard. Pulmonary:      Effort: Pulmonary effort is normal. No respiratory distress. Breath sounds: Normal breath sounds. No wheezing or rales. Abdominal:      General: Bowel sounds are normal. There is no distension. Palpations: Abdomen is soft. Tenderness: There is no abdominal tenderness. Musculoskeletal:         General: Swelling, tenderness and signs of injury present. Normal range of motion. Cervical back: Normal range of motion and neck supple. Right lower leg: No edema. Left lower leg: No edema. Comments: Left knee edema noted to be along the tibial plateau with diffuse tenderness along the joint line. Edema extends up above the patella. No obvious joint effusion. All other bones palpated without production of pain   Skin:     General: Skin is warm and dry. Findings: No rash. Neurological:      Mental Status: She is alert and oriented to person, place, and time. Cranial Nerves: No cranial nerve deficit. Motor: No abnormal muscle tone. Diagnostic Study Results     Labs -   No results found for this or any previous visit (from the past 12 hour(s)). Radiologic Studies -   CT KNEE LT WO CONT   Final Result   1. No evidence of acute traumatic injury involving the left knee.    2. Evidence of marked degenerative change involving the left knee (particularly   involving the medial compartment). 3. Presence of a small knee joint effusion. XR KNEE LT MIN 4 V   Final Result   1. No evidence of acute traumatic injury involving the left knee. 2. Evidence of marked degenerative change involving the left knee. CT Results  (Last 48 hours)               12/13/21 1635  CT KNEE LT WO CONT Final result    Impression:  1. No evidence of acute traumatic injury involving the left knee. 2. Evidence of marked degenerative change involving the left knee (particularly   involving the medial compartment). 3. Presence of a small knee joint effusion. Narrative:  CT examination of the left knee dated 12/13/2021. COMPARISON: The radiographic examination of the left knee from earlier today is   available for correlation. Spiral images through the left knee were obtained without administration of   intravenous contrast material. Dose reduction was achieved through use of a   standardized protocol tailored for this examination and automatic exposure   control for dose modulation. As was noted radiographically, there is marked   degenerative change involving the medial aspect of the knee. Prominent   osteophyte formation is present. There is marked narrowing of the medial   compartment and there is evidence of subchondral sclerotic change involving the   medial tibial plateau as well as the medial femoral condyle (see sagittal image   26). Multiple focal areas of radiolucency are noted in these regions as well. These represent subchondral cysts. As seen on sagittal image 37, there is   calcification/ossification of a portion of the anterior cruciate ligament. There   is no evidence of displaced fracture or dislocation. A small knee joint effusion   is present (see axial image 41).                CXR Results  (Last 48 hours)    None            Medical Decision Making   I am the first provider for this patient. I reviewed the vital signs, available nursing notes, past medical history, past surgical history, family history and social history. Vital Signs-Reviewed the patient's vital signs. Pulse Oximetry Analysis - 99% on RA    Records Reviewed: Nursing Notes and Old Medical Records    Provider Notes (Medical Decision Making):   MDM: 11year-old female presents with a direct fall onto her knee with tenderness along the tibial plateau. We will start with plain film imaging but concern for plateau fracture will require CT if x-ray negative. Otherwise, appears to be isolated injury without any evidence of pain elsewhere. ED Course:   Initial assessment performed. The patients presenting problems have been discussed, and they are in agreement with the care plan formulated and outlined with them. I have encouraged them to ask questions as they arise throughout their visit. PROGRESS NOTE:  4:30 PM  Pt with normal x-rays. Continues sitting in the hallway but appears more comfortable than on first arrival.  Discussed x-rays 5 with negative and recommendations for TT to which patient understands. Discharge note:  Pt re-evaluated and noted to be feeling better, ready for discharge. Updated pt on all final imaging findings. Will follow up as instructed. All questions have been answered, pt voiced understanding and agreement with plan. Specific return precautions provided as well as instructions to return to the ED should sx worsen at any time. Vital signs stable for discharge. Critical Care Time:   0      Diagnosis     Clinical Impression:   1. Knee effusion, left        PLAN:  1.    Discharge Medication List as of 12/13/2021  5:04 PM      CONTINUE these medications which have NOT CHANGED    Details   predniSONE (DELTASONE) 20 mg tablet TAKE 2 TABLETS BY MOUTH ONCE DAILY WITH BREAKFAST FOR 5 DAYS, Normal, Disp-10 Tablet, R-0      diclofenac (VOLTAREN) 1 % gel APPLY   TOPICALLY TO AFFECTED AREA 4 TIMES DAILY, Normal, Disp-100 g, R-5      gabapentin (NEURONTIN) 600 mg tablet TAKE 1 TABLET BY MOUTH THREE TIMES DAILY (MAX  DAILY  AMOUNT-1800MG), Normal, Disp-90 Tablet, R-0      budesonide (PULMICORT) 0.5 mg/2 mL nbsp USE 1 VIAL IN NEBULIZER THREE TIMES DAILY, Normal, Disp-120 mL, R-0      ondansetron (ZOFRAN ODT) 4 mg disintegrating tablet DISSOLVE 1 TABLET IN MOUTH EVERY 8 HOURS AS NEEDED FOR NAUSEA AND VOMITING FOR  UP  TO  10  DOSES, Normal, Disp-20 Tablet, R-1      buPROPion (WELLBUTRIN) 100 mg tablet Take 1 Tablet by mouth three (3) times daily. quit, Normal, Disp-90 Tablet, R-5      umeclidinium-vilanteroL (ANORO ELLIPTA) 62.5-25 mcg/actuation inhaler Take 1 Puff by inhalation daily. , Normal, Disp-1 Inhaler, R-5      albuterol-ipratropium (DUO-NEB) 2.5 mg-0.5 mg/3 ml nebu 3 mL by Nebulization route every six (6) hours as needed for Wheezing, Shortness of Breath or Cough. Use with a flare up of COPD. Indications: bronchi muscle spasm resulting from COPD, Normal, Disp-50 Nebule, R-5      diphenoxylate-atropine (LOMOTIL) 2.5-0.025 mg per tablet TAKE 1 TABLET BY MOUTH 4 TIMES DAILY AS NEEDED FOR DIARRHEA . DO NOT EXCEED 4 PER 24 HOURS, Normal, Disp-12 Tab, R-0      cyclobenzaprine (FLEXERIL) 5 mg tablet Take 1 Tab by mouth nightly as needed for Muscle Spasm(s). Indications: muscle spasm, Normal, Disp-30 Tab, R-2      lidocaine (LIDODERM) 5 % 2 Patches by TransDERmal route every twelve (12) hours. Apply patch to the affected area for 12 hours a day and remove for 12 hours a day., Normal, Disp-1 Each, R-5      diclofenac EC (VOLTAREN) 50 mg EC tablet Take 1 Tab by mouth two (2) times a day.  As needed, Normal, Disp-60 Tab, R-1      DULoxetine (CYMBALTA) 60 mg capsule Take 1 capsule by mouth once daily, Normal, Disp-30 Cap, R-5      benzonatate (TESSALON) 200 mg capsule TAKE 1 CAPSULE BY MOUTH THREE TIMES DAILY AS NEEDED WITH FOOD FOR COUGH FOR UP TO 7 DAYS, Normal, Disp-60 Cap,R-0      Nebulizer & Compressor machine 1 Each by Does Not Apply route every four (4) hours as needed for Wheezing or Shortness of Breath., Normal, Disp-1 Each,R-0Pt needs it. Has been very ill. albuterol (PROVENTIL HFA, VENTOLIN HFA, PROAIR HFA) 90 mcg/actuation inhaler INHALE ONE PUFF BY MOUTH EVERY 6 HOURS AS NEEDED FOR WHEEZING, Normal, Disp-1 Inhaler,R-2Please consider 90 day supplies to promote better adherence      albuterol (PROVENTIL VENTOLIN) 2.5 mg /3 mL (0.083 %) nebu 3 mL by Nebulization route every four (4) hours as needed for Wheezing., Normal, Disp-30 Nebule,R-3      naloxone (NARCAN) 4 mg/actuation nasal spray 1 Spray by Nasal route., Historical Med      calcium combo no.2-vitamin D3 600 mg calcium- 500 unit TbER Take 1 Each by mouth daily. , Normal, Disp-90 Tab, R-1           2. Follow-up Information     Follow up With Specialties Details Why Contact Info    Dawson Ramirez MD Family Medicine  As needed [address] [facility]  [address] [address]  [phone]      [address] [place] [address] Emergency Medicine  If symptoms worsen [address]  [phone]        Return to ED if worse     Disposition:  Home       Please note, this dictation was completed with [employer], the computer voice recognition software. Quite often unanticipated grammatical, syntax, homophones, and other interpretive errors are inadvertently transcribed by the computer software. Please disregard these errors. Please excuse any errors that have escaped final proof reading.

## 2022-01-06 ENCOUNTER — VIRTUAL VISIT (OUTPATIENT)
Dept: INTERNAL MEDICINE CLINIC | Age: 62
End: 2022-01-06
Payer: MEDICAID

## 2022-01-06 DIAGNOSIS — F33.0 DEPRESSION, MAJOR, RECURRENT, MILD (HCC): ICD-10-CM

## 2022-01-06 DIAGNOSIS — F17.200 TOBACCO DEPENDENCE SYNDROME: ICD-10-CM

## 2022-01-06 DIAGNOSIS — J18.9 COMMUNITY ACQUIRED PNEUMONIA, UNSPECIFIED LATERALITY: Primary | ICD-10-CM

## 2022-01-06 DIAGNOSIS — J44.9 CHRONIC OBSTRUCTIVE PULMONARY DISEASE, UNSPECIFIED COPD TYPE (HCC): ICD-10-CM

## 2022-01-06 DIAGNOSIS — E11.65 TYPE 2 DIABETES MELLITUS WITH HYPERGLYCEMIA, WITHOUT LONG-TERM CURRENT USE OF INSULIN (HCC): ICD-10-CM

## 2022-01-06 DIAGNOSIS — R05.9 COUGH: ICD-10-CM

## 2022-01-06 DIAGNOSIS — A09 DIARRHEA OF INFECTIOUS ORIGIN: ICD-10-CM

## 2022-01-06 PROCEDURE — 99214 OFFICE O/P EST MOD 30 MIN: CPT | Performed by: FAMILY MEDICINE

## 2022-01-06 RX ORDER — AMOXICILLIN AND CLAVULANATE POTASSIUM 875; 125 MG/1; MG/1
1 TABLET, FILM COATED ORAL 2 TIMES DAILY
Qty: 14 TABLET | Refills: 0 | Status: SHIPPED | OUTPATIENT
Start: 2022-01-06 | End: 2022-01-27 | Stop reason: ALTCHOICE

## 2022-01-06 RX ORDER — CODEINE PHOSPHATE AND GUAIFENESIN 10; 100 MG/5ML; MG/5ML
5 SOLUTION ORAL
Qty: 120 ML | Refills: 0 | Status: SHIPPED | OUTPATIENT
Start: 2022-01-06 | End: 2022-01-06 | Stop reason: SDUPTHER

## 2022-01-06 RX ORDER — CODEINE PHOSPHATE AND GUAIFENESIN 10; 100 MG/5ML; MG/5ML
5 SOLUTION ORAL
Qty: 120 ML | Refills: 0 | Status: SHIPPED | OUTPATIENT
Start: 2022-01-06 | End: 2022-01-11

## 2022-01-06 RX ORDER — ALBUTEROL SULFATE 0.83 MG/ML
2.5 SOLUTION RESPIRATORY (INHALATION)
Qty: 30 NEBULE | Refills: 3 | Status: SHIPPED | OUTPATIENT
Start: 2022-01-06 | End: 2022-01-27 | Stop reason: ALTCHOICE

## 2022-01-06 RX ORDER — NEOMYCIN SULFATE, POLYMYXIN B SULFATE AND HYDROCORTISONE 10; 3.5; 1 MG/ML; MG/ML; [USP'U]/ML
3 SUSPENSION/ DROPS AURICULAR (OTIC) 3 TIMES DAILY
Qty: 10 ML | Refills: 0 | Status: SHIPPED | OUTPATIENT
Start: 2022-01-06 | End: 2022-01-13

## 2022-01-06 RX ORDER — AZITHROMYCIN 250 MG/1
250 TABLET, FILM COATED ORAL SEE ADMIN INSTRUCTIONS
Qty: 6 TABLET | Refills: 0 | Status: SHIPPED | OUTPATIENT
Start: 2022-01-06 | End: 2022-01-27 | Stop reason: ALTCHOICE

## 2022-01-06 RX ORDER — DIPHENOXYLATE HYDROCHLORIDE AND ATROPINE SULFATE 2.5; .025 MG/1; MG/1
1 TABLET ORAL
Qty: 20 TABLET | Refills: 0 | Status: SHIPPED | OUTPATIENT
Start: 2022-01-06 | End: 2022-05-11

## 2022-01-06 RX ORDER — ONDANSETRON 4 MG/1
4 TABLET, ORALLY DISINTEGRATING ORAL
Qty: 30 TABLET | Refills: 0 | Status: SHIPPED | OUTPATIENT
Start: 2022-01-06

## 2022-01-06 RX ORDER — PREDNISONE 20 MG/1
40 TABLET ORAL
Qty: 10 TABLET | Refills: 0 | Status: SHIPPED | OUTPATIENT
Start: 2022-01-06 | End: 2022-01-11

## 2022-01-06 NOTE — PROGRESS NOTES
Subjective:   Ravin Lloyd is a 62 y.o. female who complains of congestion, sore throat, cough described as productive of black sputum, myalgias, headache, bilateral ear pain and wheezing dyspnea temp to 101 for > 10  days, stable since that time. She denies a history of rash on body. Diarrhea and vomiting as well. Evaluation to date: none. Treatment to date: decongestants, cough suppressants, OTC products. LO prednisone  Patient does smoke cigarettes. Relevant PMH: Asthma and COPD.   Allergies   Allergen Reactions    Zolpidem Unknown (comments)         Patient Active Problem List    Diagnosis Date Noted    Hepatitis C virus carrier state ([facility] 04/26/2021    Hyperglycemia due to type 2 diabetes mellitus ([facility] 04/14/2021    Anxiety state 07/21/2020    Chronic obstructive pulmonary disease ([facility] 07/21/2020    Hyperlipidemia 07/21/2020    Neck pain 07/21/2020    Osteoarthritis of knee 07/21/2020    Tobacco dependence syndrome 07/21/2020    Adenomatous colon polyp 05/16/2020    Narcotic dependence ([facility] 04/07/2019    Encounter for diagnostic colonoscopy due to change in bowel habits 03/04/2019    Elevated liver enzymes 02/19/2019    Depression, major, recurrent, mild ([facility] [address] 12/21/2017    Restless legs 12/21/2017    Spinal stenosis 08/14/2017     Current Outpatient Medications   Medication Sig Dispense Refill    predniSONE (DELTASONE) 20 mg tablet TAKE 2 TABLETS BY MOUTH ONCE DAILY WITH BREAKFAST FOR 5 DAYS 10 Tablet 0    diclofenac (VOLTAREN) 1 % gel APPLY   TOPICALLY TO AFFECTED AREA 4 TIMES DAILY 100 g 5    gabapentin (NEURONTIN) 600 mg tablet TAKE 1 TABLET BY MOUTH THREE TIMES DAILY (MAX  DAILY  AMOUNT-1800MG) 90 Tablet 0    budesonide (PULMICORT) 0.5 mg/2 mL nbsp USE 1 VIAL IN NEBULIZER THREE TIMES DAILY 120 mL 0    ondansetron (ZOFRAN ODT) 4 mg disintegrating tablet DISSOLVE 1 TABLET IN MOUTH EVERY 8 HOURS AS NEEDED FOR NAUSEA AND VOMITING FOR  UP  TO  10  DOSES 20 Tablet 1  buPROPion (WELLBUTRIN) 100 mg tablet Take 1 Tablet by mouth three (3) times daily. quit 90 Tablet 5    umeclidinium-vilanteroL (ANORO ELLIPTA) 62.5-25 mcg/actuation inhaler Take 1 Puff by inhalation daily. 1 Inhaler 5    albuterol-ipratropium (DUO-NEB) 2.5 mg-0.5 mg/3 ml nebu 3 mL by Nebulization route every six (6) hours as needed for Wheezing, Shortness of Breath or Cough. Use with a flare up of COPD. Indications: bronchi muscle spasm resulting from COPD 50 Nebule 5    diphenoxylate-atropine (LOMOTIL) 2.5-0.025 mg per tablet TAKE 1 TABLET BY MOUTH 4 TIMES DAILY AS NEEDED FOR DIARRHEA . DO NOT EXCEED 4 PER 24 HOURS 12 Tab 0    cyclobenzaprine (FLEXERIL) 5 mg tablet Take 1 Tab by mouth nightly as needed for Muscle Spasm(s). Indications: muscle spasm 30 Tab 2    lidocaine (LIDODERM) 5 % 2 Patches by TransDERmal route every twelve (12) hours. Apply patch to the affected area for 12 hours a day and remove for 12 hours a day. 1 Each 5    diclofenac EC (VOLTAREN) 50 mg EC tablet Take 1 Tab by mouth two (2) times a day. As needed 60 Tab 1    DULoxetine (CYMBALTA) 60 mg capsule Take 1 capsule by mouth once daily 30 Cap 5    benzonatate (TESSALON) 200 mg capsule TAKE 1 CAPSULE BY MOUTH THREE TIMES DAILY AS NEEDED WITH FOOD FOR COUGH FOR UP TO 7 DAYS 60 Cap 0    Nebulizer & Compressor machine 1 Each by Does Not Apply route every four (4) hours as needed for Wheezing or Shortness of Breath. 1 Each 0    albuterol (PROVENTIL HFA, VENTOLIN HFA, PROAIR HFA) 90 mcg/actuation inhaler INHALE ONE PUFF BY MOUTH EVERY 6 HOURS AS NEEDED FOR WHEEZING 1 Inhaler 2    albuterol (PROVENTIL VENTOLIN) 2.5 mg /3 mL (0.083 %) nebu 3 mL by Nebulization route every four (4) hours as needed for Wheezing. 30 Nebule 3    naloxone (NARCAN) 4 mg/actuation nasal spray 1 Spray by Nasal route.  calcium combo no.2-vitamin D3 600 mg calcium- 500 unit TbER Take 1 Each by mouth daily.  90 Tab 1     Allergies   Allergen Reactions    Zolpidem Unknown (comments)     Past Medical History:   Diagnosis Date    Arthritis     Asthma     [facility]     Depression     Hemorrhoids     Occult blood positive stool     Spinal stenosis      Social History     Tobacco Use    Smoking status: Current Every Day Smoker     Packs/day: 0.50     Years: 35.00     Pack years: 17.50     Types: Cigarettes     Last attempt to quit: 2020     Years since quittin.3    Smokeless tobacco: Never Used   Substance Use Topics    Alcohol use: Yes     Alcohol/week: 19.0 standard drinks     Types: 14 Glasses of wine, 5 Cans of beer per week        Review of Systems  Pertinent items are noted in HPI. Objective:     Visit Vitals  LMP  (LMP Unknown)     General:  fatigued, cooperative, mild distress, vomited   Eyes:    Ears:    Sinuses:    Mouth:     Neck:    Heart:    Lungs:    Abdomen:       Assessment/Plan:   asthma and pneumonia  Antibiotics per orders. RTC in 5 days or PRN. Discussed the importance of avoiding unnecessary antibiotic therapy. Encounter Diagnoses   Name Primary?     Community acquired pneumonia, unspecified laterality Yes    Cough     Chronic obstructive pulmonary disease, unspecified COPD type ([facility]     Tobacco dependence syndrome     Type 2 diabetes mellitus with hyperglycemia, without long-term current use of insulin (HCC)     Depression, major, recurrent, mild (HCC)     Diarrhea of infectious origin      Orders Placed This Encounter    NOVEL CORONAVIRUS (COVID-19) ([facility])    amoxicillin-clavulanate (AUGMENTIN) 875-125 mg per tablet    azithromycin (ZITHROMAX) 250 mg tablet    predniSONE (DELTASONE) 20 mg tablet    ondansetron (ZOFRAN ODT) 4 mg disintegrating tablet    neomycin-polymyxin-hydrocortisone, buffered, (PEDIOTIC) 3.5-10,000-1 mg/mL-unit/mL-% otic suspension    albuterol (PROVENTIL VENTOLIN) 2.5 mg /3 mL (0.083 %) nebu    DISCONTD: guaiFENesin-codeine (ROBITUSSIN AC) 100-10 mg/5 mL solution    diphenoxylate-atropine (LomotiL) 2.5-0.025 mg per tablet    guaiFENesin-codeine (ROBITUSSIN AC) 100-10 mg/5 mL solution   . Patient was instructed on the limits of making a diagnosis at this visit. Was instructed to call us or go to the emergency room if the symptoms increased or if new symptoms appeared. Follow-up and Dispositions    · Return in about 4 days (around 1/10/2022) for routine follow up.

## 2022-01-06 NOTE — PROGRESS NOTES
Chief Complaint   Patient presents with    Cough     cough, headache, sorethroat     Patient is aware that this is a Virtual Visit or Phone Call Only doctor's visit. Patient has not been out of the country in (14 months), NO diarrhea, NO cough, NO chest conjestion, NO temp. Pt has not been around anyone with these symptoms. Health Maintenance reviewed. I have reviewed the patient's medical history in detail and updated the computerized patient record. 1. Have you been to the ER, urgent care clinic since your last visit? yes  Hospitalized since your last visit? No     2. Have you seen or consulted any other health care providers outside of the [address] [place] [address] since your last visit? no  Include any pap smears or colon screening. Encouraged pt to discuss pt's wishes with spouse/partner/family and bring them in the next appt to follow thru with the Advanced Directive      Fall Risk Assessment, last 12 mths 4/2/2021   Able to walk? Yes   Fall in past 12 months? 0   Do you feel unsteady? 1   Are you worried about falling 0   Number of falls in past 12 months -   Fall with injury?  -       3 most recent PHQ Screens 1/6/2022   Little interest or pleasure in doing things Several days   Feeling down, depressed, irritable, or hopeless Several days   Total Score PHQ 2 2   Trouble falling or staying asleep, or sleeping too much -   Feeling tired or having little energy -   Poor appetite, weight loss, or overeating -   Feeling bad about yourself - or that you are a failure or have let yourself or your family down -   Trouble concentrating on things such as school, work, reading, or watching TV -   Moving or speaking so slowly that other people could have noticed; or the opposite being so fidgety that others notice -   Thoughts of being better off dead, or hurting yourself in some way -   How difficult have these problems made it for you to do your work, take care of your home and get along with others -       Abuse Screening Questionnaire 9/14/2021   Do you ever feel afraid of your partner? N   Are you in a relationship with someone who physically or mentally threatens you? N   Is it safe for you to go home?  Y       ADL Assessment 9/14/2021   Feeding yourself No Help Needed   Getting from bed to chair No Help Needed   Getting dressed No Help Needed   Bathing or showering No Help Needed   Walk across the room (includes cane/walker) No Help Needed   Using the telphone No Help Needed   Taking your medications No Help Needed   Preparing meals No Help Needed   Managing money (expenses/bills) No Help Needed   Moderately strenuous housework (laundry) No Help Needed   Shopping for personal items (toiletries/medicines) Help Needed   Shopping for groceries Help Needed   Driving Help Needed   Climbing a flight of stairs No Help Needed   Getting to places beyond walking distances Help Needed

## 2022-01-10 ENCOUNTER — VIRTUAL VISIT (OUTPATIENT)
Dept: INTERNAL MEDICINE CLINIC | Age: 62
End: 2022-01-10
Payer: MEDICAID

## 2022-01-10 DIAGNOSIS — J18.9 COMMUNITY ACQUIRED PNEUMONIA, UNSPECIFIED LATERALITY: ICD-10-CM

## 2022-01-10 DIAGNOSIS — J44.9 CHRONIC OBSTRUCTIVE PULMONARY DISEASE, UNSPECIFIED COPD TYPE (HCC): Primary | ICD-10-CM

## 2022-01-10 PROCEDURE — 99213 OFFICE O/P EST LOW 20 MIN: CPT | Performed by: FAMILY MEDICINE

## 2022-01-10 NOTE — PROGRESS NOTES
Chief Complaint   Patient presents with    Pneumonia     FU     Patient is aware that this is a Virtual Visit or Phone Call Only doctor's visit. Patient has not been out of the country in (14 months), NO diarrhea, NO cough, NO chest conjestion, NO temp. Pt has not been around anyone with these symptoms. Health Maintenance reviewed. I have reviewed the patient's medical history in detail and updated the computerized patient record. 1. Have you been to the ER, urgent care clinic since your last visit? no Hospitalized since your last visit?  no    2. Have you seen or consulted any other health care providers outside of the [address] [place] [address] since your last visit? No Include any pap smears or colon screening. Encouraged pt to discuss pt's wishes with spouse/partner/family and bring them in the next appt to follow thru with the Advanced Directive      Fall Risk Assessment, last 12 mths 4/2/2021   Able to walk? Yes   Fall in past 12 months? 0   Do you feel unsteady? 1   Are you worried about falling 0   Number of falls in past 12 months -   Fall with injury?  -       3 most recent PHQ Screens 1/10/2022   Little interest or pleasure in doing things Several days   Feeling down, depressed, irritable, or hopeless Several days   Total Score PHQ 2 2   Trouble falling or staying asleep, or sleeping too much -   Feeling tired or having little energy -   Poor appetite, weight loss, or overeating -   Feeling bad about yourself - or that you are a failure or have let yourself or your family down -   Trouble concentrating on things such as school, work, reading, or watching TV -   Moving or speaking so slowly that other people could have noticed; or the opposite being so fidgety that others notice -   Thoughts of being better off dead, or hurting yourself in some way -   How difficult have these problems made it for you to do your work, take care of your home and get along with others -       Abuse Screening Questionnaire 9/14/2021   Do you ever feel afraid of your partner? N   Are you in a relationship with someone who physically or mentally threatens you? N   Is it safe for you to go home?  Y       ADL Assessment 9/14/2021   Feeding yourself No Help Needed   Getting from bed to chair No Help Needed   Getting dressed No Help Needed   Bathing or showering No Help Needed   Walk across the room (includes cane/walker) No Help Needed   Using the telphone No Help Needed   Taking your medications No Help Needed   Preparing meals No Help Needed   Managing money (expenses/bills) No Help Needed   Moderately strenuous housework (laundry) No Help Needed   Shopping for personal items (toiletries/medicines) Help Needed   Shopping for groceries Help Needed   Driving Help Needed   Climbing a flight of stairs No Help Needed   Getting to places beyond walking distances Help Needed

## 2022-01-10 NOTE — PROGRESS NOTES
Subjective:   Yonas Clark is a 62 y.o. female who complains of congestion, sore throat and cough described as productive for > 10 days, gradually improving since that time. She had a history of shortness of breath, wheezing and both have improved. Evaluation to date: seen previously and thought to have a viral URI/COPD exacerbation/  Pneumonia. Treatment to date: antibiotics -Augmentin and Zithromax. Began taking 4 days ago., OTC products. Patient does smoke cigarettes. Relevant PMH: Asthma and COPD. Allergies   Allergen Reactions    Zolpidem Unknown (comments)         Patient Active Problem List    Diagnosis Date Noted    Hepatitis C virus carrier state ([facility] 04/26/2021    Hypergly[facility] to type 2 diabetes mellitus [facility] 04/14/2021    Anxiety state 07/21/2020    Chronic obstructive pulmonary disease ([facility] 07/21/2020    Hyperlipidemia 07/21/2020    Neck pain 07/21/2020    Osteoarthritis of knee 07/21/2020    Tobacco dependence syndrome 07/21/2020    Adenomatous colon polyp 05/16/2020    Narcotic dependence ([facility] 04/07/2019    Encounter for diagnostic colonoscopy due to change in bowel habits 03/04/2019    Elevated liver enzymes 02/19/2019    Depression, major, recurrent, mild ([facility] 12/21/2017    Restless legs 12/21/2017    Spinal stenosis 08/14/2017     Current Outpatient Medications   Medication Sig Dispense Refill    amoxicillin-clavulanate (AUGMENTIN) 875-125 mg per tablet Take 1 Tablet by mouth two (2) times a day. 14 Tablet 0    azithromycin (ZITHROMAX) 250 mg tablet Take 1 Tablet by mouth See Admin Instructions. Take two tablets today then one tablet daily for next 4 days 6 Tablet 0    ondansetron (ZOFRAN ODT) 4 mg disintegrating tablet Take 1 Tablet by mouth every eight (8) hours as needed for Nausea.  30 Tablet 0    neomycin-polymyxin-hydrocortisone, buffered, (PEDIOTIC) 3.5-10,000-1 mg/mL-unit/mL-% otic suspension Administer 3 Drops in left ear three (3) times daily for 7 days. 10 mL 0    albuterol (PROVENTIL VENTOLIN) 2.5 mg /3 mL (0.083 %) nebu 3 mL by Nebulization route every four (4) hours as needed for Wheezing. 30 Nebule 3    diphenoxylate-atropine (LomotiL) 2.5-0.025 mg per tablet Take 1 Tablet by mouth four (4) times daily as needed for Diarrhea. Max Daily Amount: 4 Tablets. 20 Tablet 0    predniSONE (DELTASONE) 20 mg tablet TAKE 2 TABLETS BY MOUTH ONCE DAILY WITH BREAKFAST FOR 5 DAYS 10 Tablet 0    diclofenac (VOLTAREN) 1 % gel APPLY   TOPICALLY TO AFFECTED AREA 4 TIMES DAILY 100 g 5    gabapentin (NEURONTIN) 600 mg tablet TAKE 1 TABLET BY MOUTH THREE TIMES DAILY (MAX  DAILY  AMOUNT-1800MG) 90 Tablet 0    budesonide (PULMICORT) 0.5 mg/2 mL nbsp USE 1 VIAL IN NEBULIZER THREE TIMES DAILY 120 mL 0    ondansetron (ZOFRAN ODT) 4 mg disintegrating tablet DISSOLVE 1 TABLET IN MOUTH EVERY 8 HOURS AS NEEDED FOR NAUSEA AND VOMITING FOR  UP  TO  10  DOSES 20 Tablet 1    buPROPion (WELLBUTRIN) 100 mg tablet Take 1 Tablet by mouth three (3) times daily. quit 90 Tablet 5    umeclidinium-vilanteroL (ANORO ELLIPTA) 62.5-25 mcg/actuation inhaler Take 1 Puff by inhalation daily. 1 Inhaler 5    albuterol-ipratropium (DUO-NEB) 2.5 mg-0.5 mg/3 ml nebu 3 mL by Nebulization route every six (6) hours as needed for Wheezing, Shortness of Breath or Cough. Use with a flare up of COPD. Indications: bronchi muscle spasm resulting from COPD 50 Nebule 5    diphenoxylate-atropine (LOMOTIL) 2.5-0.025 mg per tablet TAKE 1 TABLET BY MOUTH 4 TIMES DAILY AS NEEDED FOR DIARRHEA . DO NOT EXCEED 4 PER 24 HOURS 12 Tab 0    cyclobenzaprine (FLEXERIL) 5 mg tablet Take 1 Tab by mouth nightly as needed for Muscle Spasm(s). Indications: muscle spasm 30 Tab 2    lidocaine (LIDODERM) 5 % 2 Patches by TransDERmal route every twelve (12) hours. Apply patch to the affected area for 12 hours a day and remove for 12 hours a day.  1 Each 5    diclofenac EC (VOLTAREN) 50 mg EC tablet Take 1 Tab by mouth two (2) times a day. As needed 60 Tab 1    DULoxetine (CYMBALTA) 60 mg capsule Take 1 capsule by mouth once daily 30 Cap 5    benzonatate (TESSALON) 200 mg capsule TAKE 1 CAPSULE BY MOUTH THREE TIMES DAILY AS NEEDED WITH FOOD FOR COUGH FOR UP TO 7 DAYS 60 Cap 0    Nebulizer & Compressor machine 1 Each by Does Not Apply route every four (4) hours as needed for Wheezing or Shortness of Breath. 1 Each 0    albuterol (PROVENTIL HFA, VENTOLIN HFA, PROAIR HFA) 90 mcg/actuation inhaler INHALE ONE PUFF BY MOUTH EVERY 6 HOURS AS NEEDED FOR WHEEZING 1 Inhaler 2    naloxone (NARCAN) 4 mg/actuation nasal spray 1 Spray by Nasal route.  calcium combo no.2-vitamin D3 600 mg calcium- 500 unit TbER Take 1 Each by mouth daily. 90 Tab 1     Allergies   Allergen Reactions    Zolpidem Unknown (comments)     Past Medical History:   Diagnosis Date    Arthritis     Asthma     COPD (chronic obstructive pulmonary disease) ([facility]     Depression     Hemorrhoids     Occult blood positive stool     Spinal stenosis      Social History     Tobacco Use    Smoking status: Current Every Day Smoker     Packs/day: 0.50     Years: 35.00     Pack years: 17.50     Types: Cigarettes     Last attempt to quit: 2020     Years since quittin.3    Smokeless tobacco: Never Used   Substance Use Topics    Alcohol use: Yes     Alcohol/week: 19.0 standard drinks     Types: 14 Glasses of wine, 5 Cans of beer per week        Review of Systems  Pertinent items are noted in HPI. Objective:     Visit Vitals  LMP  (LMP Unknown)     General:  alert, cooperative, no distress   Eyes: negative   Ears:    Sinuses:    Mouth:     Neck:    Heart:    Lungs:    Abdomen:       Assessment/Plan:   pneumonia  Antibiotics per orders. RTC prn. The patient was counseled on the dangers of tobacco use, and was advised to quit. Reviewed strategies to maximize success, including stress management.     Follow-up 1 month as needed    Piyush Dick, who was evaluated through a synchronous (real-time) audio-video encounter, and/or her healthcare decision maker, is aware that it is a billable service, with coverage as determined by her insurance carrier. She provided verbal consent to proceed: Yes, and patient identification was verified. This visit was conducted pursuant to the emergency declaration under the [address] [place] [address], [address] [place] [address] and the [employer]. A caregiver was present when appropriate. Ability to conduct physical exam was limited. The patient was located in a state where the provider was credentialed to provide care.      --Ria Martinez MD on 1/13/2022 at 4:09 PM

## 2022-01-20 ENCOUNTER — TELEPHONE (OUTPATIENT)
Dept: INTERNAL MEDICINE CLINIC | Age: 62
End: 2022-01-20

## 2022-01-20 DIAGNOSIS — J44.1 CHRONIC OBSTRUCTIVE PULMONARY DISEASE WITH ACUTE EXACERBATION (HCC): Primary | ICD-10-CM

## 2022-01-20 NOTE — TELEPHONE ENCOUNTER
Patient is still not feeling well from her appt before about a week ago. She just got out of quarantine and tested negative. But, she was exposed again  - -(she sounds bad) -- she would like prednisone, cough syrup and z-pack! She will be tested again in 5 days.      Sunny Jacques

## 2022-01-21 RX ORDER — AZITHROMYCIN 250 MG/1
250 TABLET, FILM COATED ORAL SEE ADMIN INSTRUCTIONS
Qty: 6 TABLET | Refills: 0 | Status: SHIPPED | OUTPATIENT
Start: 2022-01-21 | End: 2022-01-27 | Stop reason: ALTCHOICE

## 2022-01-21 RX ORDER — PREDNISONE 20 MG/1
40 TABLET ORAL
Qty: 10 TABLET | Refills: 0 | Status: SHIPPED | OUTPATIENT
Start: 2022-01-21 | End: 2022-03-02

## 2022-01-27 ENCOUNTER — VIRTUAL VISIT (OUTPATIENT)
Dept: INTERNAL MEDICINE CLINIC | Age: 62
End: 2022-01-27
Payer: MEDICAID

## 2022-01-27 DIAGNOSIS — J44.1 COPD EXACERBATION (HCC): ICD-10-CM

## 2022-01-27 DIAGNOSIS — Z72.0 TOBACCO ABUSE: ICD-10-CM

## 2022-01-27 DIAGNOSIS — H53.8 BLURRED VISION: ICD-10-CM

## 2022-01-27 DIAGNOSIS — J44.9 CHRONIC OBSTRUCTIVE PULMONARY DISEASE, UNSPECIFIED COPD TYPE (HCC): Primary | ICD-10-CM

## 2022-01-27 DIAGNOSIS — M17.0 PRIMARY OSTEOARTHRITIS OF BOTH KNEES: ICD-10-CM

## 2022-01-27 DIAGNOSIS — F90.0 ATTENTION DEFICIT HYPERACTIVITY DISORDER (ADHD), PREDOMINANTLY INATTENTIVE TYPE: ICD-10-CM

## 2022-01-27 DIAGNOSIS — M48.00 SPINAL STENOSIS, UNSPECIFIED SPINAL REGION: ICD-10-CM

## 2022-01-27 PROCEDURE — 99214 OFFICE O/P EST MOD 30 MIN: CPT | Performed by: FAMILY MEDICINE

## 2022-01-27 RX ORDER — ATOMOXETINE 10 MG/1
10 CAPSULE ORAL
Qty: 30 CAPSULE | Refills: 5 | Status: SHIPPED | OUTPATIENT
Start: 2022-01-27 | End: 2022-03-08 | Stop reason: ALTCHOICE

## 2022-01-27 RX ORDER — IPRATROPIUM BROMIDE AND ALBUTEROL SULFATE 2.5; .5 MG/3ML; MG/3ML
3 SOLUTION RESPIRATORY (INHALATION)
Qty: 50 NEBULE | Refills: 5 | Status: SHIPPED | OUTPATIENT
Start: 2022-01-27

## 2022-01-27 RX ORDER — BENZONATATE 200 MG/1
200 CAPSULE ORAL
Qty: 30 CAPSULE | Refills: 1 | Status: SHIPPED | OUTPATIENT
Start: 2022-01-27 | End: 2022-06-09

## 2022-01-27 RX ORDER — BUPROPION HYDROCHLORIDE 100 MG/1
100 TABLET ORAL 3 TIMES DAILY
Qty: 90 TABLET | Refills: 5 | Status: SHIPPED | OUTPATIENT
Start: 2022-01-27 | End: 2022-07-15 | Stop reason: ALTCHOICE

## 2022-01-27 RX ORDER — GABAPENTIN 600 MG/1
600 TABLET ORAL 3 TIMES DAILY
Qty: 90 TABLET | Refills: 1 | Status: SHIPPED | OUTPATIENT
Start: 2022-01-27 | End: 2022-03-28 | Stop reason: SDUPTHER

## 2022-01-27 RX ORDER — DICLOFENAC SODIUM 10 MG/G
GEL TOPICAL
Qty: 100 G | Refills: 5 | Status: SHIPPED | OUTPATIENT
Start: 2022-01-27 | End: 2022-08-12

## 2022-01-27 RX ORDER — MELATONIN
1000 DAILY
Qty: 90 TABLET | Refills: 3 | Status: SHIPPED | OUTPATIENT
Start: 2022-01-27

## 2022-01-27 NOTE — PROGRESS NOTES
Chief Complaint   Patient presents with    Behavioral Problem     Patient is aware that this is a Virtual Visit or Phone Call Only doctor's visit. Patient has not been out of the country in (14 months), NO diarrhea, NO cough, NO chest conjestion, NO temp. Pt has not been around anyone with these symptoms. Health Maintenance reviewed. I have reviewed the patient's medical history in detail and updated the computerized patient record. 1. Have you been to the ER, urgent care clinic since your last visit? no Hospitalized since your last visit? no      2. Have you seen or consulted any other health care providers outside of the [address] [place] [address] since your last visit? No  Include any pap smears or colon screening. Encouraged pt to discuss pt's wishes with spouse/partner/family and bring them in the next appt to follow thru with the Advanced Directive      Fall Risk Assessment, last 12 mths 4/2/2021   Able to walk? Yes   Fall in past 12 months? 0   Do you feel unsteady? 1   Are you worried about falling 0   Number of falls in past 12 months -   Fall with injury?  -       3 most recent PHQ Screens 1/27/2022   Little interest or pleasure in doing things Several days   Feeling down, depressed, irritable, or hopeless Several days   Total Score PHQ 2 2   Trouble falling or staying asleep, or sleeping too much -   Feeling tired or having little energy -   Poor appetite, weight loss, or overeating -   Feeling bad about yourself - or that you are a failure or have let yourself or your family down -   Trouble concentrating on things such as school, work, reading, or watching TV -   Moving or speaking so slowly that other people could have noticed; or the opposite being so fidgety that others notice -   Thoughts of being better off dead, or hurting yourself in some way -   How difficult have these problems made it for you to do your work, take care of your home and get along with others -       Abuse Screening Questionnaire 9/14/2021   Do you ever feel afraid of your partner? N   Are you in a relationship with someone who physically or mentally threatens you? N   Is it safe for you to go home?  Y       ADL Assessment 9/14/2021   Feeding yourself No Help Needed   Getting from bed to chair No Help Needed   Getting dressed No Help Needed   Bathing or showering No Help Needed   Walk across the room (includes cane/walker) No Help Needed   Using the telphone No Help Needed   Taking your medications No Help Needed   Preparing meals No Help Needed   Managing money (expenses/bills) No Help Needed   Moderately strenuous housework (laundry) No Help Needed   Shopping for personal items (toiletries/medicines) Help Needed   Shopping for groceries Help Needed   Driving Help Needed   Climbing a flight of stairs No Help Needed   Getting to places beyond walking distances Help Needed

## 2022-01-27 NOTE — PROGRESS NOTES
PROGRESS NOTE        SUBJECTIVE:  Diagnosis/Chief Complaint: Behavioral Problem      Doing well with mood yes - now that her coughing and pneumonia better  Symptoms doesn't finish projects, family members have it  Suicidal: no  Side affects: no  States taking medications per medicine list.yes - quit tob  Min c/o pain  Complaints that her vision is changing, she is having difficulty seeing distances. Breathing okay, pain medications work okay from her spinal stenosis. Patient Active Problem List    Diagnosis Date Noted    Hepatitis C virus carrier state ([facility] 2021    Hyperglycemia due to type 2 diabetes mellitus ([facility] 2021    Anxiety state 2020    Chronic obstructive pulmonary disease ([facility] 2020    Hyperlipidemia 2020    Neck pain 2020    Osteoarthritis of knee 2020    Tobacco dependence syndrome 2020    Adenomatous colon polyp 2020    Narcotic dependence ([facility] 2019    Encounter for diagnostic colonoscopy due to change in bowel habits 2019    Elevated liver enzymes 2019    Depression, major, recurrent, mild ([facility] 2017    Restless legs 2017    Spinal stenosis 2017     Allergies   Allergen Reactions    Zolpidem Unknown (comments)     Past Medical History:   Diagnosis Date    Arthritis     Asthma     COPD [facility]chronic obstructive pulmonary disease) [facility]     Depression     Hemorrhoids     Occult blood positive stool     Spinal stenosis      Social History     Tobacco Use    Smoking status: Former Smoker     Packs/day: 0.50     Years: 35.00     Pack years: 17.50     Types: Cigarettes     Start date: 1975     Quit date: 2022     Years since quittin.0    Smokeless tobacco: Never Used   Substance Use Topics    Alcohol use:  Yes     Alcohol/week: 19.0 standard drinks     Types: 14 Glasses of wine, 5 Cans of beer per week        OBJECTIVE:    .  Visit Vitals  LMP  (LMP Unknown)     WDWN in NAD    Neurological exam[de-identified] 2-12 intact  Psychiatric: Normal mood, judgement    Reviewed: Medications, allergies, clinical lab test results and imaging results have been reviewed. Any abnormal findings have been addressed. ASSESSMENT:       ICD-10-CM ICD-9-CM    1. Chronic obstructive pulmonary disease, unspecified COPD type ([facility])  J44.9 496 benzonatate (TESSALON) 200 mg capsule      umeclidinium-vilanteroL (ANORO ELLIPTA) 62.5-25 mcg/actuation inhaler   2. Spinal stenosis, unspecified spinal region  M48.00 724.00 gabapentin (NEURONTIN) 600 mg tablet   3. COPD exacerbation ([facility])  J44.1 491.21 albuterol-ipratropium (DUO-NEB) 2.5 mg-0.5 mg/3 ml nebu   4. Tobacco abuse  Z72.0 305.1 buPROPion (WELLBUTRIN) 100 mg tablet   5. Primary osteoarthritis of both knees  M17.0 715.16 diclofenac (VOLTAREN) 1 % gel   6. Blurred vision  H53.8 368.8 REFERRAL TO OPTOMETRY   7. Attention deficit hyperactivity disorder (ADHD), predominantly inattentive type  F90.0 314.00 atomoxetine (Strattera) 10 mg capsule       PLAN    Orders Placed This Encounter    REFERRAL TO OPTOMETRY     Referral Priority:   Routine     Referral Type:   Consultation     Referral Reason:   Specialty Services Required     Referred to Provider:   Blair Paul OD    benzonatate (TESSALON) 200 mg capsule     Sig: Take 1 Capsule by mouth three (3) times daily as needed for Cough. Dispense:  30 Capsule     Refill:  1    umeclidinium-vilanteroL (ANORO ELLIPTA) 62.5-25 mcg/actuation inhaler     Sig: Take 1 Puff by inhalation daily. Dispense:  1 Each     Refill:  5    cholecalciferol (VITAMIN D3) (1000 Units /25 mcg) tablet     Sig: Take 1 Tablet by mouth daily. Dispense:  90 Tablet     Refill:  3    albuterol-ipratropium (DUO-NEB) 2.5 mg-0.5 mg/3 ml nebu     Sig: 3 mL by Nebulization route every six (6) hours as needed for Wheezing, Shortness of Breath or Cough. Use with a flare up of COPD.   Indications: bronchi muscle spasm resulting from COPD Dispense:  50 Nebule     Refill:  5    buPROPion (WELLBUTRIN) 100 mg tablet     Sig: Take 1 Tablet by mouth three (3) times daily. Dispense:  90 Tablet     Refill:  5    diclofenac (VOLTAREN) 1 % gel     Sig: APPLY   TOPICALLY TO AFFECTED AREA 4 TIMES DAILY     Dispense:  100 g     Refill:  5    atomoxetine (Strattera) 10 mg capsule     Sig: Take 1 Capsule by mouth every morning. Dispense:  30 Capsule     Refill:  5    gabapentin (NEURONTIN) 600 mg tablet     Sig: Take 1 Tablet by mouth three (3) times daily. Max Daily Amount: 1,800 mg. Dispense:  90 Tablet     Refill:  1     Current Outpatient Medications   Medication Sig Dispense Refill    benzonatate (TESSALON) 200 mg capsule Take 1 Capsule by mouth three (3) times daily as needed for Cough. 30 Capsule 1    umeclidinium-vilanteroL (ANORO ELLIPTA) 62.5-25 mcg/actuation inhaler Take 1 Puff by inhalation daily. 1 Each 5    cholecalciferol (VITAMIN D3) (1000 Units /25 mcg) tablet Take 1 Tablet by mouth daily. 90 Tablet 3    albuterol-ipratropium (DUO-NEB) 2.5 mg-0.5 mg/3 ml nebu 3 mL by Nebulization route every six (6) hours as needed for Wheezing, Shortness of Breath or Cough. Use with a flare up of COPD. Indications: bronchi muscle spasm resulting from COPD 50 Nebule 5    buPROPion (WELLBUTRIN) 100 mg tablet Take 1 Tablet by mouth three (3) times daily. 90 Tablet 5    diclofenac (VOLTAREN) 1 % gel APPLY   TOPICALLY TO AFFECTED AREA 4 TIMES DAILY 100 g 5    atomoxetine (Strattera) 10 mg capsule Take 1 Capsule by mouth every morning. 30 Capsule 5    gabapentin (NEURONTIN) 600 mg tablet Take 1 Tablet by mouth three (3) times daily. Max Daily Amount: 1,800 mg. 90 Tablet 1    Nebulizer & Compressor machine 1 Each by Does Not Apply route every four (4) hours as needed for Wheezing or Shortness of Breath.  1 Each 0    albuterol (PROVENTIL HFA, VENTOLIN HFA, PROAIR HFA) 90 mcg/actuation inhaler INHALE ONE PUFF BY MOUTH EVERY 6 HOURS AS NEEDED FOR WHEEZING 1 Inhaler 2    calcium combo no.2-vitamin D3 600 mg calcium- 500 unit TbER Take 1 Each by mouth daily. 90 Tab 1    DULoxetine (CYMBALTA) 60 mg capsule Take 1 capsule by mouth once daily 30 Capsule 5    ondansetron (ZOFRAN ODT) 4 mg disintegrating tablet Take 1 Tablet by mouth every eight (8) hours as needed for Nausea. (Patient not taking: Reported on 1/27/2022) 30 Tablet 0    diphenoxylate-atropine (LomotiL) 2.5-0.025 mg per tablet Take 1 Tablet by mouth four (4) times daily as needed for Diarrhea. Max Daily Amount: 4 Tablets. 20 Tablet 0    ondansetron (ZOFRAN ODT) 4 mg disintegrating tablet DISSOLVE 1 TABLET IN MOUTH EVERY 8 HOURS AS NEEDED FOR NAUSEA AND VOMITING FOR  UP  TO  10  DOSES (Patient not taking: Reported on 1/27/2022) 20 Tablet 1    diphenoxylate-atropine (LOMOTIL) 2.5-0.025 mg per tablet TAKE 1 TABLET BY MOUTH 4 TIMES DAILY AS NEEDED FOR DIARRHEA . DO NOT EXCEED 4 PER 24 HOURS 12 Tab 0    cyclobenzaprine (FLEXERIL) 5 mg tablet Take 1 Tab by mouth nightly as needed for Muscle Spasm(s). Indications: muscle spasm 30 Tab 2     Discussed possible side affects, precautions, and drug interactions and possible benefits of the medication(s).     Follow-up 2 months

## 2022-01-28 DIAGNOSIS — F33.0 DEPRESSION, MAJOR, RECURRENT, MILD (HCC): ICD-10-CM

## 2022-01-28 NOTE — TELEPHONE ENCOUNTER
----- Message from Aimee Henry sent at 1/25/2022 11:38 AM EST -----  Subject: Refill Request    QUESTIONS  Name of Medication? DULoxetine (CYMBALTA) 60 mg capsule  Patient-reported dosage and instructions? 1 time daily  How many days do you have left? 7  Preferred Pharmacy? [address]  Pharmacy phone number (if available)? [phone]  ---------------------------------------------------------------------------  --------------  CALL BACK INFO  What is the best way for the office to contact you? OK to leave message on   voicemail  Preferred Call Back Phone Number?  [phone]

## 2022-01-29 RX ORDER — DULOXETIN HYDROCHLORIDE 60 MG/1
CAPSULE, DELAYED RELEASE ORAL
Qty: 30 CAPSULE | Refills: 5 | Status: SHIPPED | OUTPATIENT
Start: 2022-01-29 | End: 2022-07-15 | Stop reason: SDUPTHER

## 2022-02-04 ENCOUNTER — VIRTUAL VISIT (OUTPATIENT)
Dept: INTERNAL MEDICINE CLINIC | Age: 62
End: 2022-02-04
Payer: MEDICAID

## 2022-02-04 DIAGNOSIS — F90.0 ATTENTION DEFICIT HYPERACTIVITY DISORDER (ADHD), PREDOMINANTLY INATTENTIVE TYPE: ICD-10-CM

## 2022-02-04 DIAGNOSIS — Z72.0 TOBACCO ABUSE: Primary | ICD-10-CM

## 2022-02-04 DIAGNOSIS — J44.9 CHRONIC OBSTRUCTIVE PULMONARY DISEASE, UNSPECIFIED COPD TYPE (HCC): ICD-10-CM

## 2022-02-04 PROCEDURE — 99214 OFFICE O/P EST MOD 30 MIN: CPT | Performed by: FAMILY MEDICINE

## 2022-02-04 RX ORDER — VARENICLINE TARTRATE 1 MG/1
1 TABLET, FILM COATED ORAL 2 TIMES DAILY
Qty: 60 TABLET | Refills: 0 | Status: SHIPPED | OUTPATIENT
Start: 2022-02-04 | End: 2022-07-03

## 2022-02-04 RX ORDER — VARENICLINE TARTRATE 0.5 MG/1
TABLET, FILM COATED ORAL
Qty: 60 TABLET | Refills: 2 | Status: SHIPPED | OUTPATIENT
Start: 2022-02-04 | End: 2022-07-28 | Stop reason: ALTCHOICE

## 2022-02-04 NOTE — PROGRESS NOTES
Chief Complaint   Patient presents with    Medication Evaluation     Patient is aware that this is a Virtual Visit or Phone Call Only doctor's visit. Patient has not been out of the country in (14 months), NO diarrhea, NO cough, NO chest conjestion, NO temp. Pt has not been around anyone with these symptoms. Health Maintenance reviewed. I have reviewed the patient's medical history in detail and updated the computerized patient record. 1. Have you been to the ER, urgent care clinic since your last visit? no Hospitalized since your last visit?  no    2. Have you seen or consulted any other health care providers outside of the [address] [place] [address] since your last visit? No Include any pap smears or colon screening. Encouraged pt to discuss pt's wishes with spouse/partner/family and bring them in the next appt to follow thru with the Advanced Directive      Fall Risk Assessment, last 12 mths 4/2/2021   Able to walk? Yes   Fall in past 12 months? 0   Do you feel unsteady? 1   Are you worried about falling 0   Number of falls in past 12 months -   Fall with injury?  -       3 most recent PHQ Screens 2/4/2022   Little interest or pleasure in doing things Several days   Feeling down, depressed, irritable, or hopeless Several days   Total Score PHQ 2 2   Trouble falling or staying asleep, or sleeping too much -   Feeling tired or having little energy -   Poor appetite, weight loss, or overeating -   Feeling bad about yourself - or that you are a failure or have let yourself or your family down -   Trouble concentrating on things such as school, work, reading, or watching TV -   Moving or speaking so slowly that other people could have noticed; or the opposite being so fidgety that others notice -   Thoughts of being better off dead, or hurting yourself in some way -   How difficult have these problems made it for you to do your work, take care of your home and get along with others -       Abuse Screening Questionnaire 9/14/2021   Do you ever feel afraid of your partner? N   Are you in a relationship with someone who physically or mentally threatens you? N   Is it safe for you to go home?  Y       ADL Assessment 9/14/2021   Feeding yourself No Help Needed   Getting from bed to chair No Help Needed   Getting dressed No Help Needed   Bathing or showering No Help Needed   Walk across the room (includes cane/walker) No Help Needed   Using the telphone No Help Needed   Taking your medications No Help Needed   Preparing meals No Help Needed   Managing money (expenses/bills) No Help Needed   Moderately strenuous housework (laundry) No Help Needed   Shopping for personal items (toiletries/medicines) Help Needed   Shopping for groceries Help Needed   Driving Help Needed   Climbing a flight of stairs No Help Needed   Getting to places beyond walking distances Help Needed

## 2022-02-04 NOTE — PROGRESS NOTES
PROGRESS NOTE        SUBJECTIVE:  Diagnosis/Chief Complaint: Medication Evaluation      Doing well with mood no  Symptoms she is still not paying attention very well and finds her Strattera not really helping that much. Started smoking again wants to quit again. Relates that her daughter got put on Chantix for her ADHD. Suicidal: no  Side affects: no  States taking medications per medicine list.yes -as prescribed    Patient Active Problem List    Diagnosis Date Noted    Hepatitis C virus carrier state ([facility] 2021    Hyperglycemia due to type 2 diabetes mellitus ([facility] 2021    Anxiety state 2020    Chronic obstructive pulmonary disease ([facility] 2020    Hyperlipidemia 2020    Neck pain 2020    Osteoarthritis of knee 2020    Tobacco dependence syndrome 2020    Adenomatous colon polyp 2020    Narcotic dependence ([facility] 2019    Encounter for diagnostic colonoscopy due to change in bowel habits 2019    Elevated liver enzymes 2019    Depression, [facility] recurrent[facility] 2017    Restless legs 2017    Spinal stenosis 2017     Allergies   Allergen Reactions    Zolpidem Unknown (comments)     Past Medical History:   Diagnosis Date    Arthritis     Asthma     COPD (chronic obstructive pulmonary disease) ([facility]     Depression     Hemorrhoids     Occult blood positive stool     Spinal stenosis      Social History     Tobacco Use    Smoking status: Former Smoker     Packs/day: 0.50     Years: 35.00     Pack years: 17.50     Types: Cigarettes     Start date: 1975     Quit date: 2022     Years since quittin.0    Smokeless tobacco: Never Used   Substance Use Topics    Alcohol use:  Yes     Alcohol/week: 19.0 standard drinks     Types: 14 Glasses of wine, 5 Cans of beer per week        OBJECTIVE:    .  Visit Vitals  LMP  (LMP Unknown)     WDWN in NAD  Neurological exam[de-identified] 2-12 intact  Psychiatric: Normal mood, judgement    Reviewed: Medications, allergies, clinical lab test results and imaging results have been reviewed. Any abnormal findings have been addressed. ASSESSMENT:       ICD-10-CM ICD-9-CM    1. Tobacco abuse  Z72.0 305.1    2. Attention deficit hyperactivity disorder (ADHD), predominantly inattentive type  F90.0 314.00    3. Chronic obstructive pulmonary disease, unspecified COPD type ([facility]  J44.9 496        PLAN      Orders Placed This Encounter    varenicline (CHANTIX) 0.5 mg tablet     Sig: Use as directed     Dispense:  60 Tablet     Refill:  2     Started pack    varenicline (CHANTIX) 1 mg tablet     Sig: Take 1 Tablet by mouth two (2) times a day. Dispense:  60 Tablet     Refill:  0     Discussed possible side affects, precautions, and drug interactions and possible benefits of the medication(s). Current Outpatient Medications   Medication Sig Dispense Refill    varenicline (CHANTIX) 0.5 mg tablet Use as directed 60 Tablet 2    varenicline (CHANTIX) 1 mg tablet Take 1 Tablet by mouth two (2) times a day. 60 Tablet 0    DULoxetine (CYMBALTA) 60 mg capsule Take 1 capsule by mouth once daily 30 Capsule 5    benzonatate (TESSALON) 200 mg capsule Take 1 Capsule by mouth three (3) times daily as needed for Cough. 30 Capsule 1    umeclidinium-vilanteroL (ANORO ELLIPTA) 62.5-25 mcg/actuation inhaler Take 1 Puff by inhalation daily. 1 Each 5    cholecalciferol (VITAMIN D3) (1000 Units /25 mcg) tablet Take 1 Tablet by mouth daily. 90 Tablet 3    albuterol-ipratropium (DUO-NEB) 2.5 mg-0.5 mg/3 ml nebu 3 mL by Nebulization route every six (6) hours as needed for Wheezing, Shortness of Breath or Cough. Use with a flare up of COPD. Indications: bronchi muscle spasm resulting from COPD 50 Nebule 5    buPROPion (WELLBUTRIN) 100 mg tablet Take 1 Tablet by mouth three (3) times daily.  90 Tablet 5    diclofenac (VOLTAREN) 1 % gel APPLY   TOPICALLY TO AFFECTED AREA 4 TIMES DAILY 100 g 5    atomoxetine (Strattera) 10 mg capsule Take 1 Capsule by mouth every morning. 30 Capsule 5    gabapentin (NEURONTIN) 600 mg tablet Take 1 Tablet by mouth three (3) times daily. Max Daily Amount: 1,800 mg. 90 Tablet 1    ondansetron (ZOFRAN ODT) 4 mg disintegrating tablet Take 1 Tablet by mouth every eight (8) hours as needed for Nausea. 30 Tablet 0    diphenoxylate-atropine (LomotiL) 2.5-0.025 mg per tablet Take 1 Tablet by mouth four (4) times daily as needed for Diarrhea. Max Daily Amount: 4 Tablets. 20 Tablet 0    ondansetron (ZOFRAN ODT) 4 mg disintegrating tablet DISSOLVE 1 TABLET IN MOUTH EVERY 8 HOURS AS NEEDED FOR NAUSEA AND VOMITING FOR  UP  TO  10  DOSES 20 Tablet 1    diphenoxylate-atropine (LOMOTIL) 2.5-0.025 mg per tablet TAKE 1 TABLET BY MOUTH 4 TIMES DAILY AS NEEDED FOR DIARRHEA . DO NOT EXCEED 4 PER 24 HOURS 12 Tab 0    cyclobenzaprine (FLEXERIL) 5 mg tablet Take 1 Tab by mouth nightly as needed for Muscle Spasm(s). Indications: muscle spasm 30 Tab 2    Nebulizer & Compressor machine 1 Each by Does Not Apply route every four (4) hours as needed for Wheezing or Shortness of Breath. 1 Each 0    albuterol (PROVENTIL HFA, VENTOLIN HFA, PROAIR HFA) 90 mcg/actuation inhaler INHALE ONE PUFF BY MOUTH EVERY 6 HOURS AS NEEDED FOR WHEEZING 1 Inhaler 2    calcium combo no.2-vitamin D3 600 mg calcium- 500 unit TbER Take 1 Each by mouth daily. 90 Tab 1     Follow-up and Dispositions    · Return in about 5 weeks (around 3/11/2022) for routine follow up.

## 2022-03-02 DIAGNOSIS — J44.1 CHRONIC OBSTRUCTIVE PULMONARY DISEASE WITH ACUTE EXACERBATION (HCC): ICD-10-CM

## 2022-03-02 RX ORDER — PREDNISONE 20 MG/1
TABLET ORAL
Qty: 10 TABLET | Refills: 0 | Status: SHIPPED | OUTPATIENT
Start: 2022-03-02 | End: 2022-04-14

## 2022-03-08 ENCOUNTER — VIRTUAL VISIT (OUTPATIENT)
Dept: INTERNAL MEDICINE CLINIC | Age: 62
End: 2022-03-08
Payer: MEDICAID

## 2022-03-08 DIAGNOSIS — F90.0 ATTENTION DEFICIT HYPERACTIVITY DISORDER (ADHD), PREDOMINANTLY INATTENTIVE TYPE: Primary | ICD-10-CM

## 2022-03-08 PROCEDURE — 99213 OFFICE O/P EST LOW 20 MIN: CPT | Performed by: FAMILY MEDICINE

## 2022-03-08 NOTE — PROGRESS NOTES
Chief Complaint   Patient presents with    COPD     FU     Patient is aware that this is a Virtual Visit or Phone Call Only doctor's visit. Patient has not been out of the country in (14 months), NO diarrhea, NO cough, NO chest conjestion, NO temp. Pt has not been around anyone with these symptoms. Health Maintenance reviewed. I have reviewed the patient's medical history in detail and updated the computerized patient record. 1. Have you been to the ER, urgent care clinic since your last visit? no Hospitalized since your last visit?  no    2. Have you seen or consulted any other health care providers outside of the [address] [place] [address] since your last visit? No Include any pap smears or colon screening. Encouraged pt to discuss pt's wishes with spouse/partner/family and bring them in the next appt to follow thru with the Advanced Directive      Fall Risk Assessment, last 12 mths 4/2/2021   Able to walk? Yes   Fall in past 12 months? 0   Do you feel unsteady? 1   Are you worried about falling 0   Number of falls in past 12 months -   Fall with injury?  -       3 most recent PHQ Screens 2/4/2022   Little interest or pleasure in doing things Several days   Feeling down, depressed, irritable, or hopeless Several days   Total Score PHQ 2 2   Trouble falling or staying asleep, or sleeping too much -   Feeling tired or having little energy -   Poor appetite, weight loss, or overeating -   Feeling bad about yourself - or that you are a failure or have let yourself or your family down -   Trouble concentrating on things such as school, work, reading, or watching TV -   Moving or speaking so slowly that other people could have noticed; or the opposite being so fidgety that others notice -   Thoughts of being better off dead, or hurting yourself in some way -   How difficult have these problems made it for you to do your work, take care of your home and get along with others -       Abuse Screening Questionnaire 9/14/2021   Do you ever feel afraid of your partner? N   Are you in a relationship with someone who physically or mentally threatens you? N   Is it safe for you to go home?  Y       ADL Assessment 9/14/2021   Feeding yourself No Help Needed   Getting from bed to chair No Help Needed   Getting dressed No Help Needed   Bathing or showering No Help Needed   Walk across the room (includes cane/walker) No Help Needed   Using the telphone No Help Needed   Taking your medications No Help Needed   Preparing meals No Help Needed   Managing money (expenses/bills) No Help Needed   Moderately strenuous housework (laundry) No Help Needed   Shopping for personal items (toiletries/medicines) Help Needed   Shopping for groceries Help Needed   Driving Help Needed   Climbing a flight of stairs No Help Needed   Getting to places beyond walking distances Help Needed

## 2022-03-08 NOTE — PROGRESS NOTES
PROGRESS NOTE        SUBJECTIVE:  Diagnosis/Chief Complaint: COPD (FU)      Doing well with mood no  Symptoms wants concerta for her adhd  Suicidal: no  Side affects: yes fatigue  States taking medications per medicine list.yes - as rx    Patient Active Problem List    Diagnosis Date Noted    Hepatitis C virus carrier state ([facility] 2021    Hyperglycemia due to type 2 diabetes mellitus ([facility] 2021    Anxiety state 2020    Chronic obstructive pulmonary disease ([facility] 2020    Hyperlipidemia 2020    Neck pain 2020    Osteoarthritis of knee 2020    Tobacco dependence syndrome 2020    Adenomatous colon polyp 2020    Narcotic dependence ([facility] 2019    Encounter for diagnostic colonoscopy due to change in bowel habits 2019    Elevated liver enzymes 2019    Depression, major, recurrent, mild ([facility] 2017    Restless legs 2017    Spinal stenosis 2017     Allergies   Allergen Reactions    Zolpidem Unknown (comments)     Past Medical History:   Diagnosis Date    Arthritis     Asthma     COPD (chronic obstructive pulmonary disease) ([facility]     Depression     Hemorrhoids     Occult blood positive stool     Spinal stenosis      Social History     Tobacco Use    Smoking status: Former Smoker     Packs/day: 0.50     Years: 35.00     Pack years: 17.50     Types: Cigarettes     Start date: 1975     Quit date: 2022     Years since quittin.1    Smokeless tobacco: Never Used   Substance Use Topics    Alcohol use: Yes     Alcohol/week: 19.0 standard drinks     Types: 14 Glasses of wine, 5 Cans of beer per week        OBJECTIVE:    .  Visit Vitals  LMP  (LMP Unknown)     WDWN in NAD  Neurological exam[de-identified] 2-12 intact  Psychiatric: Normal mood, judgement    Reviewed: Medications, allergies, clinical lab test results and imaging results have been reviewed. Any abnormal findings have been addressed.      ASSESSMENT: ICD-10-CM ICD-9-CM    1.  Attention deficit hyperactivity disorder (ADHD), predominantly inattentive type  F90.0 314.00 AMB SUPPLY ORDER       PLAN    Orders Placed This Encounter    AMB SUPPLY ORDER     Wants Concerta for ADHD startera not helped     Can go see psychiatry for this    F/up prn

## 2022-03-09 ENCOUNTER — VIRTUAL VISIT (OUTPATIENT)
Dept: INTERNAL MEDICINE CLINIC | Age: 62
End: 2022-03-09
Payer: MEDICAID

## 2022-03-09 ENCOUNTER — TELEPHONE (OUTPATIENT)
Dept: INTERNAL MEDICINE CLINIC | Age: 62
End: 2022-03-09

## 2022-03-09 DIAGNOSIS — F41.8 SITUATIONAL ANXIETY: Primary | ICD-10-CM

## 2022-03-09 PROCEDURE — 99213 OFFICE O/P EST LOW 20 MIN: CPT | Performed by: FAMILY MEDICINE

## 2022-03-09 RX ORDER — LORAZEPAM 0.5 MG/1
0.5 TABLET ORAL
Qty: 12 TABLET | Refills: 0 | Status: SHIPPED | OUTPATIENT
Start: 2022-03-09 | End: 2022-03-28 | Stop reason: SDUPTHER

## 2022-03-09 NOTE — PROGRESS NOTES
Chief Complaint   Patient presents with    Anxiety     med refill     Patient is aware that this is a Virtual Visit or Phone Call Only doctor's visit. Patient has not been out of the country in (14 months), NO diarrhea, NO cough, NO chest conjestion, NO temp. Pt has not been around anyone with these symptoms. Health Maintenance reviewed. I have reviewed the patient's medical history in detail and updated the computerized patient record. 1. Have you been to the ER, urgent care clinic since your last visit? no Hospitalized since your last visit?  no    2. Have you seen or consulted any other health care providers outside of the [address] [place] [address] since your last visit? No  Include any pap smears or colon screening. Encouraged pt to discuss pt's wishes with spouse/partner/family and bring them in the next appt to follow thru with the Advanced Directive      Fall Risk Assessment, last 12 mths 4/2/2021   Able to walk? Yes   Fall in past 12 months? 0   Do you feel unsteady? 1   Are you worried about falling 0   Number of falls in past 12 months -   Fall with injury?  -       3 most recent PHQ Screens 2/4/2022   Little interest or pleasure in doing things Several days   Feeling down, depressed, irritable, or hopeless Several days   Total Score PHQ 2 2   Trouble falling or staying asleep, or sleeping too much -   Feeling tired or having little energy -   Poor appetite, weight loss, or overeating -   Feeling bad about yourself - or that you are a failure or have let yourself or your family down -   Trouble concentrating on things such as school, work, reading, or watching TV -   Moving or speaking so slowly that other people could have noticed; or the opposite being so fidgety that others notice -   Thoughts of being better off dead, or hurting yourself in some way -   How difficult have these problems made it for you to do your work, take care of your home and get along with others -       Abuse Screening Questionnaire 9/14/2021   Do you ever feel afraid of your partner? N   Are you in a relationship with someone who physically or mentally threatens you? N   Is it safe for you to go home?  Y       ADL Assessment 9/14/2021   Feeding yourself No Help Needed   Getting from bed to chair No Help Needed   Getting dressed No Help Needed   Bathing or showering No Help Needed   Walk across the room (includes cane/walker) No Help Needed   Using the telphone No Help Needed   Taking your medications No Help Needed   Preparing meals No Help Needed   Managing money (expenses/bills) No Help Needed   Moderately strenuous housework (laundry) No Help Needed   Shopping for personal items (toiletries/medicines) Help Needed   Shopping for groceries Help Needed   Driving Help Needed   Climbing a flight of stairs No Help Needed   Getting to places beyond walking distances Help Needed

## 2022-03-09 NOTE — PROGRESS NOTES
PROGRESS NOTE        SUBJECTIVE:  Diagnosis/Chief Complaint: Anxiety (med refill)      Doing well with mood no  Symptoms brother very ill needed surgery. Still very ill. Suicidal: no  Side affects: no  States taking medications per medicine list.yes - as Rx    Patient Active Problem List    Diagnosis Date Noted    Hepatitis C virus carrier state ([facility] 2021    Hyperglycemia due to type 2 diabetes mellitus ([facility] 2021    Anxiety state 2020    Chronic obstructive pulmonary disease ([facility] 2020    Hyperlipidemia 2020    Neck pain 2020    Osteoarthritis of knee 2020    Tobacco dependence syndrome 2020    Adenomatous colon polyp 2020    Narcotic dependence ([facility] 2019    Encounter for diagnostic colonoscopy due to change in bowel habits 2019    Elevated liver enzymes 2019    Depression, major, recurrent, mild ([facility] 2017    Restless legs 2017    Spinal stenosis 2017     Allergies   Allergen Reactions    Zolpidem Unknown (comments)     Past Medical History:   Diagnosis Date    Arthritis     Asthma     COPD (chronic obstructive pulmonary disease) ([facility]     Depression     Hemorrhoids     Occult blood positive stool     Spinal stenosis      Social History     Tobacco Use    Smoking status: Former Smoker     Packs/day: 0.50     Years: 35.00     Pack years: 17.50     Types: Cigarettes     Start date: 1975     Quit date: 2022     Years since quittin.1    Smokeless tobacco: Never Used   Substance Use Topics    Alcohol use: Yes     Alcohol/week: 19.0 standard drinks     Types: 14 Glasses of wine, 5 Cans of beer per week        OBJECTIVE:    .  Visit Vitals  LMP  (LMP Unknown)     WDWN in NAD    Neurological exam[de-identified] 2-12 intact  Psychiatric: Normal mood, judgement    Reviewed: Medications, allergies, clinical lab test results and imaging results have been reviewed. Any abnormal findings have been addressed. ASSESSMENT:       ICD-10-CM ICD-9-CM    1. Situational anxiety  F41.8 300.09 LORazepam (ATIVAN) 0.5 mg tablet       PLAN    Orders Placed This Encounter    LORazepam (ATIVAN) 0.5 mg tablet     Sig: Take 1 Tablet by mouth every six (6) hours as needed for Anxiety. Max Daily Amount: 2 mg. Dispense:  12 Tablet     Refill:  0       We discussed this pain and the usage of controlled substances for anxiety relief. In general these medications are indicated for the acute symptom relief and not for chronic usage, allthough they are frequently used for chronic management  After a certain period of time they should be stopped to avoid dependence and/or addiction. I warned her about the dangers of addiction and other side affects including respiratory depression and death. Discussed how this is a controlled substance and that the prescribing of it is should be monitored very carefully. Drug usage is also monitored by our practise and the [facility], since there has been a lot of abuse and diversion of controlled substances. In general we do not refill this medication over the phone. PLease ask for enough pills to get you to your next appointment and make sure you keep your appointments. Warned not to take other medications like alcohol, other benzodiazapines marijuana or other narcotics when on this medication.     Follow-up 1 month

## 2022-03-09 NOTE — TELEPHONE ENCOUNTER
Spoke with patient - she states that her brother had to have emergency surgery last night - for a mass on his stomach - may not make it - patient Is very upset and states that she needs a refill of her Diazepam to help her cope with whats going on with him and her family  - scheduled for a virtual visit for today with Dr Marta Henao LPN  7/2/4504  0:39 PM

## 2022-03-10 ENCOUNTER — DOCUMENTATION ONLY (OUTPATIENT)
Dept: INTERNAL MEDICINE CLINIC | Age: 62
End: 2022-03-10

## 2022-03-10 NOTE — PROGRESS NOTES
3/10/22 PA submitted electronic. Key : [id number] for Lorazepam 0.5 mg tablets. Pa pending outcome Please follow up as needed when fax is received. Case ID: [id number] Oak Prick Negative      Poplar Prick Negative      Cavendish Prick Negative      Birch  Prick Negative      [place] Prick Negative      Darnell Russian Thistle Prick Negative      Lamb'

## 2022-03-18 PROBLEM — J44.9 CHRONIC OBSTRUCTIVE PULMONARY DISEASE (HCC): Status: ACTIVE | Noted: 2020-07-21

## 2022-03-18 PROBLEM — F17.200 TOBACCO DEPENDENCE SYNDROME: Status: ACTIVE | Noted: 2020-07-21

## 2022-03-19 PROBLEM — F33.0 DEPRESSION, MAJOR, RECURRENT, MILD (HCC): Status: ACTIVE | Noted: 2017-12-21

## 2022-03-19 PROBLEM — E11.65 HYPERGLYCEMIA DUE TO TYPE 2 DIABETES MELLITUS (HCC): Status: ACTIVE | Noted: 2021-04-14

## 2022-03-19 PROBLEM — M54.2 NECK PAIN: Status: ACTIVE | Noted: 2020-07-21

## 2022-03-19 PROBLEM — F11.20 NARCOTIC DEPENDENCE (HCC): Status: ACTIVE | Noted: 2019-04-07

## 2022-03-19 PROBLEM — R19.4 ENCOUNTER FOR DIAGNOSTIC COLONOSCOPY DUE TO CHANGE IN BOWEL HABITS: Status: ACTIVE | Noted: 2019-03-04

## 2022-03-19 PROBLEM — M48.00 SPINAL STENOSIS: Status: ACTIVE | Noted: 2017-08-14

## 2022-03-19 PROBLEM — D12.6 ADENOMATOUS COLON POLYP: Status: ACTIVE | Noted: 2020-05-16

## 2022-03-19 PROBLEM — M17.9 OSTEOARTHRITIS OF KNEE: Status: ACTIVE | Noted: 2020-07-21

## 2022-03-19 PROBLEM — R74.8 ELEVATED LIVER ENZYMES: Status: ACTIVE | Noted: 2019-02-19

## 2022-03-19 PROBLEM — F41.1 ANXIETY STATE: Status: ACTIVE | Noted: 2020-07-21

## 2022-03-19 PROBLEM — E78.5 HYPERLIPIDEMIA: Status: ACTIVE | Noted: 2020-07-21

## 2022-03-19 PROBLEM — B18.2 HEPATITIS C VIRUS CARRIER STATE (HCC): Status: ACTIVE | Noted: 2021-04-26

## 2022-03-20 PROBLEM — G25.81 RESTLESS LEGS: Status: ACTIVE | Noted: 2017-12-21

## 2022-03-23 ENCOUNTER — TELEPHONE (OUTPATIENT)
Dept: INTERNAL MEDICINE CLINIC | Age: 62
End: 2022-03-23

## 2022-03-23 NOTE — TELEPHONE ENCOUNTER
----- Message from Marcellus Awad sent at 3/21/2022  4:56 PM EDT -----  Subject: Message to Provider    QUESTIONS  Information for Provider? Pt has called in because she is getting   concerned regarding her health. Her siblings both have multiple forms of   cancer and she wants to know if she should be checked as she has been   having some health issues of her own over the years with swallowing and   her bladder. Can you please follow up with her to advise?   ---------------------------------------------------------------------------  --------------  CALL BACK INFO  What is the best way for the office to contact you? OK to leave message on   voicemail  Preferred Call Back Phone Number? [phone]  ---------------------------------------------------------------------------  --------------  SCRIPT ANSWERS  Relationship to Patient?  Self

## 2022-03-23 NOTE — TELEPHONE ENCOUNTER
Patient has an appointment with Dr Margarito Burrows on 3/28/2022  Jeremiah Michaels LPN  3/81/8119  4:34 AM

## 2022-03-28 ENCOUNTER — VIRTUAL VISIT (OUTPATIENT)
Dept: INTERNAL MEDICINE CLINIC | Age: 62
End: 2022-03-28
Payer: MEDICAID

## 2022-03-28 DIAGNOSIS — F41.8 SITUATIONAL ANXIETY: Primary | ICD-10-CM

## 2022-03-28 DIAGNOSIS — M48.00 SPINAL STENOSIS, UNSPECIFIED SPINAL REGION: ICD-10-CM

## 2022-03-28 DIAGNOSIS — Z12.11 SCREENING FOR COLON CANCER: ICD-10-CM

## 2022-03-28 DIAGNOSIS — F90.0 ATTENTION DEFICIT HYPERACTIVITY DISORDER (ADHD), PREDOMINANTLY INATTENTIVE TYPE: ICD-10-CM

## 2022-03-28 PROCEDURE — 99214 OFFICE O/P EST MOD 30 MIN: CPT | Performed by: FAMILY MEDICINE

## 2022-03-28 RX ORDER — GABAPENTIN 600 MG/1
600 TABLET ORAL 3 TIMES DAILY
Qty: 90 TABLET | Refills: 1 | Status: SHIPPED | OUTPATIENT
Start: 2022-03-28 | End: 2022-06-27

## 2022-03-28 RX ORDER — LORAZEPAM 0.5 MG/1
0.5 TABLET ORAL
Qty: 12 TABLET | Refills: 0 | Status: SHIPPED | OUTPATIENT
Start: 2022-03-28 | End: 2022-04-14

## 2022-03-28 NOTE — PROGRESS NOTES
PROGRESS NOTE        SUBJECTIVE:  Diagnosis/Chief Complaint: Anxiety (FU)    Brother dting of colon CA, wants to checked for that. Doing well with mood yes - ok wants stimulant  Symptoms rf benzo, mood stable  Suicidal: no  Side affects: no  States taking medications per medicine list.yes - as Rx    Patient Active Problem List    Diagnosis Date Noted    Hepatitis C virus carrier state ([facility] 04/26/2021    Hyperglycemia due to type 2 diabetes mellitus ([facility] 04/14/2021    Anxiety state 07/21/2020    Chronic obstructive pulmonary disease ([facility] 07/21/2020    Hyperlipidemia 07/21/2020    Neck pain 07/21/2020    Osteoarthritis of knee 07/21/2020    Tobacco dependence syndrome 07/21/2020    Adenomatous colon polyp 05/16/2020    Narcotic dependence ([facility] 04/07/2019    Encounter for diagnostic colonoscopy due to change in bowel habits 03/04/2019    Elevated liver enzymes 02/19/2019    Depression, major, recurrent, mild ([facility] 12/21/2017    Restless legs 12/21/2017    Spinal stenosis 08/14/2017     Current Outpatient Medications   Medication Sig Dispense Refill    LORazepam (ATIVAN) 0.5 mg tablet Take 1 Tablet by mouth every six (6) hours as needed for Anxiety. Max Daily Amount: 2 mg. 12 Tablet 0    gabapentin (NEURONTIN) 600 mg tablet Take 1 Tablet by mouth three (3) times daily. Max Daily Amount: 1,800 mg. Can Reduce dosage because of fatigue 90 Tablet 1    predniSONE (DELTASONE) 20 mg tablet TAKE 2 TABLETS BY MOUTH ONCE DAILY WITH  BREAKFAST  FOR  5  DAYS 10 Tablet 0    varenicline (CHANTIX) 0.5 mg tablet Use as directed 60 Tablet 2    varenicline (CHANTIX) 1 mg tablet Take 1 Tablet by mouth two (2) times a day. 60 Tablet 0    DULoxetine (CYMBALTA) 60 mg capsule Take 1 capsule by mouth once daily 30 Capsule 5    benzonatate (TESSALON) 200 mg capsule Take 1 Capsule by mouth three (3) times daily as needed for Cough.  30 Capsule 1    umeclidinium-vilanteroL (ANORO ELLIPTA) 62.5-25 mcg/actuation inhaler Take 1 Puff by inhalation daily. 1 Each 5    cholecalciferol (VITAMIN D3) (1000 Units /25 mcg) tablet Take 1 Tablet by mouth daily. 90 Tablet 3    albuterol-ipratropium (DUO-NEB) 2.5 mg-0.5 mg/3 ml nebu 3 mL by Nebulization route every six (6) hours as needed for Wheezing, Shortness of Breath or Cough. Use with a flare up of COPD. Indications: bronchi muscle spasm resulting from COPD 50 Nebule 5    buPROPion (WELLBUTRIN) 100 mg tablet Take 1 Tablet by mouth three (3) times daily. 90 Tablet 5    diclofenac (VOLTAREN) 1 % gel APPLY   TOPICALLY TO AFFECTED AREA 4 TIMES DAILY 100 g 5    ondansetron (ZOFRAN ODT) 4 mg disintegrating tablet Take 1 Tablet by mouth every eight (8) hours as needed for Nausea. 30 Tablet 0    diphenoxylate-atropine (LomotiL) 2.5-0.025 mg per tablet Take 1 Tablet by mouth four (4) times daily as needed for Diarrhea. Max Daily Amount: 4 Tablets. 20 Tablet 0    ondansetron (ZOFRAN ODT) 4 mg disintegrating tablet DISSOLVE 1 TABLET IN MOUTH EVERY 8 HOURS AS NEEDED FOR NAUSEA AND VOMITING FOR  UP  TO  10  DOSES 20 Tablet 1    diphenoxylate-atropine (LOMOTIL) 2.5-0.025 mg per tablet TAKE 1 TABLET BY MOUTH 4 TIMES DAILY AS NEEDED FOR DIARRHEA . DO NOT EXCEED 4 PER 24 HOURS 12 Tab 0    cyclobenzaprine (FLEXERIL) 5 mg tablet Take 1 Tab by mouth nightly as needed for Muscle Spasm(s). Indications: muscle spasm 30 Tab 2    Nebulizer & Compressor machine 1 Each by Does Not Apply route every four (4) hours as needed for Wheezing or Shortness of Breath. 1 Each 0    albuterol (PROVENTIL HFA, VENTOLIN HFA, PROAIR HFA) 90 mcg/actuation inhaler INHALE ONE PUFF BY MOUTH EVERY 6 HOURS AS NEEDED FOR WHEEZING 1 Inhaler 2    calcium combo no.2-vitamin D3 600 mg calcium- 500 unit TbER Take 1 Each by mouth daily.  90 Tab 1     Allergies   Allergen Reactions    Zolpidem Unknown (comments)     Past Medical History:   Diagnosis Date    Arthritis     Asthma     COPD [facility]chronic obstructive pulmonary disease) [facility]     Depression     Hemorrhoids     Occult blood positive stool     Spinal stenosis      Social History     Tobacco Use    Smoking status: Former Smoker     Packs/day: 0.50     Years: 35.00     Pack years: 17.50     Types: Cigarettes     Start date: 1975     Quit date: 2022     Years since quittin.2    Smokeless tobacco: Never Used   Substance Use Topics    Alcohol use: Yes     Alcohol/week: 19.0 standard drinks     Types: 14 Glasses of wine, 5 Cans of beer per week        OBJECTIVE:    .  Visit Vitals  LMP  (LMP Unknown)     WDWN in NAD  Psychiatric: Normal mood, judgement    Reviewed: Medications, allergies, clinical lab test results and imaging results have been reviewed. Any abnormal findings have been addressed. ASSESSMENT:       ICD-10-CM ICD-9-CM    1. Screening for colon cancer  Z12.11 V76.51 REFERRAL TO GASTROENTEROLOGY   2. Situational anxiety  F41.8 300.09 LORazepam (ATIVAN) 0.5 mg tablet   3. Spinal stenosis, unspecified spinal region  M48.00 724.00 gabapentin (NEURONTIN) 600 mg tablet   4. Attention deficit hyperactivity disorder (ADHD), predominantly inattentive type  F90.0 314.00 AMB SUPPLY ORDER       PLAN    Orders Placed This Encounter    AMB SUPPLY ORDER     ADHD Rx    REFERRAL TO GASTROENTEROLOGY     Referral Priority:   Routine     Referral Type:   Consultation     Referral Reason:   Specialty Services Required     Referred to Provider:   Elena Quispe MD     Number of Visits Requested:   1    LORazepam (ATIVAN) 0.5 mg tablet     Sig: Take 1 Tablet by mouth every six (6) hours as needed for Anxiety. Max Daily Amount: 2 mg. Dispense:  12 Tablet     Refill:  0    gabapentin (NEURONTIN) 600 mg tablet     Sig: Take 1 Tablet by mouth three (3) times daily. Max Daily Amount: 1,800 mg.  Can Reduce dosage because of fatigue     Dispense:  90 Tablet     Refill:  1     Current Outpatient Medications   Medication Sig Dispense Refill    LORazepam (ATIVAN) 0.5 mg tablet Take 1 Tablet by mouth every six (6) hours as needed for Anxiety. Max Daily Amount: 2 mg. 12 Tablet 0    gabapentin (NEURONTIN) 600 mg tablet Take 1 Tablet by mouth three (3) times daily. Max Daily Amount: 1,800 mg. Can Reduce dosage because of fatigue 90 Tablet 1    predniSONE (DELTASONE) 20 mg tablet TAKE 2 TABLETS BY MOUTH ONCE DAILY WITH  BREAKFAST  FOR  5  DAYS 10 Tablet 0    varenicline (CHANTIX) 0.5 mg tablet Use as directed 60 Tablet 2    varenicline (CHANTIX) 1 mg tablet Take 1 Tablet by mouth two (2) times a day. 60 Tablet 0    DULoxetine (CYMBALTA) 60 mg capsule Take 1 capsule by mouth once daily 30 Capsule 5    benzonatate (TESSALON) 200 mg capsule Take 1 Capsule by mouth three (3) times daily as needed for Cough. 30 Capsule 1    umeclidinium-vilanteroL (ANORO ELLIPTA) 62.5-25 mcg/actuation inhaler Take 1 Puff by inhalation daily. 1 Each 5    cholecalciferol (VITAMIN D3) (1000 Units /25 mcg) tablet Take 1 Tablet by mouth daily. 90 Tablet 3    albuterol-ipratropium (DUO-NEB) 2.5 mg-0.5 mg/3 ml nebu 3 mL by Nebulization route every six (6) hours as needed for Wheezing, Shortness of Breath or Cough. Use with a flare up of COPD. Indications: bronchi muscle spasm resulting from COPD 50 Nebule 5    buPROPion (WELLBUTRIN) 100 mg tablet Take 1 Tablet by mouth three (3) times daily. 90 Tablet 5    diclofenac (VOLTAREN) 1 % gel APPLY   TOPICALLY TO AFFECTED AREA 4 TIMES DAILY 100 g 5    ondansetron (ZOFRAN ODT) 4 mg disintegrating tablet Take 1 Tablet by mouth every eight (8) hours as needed for Nausea. 30 Tablet 0    diphenoxylate-atropine (LomotiL) 2.5-0.025 mg per tablet Take 1 Tablet by mouth four (4) times daily as needed for Diarrhea. Max Daily Amount: 4 Tablets.  20 Tablet 0    ondansetron (ZOFRAN ODT) 4 mg disintegrating tablet DISSOLVE 1 TABLET IN MOUTH EVERY 8 HOURS AS NEEDED FOR NAUSEA AND VOMITING FOR  UP  TO  10  DOSES 20 Tablet 1    diphenoxylate-atropine (LOMOTIL) 2.5-0.025 mg per tablet TAKE 1 TABLET BY MOUTH 4 TIMES DAILY AS NEEDED FOR DIARRHEA . DO NOT EXCEED 4 PER 24 HOURS 12 Tab 0    cyclobenzaprine (FLEXERIL) 5 mg tablet Take 1 Tab by mouth nightly as needed for Muscle Spasm(s). Indications: muscle spasm 30 Tab 2    Nebulizer & Compressor machine 1 Each by Does Not Apply route every four (4) hours as needed for Wheezing or Shortness of Breath. 1 Each 0    albuterol (PROVENTIL HFA, VENTOLIN HFA, PROAIR HFA) 90 mcg/actuation inhaler INHALE ONE PUFF BY MOUTH EVERY 6 HOURS AS NEEDED FOR WHEEZING 1 Inhaler 2    calcium combo no.2-vitamin D3 600 mg calcium- 500 unit TbER Take 1 Each by mouth daily. [facility], who was evaluated through a synchronous (real-time) audio-video encounter, and/or her healthcare decision maker, is aware that it is a billable service, which includes applicable co-pays, with coverage as determined by her insurance carrier. She provided verbal consent to proceed and patient identification was verified. This visit was conducted pursuant to the emergency declaration under the [facility], [address] [place] [address] waiver authority and the [employer] General Act. A caregiver was present when appropriate. Ability to conduct physical exam was limited. The patient was located at home in a state where the provider was licensed to provide care.      --Will Bragg MD on 3/28/2022 at 3:14 PM

## 2022-03-28 NOTE — PROGRESS NOTES
Chief Complaint   Patient presents with    Anxiety     FU     Patient is aware that this is a Virtual Visit or Phone Call Only doctor's visit. Patient has not been out of the country in (14 months), NO diarrhea, NO cough, NO chest conjestion, NO temp. Pt has not been around anyone with these symptoms. Health Maintenance reviewed. I have reviewed the patient's medical history in detail and updated the computerized patient record. 1. Have you been to the ER, urgent care clinic since your last visit? no Hospitalized since your last visit?  no    2. Have you seen or consulted any other health care providers outside of the [address] [place] [address] since your last visit? No  Include any pap smears or colon screening. Encouraged pt to discuss pt's wishes with spouse/partner/family and bring them in the next appt to follow thru with the Advanced Directive      Fall Risk Assessment, last 12 mths 4/2/2021   Able to walk? Yes   Fall in past 12 months? 0   Do you feel unsteady? 1   Are you worried about falling 0   Number of falls in past 12 months -   Fall with injury?  -       3 most recent PHQ Screens 2/4/2022   Little interest or pleasure in doing things Several days   Feeling down, depressed, irritable, or hopeless Several days   Total Score PHQ 2 2   Trouble falling or staying asleep, or sleeping too much -   Feeling tired or having little energy -   Poor appetite, weight loss, or overeating -   Feeling bad about yourself - or that you are a failure or have let yourself or your family down -   Trouble concentrating on things such as school, work, reading, or watching TV -   Moving or speaking so slowly that other people could have noticed; or the opposite being so fidgety that others notice -   Thoughts of being better off dead, or hurting yourself in some way -   How difficult have these problems made it for you to do your work, take care of your home and get along with others -       Abuse Screening Questionnaire 9/14/2021   Do you ever feel afraid of your partner? N   Are you in a relationship with someone who physically or mentally threatens you? N   Is it safe for you to go home?  Y       ADL Assessment 9/14/2021   Feeding yourself No Help Needed   Getting from bed to chair No Help Needed   Getting dressed No Help Needed   Bathing or showering No Help Needed   Walk across the room (includes cane/walker) No Help Needed   Using the telphone No Help Needed   Taking your medications No Help Needed   Preparing meals No Help Needed   Managing money (expenses/bills) No Help Needed   Moderately strenuous housework (laundry) No Help Needed   Shopping for personal items (toiletries/medicines) Help Needed   Shopping for groceries Help Needed   Driving Help Needed   Climbing a flight of stairs No Help Needed   Getting to places beyond walking distances Help Needed

## 2022-04-25 ENCOUNTER — TELEPHONE (OUTPATIENT)
Dept: INTERNAL MEDICINE CLINIC | Age: 62
End: 2022-04-25

## 2022-04-25 NOTE — TELEPHONE ENCOUNTER
----- Message from [facility] sent at 4/25/2022  9:14 AM EDT -----  Subject: Refill Request    QUESTIONS  Name of Medication? predniSONE (DELTASONE) 20 mg tablet  Patient-reported dosage and instructions? Take 40 mg by mouth daily (with   breakfast) for 5 days. How many days do you have left? 0  Preferred Pharmacy? [address]  Pharmacy phone number (if available)? [phone]  Additional Information for Provider? Patient would like the hose for her   albuterol machine as well. Patient would like a call back once done.  ---------------------------------------------------------------------------  --------------  CALL BACK INFO  What is the best way for the office to contact you? OK to leave message on   voicemail  Preferred Call Back Phone Number? [phone]  ---------------------------------------------------------------------------  --------------  SCRIPT ANSWERS  Relationship to Patient?  Self

## 2022-05-11 DIAGNOSIS — A09 DIARRHEA OF INFECTIOUS ORIGIN: ICD-10-CM

## 2022-05-11 RX ORDER — DIPHENOXYLATE HYDROCHLORIDE AND ATROPINE SULFATE 2.5; .025 MG/1; MG/1
TABLET ORAL
Qty: 20 TABLET | Refills: 0 | Status: SHIPPED | OUTPATIENT
Start: 2022-05-11 | End: 2022-06-09

## 2022-05-12 DIAGNOSIS — F41.8 SITUATIONAL ANXIETY: ICD-10-CM

## 2022-05-13 RX ORDER — LORAZEPAM 0.5 MG/1
TABLET ORAL
Qty: 12 TABLET | Refills: 0 | OUTPATIENT
Start: 2022-05-13

## 2022-05-18 DIAGNOSIS — J44.1 CHRONIC OBSTRUCTIVE PULMONARY DISEASE WITH ACUTE EXACERBATION (HCC): ICD-10-CM

## 2022-05-18 NOTE — TELEPHONE ENCOUNTER
----- Message from Hood Quintero sent at 5/18/2022  1:38 PM EDT -----  Subject: Refill Request    QUESTIONS  Name of Medication? predniSONE (DELTASONE) 20 mg tablet  Patient-reported dosage and instructions? One pill, 3 times a day  How many days do you have left? 0  Preferred Pharmacy? [address]  Pharmacy phone number (if available)? [phone]  ---------------------------------------------------------------------------  --------------,  Name of Medication? LORazepam (ATIVAN) 0.5 mg tablet  Patient-reported dosage and instructions? one pill, prn as needed  How many days do you have left? 0  Preferred Pharmacy? [address]  Pharmacy phone number (if available)? [phone]  ---------------------------------------------------------------------------  --------------  CALL BACK INFO  What is the best way for the office to contact you? OK to leave message on   voicemail  Preferred Call Back Phone Number? [phone]  ---------------------------------------------------------------------------  --------------  SCRIPT ANSWERS  Relationship to Patient?  Self

## 2022-05-19 RX ORDER — PREDNISONE 20 MG/1
TABLET ORAL
Qty: 10 TABLET | Refills: 0 | Status: SHIPPED | OUTPATIENT
Start: 2022-05-19 | End: 2022-06-06 | Stop reason: SDUPTHER

## 2022-06-06 DIAGNOSIS — J44.1 CHRONIC OBSTRUCTIVE PULMONARY DISEASE WITH ACUTE EXACERBATION (HCC): ICD-10-CM

## 2022-06-07 RX ORDER — PREDNISONE 20 MG/1
TABLET ORAL
Qty: 10 TABLET | Refills: 0 | Status: SHIPPED | OUTPATIENT
Start: 2022-06-07 | End: 2022-06-27

## 2022-06-08 DIAGNOSIS — A09 DIARRHEA OF INFECTIOUS ORIGIN: ICD-10-CM

## 2022-06-08 DIAGNOSIS — J44.9 CHRONIC OBSTRUCTIVE PULMONARY DISEASE, UNSPECIFIED COPD TYPE (HCC): ICD-10-CM

## 2022-06-09 ENCOUNTER — TELEPHONE (OUTPATIENT)
Dept: INTERNAL MEDICINE CLINIC | Age: 62
End: 2022-06-09

## 2022-06-09 RX ORDER — DIPHENOXYLATE HYDROCHLORIDE AND ATROPINE SULFATE 2.5; .025 MG/1; MG/1
TABLET ORAL
Qty: 20 TABLET | Refills: 0 | Status: SHIPPED | OUTPATIENT
Start: 2022-06-09 | End: 2022-08-04

## 2022-06-09 RX ORDER — BENZONATATE 200 MG/1
CAPSULE ORAL
Qty: 30 CAPSULE | Refills: 0 | Status: SHIPPED | OUTPATIENT
Start: 2022-06-09 | End: 2022-07-15 | Stop reason: SDUPTHER

## 2022-06-09 NOTE — TELEPHONE ENCOUNTER
Pt calling to get refill called in to [facility], her brother is dying and she says she needs it     diazePAM (VALIUM) 5 mg tablet [[id number]]  DISCONTINUED

## 2022-06-10 DIAGNOSIS — F41.8 SITUATIONAL ANXIETY: ICD-10-CM

## 2022-06-10 RX ORDER — LORAZEPAM 0.5 MG/1
0.5 TABLET ORAL
Qty: 6 TABLET | Refills: 0 | Status: SHIPPED | OUTPATIENT
Start: 2022-06-10 | End: 2022-07-15 | Stop reason: SDUPTHER

## 2022-06-10 NOTE — TELEPHONE ENCOUNTER
Encouraged pt to discuss pt's wishes with spouse/partner/family and bring them in the next appt to follow thru with the Advanced Directive    Tried to contact patient and phone not set up for leaviing messages.

## 2022-06-15 NOTE — PROGRESS NOTES
Patient contacted regarding COVID-19 risk and screening. Discussed COVID-19 related testing which was available at this time. Test results were negative. Patient informed of results, if available? yes    Care Transition Nurse/ Ambulatory Care Manager/ LPN Care Coordinator contacted the patient by telephone to perform follow-up assessment. Verified name and  with patient as identifiers. Patient has following risk factors of: COPD. Symptoms reviewed with patient who verbalized the following symptoms: no new symptoms and no worsening symptoms. Due to no new or worsening symptoms encounter was not routed to provider for escalation. Education provided regarding infection prevention, and signs and symptoms of COVID-19 and when to seek medical attention with patient who verbalized understanding. Discussed exposure protocols and quarantine from [address] you at higher risk for severe illness  and given an opportunity for questions and concerns. The patient agrees to contact the COVID-19 hotline [phone] or PCP office for questions related to their healthcare. CTN/ACM/LPN provided contact information for future reference. From CDC: Are you at higher risk for severe illness?  Wash your hands often.  Avoid close contact (6 feet, which is about two arm lengths) with people who are sick.  Put distance between yourself and other people if COVID-19 is spreading in your community.  Clean and disinfect frequently touched surfaces.  Avoid all cruise travel and non-essential air travel.  Call your healthcare professional if you have concerns about COVID-19 and your underlying condition or if you are sick. For more information on steps you can take to protect yourself, see CDC's How to Lauryn for follow-up call in 7-14 days based on severity of symptoms and risk factors. Spontaneous, unlabored and symmetrical

## 2022-07-03 RX ORDER — VARENICLINE TARTRATE 1 MG/1
TABLET, FILM COATED ORAL
Qty: 60 TABLET | Refills: 0 | Status: SHIPPED | OUTPATIENT
Start: 2022-07-03

## 2022-07-15 ENCOUNTER — VIRTUAL VISIT (OUTPATIENT)
Dept: INTERNAL MEDICINE CLINIC | Age: 62
End: 2022-07-15
Payer: MEDICAID

## 2022-07-15 DIAGNOSIS — F33.0 DEPRESSION, MAJOR, RECURRENT, MILD (HCC): ICD-10-CM

## 2022-07-15 DIAGNOSIS — J44.9 CHRONIC OBSTRUCTIVE PULMONARY DISEASE, UNSPECIFIED COPD TYPE (HCC): ICD-10-CM

## 2022-07-15 DIAGNOSIS — F41.8 SITUATIONAL ANXIETY: ICD-10-CM

## 2022-07-15 DIAGNOSIS — M48.00 SPINAL STENOSIS, UNSPECIFIED SPINAL REGION: ICD-10-CM

## 2022-07-15 PROCEDURE — 99213 OFFICE O/P EST LOW 20 MIN: CPT | Performed by: FAMILY MEDICINE

## 2022-07-15 RX ORDER — AZITHROMYCIN 250 MG/1
250 TABLET, FILM COATED ORAL SEE ADMIN INSTRUCTIONS
Qty: 6 TABLET | Refills: 0 | Status: SHIPPED | OUTPATIENT
Start: 2022-07-15 | End: 2022-07-20

## 2022-07-15 RX ORDER — LORAZEPAM 0.5 MG/1
0.5 TABLET ORAL
Qty: 30 TABLET | Refills: 0 | Status: SHIPPED | OUTPATIENT
Start: 2022-07-15 | End: 2022-08-12 | Stop reason: SDUPTHER

## 2022-07-15 RX ORDER — DULOXETIN HYDROCHLORIDE 60 MG/1
CAPSULE, DELAYED RELEASE ORAL
Qty: 30 CAPSULE | Refills: 5 | Status: SHIPPED | OUTPATIENT
Start: 2022-07-15

## 2022-07-15 RX ORDER — GABAPENTIN 600 MG/1
600 TABLET ORAL 3 TIMES DAILY
Qty: 90 TABLET | Refills: 0 | Status: SHIPPED | OUTPATIENT
Start: 2022-07-15 | End: 2022-08-12 | Stop reason: SDUPTHER

## 2022-07-15 RX ORDER — PREDNISONE 20 MG/1
40 TABLET ORAL
Qty: 10 TABLET | Refills: 0 | Status: SHIPPED | OUTPATIENT
Start: 2022-07-15 | End: 2022-07-20

## 2022-07-15 RX ORDER — BENZONATATE 200 MG/1
200 CAPSULE ORAL
Qty: 30 CAPSULE | Refills: 0 | Status: SHIPPED | OUTPATIENT
Start: 2022-07-15 | End: 2022-10-18 | Stop reason: SDUPTHER

## 2022-07-15 NOTE — PROGRESS NOTES
Subjective:   Lovely Laureano is a 62 y.o. female who complains of nasal blockage, cough described as productive of yellow sputum, left ear pain, creamy nasal discharge and dyspnea  for 4 days, unchanged since that time. She denies a history of fevers and diarrhea and sore throat. COVID was neg today  Evaluation to date: none. Treatment to date: OTC products. Patient does smoke cigarettes. Relevant PMH: Asthma and COPD. Allergies   Allergen Reactions    Zolpidem Unknown (comments)         Patient Active Problem List    Diagnosis Date Noted    Hepatitis C virus carrier state ([facility] 2021    Hyperglycemia due to type 2 diabetes mellitus ([facility] 2021    Anxiety state 2020    Chronic obstructive pulmonary disease ([facility] 2020    Hyperlipidemia 2020    Neck pain 2020    Osteoarthritis of knee 2020    Tobacco dependence syndrome 2020    Adenomatous colon polyp 2020    Narcotic dependence ([facility] 2019    Encounter for diagnostic colonoscopy due to change in bowel habits 2019    Elevated liver enzymes 2019    Depression, major, recurrent, mild ([address] 2017    Restless legs 2017    Spinal stenosis 2017     Allergies   Allergen Reactions    Zolpidem Unknown (comments)     Past Medical History:   Diagnosis Date    Arthritis     Asthma     COPD (chronic obstructive pulmonary disease) ([facility]     Depression     Hemorrhoids     Occult blood positive stool     Spinal stenosis      Social History     Tobacco Use    Smoking status: Former Smoker     Packs/day: 0.50     Years: 35.00     Pack years: 17.50     Types: Cigarettes     Start date: 1975     Quit date: 2022     Years since quittin.5    Smokeless tobacco: Never Used   Substance Use Topics    Alcohol use:  Yes     Alcohol/week: 19.0 standard drinks     Types: 14 Glasses of wine, 5 Cans of beer per week        Review of Systems  Pertinent items are noted in HPI. Objective:     Visit Vitals  LMP  (LMP Unknown)     Sounds NAD  Assessment/Plan:   asthma  Antibiotics per orders. RTC in 4 weeks or PRN. Discussed diagnosis and treatment of viral URIs. Discussed the importance of avoiding unnecessary antibiotic therapy. Encounter Diagnoses   Name Primary?  Spinal stenosis, unspecified spinal region     Situational anxiety     Chronic obstructive pulmonary disease, unspecified COPD type (HCC)     Depression, major, recurrent, mild ([facility]      Orders Placed This Encounter    azithromycin (ZITHROMAX) 250 mg tablet    predniSONE (DELTASONE) 20 mg tablet    gabapentin (NEURONTIN) 600 mg tablet    LORazepam (ATIVAN) 0.5 mg tablet    benzonatate (TESSALON) 200 mg capsule    umeclidinium-vilanteroL (ANORO ELLIPTA) 62.5-25 mcg/actuation inhaler    DULoxetine (CYMBALTA) 60 mg capsule         Follow-up and Dispositions    · Return in about 4 weeks (around 8/12/2022) for routine follow up. Pankaj Luis is a 62 y.o. female who was phone evaluated on 7/15/2022. Consent:  She and/or her healthcare decision maker is aware that this patient-initiated Telehealth encounter is a billable service, with coverage as determined by her insurance carrier. She is aware that she may receive a bill and has provided verbal consent to proceed: Yes    I was in the office while conducting this encounter. Assessment & Plan:   Diagnoses and all orders for this visit:    1. Spinal stenosis, unspecified spinal region  -     gabapentin (NEURONTIN) 600 mg tablet; Take 1 Tablet by mouth three (3) times daily. 2. Situational anxiety  -     LORazepam (ATIVAN) 0.5 mg tablet; Take 1 Tablet by mouth daily as needed for Anxiety. 3. Chronic obstructive pulmonary disease, unspecified COPD type (HCC)  -     benzonatate (TESSALON) 200 mg capsule; Take 1 Capsule by mouth three (3) times daily as needed for Cough.   -     umeclidinium-vilanteroL (ANORO ELLIPTA) 62.5-25 mcg/actuation inhaler; Take 1 Puff by inhalation daily. 4. Depression, major, recurrent, mild (HCC)  -     DULoxetine (CYMBALTA) 60 mg capsule; Take 1 capsule by mouth once daily    Other orders  -     azithromycin (ZITHROMAX) 250 mg tablet; Take 1 Tablet by mouth See Admin Instructions for 5 days. Take two tablets today then one tablet daily for next 4 days  -     predniSONE (DELTASONE) 20 mg tablet; Take 2 Tablets by mouth daily (with breakfast) for 5 days. Patient was instructed on the limits of making a diagnosis at this visit. Was instructed to call us or go to the emergency room if the symptoms increased or if new symptoms appeared. Follow-up and Dispositions    · Return in about 4 weeks (around 8/12/2022) for routine follow up. 712  Subjective:        20 min    We discussed the expected course, resolution and complications of the diagnosis(es) in detail. Medication risks, benefits, costs, interactions, and alternatives were discussed as indicated. I advised her to contact the office if her condition worsens, changes or fails to improve as anticipated. She expressed understanding with the diagnosis(es) and plan. Pursuant to the emergency declaration under the [facility] waiver authority and the [employer] General Act, this Virtual  Visit was conducted, with patient's consent, to reduce the patient's risk of exposure to COVID-19 and provide continuity of care for an established patient. Services were provided through a video synchronous discussion virtually to substitute for in-person clinic visit.     Ninoska Turpin MD

## 2022-07-15 NOTE — PROGRESS NOTES
Chief Complaint   Patient presents with   Karely Navarro     not feeling well     Patient is aware that this is a Virtual Visit or Phone Call Only doctor's visit. Patient has not been out of the country in (14 months), NO diarrhea, NO cough, NO chest conjestion, NO temp. Pt has not been around anyone with these symptoms. Health Maintenance reviewed. I have reviewed the patient's medical history in detail and updated the computerized patient record. 1. Have you been to the ER, urgent care clinic since your last visit? No Hospitalized since your last visit? No     2. Have you seen or consulted any other health care providers outside of the [address] [place] [address] since your last visit? No  Include any pap smears or colon screening. Encouraged pt to discuss pt's wishes with spouse/partner/family and bring them in the next appt to follow thru with the Advanced Directive      Fall Risk Assessment, last 12 mths 4/2/2021   Able to walk? Yes   Fall in past 12 months? 0   Do you feel unsteady? 1   Are you worried about falling 0   Number of falls in past 12 months -   Fall with injury?  -       3 most recent PHQ Screens 2/4/2022   Little interest or pleasure in doing things Several days   Feeling down, depressed, irritable, or hopeless Several days   Total Score PHQ 2 2   Trouble falling or staying asleep, or sleeping too much -   Feeling tired or having little energy -   Poor appetite, weight loss, or overeating -   Feeling bad about yourself - or that you are a failure or have let yourself or your family down -   Trouble concentrating on things such as school, work, reading, or watching TV -   Moving or speaking so slowly that other people could have noticed; or the opposite being so fidgety that others notice -   Thoughts of being better off dead, or hurting yourself in some way -   How difficult have these problems made it for you to do your work, take care of your home and get along with others - Abuse Screening Questionnaire 9/14/2021   Do you ever feel afraid of your partner? N   Are you in a relationship with someone who physically or mentally threatens you? N   Is it safe for you to go home?  Y       ADL Assessment 9/14/2021   Feeding yourself No Help Needed   Getting from bed to chair No Help Needed   Getting dressed No Help Needed   Bathing or showering No Help Needed   Walk across the room (includes cane/walker) No Help Needed   Using the telphone No Help Needed   Taking your medications No Help Needed   Preparing meals No Help Needed   Managing money (expenses/bills) No Help Needed   Moderately strenuous housework (laundry) No Help Needed   Shopping for personal items (toiletries/medicines) Help Needed   Shopping for groceries Help Needed   Driving Help Needed   Climbing a flight of stairs No Help Needed   Getting to places beyond walking distances Help Needed

## 2022-07-25 ENCOUNTER — NURSE TRIAGE (OUTPATIENT)
Dept: OTHER | Facility: CLINIC | Age: 62
End: 2022-07-25

## 2022-07-25 ENCOUNTER — TELEPHONE (OUTPATIENT)
Dept: INTERNAL MEDICINE CLINIC | Age: 62
End: 2022-07-25

## 2022-07-25 NOTE — TELEPHONE ENCOUNTER
Received call from [address] [facility] at [facility] with Red Flag Complaint. Subjective: Caller states \"blood sugar 283 10am\"     Current Symptoms: fatigue, headache, feet pain    Onset: 2 weeks ago; gradual    Associated Symptoms: NA    Pain Severity: 8/10; aching and sharp; intermittent    Temperature: denies    What has been tried: denies    LMP: NA Pregnant: No    Recommended disposition: See in Office Today    Care advice provided, patient verbalizes understanding; denies any other questions or concerns; instructed to call back for any new or worsening symptoms. Patient/Caller agrees with recommended disposition; writer provided warm transfer to [place] at [facility] for appointment scheduling    Attention Provider: Thank you for allowing me to participate in the care of your patient. The patient was connected to triage in response to information provided to the [facility]. Please do not respond through this encounter as the response is not directed to a shared pool.     Reason for Disposition   New-onset diabetes mellitus suspected (e.g., frequent urination, weak, weight loss)    Protocols used: Diabetes - High Blood Sugar-ADULT-[place]

## 2022-07-27 ENCOUNTER — VIRTUAL VISIT (OUTPATIENT)
Dept: INTERNAL MEDICINE CLINIC | Age: 62
End: 2022-07-27

## 2022-07-27 DIAGNOSIS — E11.65 TYPE 2 DIABETES MELLITUS WITH HYPERGLYCEMIA, WITHOUT LONG-TERM CURRENT USE OF INSULIN (HCC): Primary | ICD-10-CM

## 2022-07-27 NOTE — PROGRESS NOTES
Chief Complaint   Patient presents with    Anxiety     Patient is aware that this is a Virtual Visit or Phone Call Only doctor's visit. Patient has not been out of the country in (14 months), NO diarrhea, NO cough, NO chest conjestion, NO temp. Pt has not been around anyone with these symptoms. Health Maintenance reviewed. I have reviewed the patient's medical history in detail and updated the computerized patient record. Have you been to the ER, urgent care clinic since your last visit? No Hospitalized since your last visit?  no    2. Have you seen or consulted any other health care providers outside of the [address] [place] [address] since your last visit? No  Include any pap smears or colon screening. Encouraged pt to discuss pt's wishes with spouse/partner/family and bring them in the next appt to follow thru with the Advanced Directive      Fall Risk Assessment, last 12 mths 4/2/2021   Able to walk? Yes   Fall in past 12 months? 0   Do you feel unsteady? 1   Are you worried about falling 0   Number of falls in past 12 months -   Fall with injury?  -       3 most recent PHQ Screens 2/4/2022   Little interest or pleasure in doing things Several days   Feeling down, depressed, irritable, or hopeless Several days   Total Score PHQ 2 2   Trouble falling or staying asleep, or sleeping too much -   Feeling tired or having little energy -   Poor appetite, weight loss, or overeating -   Feeling bad about yourself - or that you are a failure or have let yourself or your family down -   Trouble concentrating on things such as school, work, reading, or watching TV -   Moving or speaking so slowly that other people could have noticed; or the opposite being so fidgety that others notice -   Thoughts of being better off dead, or hurting yourself in some way -   How difficult have these problems made it for you to do your work, take care of your home and get along with others -       Abuse Screening Questionnaire 9/14/2021   Do you ever feel afraid of your partner? N   Are you in a relationship with someone who physically or mentally threatens you? N   Is it safe for you to go home?  Y       ADL Assessment 9/14/2021   Feeding yourself No Help Needed   Getting from bed to chair No Help Needed   Getting dressed No Help Needed   Bathing or showering No Help Needed   Walk across the room (includes cane/walker) No Help Needed   Using the telphone No Help Needed   Taking your medications No Help Needed   Preparing meals No Help Needed   Managing money (expenses/bills) No Help Needed   Moderately strenuous housework (laundry) No Help Needed   Shopping for personal items (toiletries/medicines) Help Needed   Shopping for groceries Help Needed   Driving Help Needed   Climbing a flight of stairs No Help Needed   Getting to places beyond walking distances Help Needed

## 2022-07-28 ENCOUNTER — TELEPHONE (OUTPATIENT)
Dept: INTERNAL MEDICINE CLINIC | Age: 62
End: 2022-07-28

## 2022-07-28 ENCOUNTER — VIRTUAL VISIT (OUTPATIENT)
Dept: INTERNAL MEDICINE CLINIC | Age: 62
End: 2022-07-28
Payer: MEDICAID

## 2022-07-28 ENCOUNTER — DOCUMENTATION ONLY (OUTPATIENT)
Dept: INTERNAL MEDICINE CLINIC | Age: 62
End: 2022-07-28

## 2022-07-28 DIAGNOSIS — Z72.0 TOBACCO ABUSE: ICD-10-CM

## 2022-07-28 DIAGNOSIS — J44.9 CHRONIC OBSTRUCTIVE PULMONARY DISEASE, UNSPECIFIED COPD TYPE (HCC): ICD-10-CM

## 2022-07-28 DIAGNOSIS — I10 ESSENTIAL HYPERTENSION: ICD-10-CM

## 2022-07-28 DIAGNOSIS — Z12.2 SCREENING FOR LUNG CANCER: ICD-10-CM

## 2022-07-28 DIAGNOSIS — E11.65 TYPE 2 DIABETES MELLITUS WITH HYPERGLYCEMIA, WITHOUT LONG-TERM CURRENT USE OF INSULIN (HCC): Primary | ICD-10-CM

## 2022-07-28 DIAGNOSIS — E78.2 MIXED HYPERLIPIDEMIA: ICD-10-CM

## 2022-07-28 PROCEDURE — 99214 OFFICE O/P EST MOD 30 MIN: CPT | Performed by: FAMILY MEDICINE

## 2022-07-28 NOTE — PROGRESS NOTES
Subjective:     Almas Cueva is a 62 y.o. female seen for follow-up of diabetes. She has had hypoglycemic attacks. .no  Blood sugar control has been hi 300's 200's    Lab Results   Component Value Date/Time    Hemoglobin A1c 7.0 (H) 2021 12:00 AM    Glucose 105 (H) 2021 12:00 AM    Microalb/Creat ratio (ug/mg creat.) 6 2021 12:00 AM    LDL, calculated 113 (H) 2021 12:00 AM    Creatinine 0.69 2021 12:00 AM       She has diabetes, hypertension, and hyperlipidemia. Almas Cueva has the additional concern of got a meter sugars hi    Reports taking blood pressure medications without side affects. No complaints of exertional chest pain, excessive shortness of breath or focal weakness. Minimal swelling in lower legs or dizziness with standing. Diet and Lifestyle: smoker almost quit . Allergies   Allergen Reactions    Zolpidem Unknown (comments)     Past Medical History:   Diagnosis Date    Arthritis     Asthma     COPD (chronic obstructive pulmonary disease) (HCC)     Depression     Hemorrhoids     Occult blood positive stool     Spinal stenosis      Family History   Problem Relation Age of Onset    Cancer Mother         colon    Cancer Father 79        lung    Diabetes Sister     Cancer Sister     Breast Cancer Sister         age 39    Cancer Brother 27        lymphoma     Social History     Tobacco Use    Smoking status: Every Day     Packs/day: 0.50     Years: 45.00     Pack years: 22.50     Types: Cigarettes     Start date: 1975     Last attempt to quit: 2022     Years since quittin.5    Smokeless tobacco: Never   Substance Use Topics    Alcohol use: Yes     Alcohol/week: 19.0 standard drinks     Types: 14 Glasses of wine, 5 Cans of beer per week             Review of Systems  Pertinent items are noted in HPI.     Objective:     Significant for the following: sounds NAD  Visit Vitals  LMP  (LMP Unknown)     WD WN female NAD      Lab review: labs reviewed, I note that glycosylated hemoglobin not done recently. Assessment/Plan:     Follow-up diabetes poorly controlled, loss of control due to intercurrent illness, needs further observation, needs improvement. Diabetic issues reviewed with her: all medications, side effects and compliance discussed carefully, glycohemoglobin and other lab monitoring discussed, patient urged in the strongest terms to quit smoking, and labs immediately prior to next visit. ICD-10-CM ICD-9-CM    1. Type 2 diabetes mellitus with hyperglycemia, without long-term current use of insulin (HCC)  E11.65 250.00      790.29       2. Chronic obstructive pulmonary disease, unspecified COPD type ([facility]  J44.9 496       3. Tobacco abuse  Z72.0 305.1           No orders of the defined types were placed in this encounter. Follow-up and Dispositions    Return in about 4 weeks (around 8/25/2022). Chronic Conditions Addressed Today       1. Hyperglycemia due to type 2 diabetes mellitus ([facility] - Primary     Relevant Medications     semaglutide (OZEMPIC) 0.25 mg or 0.5 mg/dose (2 mg/1.5 ml) subq pen     Other Relevant Orders     HEMOGLOBIN A1C WITH EAG    2. Hyperlipidemia     Relevant Orders     LIPID PANEL    3. Chronic obstructive pulmonary disease (HCC)     Acute Diagnoses Addressed Today       Tobacco abuse            Relevant Orders        CT LOW DOSE LUNG CANCER SCREENING    Screening for lung cancer            Relevant Orders        CT LOW DOSE LUNG CANCER SCREENING    Essential hypertension            Relevant Orders        METABOLIC PANEL, COMPREHENSIVE        CBC W/O DIFF          Discussed possible side affects, precautions, and drug interactions and possible benefits of the medication(s). Current Outpatient Medications   Medication Sig Dispense Refill    semaglutide (OZEMPIC) 0.25 mg or 0.5 mg/dose (2 mg/1.5 ml) subq pen 0.25 mg by SubCUTAneous route every seven (7) days.  1 Box 3    gabapentin (NEURONTIN) 600 mg tablet Take 1 Tablet by mouth three (3) times daily. 90 Tablet 0    LORazepam (ATIVAN) 0.5 mg tablet Take 1 Tablet by mouth daily as needed for Anxiety. 30 Tablet 0    benzonatate (TESSALON) 200 mg capsule Take 1 Capsule by mouth three (3) times daily as needed for Cough. 30 Capsule 0    umeclidinium-vilanteroL (ANORO ELLIPTA) 62.5-25 mcg/actuation inhaler Take 1 Puff by inhalation daily. 1 Each 5    DULoxetine (CYMBALTA) 60 mg capsule Take 1 capsule by mouth once daily 30 Capsule 5    varenicline (CHANTIX) 1 mg tablet Take 1 tablet by mouth twice daily 60 Tablet 0    predniSONE (DELTASONE) 20 mg tablet TAKE 2 TABLETS BY MOUTH ONCE DAILY WITH BREAKFAST FOR 5 DAYS 10 Tablet 0    diphenoxylate-atropine (LOMOTIL) 2.5-0.025 mg per tablet TAKE 1 TABLET BY MOUTH 4 TIMES DAILY AS NEEDED FOR DIARRHEA . DO NOT EXCEED 4 PER 24 HOURS 20 Tablet 0    cholecalciferol (VITAMIN D3) (1000 Units /25 mcg) tablet Take 1 Tablet by mouth daily. 90 Tablet 3    albuterol-ipratropium (DUO-NEB) 2.5 mg-0.5 mg/3 ml nebu 3 mL by Nebulization route every six (6) hours as needed for Wheezing, Shortness of Breath or Cough. Use with a flare up of COPD. Indications: bronchi muscle spasm resulting from COPD 50 Nebule 5    diclofenac (VOLTAREN) 1 % gel APPLY   TOPICALLY TO AFFECTED AREA 4 TIMES DAILY 100 g 5    ondansetron (ZOFRAN ODT) 4 mg disintegrating tablet Take 1 Tablet by mouth every eight (8) hours as needed for Nausea. 30 Tablet 0    cyclobenzaprine (FLEXERIL) 5 mg tablet Take 1 Tab by mouth nightly as needed for Muscle Spasm(s). Indications: muscle spasm 30 Tab 2    Nebulizer & Compressor machine 1 Each by Does Not Apply route every four (4) hours as needed for Wheezing or Shortness of Breath.  1 Each 0    albuterol (PROVENTIL HFA, VENTOLIN HFA, PROAIR HFA) 90 mcg/actuation inhaler INHALE ONE PUFF BY MOUTH EVERY 6 HOURS AS NEEDED FOR WHEEZING 1 Inhaler 2    calcium combo no.2-vitamin D3 600 mg calcium- 500 unit TbER Take 1 Each by mouth daily.  90 Tab 1       F/up 3 weeks

## 2022-07-28 NOTE — TELEPHONE ENCOUNTER
PA request has already been submitted - awaiting response from insurance Jodee [id number], [facility]  5/62/5749  3:39 PM

## 2022-07-28 NOTE — TELEPHONE ENCOUNTER
----- Message from Joyce Sarmiento sent at 7/28/2022 12:55 PM EDT -----  Subject: Message to Provider    QUESTIONS  Information for Provider? Patient states that she needs prior   authorization for a diabetes medication she was prescribed today at her   appt. She does not know the name of the medication. Patient states that   her blood sugar has been 400. Attempted to contact the practice for over   20 mins but no answer. Patient stated she could not leave the pharmacy   without her prescription. Patient disconnected.   ---------------------------------------------------------------------------  --------------  [id number] [employer]  [phone];  Do not leave any message, patient will call back for answer  ---------------------------------------------------------------------------  --------------  SCRIPT ANSWERS  undefined

## 2022-07-28 NOTE — PROGRESS NOTES
Chief Complaint   Patient presents with    Diabetes     High BS in the 300s     Patient is aware that this is a Virtual Visit or Phone Call Only doctor's visit. Patient has not been out of the country in (14 months), NO diarrhea, NO cough, NO chest conjestion, NO temp. Pt has not been around anyone with these symptoms. Health Maintenance reviewed. I have reviewed the patient's medical history in detail and updated the computerized patient record. Have you been to the ER, urgent care clinic since your last visit? No  Hospitalized since your last visit? No     2. Have you seen or consulted any other health care providers outside of the [address] [place] [address] since your last visit? No  Include any pap smears or colon screening. Encouraged pt to discuss pt's wishes with spouse/partner/family and bring them in the next appt to follow thru with the Advanced Directive      Fall Risk Assessment, last 12 mths 4/2/2021   Able to walk? Yes   Fall in past 12 months? 0   Do you feel unsteady? 1   Are you worried about falling 0   Number of falls in past 12 months -   Fall with injury?  -       3 most recent PHQ Screens 2/4/2022   Little interest or pleasure in doing things Several days   Feeling down, depressed, irritable, or hopeless Several days   Total Score PHQ 2 2   Trouble falling or staying asleep, or sleeping too much -   Feeling tired or having little energy -   Poor appetite, weight loss, or overeating -   Feeling bad about yourself - or that you are a failure or have let yourself or your family down -   Trouble concentrating on things such as school, work, reading, or watching TV -   Moving or speaking so slowly that other people could have noticed; or the opposite being so fidgety that others notice -   Thoughts of being better off dead, or hurting yourself in some way -   How difficult have these problems made it for you to do your work, take care of your home and get along with others - Abuse Screening Questionnaire 9/14/2021   Do you ever feel afraid of your partner? N   Are you in a relationship with someone who physically or mentally threatens you? N   Is it safe for you to go home?  Y       ADL Assessment 9/14/2021   Feeding yourself No Help Needed   Getting from bed to chair No Help Needed   Getting dressed No Help Needed   Bathing or showering No Help Needed   Walk across the room (includes cane/walker) No Help Needed   Using the telphone No Help Needed   Taking your medications No Help Needed   Preparing meals No Help Needed   Managing money (expenses/bills) No Help Needed   Moderately strenuous housework (laundry) No Help Needed   Shopping for personal items (toiletries/medicines) Help Needed   Shopping for groceries Help Needed   Driving Help Needed   Climbing a flight of stairs No Help Needed   Getting to places beyond walking distances Help Needed

## 2022-07-28 NOTE — PROGRESS NOTES
7/28/22 Plan called @ [phone] PA completed for Ozempic 2 mg/1.5 ml pen-injectors,with [place] rep Simeon GEORGE,quantity of 1.5/42 days. Ref:# [id number]. Outcome will be faxed to office within 24 hours. Please follow up as needed.

## 2022-08-01 NOTE — TELEPHONE ENCOUNTER
Received notice from [address] [facility],  [address] request for Boogie Aguilar has been denied - needs to have documented trial and failure of 2 preferred drugs first, which include:  Byetta, Trulicity, and Saul - Dr Hanna Teran notified - will need a different medication sent in to the [id number] [place], [place] [address], [facility]  4/5/1025  6:85 PM

## 2022-08-03 ENCOUNTER — TELEPHONE (OUTPATIENT)
Dept: INTERNAL MEDICINE CLINIC | Age: 62
End: 2022-08-03

## 2022-08-03 DIAGNOSIS — E11.65 TYPE 2 DIABETES MELLITUS WITH HYPERGLYCEMIA, WITHOUT LONG-TERM CURRENT USE OF INSULIN (HCC): Primary | ICD-10-CM

## 2022-08-03 RX ORDER — GLIMEPIRIDE 1 MG/1
1 TABLET ORAL
Qty: 30 TABLET | Refills: 5 | Status: SHIPPED | OUTPATIENT
Start: 2022-08-03 | End: 2022-08-12

## 2022-08-09 ENCOUNTER — VIRTUAL VISIT (OUTPATIENT)
Dept: INTERNAL MEDICINE CLINIC | Age: 62
End: 2022-08-09
Payer: MEDICAID

## 2022-08-09 DIAGNOSIS — E11.65 TYPE 2 DIABETES MELLITUS WITH HYPERGLYCEMIA, WITHOUT LONG-TERM CURRENT USE OF INSULIN (HCC): Primary | ICD-10-CM

## 2022-08-09 DIAGNOSIS — I10 ESSENTIAL HYPERTENSION: ICD-10-CM

## 2022-08-09 DIAGNOSIS — F33.0 DEPRESSION, MAJOR, RECURRENT, MILD (HCC): ICD-10-CM

## 2022-08-09 DIAGNOSIS — E78.2 MIXED HYPERLIPIDEMIA: ICD-10-CM

## 2022-08-09 DIAGNOSIS — J44.9 CHRONIC OBSTRUCTIVE PULMONARY DISEASE, UNSPECIFIED COPD TYPE (HCC): ICD-10-CM

## 2022-08-09 DIAGNOSIS — F41.8 SITUATIONAL ANXIETY: ICD-10-CM

## 2022-08-09 DIAGNOSIS — M48.00 SPINAL STENOSIS, UNSPECIFIED SPINAL REGION: ICD-10-CM

## 2022-08-09 PROCEDURE — 99213 OFFICE O/P EST LOW 20 MIN: CPT | Performed by: FAMILY MEDICINE

## 2022-08-09 RX ORDER — GABAPENTIN 600 MG/1
600 TABLET ORAL 3 TIMES DAILY
Qty: 90 TABLET | Refills: 0 | Status: SHIPPED | OUTPATIENT
Start: 2022-08-09 | End: 2022-09-13 | Stop reason: SDUPTHER

## 2022-08-09 RX ORDER — LORAZEPAM 0.5 MG/1
0.5 TABLET ORAL
Qty: 30 TABLET | Refills: 0 | Status: SHIPPED | OUTPATIENT
Start: 2022-08-09 | End: 2022-09-13 | Stop reason: ALTCHOICE

## 2022-08-09 RX ORDER — GLIMEPIRIDE 2 MG/1
2 TABLET ORAL
Qty: 30 TABLET | Refills: 5 | Status: SHIPPED | OUTPATIENT
Start: 2022-08-09

## 2022-08-09 NOTE — PROGRESS NOTES
Subjective:     Radha Allen is a 62 y.o. female seen for follow-up of diabetes. She has had hypoglycemic attacks. .no  Blood sugar control has been ok    Lab Results   Component Value Date/Time    Hemoglobin A1c 7.0 (H) 04/09/2021 12:00 AM    Glucose 105 (H) 04/09/2021 12:00 AM    Microalb/Creat ratio (ug/mg creat.) 6 04/14/2021 12:00 AM    LDL, calculated 113 (H) 04/09/2021 12:00 AM    Creatinine 0.69 04/09/2021 12:00 AM       She has diabetes and hypertension. Radha Allen has the additional concern of refills of anxiety and pain medications. Still waiting for Ozempic but sugars have been better on Amaryl. Reports taking blood pressure medications without side affects. No complaints of exertional chest pain, excessive shortness of breath or focal weakness. Minimal swelling in lower legs or dizziness with standing. Diet and Lifestyle: follows a diabetic diet regularly.     Patient Active Problem List    Diagnosis Date Noted    Hepatitis C virus carrier state ([facility] [address] 04/26/2021    Hyperglycemia due to type 2 diabetes mellitus ([facility] [address] 04/14/2021    Anxiety state 07/21/2020    Chronic obstructive pulmonary disease ([facility] [address] 07/21/2020    Hyperlipidemia 07/21/2020    Neck pain 07/21/2020    Osteoarthritis of knee 07/21/2020    Tobacco dependence syndrome 07/21/2020    Adenomatous colon polyp 05/16/2020    Narcotic dependence ([facility] [address] 04/07/2019    Encounter for diagnostic colonoscopy due to change in bowel habits 03/04/2019    Elevated liver enzymes 02/19/2019    Depression, major, recurrent, mild ([address] 12/21/2017    Restless legs 12/21/2017    Spinal stenosis 08/14/2017     Allergies   Allergen Reactions    Zolpidem Unknown (comments)     Past Medical History:   Diagnosis Date    Arthritis     Asthma     COPD (chronic obstructive pulmonary disease) (HCC)     Depression     Hemorrhoids     Occult blood positive stool     Spinal stenosis      Social History     Tobacco Use    Smoking status: Every Day     Packs/day: 0.50     Years: 45.00     Pack years: 22.50     Types: Cigarettes     Start date: 1975     Last attempt to quit: 2022     Years since quittin.5    Smokeless tobacco: Never   Substance Use Topics    Alcohol use: Yes     Alcohol/week: 19.0 standard drinks     Types: 14 Glasses of wine, 5 Cans of beer per week             Review of Systems  Pertinent items are noted in HPI. Objective:     Significant for the following: WNL  Visit Vitals  LMP  (LMP Unknown)     WD WN female NAD      Lab review: labs reviewed, I note that glycosylated hemoglobin therapeutic, stable. Assessment/Plan:     Follow-up diabetes needs further observation, needs improvement, needs to quit smoking. Diabetic issues reviewed with her: all medications, side effects and compliance discussed carefully and glycohemoglobin and other lab monitoring discussed. Chronic Conditions Addressed Today       1. Hyperglycemia due to type 2 diabetes mellitus (HCC) - Primary     Relevant Medications     gabapentin (NEURONTIN) 600 mg tablet     glimepiride (AMARYL) 2 mg tablet     semaglutide (OZEMPIC) 0.25 mg or 0.5 mg/dose (2 mg/1.5 ml) subq pen     Other Relevant Orders     HEMOGLOBIN A1C WITH EAG    2. Hyperlipidemia    3. Chronic obstructive pulmonary disease ([facility] [address]    4. Depression, major, recurrent, mild (HCC)     Relevant Medications     gabapentin (NEURONTIN) 600 mg tablet     LORazepam (ATIVAN) 0.5 mg tablet    5. Spinal stenosis     Relevant Medications     gabapentin (NEURONTIN) 600 mg tablet     Acute Diagnoses Addressed Today       Essential hypertension            Relevant Orders        METABOLIC PANEL, COMPREHENSIVE    Situational anxiety            Relevant Medications        LORazepam (ATIVAN) 0.5 mg tablet              Abebe Wing is a 62 y.o. female who was phone evaluated on 2022.       Consent:  She and/or her healthcare decision maker is aware that this patient-initiated Telehealth encounter is a billable service, with coverage as determined by her insurance carrier. She is aware that she may receive a bill and has provided verbal consent to proceed: Yes    I was in the office while conducting this encounter. Assessment & Plan:   Diagnoses and all orders for this visit:    1. Type 2 diabetes mellitus with hyperglycemia, without long-term current use of insulin (HCC)  -     glimepiride (AMARYL) 2 mg tablet; Take 1 Tablet by mouth Daily (before breakfast). -     semaglutide (OZEMPIC) 0.25 mg or 0.5 mg/dose (2 mg/1.5 ml) subq pen; 0.25 mg by SubCUTAneous route every seven (7) days.  -     HEMOGLOBIN A1C WITH EAG; Future    2. Chronic obstructive pulmonary disease, unspecified COPD type ([facility] [address]    3. Essential hypertension  -     METABOLIC PANEL, COMPREHENSIVE; Future    4. Depression, major, recurrent, mild ([facility] [address]    5. Mixed hyperlipidemia    6. Spinal stenosis, unspecified spinal region  -     gabapentin (NEURONTIN) 600 mg tablet; Take 1 Tablet by mouth three (3) times daily. 7. Situational anxiety  -     LORazepam (ATIVAN) 0.5 mg tablet; Take 1 Tablet by mouth daily as needed for Anxiety. 712  Subjective:      20 minutes      We discussed the expected course, resolution and complications of the diagnosis(es) in detail. Medication risks, benefits, costs, interactions, and alternatives were discussed as indicated. I advised her to contact the office if her condition worsens, changes or fails to improve as anticipated. She expressed understanding with the diagnosis(es) and plan. Pursuant to the emergency declaration under the [facility] waiver authority and the [employer] Act, this Virtual  Visit was conducted, with patient's consent, to reduce the patient's risk of exposure to COVID-19 and provide continuity of care for an established patient.      Services were provided through a video synchronous discussion virtually to substitute for in-person clinic visit.     Luh Jaime MD

## 2022-08-16 ENCOUNTER — DOCUMENTATION ONLY (OUTPATIENT)
Dept: INTERNAL MEDICINE CLINIC | Age: 62
End: 2022-08-16

## 2022-08-16 NOTE — PROGRESS NOTES
8/16/22 Key: [id number] for Ozempic re submitted to [facility]. If no response within 24 hours,may call [phone]. Please follow up as needed when decision is received via fax.

## 2022-08-20 DIAGNOSIS — E11.65 TYPE 2 DIABETES MELLITUS WITH HYPERGLYCEMIA, WITHOUT LONG-TERM CURRENT USE OF INSULIN (HCC): Primary | ICD-10-CM

## 2022-08-26 DIAGNOSIS — J44.1 CHRONIC OBSTRUCTIVE PULMONARY DISEASE WITH ACUTE EXACERBATION (HCC): ICD-10-CM

## 2022-08-26 NOTE — TELEPHONE ENCOUNTER
----- Message from Estela Pettit sent at 8/22/2022  2:15 PM EDT -----  Subject: Refill Request    QUESTIONS  Name of Medication? predniSONE (DELTASONE) 20 mg tablet  Patient-reported dosage and instructions? TAKE 2 TABLETS BY MOUTH ONCE   DAILY WITH BREAKFAST FOR 5 DAYS  How many days do you have left? 1  Preferred Pharmacy? [address]  Pharmacy phone number (if available)? [phone]  Additional Information for Provider? Patient will also need the needles   for her Victoza pen.  ---------------------------------------------------------------------------  --------------  CALL BACK INFO  What is the best way for the office to contact you? OK to leave message on   voicemail  Preferred Call Back Phone Number? [phone]  ---------------------------------------------------------------------------  --------------  SCRIPT ANSWERS  Relationship to Patient?  Self

## 2022-08-27 RX ORDER — PREDNISONE 20 MG/1
40 TABLET ORAL DAILY
Qty: 10 TABLET | Refills: 0 | Status: SHIPPED | OUTPATIENT
Start: 2022-08-27 | End: 2022-09-09

## 2022-09-08 ENCOUNTER — TELEPHONE (OUTPATIENT)
Dept: INTERNAL MEDICINE CLINIC | Age: 62
End: 2022-09-08

## 2022-09-09 DIAGNOSIS — J44.1 CHRONIC OBSTRUCTIVE PULMONARY DISEASE WITH ACUTE EXACERBATION (HCC): ICD-10-CM

## 2022-09-09 RX ORDER — PREDNISONE 20 MG/1
TABLET ORAL
Qty: 10 TABLET | Refills: 0 | Status: SHIPPED | OUTPATIENT
Start: 2022-09-09

## 2022-09-10 ENCOUNTER — HOSPITAL ENCOUNTER (EMERGENCY)
Age: 62
Discharge: HOME | End: 2022-09-10
Attending: EMERGENCY MEDICINE
Payer: MEDICAID

## 2022-09-10 ENCOUNTER — APPOINTMENT (OUTPATIENT)
Dept: GENERAL RADIOLOGY | Age: 62
End: 2022-09-10
Attending: EMERGENCY MEDICINE
Payer: MEDICAID

## 2022-09-10 VITALS
SYSTOLIC BLOOD PRESSURE: 169 MMHG | TEMPERATURE: 98.9 F | BODY MASS INDEX: 28.25 KG/M2 | DIASTOLIC BLOOD PRESSURE: 73 MMHG | WEIGHT: 180 LBS | HEIGHT: 67 IN | RESPIRATION RATE: 16 BRPM | HEART RATE: 70 BPM | OXYGEN SATURATION: 97 %

## 2022-09-10 DIAGNOSIS — S83.92XA SPRAIN OF LEFT KNEE, UNSPECIFIED LIGAMENT, INITIAL ENCOUNTER: ICD-10-CM

## 2022-09-10 DIAGNOSIS — F41.8 SITUATIONAL ANXIETY: ICD-10-CM

## 2022-09-10 DIAGNOSIS — S16.1XXA STRAIN OF NECK MUSCLE, INITIAL ENCOUNTER: Primary | ICD-10-CM

## 2022-09-10 PROCEDURE — 96372 THER/PROPH/DIAG INJ SC/IM: CPT

## 2022-09-10 PROCEDURE — 72050 X-RAY EXAM NECK SPINE 4/5VWS: CPT

## 2022-09-10 PROCEDURE — 73562 X-RAY EXAM OF KNEE 3: CPT

## 2022-09-10 PROCEDURE — 74011250637 HC RX REV CODE- 250/637: Performed by: EMERGENCY MEDICINE

## 2022-09-10 PROCEDURE — 99284 EMERGENCY DEPT VISIT MOD MDM: CPT

## 2022-09-10 PROCEDURE — 74011250636 HC RX REV CODE- 250/636: Performed by: EMERGENCY MEDICINE

## 2022-09-10 RX ORDER — NAPROXEN 500 MG/1
500 TABLET ORAL
Qty: 20 TABLET | Refills: 0 | Status: SHIPPED | OUTPATIENT
Start: 2022-09-10

## 2022-09-10 RX ORDER — KETOROLAC TROMETHAMINE 30 MG/ML
30 INJECTION, SOLUTION INTRAMUSCULAR; INTRAVENOUS
Status: COMPLETED | OUTPATIENT
Start: 2022-09-10 | End: 2022-09-10

## 2022-09-10 RX ORDER — HYDROCODONE BITARTRATE AND ACETAMINOPHEN 5; 325 MG/1; MG/1
1 TABLET ORAL
Qty: 12 TABLET | Refills: 0 | Status: SHIPPED | OUTPATIENT
Start: 2022-09-10 | End: 2022-09-13 | Stop reason: SDUPTHER

## 2022-09-10 RX ORDER — DEXAMETHASONE SODIUM PHOSPHATE 10 MG/ML
10 INJECTION INTRAMUSCULAR; INTRAVENOUS
Status: COMPLETED | OUTPATIENT
Start: 2022-09-10 | End: 2022-09-10

## 2022-09-10 RX ORDER — HYDROCODONE BITARTRATE AND ACETAMINOPHEN 5; 325 MG/1; MG/1
2 TABLET ORAL
Status: COMPLETED | OUTPATIENT
Start: 2022-09-10 | End: 2022-09-10

## 2022-09-10 RX ORDER — METHYLPREDNISOLONE 4 MG/1
TABLET ORAL
Qty: 1 DOSE PACK | Refills: 0 | Status: SHIPPED | OUTPATIENT
Start: 2022-09-10

## 2022-09-10 RX ADMIN — HYDROCODONE BITARTRATE AND ACETAMINOPHEN 2 TABLET: 5; 325 TABLET ORAL at 14:45

## 2022-09-10 RX ADMIN — KETOROLAC TROMETHAMINE 30 MG: 30 INJECTION, SOLUTION INTRAMUSCULAR at 14:47

## 2022-09-10 RX ADMIN — DEXAMETHASONE SODIUM PHOSPHATE 10 MG: 10 INJECTION, SOLUTION INTRAMUSCULAR; INTRAVENOUS at 14:47

## 2022-09-10 NOTE — ED PROVIDER NOTES
EMERGENCY DEPARTMENT HISTORY AND PHYSICAL EXAM      Date: 9/10/2022  Patient Name: Tobi Napoles    History of Presenting Illness     Chief Complaint   Patient presents with    Fall     Trip and fall 2 days prior with left knee swelling and left shoulder pain radiating from left neck       History Provided By: Patient    HPI: Tobi Napoles, 62 y.o. female with PMHx significant for COPD, spinal stenosis, presents ambulatory to the ED with cc of neck pain and left knee pain. Patient reports she is currently moving out of her house and 2 days ago she was moving a dresser down the stairs carrying it in front of her. She slipped and fell backwards hitting her neck and back on the stairs and she thinks she twisted her knee. She reports increasing pain over the past 2 days. Has been using gabapentin and Voltaren gel with moderate relief. Has been able to bear weight on the left lower extremity and ambulate although painful. No loss of consciousness. Pain in her left neck radiates to her left shoulder trapezius area. PMHx: Significant for spinal stenosis, COPD  PSHx: Significant for cervical fusion  Social Hx: Half pack a day smoker for 45 years. Has about 2 glasses of wine daily. Denies any illicit drug use. There are no other complaints, changes, or physical findings at this time. PCP: Carlos Silva MD    No current facility-administered medications on file prior to encounter. Current Outpatient Medications on File Prior to Encounter   Medication Sig Dispense Refill    predniSONE (DELTASONE) 20 mg tablet Take 2 tablets by mouth once daily for 5 days 10 Tablet 0    liraglutide (VICTOZA) 0.6 mg/0.1 mL (18 mg/3 mL) pnij 0.6 mg by SubCUTAneous route daily. 0.6 mg 3    diclofenac (VOLTAREN) 1 % gel APPLY   TOPICALLY TO AFFECTED AREA 4 TIMES DAILY 100 g 5    gabapentin (NEURONTIN) 600 mg tablet Take 1 Tablet by mouth three (3) times daily.  90 Tablet 0    LORazepam (ATIVAN) 0.5 mg tablet Take 1 Tablet by mouth daily as needed for Anxiety. 30 Tablet 0    glimepiride (AMARYL) 2 mg tablet Take 1 Tablet by mouth Daily (before breakfast). 30 Tablet 5    diphenoxylate-atropine (LOMOTIL) 2.5-0.025 mg per tablet TAKE 1 TABLET BY MOUTH 4 TIMES DAILY AS NEEDED FOR DIARRHEA . DO NOT EXCEED 4 PER 24 HOURS 20 Tablet 0    benzonatate (TESSALON) 200 mg capsule Take 1 Capsule by mouth three (3) times daily as needed for Cough. 30 Capsule 0    umeclidinium-vilanteroL (ANORO ELLIPTA) 62.5-25 mcg/actuation inhaler Take 1 Puff by inhalation daily. 1 Each 5    DULoxetine (CYMBALTA) 60 mg capsule Take 1 capsule by mouth once daily 30 Capsule 5    varenicline (CHANTIX) 1 mg tablet Take 1 tablet by mouth twice daily 60 Tablet 0    cholecalciferol (VITAMIN D3) (1000 Units /25 mcg) tablet Take 1 Tablet by mouth daily. 90 Tablet 3    albuterol-ipratropium (DUO-NEB) 2.5 mg-0.5 mg/3 ml nebu 3 mL by Nebulization route every six (6) hours as needed for Wheezing, Shortness of Breath or Cough. Use with a flare up of COPD. Indications: bronchi muscle spasm resulting from COPD 50 Nebule 5    ondansetron (ZOFRAN ODT) 4 mg disintegrating tablet Take 1 Tablet by mouth every eight (8) hours as needed for Nausea. 30 Tablet 0    cyclobenzaprine (FLEXERIL) 5 mg tablet Take 1 Tab by mouth nightly as needed for Muscle Spasm(s). Indications: muscle spasm 30 Tab 2    Nebulizer & Compressor machine 1 Each by Does Not Apply route every four (4) hours as needed for Wheezing or Shortness of Breath. 1 Each 0    albuterol (PROVENTIL HFA, VENTOLIN HFA, PROAIR HFA) 90 mcg/actuation inhaler INHALE ONE PUFF BY MOUTH EVERY 6 HOURS AS NEEDED FOR WHEEZING 1 Inhaler 2    calcium combo no.2-vitamin D3 600 mg calcium- 500 unit TbER Take 1 Each by mouth daily.  80 Tab 1       Past History     Past Medical History:  Past Medical History:   Diagnosis Date    Arthritis     Asthma     COPD (chronic obstructive pulmonary disease) (HCC)     Depression     Hemorrhoids Occult blood positive stool     Spinal stenosis        Past Surgical History:  Past Surgical History:   Procedure Laterality Date    COLONOSCOPY N/A 2019    COLONOSCOPY performed by Madan Loja MD at [place] [address]    HX [address] [facility]  2016    laminectomy    HX CERVICAL FUSION      HX COLONOSCOPY  2019    4 polyps- 2 tubular adenoma 2 hyperplastic polyp    HX HYSTERECTOMY      NM ABDOMEN SURGERY PROC UNLISTED      hystrectomy       Family History:  Family History   Problem Relation Age of Onset    Cancer Mother         colon    Cancer Father 79        lung    Diabetes Sister     Cancer Sister     Breast Cancer Sister         age 39    Cancer Brother 27        lymphoma       Social History:  Social History     Tobacco Use    Smoking status: Every Day     Packs/day: 0.50     Years: 45.00     Pack years: 22.50     Types: Cigarettes     Start date: 1975     Last attempt to quit: 2022     Years since quittin.6    Smokeless tobacco: Never   Substance Use Topics    Alcohol use: Yes     Alcohol/week: 19.0 standard drinks     Types: 14 Glasses of wine, 5 Cans of beer per week    Drug use: Not Currently     Types: Cocaine       Allergies: Allergies   Allergen Reactions    Zolpidem Unknown (comments)         Review of Systems   Review of Systems   Constitutional:  Negative for activity change, chills and fever. HENT:  Negative for congestion and sore throat. Eyes:  Negative for pain and redness. Respiratory:  Negative for cough, chest tightness and shortness of breath. Cardiovascular:  Negative for chest pain and palpitations. Gastrointestinal:  Negative for abdominal pain, diarrhea, nausea and vomiting. Genitourinary:  Negative for dysuria, frequency and urgency. Musculoskeletal:  Positive for joint swelling and neck pain. Negative for back pain. Skin:  Negative for rash. Neurological:  Negative for syncope, light-headedness and headaches.    Psychiatric/Behavioral: Negative for confusion. All other systems reviewed and are negative. Physical Exam   Physical Exam  Vitals and nursing note reviewed. Constitutional:       General: She is not in acute distress. Appearance: She is well-developed. She is not diaphoretic. HENT:      Head: Normocephalic. Nose: Nose normal.      Mouth/Throat:      Pharynx: No oropharyngeal exudate. Eyes:      General: No scleral icterus. Conjunctiva/sclera: Conjunctivae normal.      Pupils: Pupils are equal, round, and reactive to light. Neck:      Thyroid: No thyromegaly. Vascular: No JVD. Trachea: No tracheal deviation. Comments: Mild tenderness of bilateral lower C-spine paraspinal musculature. No midline vertebral point tenderness. No step-offs. Skin intact. Cardiovascular:      Rate and Rhythm: Normal rate and regular rhythm. Heart sounds: No murmur heard. No friction rub. No gallop. Pulmonary:      Effort: Pulmonary effort is normal. No respiratory distress. Breath sounds: Normal breath sounds. No stridor. No wheezing or rales. Abdominal:      General: Bowel sounds are normal. There is no distension. Palpations: Abdomen is soft. Tenderness: There is no abdominal tenderness. There is no guarding or rebound. Musculoskeletal:         General: Normal range of motion. Cervical back: Normal range of motion and neck supple. Comments: L  knee: No rash; mild prepatellar soft tissue swelling.;  Mildly increased warmth; full arom without difficulty; stable to varus and valgus stress; negative anterior drawer and lachman; 2+ dp and pt pulses; brisk cr; skin intact; toes up/down;       Lymphadenopathy:      Cervical: No cervical adenopathy. Skin:     General: Skin is warm and dry. Findings: No erythema or rash. Neurological:      Mental Status: She is alert and oriented to person, place, and time. Cranial Nerves: No cranial nerve deficit.       Motor: No abnormal muscle tone. Coordination: Coordination normal.   Psychiatric:         Behavior: Behavior normal.           Diagnostic Study Results     Labs -   No results found for this or any previous visit (from the past 12 hour(s)). Radiologic Studies -   XR SPINE CERV 4 OR 5 V   Final Result   No acute fracture. Intact anterior cervical fusion at C5-C6. XR KNEE LT 3 V   Final Result   No acute abnormality. Severe tricompartmental DJD, preferentially   involving the medial compartment. CT Results  (Last 48 hours)      None          CXR Results  (Last 48 hours)      None              Medical Decision Making   I am the first provider for this patient. I reviewed the vital signs, available nursing notes, past medical history, past surgical history, family history and social history. Vital Signs-Reviewed the patient's vital signs. No data found. Records Reviewed: Nursing notes reviewed    Provider Notes (Medical Decision Making):   DDX: Knee sprain, cervical strain, cervical fracture. ED Course:   Initial assessment performed. The patients presenting problems have been discussed, and they are in agreement with the care plan formulated and outlined with them. I have encouraged them to ask questions as they arise throughout their visit. PROGRESS NOTE    Pt reevaluated. Plain films without acute findings. Cervical fusion hardware intact. No neurodeficits. Reassured patient. Will discharge with Medrol Dosepak, naproxen and short course of Norco.  Written by Zechariah Espinoza MD     Progress note:    Pt noted to be feeling better and ready for discharge. Updated pt and/or family on all final lab and/or  imaging findings. Will follow up as instructed. All questions have been answered, pt voiced understanding and agreement with plan. Specific return precautions provided as well as instructions to return to the ED should sx worsen at any time. Vital signs stable for discharge. I have also put together some discharge instructions for them that include: 1) educational information regarding their diagnosis, 2) how to care for their diagnosis at home, as well a 3) list of reasons why they would want to return to the ED prior to their follow-up appointment, should their condition change. Written by Toni Bhardwaj MD        Critical Care Time:   0    Disposition:  Discharge    PLAN:  1. Discharge Medication List as of 9/10/2022  3:01 PM        START taking these medications    Details   naproxen (NAPROSYN) 500 mg tablet Take 1 Tablet by mouth every twelve (12) hours as needed for Pain., Normal, Disp-20 Tablet, R-0      methylPREDNISolone (Medrol, Magdy,) 4 mg tablet Take as directed, Normal, Disp-1 Dose Pack, R-0      HYDROcodone-acetaminophen (Norco) 5-325 mg per tablet Take 1 Tablet by mouth every four (4) hours as needed for Pain for up to 3 days. Max Daily Amount: 6 Tablets., Normal, Disp-12 Tablet, R-0           CONTINUE these medications which have NOT CHANGED    Details   predniSONE (DELTASONE) 20 mg tablet Take 2 tablets by mouth once daily for 5 days, Normal, Disp-10 Tablet, R-0      liraglutide (VICTOZA) 0.6 mg/0.1 mL (18 mg/3 mL) pnij 0.6 mg by SubCUTAneous route daily. , Normal, Disp-0.6 mg, R-3      diclofenac (VOLTAREN) 1 % gel APPLY   TOPICALLY TO AFFECTED AREA 4 TIMES DAILY, Normal, Disp-100 g, R-5      gabapentin (NEURONTIN) 600 mg tablet Take 1 Tablet by mouth three (3) times daily. , Normal, Disp-90 Tablet, R-0      LORazepam (ATIVAN) 0.5 mg tablet Take 1 Tablet by mouth daily as needed for Anxiety., Normal, Disp-30 Tablet, R-0      glimepiride (AMARYL) 2 mg tablet Take 1 Tablet by mouth Daily (before breakfast). , Normal, Disp-30 Tablet, R-5      diphenoxylate-atropine (LOMOTIL) 2.5-0.025 mg per tablet TAKE 1 TABLET BY MOUTH 4 TIMES DAILY AS NEEDED FOR DIARRHEA .  DO NOT EXCEED 4 PER 24 HOURS, Normal, Disp-20 Tablet, R-0      benzonatate (TESSALON) 200 mg capsule Take 1 Capsule by mouth three (3) times daily as needed for Cough., Normal, Disp-30 Capsule, R-0      umeclidinium-vilanteroL (ANORO ELLIPTA) 62.5-25 mcg/actuation inhaler Take 1 Puff by inhalation daily. , Normal, Disp-1 Each, R-5      DULoxetine (CYMBALTA) 60 mg capsule Take 1 capsule by mouth once daily, Normal, Disp-30 Capsule, R-5      varenicline (CHANTIX) 1 mg tablet Take 1 tablet by mouth twice daily, Normal, Disp-60 Tablet, R-0      cholecalciferol (VITAMIN D3) (1000 Units /25 mcg) tablet Take 1 Tablet by mouth daily. , Normal, Disp-90 Tablet, R-3      albuterol-ipratropium (DUO-NEB) 2.5 mg-0.5 mg/3 ml nebu 3 mL by Nebulization route every six (6) hours as needed for Wheezing, Shortness of Breath or Cough. Use with a flare up of COPD. Indications: bronchi muscle spasm resulting from COPD, Normal, Disp-50 Nebule, R-5      ondansetron (ZOFRAN ODT) 4 mg disintegrating tablet Take 1 Tablet by mouth every eight (8) hours as needed for Nausea., Normal, Disp-30 Tablet, R-0      cyclobenzaprine (FLEXERIL) 5 mg tablet Take 1 Tab by mouth nightly as needed for Muscle Spasm(s). Indications: muscle spasm, Normal, Disp-30 Tab, R-2      Nebulizer & Compressor machine 1 Each by Does Not Apply route every four (4) hours as needed for Wheezing or Shortness of Breath., Normal, Disp-1 Each,R-0Pt needs it. Has been very ill. albuterol (PROVENTIL HFA, VENTOLIN HFA, PROAIR HFA) 90 mcg/actuation inhaler INHALE ONE PUFF BY MOUTH EVERY 6 HOURS AS NEEDED FOR WHEEZING, Normal, Disp-1 Inhaler,R-2Please consider 90 day supplies to promote better adherence      calcium combo no.2-vitamin D3 600 mg calcium- 500 unit TbER Take 1 Each by mouth daily. , Normal, Disp-90 Tab, R-1           2.    Follow-up Information       Follow up With Specialties Details Why Contact Info    Shey García MD Family Medicine Schedule an appointment as soon as possible for a visit in 1 week  [address]  [address] [phone]      [facility] EMERGENCY DEP Emergency Medicine Go in 1 day If symptoms worsen [place]  [phone]          Return to ED if worse     Diagnosis     Clinical Impression:   1. Strain of neck muscle, initial encounter    2. Sprain of left knee, unspecified ligament, initial encounter              Please note that this dictation was completed with [employer], the computer voice recognition software. Quite often unanticipated grammatical, syntax, homophones, and other interpretive errors are inadvertently transcribed by the computer software. Please disregard these errors. Please excuse any errors that have escaped final proofreading.

## 2022-09-12 RX ORDER — LORAZEPAM 0.5 MG/1
TABLET ORAL
Qty: 30 TABLET | Refills: 0 | OUTPATIENT
Start: 2022-09-12

## 2022-09-13 ENCOUNTER — OFFICE VISIT (OUTPATIENT)
Dept: INTERNAL MEDICINE CLINIC | Age: 62
End: 2022-09-13
Payer: MEDICAID

## 2022-09-13 VITALS
DIASTOLIC BLOOD PRESSURE: 94 MMHG | BODY MASS INDEX: 26.37 KG/M2 | HEART RATE: 77 BPM | WEIGHT: 168 LBS | TEMPERATURE: 97.6 F | SYSTOLIC BLOOD PRESSURE: 160 MMHG | OXYGEN SATURATION: 96 % | HEIGHT: 67 IN | RESPIRATION RATE: 18 BRPM

## 2022-09-13 DIAGNOSIS — Z23 ENCOUNTER FOR IMMUNIZATION: ICD-10-CM

## 2022-09-13 DIAGNOSIS — S16.1XXA STRAIN OF NECK MUSCLE, INITIAL ENCOUNTER: ICD-10-CM

## 2022-09-13 DIAGNOSIS — E11.65 TYPE 2 DIABETES MELLITUS WITH HYPERGLYCEMIA, WITHOUT LONG-TERM CURRENT USE OF INSULIN (HCC): ICD-10-CM

## 2022-09-13 DIAGNOSIS — S89.92XA KNEE INJURY, LEFT, INITIAL ENCOUNTER: Primary | ICD-10-CM

## 2022-09-13 DIAGNOSIS — S83.92XA SPRAIN OF LEFT KNEE, UNSPECIFIED LIGAMENT, INITIAL ENCOUNTER: ICD-10-CM

## 2022-09-13 DIAGNOSIS — M48.00 SPINAL STENOSIS, UNSPECIFIED SPINAL REGION: ICD-10-CM

## 2022-09-13 DIAGNOSIS — F17.200 TOBACCO DEPENDENCE SYNDROME: ICD-10-CM

## 2022-09-13 DIAGNOSIS — J44.9 CHRONIC OBSTRUCTIVE PULMONARY DISEASE, UNSPECIFIED COPD TYPE (HCC): ICD-10-CM

## 2022-09-13 DIAGNOSIS — E78.5 HYPERLIPIDEMIA, UNSPECIFIED HYPERLIPIDEMIA TYPE: ICD-10-CM

## 2022-09-13 PROCEDURE — 99214 OFFICE O/P EST MOD 30 MIN: CPT | Performed by: FAMILY MEDICINE

## 2022-09-13 RX ORDER — PEN NEEDLE, DIABETIC 30 GX3/16"
NEEDLE, DISPOSABLE MISCELLANEOUS
Qty: 1 EACH | Refills: 11 | Status: SHIPPED | OUTPATIENT
Start: 2022-09-13

## 2022-09-13 RX ORDER — GABAPENTIN 600 MG/1
600 TABLET ORAL 3 TIMES DAILY
Qty: 90 TABLET | Refills: 0 | Status: SHIPPED | OUTPATIENT
Start: 2022-09-13 | End: 2022-10-18

## 2022-09-13 RX ORDER — HYDROCODONE BITARTRATE AND ACETAMINOPHEN 5; 325 MG/1; MG/1
1 TABLET ORAL
Qty: 12 TABLET | Refills: 0 | Status: SHIPPED | OUTPATIENT
Start: 2022-09-13 | End: 2022-09-19 | Stop reason: ALTCHOICE

## 2022-09-13 RX ORDER — ALBUTEROL SULFATE 90 UG/1
AEROSOL, METERED RESPIRATORY (INHALATION)
Qty: 1 EACH | Refills: 2 | Status: SHIPPED | OUTPATIENT
Start: 2022-09-13

## 2022-09-13 NOTE — PROGRESS NOTES
Chief Complaint   Patient presents with    Knee Injury     Ky Bran last week - ER FU     Patient has not been out of the country in (14 months), NO diarrhea, NO cough, NO chest conjestion, NO temp. Pt has not been around anyone with these symptoms. Health Maintenance reviewed. I have reviewed the patient's medical history in detail and updated the computerized patient record. 1. Have you been to the ER, urgent care clinic since your last visit? No   Hospitalized since your last visit?  no    2. Have you seen or consulted any other health care providers outside of the [address] [place] [address] since your last visit? No  Include any pap smears or colon screening. Encouraged pt to discuss pt's wishes with spouse/partner/family and bring them in the next appt to follow thru with the Advanced Directive    @  [address], last 12 mths 4/2/2021   Able to walk? Yes   Fall in past 12 months? 0   Do you feel unsteady? 1   Are you worried about falling 0   Number of falls in past 12 months -   Fall with injury?  -       3 most recent PHQ Screens 2/4/2022   Little interest or pleasure in doing things Several days   Feeling down, depressed, irritable, or hopeless Several days   Total Score PHQ 2 2   Trouble falling or staying asleep, or sleeping too much -   Feeling tired or having little energy -   Poor appetite, weight loss, or overeating -   Feeling bad about yourself - or that you are a failure or have let yourself or your family down -   Trouble concentrating on things such as school, work, reading, or watching TV -   Moving or speaking so slowly that other people could have noticed; or the opposite being so fidgety that others notice -   Thoughts of being better off dead, or hurting yourself in some way -   How difficult have these problems made it for you to do your work, take care of your home and get along with others -       Abuse Screening Questionnaire 9/14/2021   Do you ever feel afraid of your partner? N   Are you in a relationship with someone who physically or mentally threatens you? N   Is it safe for you to go home?  Y       ADL Assessment 9/14/2021   Feeding yourself No Help Needed   Getting from bed to chair No Help Needed   Getting dressed No Help Needed   Bathing or showering No Help Needed   Walk across the room (includes cane/walker) No Help Needed   Using the telphone No Help Needed   Taking your medications No Help Needed   Preparing meals No Help Needed   Managing money (expenses/bills) No Help Needed   Moderately strenuous housework (laundry) No Help Needed   Shopping for personal items (toiletries/medicines) Help Needed   Shopping for groceries Help Needed   Driving Help Needed   Climbing a flight of stairs No Help Needed   Getting to places beyond walking distances Help Needed

## 2022-09-13 NOTE — PROGRESS NOTES
HISTORY OF PRESENT ILLNESS  Gonzalo Snyder is a 62 y.o. female. Knee Injury   The history is provided by the Patient. This is a new problem. The current episode started more than 1 week ago. The problem occurs hourly. The problem has not changed since onset. The pain is present in the right knee. The quality of the pain is described as sharp. The pain is severe. Associated symptoms include tingling and back pain. The symptoms are aggravated by activity and movement. Treatments tried: ER put her on steroids HC. The treatment provided mild relief. Pain since her fall re ER records  Review of Systems   Constitutional:  Negative for fever. Respiratory:  Negative for shortness of breath and wheezing. Musculoskeletal:  Positive for back pain, falls and joint pain. Neurological:  Positive for tingling. Patient Active Problem List   Diagnosis Code    Spinal stenosis M48.00    Depression, major, recurrent, mild (HCC) F33.0    Restless legs G25.81    Elevated liver enzymes R74.8    Encounter for diagnostic colonoscopy due to change in bowel habits R19.4    Narcotic dependence ([address] F11.20    Adenomatous colon polyp D12.6    Anxiety state F41.1    Chronic obstructive pulmonary disease (HCC) J44.9    Hyperlipidemia E78.5    Neck pain M54.2    Osteoarthritis of knee M17.10    Tobacco dependence syndrome F17.200    Hyperglycemia due to type 2 diabetes mellitus (HCC) E11.65    Hepatitis C virus carrier state ([address] B18.2       Physical Exam  Visit Vitals  BP (!) 160/94   Pulse 77   Temp 97.6 °F (36.4 °C) (Temporal)   Resp 18   Ht 5' 7\" (1.702 m)   Wt 168 lb (76.2 kg)   LMP  (LMP Unknown)   SpO2 96%   BMI 26.31 kg/m²   Left knee mildly swollen    ASSESSMENT and PLAN  Encounter Diagnoses   Name Primary?     Knee injury, left, initial encounter Yes    Encounter for immunization     Type 2 diabetes mellitus with hyperglycemia, without long-term current use of insulin ([address]     Tobacco dependence syndrome Hyperlipidemia, unspecified hyperlipidemia type     Strain of neck muscle, initial encounter     Sprain of left knee, unspecified ligament, initial encounter     Spinal stenosis, unspecified spinal region     Chronic obstructive pulmonary disease, unspecified COPD type ([facility]      Orders Placed This Encounter    THER/PROPH/DIAG INJECTION, SUBCUT/IM    AMB SUPPLY ORDER    INFLUENZA VIRUS VACCINE, PRESERVATIVE FREE SYRINGE, 3 YRS AND OLDER    METABOLIC PANEL, BASIC    LIPID PANEL    HEMOGLOBIN A1C WITH EAG    REFERRAL TO ORTHOPEDIC SURGERY    Insulin Needles, Disposable, 31 gauge x 5/16\" ndle    HYDROcodone-acetaminophen (Norco) 5-325 mg per tablet    gabapentin (NEURONTIN) 600 mg tablet    albuterol (PROVENTIL HFA, VENTOLIN HFA, PROAIR HFA) 90 mcg/actuation inhaler   Send her to see orthopedics  Patient was looked up in the . There are multiple prescribers of controlled substances  re. Dc lorazepam    We discussed this pain and the usage of controlled substances for pain knee relief. In general these medications are indicated for the acute symptom relief and not for chronic usage, allthough they are frequently used for chronic management  After a certain period of time they should be stopped to avoid dependence and/or addiction. I warned her about the dangers of addiction and other side affects including respiratory depression and death. Discussed how this is a controlled substance and that the prescribing of it is should be monitored very carefully. Drug usage is also monitored by our practise and the [facility], since there has been a lot of abuse and diversion of controlled substances. In general we do not refill this medication over the phone. PLease ask for enough pills to get you to your next appointment and make sure you keep your appointments. Warned not to take other medications like alcohol, other benzodiazapines marijuana or other narcotics when on this medication.   2 mo f/up

## 2022-09-14 ENCOUNTER — TELEPHONE (OUTPATIENT)
Dept: INTERNAL MEDICINE CLINIC | Age: 62
End: 2022-09-14

## 2022-09-14 NOTE — TELEPHONE ENCOUNTER
Patient would like to see if someone can find out how she will receive her brace. Please call her back please, she has some questions about the order.  [phone]

## 2022-09-15 ENCOUNTER — TELEPHONE (OUTPATIENT)
Dept: INTERNAL MEDICINE CLINIC | Age: 62
End: 2022-09-15

## 2022-09-16 ENCOUNTER — OFFICE VISIT (OUTPATIENT)
Dept: ORTHOPEDIC SURGERY | Age: 62
End: 2022-09-16
Payer: MEDICAID

## 2022-09-16 VITALS
HEART RATE: 66 BPM | TEMPERATURE: 98 F | HEIGHT: 67 IN | BODY MASS INDEX: 26.53 KG/M2 | OXYGEN SATURATION: 99 % | DIASTOLIC BLOOD PRESSURE: 82 MMHG | SYSTOLIC BLOOD PRESSURE: 136 MMHG | WEIGHT: 169 LBS

## 2022-09-16 DIAGNOSIS — M17.0 BILATERAL PRIMARY OSTEOARTHRITIS OF KNEE: Primary | ICD-10-CM

## 2022-09-16 PROCEDURE — 99203 OFFICE O/P NEW LOW 30 MIN: CPT | Performed by: ORTHOPAEDIC SURGERY

## 2022-09-16 PROCEDURE — 90656 IIV3 VACC NO PRSV 0.5 ML IM: CPT | Performed by: FAMILY MEDICINE

## 2022-09-16 NOTE — LETTER
9/17/2022    Patient: Romero Cornea   YOB: 1960   Date of Visit: 9/16/2022     Tab Client, MD [address]  [place]    Dear Tab Client, MD,      Thank you for referring Ms. Ted Rodriguez to [facility] for evaluation. My notes for this consultation are attached. If you have questions, please do not hesitate to call me. I look forward to following your patient along with you.       Sincerely,    Debbi Fox, DO

## 2022-09-16 NOTE — PROGRESS NOTES
Identified pt with two pt identifiers (name and ). Reviewed chart in preparation for visit and have obtained necessary documentation. Samy Shea is a 62 y.o. female  Chief Complaint   Patient presents with    Knee Pain     LT Knee     Visit Vitals  /82 (BP 1 Location: Right arm, BP Patient Position: Sitting, BP Cuff Size: Adult)   Pulse 66   Temp 98 °F (36.7 °C) (Tympanic)   Ht 5' 7\" (1.702 m)   Wt 169 lb (76.7 kg)   LMP  (LMP Unknown)   SpO2 99%   BMI 26.47 kg/m²     1. Have you been to the ER, urgent care clinic since your last visit? Hospitalized since your last visit? No    2. Have you seen or consulted any other health care providers outside of the [address] [place] [address] since your last visit? Include any pap smears or colon screening.  No

## 2022-09-17 NOTE — PROGRESS NOTES
9/17/2022    Chief Complaint: Left knee pain    HPI: This is a 62 y.o. female who complains of left knee pain. Onset was gradual.  The patient has had activity dependent pain for years. The patient has tried activity modification, physical therapy exercises, injections have failed to provide good relief. The pain is in the knee, it is severe in intensity. The patient feels unstable with the knee, fears falling, and has significant limitation with activities of daily living, recreation, and walks with a limp. Past Medical History:   Diagnosis Date    Arthritis     Asthma     COPD (chronic obstructive pulmonary disease) ([facility]     Depression     Hemorrhoids     Occult blood positive stool     Spinal stenosis        Past Surgical History:   Procedure Laterality Date    COLONOSCOPY N/A 5/7/2019    COLONOSCOPY performed by Alyssa Villatoro MD at [facility]    HX [address]  09/2016    laminectomy    HX CERVICAL FUSION      HX COLONOSCOPY  05/07/2019    4 polyps- 2 tubular adenoma 2 hyperplastic polyp    HX HYSTERECTOMY      [facility] ABDOMEN SURGERY PROC UNLISTED      hystrectomy       Current Outpatient Medications on File Prior to Visit   Medication Sig Dispense Refill    Insulin Needles, Disposable, 31 gauge x 5/16\" ndle Victoza pen needle use 1 daily 1 Each 11    albuterol (PROVENTIL HFA, VENTOLIN HFA, PROAIR HFA) 90 mcg/actuation inhaler INHALE ONE PUFF BY MOUTH EVERY 6 HOURS AS NEEDED FOR WHEEZING 1 Each 2    liraglutide (VICTOZA) 0.6 mg/0.1 mL (18 mg/3 mL) pnij 0.6 mg by SubCUTAneous route daily. 0.6 mg 3    diclofenac (VOLTAREN) 1 % gel APPLY   TOPICALLY TO AFFECTED AREA 4 TIMES DAILY 100 g 5    benzonatate (TESSALON) 200 mg capsule Take 1 Capsule by mouth three (3) times daily as needed for Cough. 30 Capsule 0    umeclidinium-vilanteroL (ANORO ELLIPTA) 62.5-25 mcg/actuation inhaler Take 1 Puff by inhalation daily.  1 Each 5    albuterol-ipratropium (DUO-NEB) 2.5 mg-0.5 mg/3 ml nebu 3 mL by Nebulization route every six (6) hours as needed for Wheezing, Shortness of Breath or Cough. Use with a flare up of COPD. Indications: bronchi muscle spasm resulting from COPD 50 Nebule 5    Nebulizer & Compressor machine 1 Each by Does Not Apply route every four (4) hours as needed for Wheezing or Shortness of Breath. 1 Each 0    HYDROcodone-acetaminophen (Norco) 5-325 mg per tablet Take 1 Tablet by mouth every eight (8) hours as needed for Pain for up to 7 days. Max Daily Amount: 3 Tablets. (Patient not taking: Reported on 9/16/2022) 12 Tablet 0    gabapentin (NEURONTIN) 600 mg tablet Take 1 Tablet by mouth three (3) times daily. (Patient not taking: Reported on 9/16/2022) 90 Tablet 0    naproxen (NAPROSYN) 500 mg tablet Take 1 Tablet by mouth every twelve (12) hours as needed for Pain. (Patient not taking: Reported on 9/16/2022) 20 Tablet 0    methylPREDNISolone (Medrol, Magdy,) 4 mg tablet Take as directed (Patient not taking: Reported on 9/16/2022) 1 Dose Pack 0    predniSONE (DELTASONE) 20 mg tablet Take 2 tablets by mouth once daily for 5 days (Patient not taking: Reported on 9/16/2022) 10 Tablet 0    glimepiride (AMARYL) 2 mg tablet Take 1 Tablet by mouth Daily (before breakfast). (Patient not taking: Reported on 9/16/2022) 30 Tablet 5    DULoxetine (CYMBALTA) 60 mg capsule Take 1 capsule by mouth once daily (Patient not taking: Reported on 9/16/2022) 30 Capsule 5    varenicline (CHANTIX) 1 mg tablet Take 1 tablet by mouth twice daily (Patient not taking: Reported on 9/16/2022) 60 Tablet 0    cholecalciferol (VITAMIN D3) (1000 Units /25 mcg) tablet Take 1 Tablet by mouth daily. (Patient not taking: Reported on 9/16/2022) 90 Tablet 3    ondansetron (ZOFRAN ODT) 4 mg disintegrating tablet Take 1 Tablet by mouth every eight (8) hours as needed for Nausea. (Patient not taking: Reported on 9/16/2022) 30 Tablet 0    calcium combo no.2-vitamin D3 600 mg calcium- 500 unit TbER Take 1 Each by mouth daily.  (Patient not taking: Reported on 2022) 90 Tab 1     No current facility-administered medications on file prior to visit. Allergies   Allergen Reactions    Zolpidem Unknown (comments)       Family History   Problem Relation Age of Onset    Cancer Mother         colon    Cancer Father 79        lung    Diabetes Sister     Cancer Sister     Breast Cancer Sister         age 39    Cancer Brother 27        lymphoma       Social History     Socioeconomic History    Marital status: LEGALLY     Number of children: 2    Highest education level: 10th grade   Occupational History    Occupation: disabled   Tobacco Use    Smoking status: Every Day     Packs/day: 0.50     Years: 45.00     Pack years: 22.50     Types: Cigarettes     Start date: 1975     Last attempt to quit: 2022     Years since quittin.6    Smokeless tobacco: Never   Substance and Sexual Activity    Alcohol use: Yes     Alcohol/week: 19.0 standard drinks     Types: 14 Glasses of wine, 5 Cans of beer per week    Drug use: Not Currently     Types: Cocaine    Sexual activity: Not Currently     Partners: Male   Other Topics Concern     Service No    Blood Transfusions No    Seat Belt Yes    Self-Exams Yes   Social History Narrative    Lives with friends         Review of Systems:       General: Denies headache, lethargy, fever, weight loss  Ears/Nose/Throat: Denies ear discharge, drainage, nosebleeds, hoarse voice, dental problems  Cardiovascular: Denies chest pain, shortness of breath  Lungs: Denies chest pain, breathing problems, wheezing, pneumonia  Stomach: Denies stomach pain, heartburn, constipation, irritable bowel  Skin: Denies rash, sores, open wounds  Musculoskeletal: Admits to knee pain  Genitourinary: Denies dysuria, hematuria, polyuria  Gastrointestinal: Denies constipation, obstipation, diarrhea  Neurological: Denies changes in sight, smell, hearing, taste, seizures. Denies loss of consciousness.   Psychiatric: Denies depression, sleep pattern changes, anxiety, change in personality  Endocrine: Denies mood swings, heat or cold intolerance  Hematologic/Lymphatic: Denies anemia, purpura, petechia  Allergic/Immunologic: Denies swelling of throat, pain or swelling at lymph nodes      Physical Examination:    Visit Vitals  /82 (BP 1 Location: Right arm, BP Patient Position: Sitting, BP Cuff Size: Adult)   Pulse 66   Temp 98 °F (36.7 °C) (Tympanic)   Ht 5' 7\" (1.702 m)   Wt 169 lb (76.7 kg)   SpO2 99%   BMI 26.47 kg/m²        General: AOX3, no apparent distress  Psychiatric: mood and affect appropriate  Lungs: breathing is symmetric and unlabored bilaterally  Heart: regular rate and rhythm  Abdomen: no guarding  Head: normocephalic, atraumatic  Skin: No significant abnormalities, good turgor  Sensation intact to light touch: L1-S1 dermatomes  Muscular exam: 5/5 strength in all major muscle groups unless noted in specialty exam.    Extremities:      Left upper extremity: Full active and passive range of motion without pain, deformity, no open wound, strength 5/5 in all major muscle groups. Right upper extremity: Full active and passive range of motion without pain, deformity, no open wound, strength 5/5 in all major muscle groups. Right lower extremity: Full active and passive range of motion without pain, deformity, no open wound, strength 5/5 in all major muscle groups. Left lower extremity:  No deformity is noted. Range of motion of the knee is 0-110. Ligamentous testing of the knee indicates stability of the the MCL, LCL, PCL, and ACL. Lachman's, anterior and posterior drawer tests are specifically negative. Medial joint line tenderness to palpation is noted. Popliteal area is unremarkable. 1+  for effusion. No patellar crepitus. Patella tracks centrally. Pivot shift is negative. Strength testing is indicative of 5/5 strength at hip flexion, extension, knee flexion and extension, tibialis anterior, EHL, and FHL.   Sensation is intact to light touch in the L1-S1 dermatomes. Capillary refill is less than 2 seconds in the toes. Diagnostics:    Pertinent Xrays:  Xrays are available of the left knee. Bone on bone osteoarthritic changes of the left knee, osteophytes and subchondral sclerosis is noted. Assessment: Osteoarthritis left knee    Plan: This patient and I did discuss the many options in treating knee osteoarthritis. We did discuss that we could continue to seek out nonoperative modalities, such as: NSAIDs, oral and topical analgesics, knee injections, knee braces, physical therapy, stretching, strengthening, and weight loss strategies, activity modification, ambulatory assistive devices. The patient stated their understanding with this, but would like to proceed with surgical management in the form of a total knee arthroplasty. We did discuss the risks of surgery which include but are not limited to infection, nerve or blood vessel damage, failure of fixation, failure of any possible implant, need for reoperation, postoperative pain and swelling, intra-or postoperative fracture, postoperative dislocation, leg length inequality, need for reoperation, implant failure, death, disability, organ dysfunction, wound healing issues, DVT, PE, and the need for further procedures. The patient did freely state their understanding and satisfaction with our discussion. We will proceed after medical clearances. The patient was counseled at length about the risks of carmine Covid-19 during their perioperative period and any recovery window from their procedure. The patient was made aware that carmine Covid-19  may worsen their prognosis for recovering from their procedure and lend to a higher morbidity and/or mortality risk. All material risks, benefits, and reasonable alternatives including postponing the procedure were discussed. The patient does  wish to proceed with the procedure at this time.       Ms. Aline Bernal has a reminder for a \"due or due soon\" health maintenance. I have asked that she contact her primary care provider for follow-up on this health maintenance.

## 2022-09-19 ENCOUNTER — TELEPHONE (OUTPATIENT)
Dept: ORTHOPEDIC SURGERY | Age: 62
End: 2022-09-19

## 2022-09-19 DIAGNOSIS — M17.0 BILATERAL PRIMARY OSTEOARTHRITIS OF KNEE: Primary | ICD-10-CM

## 2022-09-19 RX ORDER — TRAMADOL HYDROCHLORIDE 50 MG/1
50 TABLET ORAL
Qty: 28 TABLET | Refills: 0 | Status: SHIPPED | OUTPATIENT
Start: 2022-09-19 | End: 2022-10-03 | Stop reason: SDUPTHER

## 2022-09-19 NOTE — TELEPHONE ENCOUNTER
Spoke with pt and she was tried to get her A1C done today, but her sugar dropped and she didn't feel well. Pt stated she is going to go tomorrow. I advised her to let us know when she gets it done so we can look out for the results.          ----- Message from Wilbert Fry DO sent at 9/17/2022  2:53 PM EDT -----  Please follow up on her labs that were to be done Friday,     I need to know her A1C prior to posting, but if it is below 8,    Diagnosis: Unilateral Primary Osteoarthritis, left knee M17.12  Procedure: Left total knee arthroplasty  CPT: 60973  Operative minutes: 110  Inpatient  Location: [facility] Main OR  PAT: Yes  Class: Yes  Special Equipment: Regular table, Budny foot long, Gama Triathlon, Plan for cemented TKA, foot long for prepping  Staffing: Retractor long  Anesthesia: spinal with adductor canal block

## 2022-09-19 NOTE — TELEPHONE ENCOUNTER
Patient is requesting for a pain medication for her bilateral knee pain to be sent into her pharmacy. Her preferred pharmacy is the [address] [place] [address] [id number].

## 2022-09-22 ENCOUNTER — VIRTUAL VISIT (OUTPATIENT)
Dept: INTERNAL MEDICINE CLINIC | Age: 62
End: 2022-09-22
Payer: MEDICAID

## 2022-09-22 ENCOUNTER — DOCUMENTATION ONLY (OUTPATIENT)
Dept: INTERNAL MEDICINE CLINIC | Age: 62
End: 2022-09-22

## 2022-09-22 DIAGNOSIS — F41.9 ANXIETY: ICD-10-CM

## 2022-09-22 DIAGNOSIS — B18.2 HEPATITIS C VIRUS CARRIER STATE (HCC): Primary | ICD-10-CM

## 2022-09-22 DIAGNOSIS — E11.65 TYPE 2 DIABETES MELLITUS WITH HYPERGLYCEMIA, WITHOUT LONG-TERM CURRENT USE OF INSULIN (HCC): ICD-10-CM

## 2022-09-22 DIAGNOSIS — F17.200 TOBACCO DEPENDENCE SYNDROME: ICD-10-CM

## 2022-09-22 DIAGNOSIS — M17.12 ARTHRITIS OF LEFT KNEE: ICD-10-CM

## 2022-09-22 PROCEDURE — 99214 OFFICE O/P EST MOD 30 MIN: CPT | Performed by: FAMILY MEDICINE

## 2022-09-22 RX ORDER — IBUPROFEN 200 MG
1 TABLET ORAL EVERY 24 HOURS
Qty: 30 PATCH | Refills: 2 | Status: SHIPPED | OUTPATIENT
Start: 2022-09-22 | End: 2022-10-22

## 2022-09-22 RX ORDER — BUSPIRONE HYDROCHLORIDE 5 MG/1
5 TABLET ORAL
Qty: 90 TABLET | Refills: 1 | Status: SHIPPED | OUTPATIENT
Start: 2022-09-22

## 2022-09-22 RX ORDER — IBUPROFEN 200 MG
1 TABLET ORAL EVERY 24 HOURS
Qty: 30 PATCH | Refills: 0 | Status: SHIPPED | OUTPATIENT
Start: 2022-09-22 | End: 2022-09-22 | Stop reason: SDUPTHER

## 2022-09-22 NOTE — PROGRESS NOTES
Chief Complaint   Patient presents with    Abnormal Lab Results    Anxiety     Pt leaving to go out of stated to be with her brother's end of days and wanted something for anxiety     Patient is aware that this is a Virtual Visit or Phone Call Only doctor's visit. Patient has not been out of the country in (14 months), NO diarrhea, NO cough, NO chest conjestion, NO temp. Pt has not been around anyone with these symptoms. Health Maintenance reviewed. I have reviewed the patient's medical history in detail and updated the computerized patient record. Have you been to the ER, urgent care clinic since your last visit? No Hospitalized since your last visit? No     2. Have you seen or consulted any other health care providers outside of the [address] [place] [address] since your last visit? No Include any pap smears or colon screening. Encouraged pt to discuss pt's wishes with spouse/partner/family and bring them in the next appt to follow thru with the Advanced Directive      Fall Risk Assessment, last 12 mths 4/2/2021   Able to walk? Yes   Fall in past 12 months? 0   Do you feel unsteady? 1   Are you worried about falling 0   Number of falls in past 12 months -   Fall with injury?  -       3 most recent PHQ Screens 9/16/2022   Little interest or pleasure in doing things Not at all   Feeling down, depressed, irritable, or hopeless Not at all   Total Score PHQ 2 0   Trouble falling or staying asleep, or sleeping too much -   Feeling tired or having little energy -   Poor appetite, weight loss, or overeating -   Feeling bad about yourself - or that you are a failure or have let yourself or your family down -   Trouble concentrating on things such as school, work, reading, or watching TV -   Moving or speaking so slowly that other people could have noticed; or the opposite being so fidgety that others notice -   Thoughts of being better off dead, or hurting yourself in some way -   How difficult have these problems made it for you to do your work, take care of your home and get along with others -       Abuse Screening Questionnaire 9/14/2021   Do you ever feel afraid of your partner? N   Are you in a relationship with someone who physically or mentally threatens you? N   Is it safe for you to go home?  Y       ADL Assessment 9/14/2021   Feeding yourself No Help Needed   Getting from bed to chair No Help Needed   Getting dressed No Help Needed   Bathing or showering No Help Needed   Walk across the room (includes cane/walker) No Help Needed   Using the telphone No Help Needed   Taking your medications No Help Needed   Preparing meals No Help Needed   Managing money (expenses/bills) No Help Needed   Moderately strenuous housework (laundry) No Help Needed   Shopping for personal items (toiletries/medicines) Help Needed   Shopping for groceries Help Needed   Driving Help Needed   Climbing a flight of stairs No Help Needed   Getting to places beyond walking distances Help Needed

## 2022-09-22 NOTE — PROGRESS NOTES
Called patient to let her know that [facility] does not service for a knee brace.   Pt stated that she got a knee brace from her orthopedic

## 2022-09-22 NOTE — PROGRESS NOTES
PROGRESS NOTE        SUBJECTIVE:  Diagnosis/Chief Complaint: Abnormal Lab Results and Anxiety (Pt leaving to go out of stated to be with her brother's end of days and wanted something for anxiety)  Agree with comments, see chief complaint. Orthopedics says she needs knee replacements  Doing well with mood no  Symptoms wants nerve for anixety since going to visit dying brother. Rx tramadol for her knee arthritis, says will need replacement  Alsoi not contacted hepatology for her Hep C transoprtaion issues. Suicidal: no  Side affects: no  States taking medications per medicine list.yes -previously referred her to hepatitis treatment center. Had transportation issues and has not gone yet. Those are about to get better and she will be able to have better transportation. Allergies   Allergen Reactions    Zolpidem Unknown (comments)     Past Medical History:   Diagnosis Date    Arthritis     Asthma     COPD (chronic obstructive pulmonary disease) (HCC)     Depression     Hemorrhoids     Occult blood positive stool     Spinal stenosis      Past Surgical History:   Procedure Laterality Date    COLONOSCOPY N/A 2019    COLONOSCOPY performed by Radha Haji MD at [facility] MAIN OR    HX [address]  2016    laminectomy    HX CERVICAL FUSION      HX COLONOSCOPY  2019    4 polyps- 2 tubular adenoma 2 hyperplastic polyp    HX HYSTERECTOMY      ID ABDOMEN SURGERY PROC UNLISTED      hystrectomy     Social History     Tobacco Use    Smoking status: Every Day     Packs/day: 0.50     Years: 45.00     Pack years: 22.50     Types: Cigarettes     Start date: 1975     Last attempt to quit: 2022     Years since quittin.7    Smokeless tobacco: Never   Substance Use Topics    Alcohol use:  Yes     Alcohol/week: 19.0 standard drinks     Types: 14 Glasses of wine, 5 Cans of beer per week        OBJECTIVE:    .Visit Vitals  LMP  (LMP Unknown)     WDWN in NAD    Neurological exam[de-identified] 2-12 intact  Psychiatric: Normal mood, judgement    Reviewed: Medications, allergies, clinical lab test results and imaging results have been reviewed. Any abnormal findings have been addressed. ASSESSMENT:       ICD-10-CM ICD-9-CM    1. Hepatitis C virus carrier state ([facility]  B18.2 V02.62 REFERRAL TO GASTROENTEROLOGY      2. Anxiety  F41.9 300.00 busPIRone (BUSPAR) 5 mg tablet      3. Type 2 diabetes mellitus with hyperglycemia, without long-term current use of insulin (HCC)  E11.65 250.00      790.29       4. Arthritis of left knee  M17.12 716.96       5. Tobacco dependence syndrome  F17.200 305.1 nicotine (NICODERM CQ) 14 mg/24 hr patch      DISCONTINUED: nicotine (NICODERM CQ) 14 mg/24 hr patch            PLAN    Orders Placed This Encounter    REFERRAL TO GASTROENTEROLOGY     Referral Priority:   Routine     Referral Type:   Consultation     Referral Reason:   Specialty Services Required     Referred to Provider:   Esthela Mclaughlin MD    busPIRone (BUSPAR) 5 mg tablet     Sig: Take 1 Tablet by mouth three (3) times daily (with meals). As needed     Dispense:  90 Tablet     Refill:  1    DISCONTD: nicotine (NICODERM CQ) 14 mg/24 hr patch     Si Patch by TransDERmal route every twenty-four (24) hours for 30 days. Dispense:  30 Patch     Refill:  0    nicotine (NICODERM CQ) 14 mg/24 hr patch     Si Patch by TransDERmal route every twenty-four (24) hours for 30 days. Dispense:  30 Patch     Refill:  2     Discussed possible side affects, precautions, and drug interactions and possible benefits of the medication(s). The patient was counseled on the dangers of tobacco use, and was advised to quit. Reviewed strategies to maximize success, including pharmacotherapy (patches).     Try buspirone for anxiety  She will need to go see hepatology referral given    6-week follow-up

## 2022-10-03 DIAGNOSIS — M17.0 BILATERAL PRIMARY OSTEOARTHRITIS OF KNEE: ICD-10-CM

## 2022-10-03 NOTE — TELEPHONE ENCOUNTER
Patient is requesting a refill on Tramadol. She would like it sent to [facility] in [place], [place] as she is currently caring for her brother there. I did let her know that this may not be possible d/t this being a controlled medication. States if we can not send there to send to [facility] in [place]. Patient also wanted to note that she is planning on scheduling surgery when she is back from taking care of her brother.

## 2022-10-04 ENCOUNTER — TELEPHONE (OUTPATIENT)
Dept: ORTHOPEDIC SURGERY | Age: 62
End: 2022-10-04

## 2022-10-04 RX ORDER — TRAMADOL HYDROCHLORIDE 50 MG/1
50 TABLET ORAL
Qty: 28 TABLET | Refills: 0 | Status: SHIPPED | OUTPATIENT
Start: 2022-10-04 | End: 2022-10-17 | Stop reason: SDUPTHER

## 2022-10-04 NOTE — TELEPHONE ENCOUNTER
Patient called stating she had gone to her pharmacy to  her prescription of tramadol 50 mg and was informed that a p/a needed to be completed.      Her current preferred [facility]

## 2022-10-17 ENCOUNTER — TELEPHONE (OUTPATIENT)
Dept: ORTHOPEDIC SURGERY | Age: 62
End: 2022-10-17

## 2022-10-17 DIAGNOSIS — M17.0 BILATERAL PRIMARY OSTEOARTHRITIS OF KNEE: ICD-10-CM

## 2022-10-17 RX ORDER — TRAMADOL HYDROCHLORIDE 50 MG/1
50 TABLET ORAL
Qty: 28 TABLET | Refills: 0 | Status: SHIPPED | OUTPATIENT
Start: 2022-10-17 | End: 2022-11-02 | Stop reason: SDUPTHER

## 2022-10-18 ENCOUNTER — VIRTUAL VISIT (OUTPATIENT)
Dept: INTERNAL MEDICINE CLINIC | Age: 62
End: 2022-10-18
Payer: MEDICAID

## 2022-10-18 DIAGNOSIS — E11.65 TYPE 2 DIABETES MELLITUS WITH HYPERGLYCEMIA, WITHOUT LONG-TERM CURRENT USE OF INSULIN (HCC): Primary | ICD-10-CM

## 2022-10-18 DIAGNOSIS — J44.9 CHRONIC OBSTRUCTIVE PULMONARY DISEASE, UNSPECIFIED COPD TYPE (HCC): ICD-10-CM

## 2022-10-18 PROCEDURE — 99214 OFFICE O/P EST MOD 30 MIN: CPT | Performed by: FAMILY MEDICINE

## 2022-10-18 RX ORDER — PREDNISONE 20 MG/1
40 TABLET ORAL
Qty: 10 TABLET | Refills: 0 | Status: SHIPPED | OUTPATIENT
Start: 2022-10-18 | End: 2022-10-23

## 2022-10-18 RX ORDER — AZITHROMYCIN 250 MG/1
250 TABLET, FILM COATED ORAL SEE ADMIN INSTRUCTIONS
Qty: 6 TABLET | Refills: 0 | Status: SHIPPED | OUTPATIENT
Start: 2022-10-18

## 2022-10-18 RX ORDER — BENZONATATE 200 MG/1
200 CAPSULE ORAL
Qty: 30 CAPSULE | Refills: 0 | Status: SHIPPED | OUTPATIENT
Start: 2022-10-18

## 2022-10-18 NOTE — PROGRESS NOTES
Nina Tracy, who was evaluated through a synchronous (real-time) audio-video encounter, and/or her healthcare decision maker, is aware that it is a billable service, which includes applicable co-pays, with coverage as determined by her insurance carrier. She provided verbal consent to proceed and patient identification was verified. This visit was conducted pursuant to the emergency declaration under the [facility] [address] and the [employer] Act. A caregiver was present when appropriate. Ability to conduct physical exam was limited. The patient was located at: Home: [place] [id number]  The provider was located at: Facility [facility] [address] Paz Bradford MD on 10/18/2022 at 2:07 PM                  Subjective:   Nina Tracy is a 62 y.o. female who complains of congestion, sore throat, cough described as dry, headache, chills, and dyspnea/wheezing for 3 days, stable since that time. She denies a history of nausea, rash on body. Back from [place]  Evaluation to date: covid yesterday was neg. DAughter in law has covid. Treatment to date: OTC products. Patient does smoke cigarettes. Has not laetly  Relevant PMH: COPD.   Allergies   Allergen Reactions    Zolpidem Unknown (comments)         Patient Active Problem List    Diagnosis Date Noted    Hepatitis C virus carrier state ([address] 04/26/2021    Hyperglycemia due to type 2 diabetes mellitus ([address] 04/14/2021    Anxiety state 07/21/2020    Chronic obstructive pulmonary disease ([address] 07/21/2020    Hyperlipidemia 07/21/2020    Neck pain 07/21/2020    Osteoarthritis of knee 07/21/2020    Tobacco dependence syndrome 07/21/2020    Adenomatous colon polyp 05/16/2020    Narcotic dependence ([address] 04/07/2019    Encounter for diagnostic colonoscopy due to change in bowel habits 03/04/2019 Elevated liver enzymes 2019    Depression, major, recurrent, mild ([facility] 2017    Restless legs 2017    Spinal stenosis 2017     Past Medical History:   Diagnosis Date    Arthritis     Asthma     COPD (chronic obstructive pulmonary disease) ([facility])     Depression     Hemorrhoids     Occult blood positive stool     Spinal stenosis      Social History     Tobacco Use    Smoking status: Every Day     Packs/day: 0.50     Years: 45.00     Pack years: 22.50     Types: Cigarettes     Start date: 1975     Last attempt to quit: 2022     Years since quittin.7    Smokeless tobacco: Never   Substance Use Topics    Alcohol use: Yes     Alcohol/week: 19.0 standard drinks     Types: 14 Glasses of wine, 5 Cans of beer per week        Review of Systems  Pertinent items are noted in HPI. Objective:     Visit Vitals  LMP  (LMP Unknown)     General:  alert, cooperative, no distress   Eyes: negative     Assessment/Plan:   asthma and pneumonia  Antibiotics per orders. RTC in 1 week or PRN. Encounter Diagnoses   Name Primary? Type 2 diabetes mellitus with hyperglycemia, without long-term current use of insulin ([facility]) Yes    Chronic obstructive pulmonary disease, unspecified COPD type ([facility]      Orders Placed This Encounter    benzonatate (TESSALON) 200 mg capsule    azithromycin (ZITHROMAX) 250 mg tablet    predniSONE (DELTASONE) 20 mg tablet   . Repeat covid  in 1 -2 days call me if +    Current Outpatient Medications   Medication Sig Dispense Refill    benzonatate (TESSALON) 200 mg capsule Take 1 Capsule by mouth three (3) times daily as needed for Cough. 30 Capsule 0    azithromycin (ZITHROMAX) 250 mg tablet Take 1 Tablet by mouth See Admin Instructions. Take two tablets today then one tablet daily for next 4 days 6 Tablet 0    predniSONE (DELTASONE) 20 mg tablet Take 2 Tablets by mouth daily (with breakfast) for 5 days.  10 Tablet 0    traMADoL (ULTRAM) 50 mg tablet Take 1 Tablet by mouth every six (6) hours as needed for Pain for up to 7 days. Max Daily Amount: 200 mg. 28 Tablet 0    busPIRone (BUSPAR) 5 mg tablet Take 1 Tablet by mouth three (3) times daily (with meals). As needed 90 Tablet 1    nicotine (NICODERM CQ) 14 mg/24 hr patch 1 Patch by TransDERmal route every twenty-four (24) hours for 30 days. 30 Patch 2    Insulin Needles, Disposable, 31 gauge x 5/16\" ndle Victoza pen needle use 1 daily 1 Each 11    gabapentin (NEURONTIN) 600 mg tablet Take 1 Tablet by mouth three (3) times daily. 90 Tablet 0    albuterol (PROVENTIL HFA, VENTOLIN HFA, PROAIR HFA) 90 mcg/actuation inhaler INHALE ONE PUFF BY MOUTH EVERY 6 HOURS AS NEEDED FOR WHEEZING 1 Each 2    naproxen (NAPROSYN) 500 mg tablet Take 1 Tablet by mouth every twelve (12) hours as needed for Pain. 20 Tablet 0    methylPREDNISolone (Medrol, Magdy,) 4 mg tablet Take as directed 1 Dose Pack 0    predniSONE (DELTASONE) 20 mg tablet Take 2 tablets by mouth once daily for 5 days 10 Tablet 0    liraglutide (VICTOZA) 0.6 mg/0.1 mL (18 mg/3 mL) pnij 0.6 mg by SubCUTAneous route daily. 0.6 mg 3    diclofenac (VOLTAREN) 1 % gel APPLY   TOPICALLY TO AFFECTED AREA 4 TIMES DAILY 100 g 5    glimepiride (AMARYL) 2 mg tablet Take 1 Tablet by mouth Daily (before breakfast). 30 Tablet 5    umeclidinium-vilanteroL (ANORO ELLIPTA) 62.5-25 mcg/actuation inhaler Take 1 Puff by inhalation daily. 1 Each 5    DULoxetine (CYMBALTA) 60 mg capsule Take 1 capsule by mouth once daily 30 Capsule 5    varenicline (CHANTIX) 1 mg tablet Take 1 tablet by mouth twice daily 60 Tablet 0    cholecalciferol (VITAMIN D3) (1000 Units /25 mcg) tablet Take 1 Tablet by mouth daily. 90 Tablet 3    albuterol-ipratropium (DUO-NEB) 2.5 mg-0.5 mg/3 ml nebu 3 mL by Nebulization route every six (6) hours as needed for Wheezing, Shortness of Breath or Cough. Use with a flare up of COPD.   Indications: bronchi muscle spasm resulting from COPD 50 Nebule 5    ondansetron (ZOFRAN ODT) 4 mg disintegrating tablet Take 1 Tablet by mouth every eight (8) hours as needed for Nausea. 30 Tablet 0    Nebulizer & Compressor machine 1 Each by Does Not Apply route every four (4) hours as needed for Wheezing or Shortness of Breath. 1 Each 0    calcium combo no.2-vitamin D3 600 mg calcium- 500 unit TbER Take 1 Each by mouth daily. 90 Tab 1         Current Outpatient Medications   Medication Sig Dispense Refill    benzonatate (TESSALON) 200 mg capsule Take 1 Capsule by mouth three (3) times daily as needed for Cough. 30 Capsule 0    azithromycin (ZITHROMAX) 250 mg tablet Take 1 Tablet by mouth See Admin Instructions. Take two tablets today then one tablet daily for next 4 days 6 Tablet 0    predniSONE (DELTASONE) 20 mg tablet Take 2 Tablets by mouth daily (with breakfast) for 5 days. 10 Tablet 0    traMADoL (ULTRAM) 50 mg tablet Take 1 Tablet by mouth every six (6) hours as needed for Pain for up to 7 days. Max Daily Amount: 200 mg. 28 Tablet 0    busPIRone (BUSPAR) 5 mg tablet Take 1 Tablet by mouth three (3) times daily (with meals). As needed 90 Tablet 1    nicotine (NICODERM CQ) 14 mg/24 hr patch 1 Patch by TransDERmal route every twenty-four (24) hours for 30 days. 30 Patch 2    Insulin Needles, Disposable, 31 gauge x 5/16\" ndle Victoza pen needle use 1 daily 1 Each 11    gabapentin (NEURONTIN) 600 mg tablet Take 1 Tablet by mouth three (3) times daily. 90 Tablet 0    albuterol (PROVENTIL HFA, VENTOLIN HFA, PROAIR HFA) 90 mcg/actuation inhaler INHALE ONE PUFF BY MOUTH EVERY 6 HOURS AS NEEDED FOR WHEEZING 1 Each 2    naproxen (NAPROSYN) 500 mg tablet Take 1 Tablet by mouth every twelve (12) hours as needed for Pain. 20 Tablet 0    methylPREDNISolone (Medrol, Magdy,) 4 mg tablet Take as directed 1 Dose Pack 0    predniSONE (DELTASONE) 20 mg tablet Take 2 tablets by mouth once daily for 5 days 10 Tablet 0    liraglutide (VICTOZA) 0.6 mg/0.1 mL (18 mg/3 mL) pnij 0.6 mg by SubCUTAneous route daily.  0.6 mg 3 diclofenac (VOLTAREN) 1 % gel APPLY   TOPICALLY TO AFFECTED AREA 4 TIMES DAILY 100 g 5    glimepiride (AMARYL) 2 mg tablet Take 1 Tablet by mouth Daily (before breakfast). 30 Tablet 5    umeclidinium-vilanteroL (ANORO ELLIPTA) 62.5-25 mcg/actuation inhaler Take 1 Puff by inhalation daily. 1 Each 5    DULoxetine (CYMBALTA) 60 mg capsule Take 1 capsule by mouth once daily 30 Capsule 5    varenicline (CHANTIX) 1 mg tablet Take 1 tablet by mouth twice daily 60 Tablet 0    cholecalciferol (VITAMIN D3) (1000 Units /25 mcg) tablet Take 1 Tablet by mouth daily. 90 Tablet 3    albuterol-ipratropium (DUO-NEB) 2.5 mg-0.5 mg/3 ml nebu 3 mL by Nebulization route every six (6) hours as needed for Wheezing, Shortness of Breath or Cough. Use with a flare up of COPD. Indications: bronchi muscle spasm resulting from COPD 50 Nebule 5    ondansetron (ZOFRAN ODT) 4 mg disintegrating tablet Take 1 Tablet by mouth every eight (8) hours as needed for Nausea. 30 Tablet 0    Nebulizer & Compressor machine 1 Each by Does Not Apply route every four (4) hours as needed for Wheezing or Shortness of Breath. 1 Each 0    calcium combo no.2-vitamin D3 600 mg calcium- 500 unit TbER Take 1 Each by mouth daily.  90 Tab 1     f/up 1 mo

## 2022-10-18 NOTE — PROGRESS NOTES
Chief Complaint   Patient presents with    Concern For COVID-19 (Coronavirus)     Patient is aware that this is a Virtual Visit or Phone Call Only doctor's visit. Patient has not been out of the country in (14 months), NO diarrhea, NO cough, NO chest conjestion, NO temp. Pt has not been around anyone with these symptoms. Health Maintenance reviewed. I have reviewed the patient's medical history in detail and updated the computerized patient record. Have you been to the ER, urgent care clinic since your last visit? No Hospitalized since your last visit? No     2. Have you seen or consulted any other health care providers outside of the [address] [place] [address] since your last visit? No  Include any pap smears or colon screening. Encouraged pt to discuss pt's wishes with spouse/partner/family and bring them in the next appt to follow thru with the Advanced Directive      Fall Risk Assessment, last 12 mths 4/2/2021   Able to walk? Yes   Fall in past 12 months? 0   Do you feel unsteady? 1   Are you worried about falling 0   Number of falls in past 12 months -   Fall with injury?  -       3 most recent PHQ Screens 9/16/2022   Little interest or pleasure in doing things Not at all   Feeling down, depressed, irritable, or hopeless Not at all   Total Score PHQ 2 0   Trouble falling or staying asleep, or sleeping too much -   Feeling tired or having little energy -   Poor appetite, weight loss, or overeating -   Feeling bad about yourself - or that you are a failure or have let yourself or your family down -   Trouble concentrating on things such as school, work, reading, or watching TV -   Moving or speaking so slowly that other people could have noticed; or the opposite being so fidgety that others notice -   Thoughts of being better off dead, or hurting yourself in some way -   How difficult have these problems made it for you to do your work, take care of your home and get along with others - Abuse Screening Questionnaire 9/14/2021   Do you ever feel afraid of your partner? N   Are you in a relationship with someone who physically or mentally threatens you? N   Is it safe for you to go home?  Y       ADL Assessment 9/14/2021   Feeding yourself No Help Needed   Getting from bed to chair No Help Needed   Getting dressed No Help Needed   Bathing or showering No Help Needed   Walk across the room (includes cane/walker) No Help Needed   Using the telphone No Help Needed   Taking your medications No Help Needed   Preparing meals No Help Needed   Managing money (expenses/bills) No Help Needed   Moderately strenuous housework (laundry) No Help Needed   Shopping for personal items (toiletries/medicines) Help Needed   Shopping for groceries Help Needed   Driving Help Needed   Climbing a flight of stairs No Help Needed   Getting to places beyond walking distances Help Needed

## 2022-10-19 ENCOUNTER — DOCUMENTATION ONLY (OUTPATIENT)
Dept: INTERNAL MEDICINE CLINIC | Age: 62
End: 2022-10-19

## 2022-11-02 ENCOUNTER — TELEPHONE (OUTPATIENT)
Dept: ORTHOPEDIC SURGERY | Age: 62
End: 2022-11-02

## 2022-11-02 ENCOUNTER — NURSE TRIAGE (OUTPATIENT)
Dept: OTHER | Facility: CLINIC | Age: 62
End: 2022-11-02

## 2022-11-02 ENCOUNTER — TELEPHONE (OUTPATIENT)
Dept: INTERNAL MEDICINE CLINIC | Age: 62
End: 2022-11-02

## 2022-11-02 DIAGNOSIS — M17.0 BILATERAL PRIMARY OSTEOARTHRITIS OF KNEE: ICD-10-CM

## 2022-11-02 NOTE — TELEPHONE ENCOUNTER
Patient called and stated she is back in the state of [place] and would like to move forward with scheduling her knee surgery. She would like to get scheduled for surgery as soon as possible.

## 2022-11-02 NOTE — TELEPHONE ENCOUNTER
Location of patient: [address] [facility]  call from Rell at [facility] with [employer]. Subjective: Caller states \"right hand is losing feeling. Got a thorn in thumb\"     Current Symptoms: got a thorn in thumb from [place]. Tried getting it out  This occurred last summer. Thumb turned black. Is very sensative to the touch. Has always been sensitive but is getting worse. Is tingling  Pain is also worse. Has weakness to hand, will drop things    Onset: ongoing for a year     Associated Symptoms: NA    Pain Severity: 8/10    Temperature: denies     What has been tried: ibuprofen    LMP: NA Pregnant: NA    Recommended disposition: Go to [facility]    Care advice provided, patient verbalizes understanding; denies any other questions or concerns; instructed to call back for any new or worsening symptoms. Patient/Caller agrees with recommended disposition; writer provided warm transfer to Rell at [facility] for appointment scheduling    Attention Provider: Thank you for allowing me to participate in the care of your patient. The patient was connected to triage in response to information provided to the [facility]. Please do not respond through this encounter as the response is not directed to a shared pool.     Reason for Disposition   SEVERE pain (e.g., excruciating, unable to use hand at all)    Protocols used: Finger Pain-ADULT-[place]

## 2022-11-03 NOTE — TELEPHONE ENCOUNTER
Patient has been informed of the below information and states that she will be going this afternoon to get her labs done.

## 2022-11-04 RX ORDER — TRAMADOL HYDROCHLORIDE 50 MG/1
50 TABLET ORAL
Qty: 28 TABLET | Refills: 0 | Status: SHIPPED | OUTPATIENT
Start: 2022-11-04 | End: 2022-11-14 | Stop reason: SDUPTHER

## 2022-11-07 NOTE — PROGRESS NOTES
Krish Santamaria is a 62 y.o. female who was phone evaluated on 4/26/2021. Consent:  She and/or her healthcare decision maker is aware that this patient-initiated Telehealth encounter is a billable service, with coverage as determined by her insurance carrier. She is aware that she may receive a bill and has provided verbal consent to proceed: Yes    I was in the office while conducting this encounter. Assessment & Plan:       ICD-10-CM ICD-9-CM    1. Hepatitis C virus carrier state ([facility]  B18.2 V02.62    2. Anxiety  F41.9 300.00    3. Chronic obstructive pulmonary disease, unspecified COPD type ([facility]  J44.9 496    4. Chronic narcotic use  F11.90 305.50      Orders Placed This Encounter    REFERRAL TO GASTROENTEROLOGY     Referral Priority:   Routine     Referral Type:   Consultation     Referral Reason:   Specialty Services Required     Referred to Provider:   Raine Tavares MD     Counseled about hepatitis C. Suspect that a lot of her issues are related to hepatitis C fatigue joint pains etc. and that may be if we can get her treated for much of this we can reduce her usage of pain agents. Asks for a refill of her tramadol. Told she would need an appointment in office to get a narcotic refill which she accepted. 25 minutes spent counseling and discussing hepatitis C chronic pain issues. Has been referred to [address] for further evaluation and treatment    Follow-up and Dispositions    · Return in about 6 weeks (around 6/7/2021). 712  Subjective: We discussed the hepatitis C result, risk factor seems to be a old boyfriend who had the disease and passed away from it. Does not relate IV drug abuse.   Labs from today were reviewed  no, labs done previously were reviewed  yes, Labs done in ER were reviewed  no, Additional labs are ordered  no,      Current Outpatient Medications   Medication Sig Dispense Refill    diphenoxylate-atropine (LOMOTIL) 2.5-0.025 mg No per tablet TAKE 1 TABLET BY MOUTH 4 TIMES DAILY AS NEEDED FOR DIARRHEA . DO NOT EXCEED 4 PER 24 HOURS 12 Tab 0    diclofenac (VOLTAREN) 1 % gel APPLY   TOPICALLY TO AFFECTED AREA 4 TIMES DAILY 100 g 5    cyclobenzaprine (FLEXERIL) 5 mg tablet Take 1 Tab by mouth nightly as needed for Muscle Spasm(s). Indications: muscle spasm 30 Tab 2    lidocaine (LIDODERM) 5 % 2 Patches by TransDERmal route every twelve (12) hours. Apply patch to the affected area for 12 hours a day and remove for 12 hours a day. 1 Each 5    gabapentin (NEURONTIN) 600 mg tablet Take 1 Tab by mouth three (3) times daily. Max Daily Amount: 1,800 mg. 90 Tab 1    diclofenac EC (VOLTAREN) 50 mg EC tablet Take 1 Tab by mouth two (2) times a day. As needed 60 Tab 1    DULoxetine (CYMBALTA) 60 mg capsule Take 1 capsule by mouth once daily 30 Cap 5    benzonatate (TESSALON) 200 mg capsule TAKE 1 CAPSULE BY MOUTH THREE TIMES DAILY AS NEEDED WITH FOOD FOR COUGH FOR UP TO 7 DAYS 60 Cap 0    Nebulizer & Compressor machine 1 Each by Does Not Apply route every four (4) hours as needed for Wheezing or Shortness of Breath. 1 Each 0    albuterol-ipratropium (DUO-NEB) 2.5 mg-0.5 mg/3 ml nebu 3 mL by Nebulization route every four (4) hours as needed for Wheezing, Shortness of Breath or Cough. Use with a flare up of COPD. Indications: bronchi muscle spasm resulting from COPD 30 Nebule 1    umeclidinium-vilanteroL (ANORO ELLIPTA) 62.5-25 mcg/actuation inhaler Take 1 Puff by inhalation daily. 1 Inhaler 5    albuterol (PROVENTIL HFA, VENTOLIN HFA, PROAIR HFA) 90 mcg/actuation inhaler INHALE ONE PUFF BY MOUTH EVERY 6 HOURS AS NEEDED FOR WHEEZING 1 Inhaler 2    albuterol (PROVENTIL VENTOLIN) 2.5 mg /3 mL (0.083 %) nebu 3 mL by Nebulization route every four (4) hours as needed for Wheezing.  30 Nebule 3    nicotine (NICODERM CQ) 21 mg/24 hr APPLY 1 PATCH TOPICALLY EVERY 24 HOURS FOR 30 DAYS 30 Patch 0    ondansetron (Zofran ODT) 4 mg disintegrating tablet Take 1 Tab by mouth every eight (8) hours as needed for Nausea or Vomiting for up to 10 doses. 10 Tab 0    naloxone (NARCAN) 4 mg/actuation nasal spray 1 Spray by Nasal route.  calcium combo no.2-vitamin D3 600 mg calcium- 500 unit TbER Take 1 Each by mouth daily. 90 Tab 1    buPROPion (WELLBUTRIN) 100 mg tablet Take 1 Tab by mouth three (3) times daily. 90 Tab 5     Allergies   Allergen Reactions    Zolpidem Unknown (comments)     Patient Active Problem List   Diagnosis Code    Spinal stenosis M48.00    Depression, major, recurrent, mild (HCC) F33.0    Restless legs G25.81    Tobacco abuse Z72.0    Elevated liver enzymes R74.8    Encounter for diagnostic colonoscopy due to change in bowel habits R19.4    Narcotic dependence (HCC) F11.20    Adenomatous colon polyp D12.6    Anxiety state F41.1    Backache M54.9    Chronic obstructive pulmonary disease (HCC) J44.9    Hyperlipidemia E78.5    Knee pain M25.569    Neck pain M54.2    Osteoarthritis of knee M17.10    Tobacco dependence syndrome F17.200    Chronic cough R05    Hyperglycemia due to type 2 diabetes mellitus (HCC) E11.65    Hepatitis C virus carrier state ([facility] B18.2     Social History     Socioeconomic History    Marital status: LEGALLY      Spouse name: Not on file    Number of children: 2    Years of education: Not on file    Highest education level: 10th grade   Occupational History    Occupation: disabled   Social Needs    Financial resource strain: Not on file    Food insecurity     Worry: Not on file     Inability: Not on file   [employer] needs     Medical: Not on file     Non-medical: Not on file   Tobacco Use    Smoking status: Current Every Day Smoker     Packs/day: 0.50     Years: 35.00     Pack years: 17.50     Types: Cigarettes     Last attempt to quit: 2020     Years since quittin.6    Smokeless tobacco: Never Used   Substance and Sexual Activity    Alcohol use:  Yes     Alcohol/week: 19.0 standard drinks     Types: 14 Glasses of wine, 5 Cans of beer per week     Frequency: Monthly or less     Drinks per session: 1 or 2     Binge frequency: Never    Drug use: Not Currently     Types: Cocaine    Sexual activity: Not Currently     Partners: Male   Lifestyle    Physical activity     Days per week: Not on file     Minutes per session: Not on file    Stress: Not on file   Relationships    Social connections     Talks on phone: Not on file     Gets together: Not on file     Attends Muslim service: Not on file     Active member of club or organization: Not on file     Attends meetings of clubs or organizations: Not on file     Relationship status: Not on file    Intimate partner violence     Fear of current or ex partner: Not on file     Emotionally abused: Not on file     Physically abused: Not on file     Forced sexual activity: Not on file   Other Topics Concern     Service No    Blood Transfusions No    Caffeine Concern Not Asked    Occupational Exposure Not Asked   [place] Hazards Not Asked    Sleep Concern Not Asked    Stress Concern Not Asked    Weight Concern Not Asked    Special Diet Not Asked    Back Care Not Asked    Exercise Not Asked    Bike Helmet Not Asked    Seat Belt Yes    Self-Exams Yes   Social History Narrative    Lives with friends       We discussed the expected course, resolution and complications of the diagnosis(es) in detail. Medication risks, benefits, costs, interactions, and alternatives were discussed as indicated. I advised her to contact the office if her condition worsens, changes or fails to improve as anticipated. She expressed understanding with the diagnosis(es) and plan.      Pursuant to the emergency declaration under the [address] [facility] waiver authority and the [employer] Act, this Virtual  Visit was conducted, with patient's consent, to reduce the patient's risk of exposure to COVID-19 and provide continuity of care for an established patient. Services were provided through a video synchronous discussion virtually to substitute for in-person clinic visit.     Chele Jamil MD

## 2022-11-11 ENCOUNTER — HOSPITAL ENCOUNTER (OUTPATIENT)
Dept: LAB | Age: 62
Discharge: HOME | End: 2022-11-11
Payer: MEDICAID

## 2022-11-11 PROCEDURE — 99001 SPECIMEN HANDLING PT-LAB: CPT

## 2022-11-12 DIAGNOSIS — F17.200 TOBACCO DEPENDENCE SYNDROME: ICD-10-CM

## 2022-11-13 RX ORDER — IBUPROFEN 200 MG
TABLET ORAL
Qty: 30 PATCH | Refills: 0 | Status: SHIPPED | OUTPATIENT
Start: 2022-11-13

## 2022-11-14 DIAGNOSIS — M17.0 BILATERAL PRIMARY OSTEOARTHRITIS OF KNEE: ICD-10-CM

## 2022-11-14 RX ORDER — TRAMADOL HYDROCHLORIDE 50 MG/1
50 TABLET ORAL
Qty: 28 TABLET | Refills: 0 | Status: SHIPPED | OUTPATIENT
Start: 2022-11-14 | End: 2022-11-21 | Stop reason: SDUPTHER

## 2022-11-15 ENCOUNTER — VIRTUAL VISIT (OUTPATIENT)
Dept: INTERNAL MEDICINE CLINIC | Age: 62
End: 2022-11-15
Payer: MEDICAID

## 2022-11-15 DIAGNOSIS — F33.0 DEPRESSION, MAJOR, RECURRENT, MILD (HCC): ICD-10-CM

## 2022-11-15 DIAGNOSIS — E11.65 TYPE 2 DIABETES MELLITUS WITH HYPERGLYCEMIA, WITHOUT LONG-TERM CURRENT USE OF INSULIN (HCC): ICD-10-CM

## 2022-11-15 DIAGNOSIS — J44.9 CHRONIC OBSTRUCTIVE PULMONARY DISEASE, UNSPECIFIED COPD TYPE (HCC): ICD-10-CM

## 2022-11-15 DIAGNOSIS — J44.1 CHRONIC OBSTRUCTIVE PULMONARY DISEASE WITH ACUTE EXACERBATION (HCC): ICD-10-CM

## 2022-11-15 DIAGNOSIS — M48.00 SPINAL STENOSIS, UNSPECIFIED SPINAL REGION: ICD-10-CM

## 2022-11-15 PROCEDURE — 99214 OFFICE O/P EST MOD 30 MIN: CPT | Performed by: FAMILY MEDICINE

## 2022-11-15 RX ORDER — BENZONATATE 200 MG/1
200 CAPSULE ORAL
Qty: 12 CAPSULE | Refills: 0 | Status: SHIPPED | OUTPATIENT
Start: 2022-11-15 | End: 2022-11-19

## 2022-11-15 RX ORDER — GABAPENTIN 600 MG/1
600 TABLET ORAL 3 TIMES DAILY
Qty: 90 TABLET | Refills: 1 | Status: SHIPPED | OUTPATIENT
Start: 2022-11-15

## 2022-11-15 RX ORDER — AZITHROMYCIN 250 MG/1
250 TABLET, FILM COATED ORAL SEE ADMIN INSTRUCTIONS
Qty: 6 TABLET | Refills: 0 | Status: SHIPPED | OUTPATIENT
Start: 2022-11-15 | End: 2022-11-30 | Stop reason: ALTCHOICE

## 2022-11-15 RX ORDER — DULOXETIN HYDROCHLORIDE 60 MG/1
CAPSULE, DELAYED RELEASE ORAL
Qty: 30 CAPSULE | Refills: 5 | Status: SHIPPED | OUTPATIENT
Start: 2022-11-15

## 2022-11-15 RX ORDER — GLIMEPIRIDE 2 MG/1
2 TABLET ORAL
Qty: 30 TABLET | Refills: 5 | Status: SHIPPED | OUTPATIENT
Start: 2022-11-15

## 2022-11-15 RX ORDER — PREDNISONE 20 MG/1
40 TABLET ORAL
Qty: 10 TABLET | Refills: 0 | Status: SHIPPED | OUTPATIENT
Start: 2022-11-15 | End: 2022-11-20

## 2022-11-15 NOTE — PROGRESS NOTES
Subjective:   Any Mi is a 62 y.o. female who complains of congestion, sore throat, cough described as productive of yellow sputum, and fevers up to 102 degrees for 3 days, Some yspnea better gradually improving since that time. She denies a history of anorexia and rash on body. Evaluation to date: CIVD was neg. Treatment to date: OTC products. Patient does smoke cigarettes. Relevant PMH: Asthma and COPD. Allergies   Allergen Reactions    Zolpidem Unknown (comments)         Patient Active Problem List    Diagnosis Date Noted    Hepatitis C virus carrier state ([facility] 2021    Hyperglycemia due to type 2 diabetes mellitus ([facility] 2021    Anxiety state 2020    Chronic obstructive pulmonary disease ([facility] 2020    Hyperlipidemia 2020    Neck pain 2020    Osteoarthritis of knee 2020    Tobacco dependence syndrome 2020    Adenomatous colon polyp 2020    Narcotic dependence ([facility] 2019    Encounter for diagnostic colonoscopy due to change in bowel habits 2019    Elevated liver enzymes 2019    Depression, major, recurrent, mild ([address] 2017    Restless legs 2017    Spinal stenosis 2017     Past Medical History:   Diagnosis Date    Arthritis     Asthma     COPD (chronic obstructive pulmonary disease) (HCC)     Depression     Hemorrhoids     Occult blood positive stool     Spinal stenosis      Social History     Tobacco Use    Smoking status: Every Day     Packs/day: 0.50     Years: 45.00     Pack years: 22.50     Types: Cigarettes     Start date: 1975     Last attempt to quit: 2022     Years since quittin.8    Smokeless tobacco: Never   Substance Use Topics    Alcohol use: Yes     Alcohol/week: 19.0 standard drinks     Types: 14 Glasses of wine, 5 Cans of beer per week        Review of Systems  Pertinent items are noted in HPI.     Objective:     Visit Vitals  LMP  (LMP Unknown)     General:  alert, cooperative, no distress   Eyes: negative     Assessment/Plan:   asthma  Antibiotics per orders. Encounter Diagnoses   Name Primary? Chronic obstructive pulmonary disease, unspecified COPD type ([facility]     Chronic obstructive pulmonary disease with acute exacerbation (HCC)     Spinal stenosis, unspecified spinal region     Depression, major, recurrent, mild (HCC)     Type 2 diabetes mellitus with hyperglycemia, without long-term current use of insulin ([facility]      Orders Placed This Encounter    azithromycin (ZITHROMAX) 250 mg tablet    predniSONE (DELTASONE) 20 mg tablet    benzonatate (TESSALON) 200 mg capsule    gabapentin (NEURONTIN) 600 mg tablet    DULoxetine (CYMBALTA) 60 mg capsule    umeclidinium-vilanteroL (ANORO ELLIPTA) 62.5-25 mcg/actuation inhaler    glimepiride (AMARYL) 2 mg tablet   . Current Outpatient Medications   Medication Sig Dispense Refill    azithromycin (ZITHROMAX) 250 mg tablet Take 1 Tablet by mouth See Admin Instructions. Take two tablets today then one tablet daily for next 4 days 6 Tablet 0    predniSONE (DELTASONE) 20 mg tablet Take 2 Tablets by mouth daily (with breakfast) for 5 days. 10 Tablet 0    benzonatate (TESSALON) 200 mg capsule Take 1 Capsule by mouth three (3) times daily as needed for Cough for up to 4 days. 12 Capsule 0    gabapentin (NEURONTIN) 600 mg tablet Take 1 Tablet by mouth three (3) times daily. 90 Tablet 1    DULoxetine (CYMBALTA) 60 mg capsule Take 1 capsule by mouth once daily 30 Capsule 5    umeclidinium-vilanteroL (ANORO ELLIPTA) 62.5-25 mcg/actuation inhaler Take 1 Puff by inhalation daily. 1 Each 5    glimepiride (AMARYL) 2 mg tablet Take 1 Tablet by mouth Daily (before breakfast). 30 Tablet 5    traMADoL (ULTRAM) 50 mg tablet Take 1 Tablet by mouth every six (6) hours as needed for Pain for up to 7 days.  Max Daily Amount: 200 mg. 28 Tablet 0    nicotine (NICODERM CQ) 14 mg/24 hr patch APPLY 1 PATCH TOPICALLY EVERY 24 HOURS FOR 30 DAYS 30 Patch 0 busPIRone (BUSPAR) 5 mg tablet Take 1 Tablet by mouth three (3) times daily (with meals). As needed 90 Tablet 1    Insulin Needles, Disposable, 31 gauge x 5/16\" ndle Victoza pen needle use 1 daily 1 Each 11    albuterol (PROVENTIL HFA, VENTOLIN HFA, PROAIR HFA) 90 mcg/actuation inhaler INHALE ONE PUFF BY MOUTH EVERY 6 HOURS AS NEEDED FOR WHEEZING 1 Each 2    naproxen (NAPROSYN) 500 mg tablet Take 1 Tablet by mouth every twelve (12) hours as needed for Pain. 20 Tablet 0    methylPREDNISolone (Medrol, Magdy,) 4 mg tablet Take as directed 1 Dose Pack 0    predniSONE (DELTASONE) 20 mg tablet Take 2 tablets by mouth once daily for 5 days 10 Tablet 0    liraglutide (VICTOZA) 0.6 mg/0.1 mL (18 mg/3 mL) pnij 0.6 mg by SubCUTAneous route daily. 0.6 mg 3    diclofenac (VOLTAREN) 1 % gel APPLY   TOPICALLY TO AFFECTED AREA 4 TIMES DAILY 100 g 5    varenicline (CHANTIX) 1 mg tablet Take 1 tablet by mouth twice daily 60 Tablet 0    cholecalciferol (VITAMIN D3) (1000 Units /25 mcg) tablet Take 1 Tablet by mouth daily. 90 Tablet 3    albuterol-ipratropium (DUO-NEB) 2.5 mg-0.5 mg/3 ml nebu 3 mL by Nebulization route every six (6) hours as needed for Wheezing, Shortness of Breath or Cough. Use with a flare up of COPD. Indications: bronchi muscle spasm resulting from COPD 50 Nebule 5    Nebulizer & Compressor machine 1 Each by Does Not Apply route every four (4) hours as needed for Wheezing or Shortness of Breath. 1 Each 0    calcium combo no.2-vitamin D3 600 mg calcium- 500 unit TbER Take 1 Each by mouth daily. [facility], who was evaluated through a synchronous (real-time) audio-video encounter, and/or her healthcare decision maker, is aware that it is a billable service, which includes applicable co-pays, with coverage as determined by her insurance carrier. She provided verbal consent to proceed and patient identification was verified.  This visit was conducted pursuant to the emergency declaration under the [address] [place] [address], [address] [place] [address] authority and the [employer] Act. A caregiver was present when appropriate. Ability to conduct physical exam was limited. The patient was located at: Home: [place] [phone]  The provider was located at:  Facility ([facility] [address] Paz Smith MD on 11/15/2022 at 3:53 PM      1 mo F/up

## 2022-11-15 NOTE — PROGRESS NOTES
Chief Complaint   Patient presents with    Cold Symptoms     Cough, diarrhea, congestion - negative for Covid       Patient is aware that this is a Virtual Visit or Phone Call Only doctor's visit. Patient has not been out of the country in (14 months), NO diarrhea, NO cough, NO chest conjestion, NO temp. Pt has not been around anyone with these symptoms. Health Maintenance reviewed. I have reviewed the patient's medical history in detail and updated the computerized patient record. Have you been to the ER, urgent care clinic since your last visit? No  Hospitalized since your last visit?  no    2. Have you seen or consulted any other health care providers outside of the [address] [place] [address] since your last visit? No  Include any pap smears or colon screening. Encouraged pt to discuss pt's wishes with spouse/partner/family and bring them in the next appt to follow thru with the Advanced Directive      Fall Risk Assessment, last 12 mths 4/2/2021   Able to walk? Yes   Fall in past 12 months? 0   Do you feel unsteady? 1   Are you worried about falling 0   Number of falls in past 12 months -   Fall with injury?  -       3 most recent PHQ Screens 9/16/2022   Little interest or pleasure in doing things Not at all   Feeling down, depressed, irritable, or hopeless Not at all   Total Score PHQ 2 0   Trouble falling or staying asleep, or sleeping too much -   Feeling tired or having little energy -   Poor appetite, weight loss, or overeating -   Feeling bad about yourself - or that you are a failure or have let yourself or your family down -   Trouble concentrating on things such as school, work, reading, or watching TV -   Moving or speaking so slowly that other people could have noticed; or the opposite being so fidgety that others notice -   Thoughts of being better off dead, or hurting yourself in some way -   How difficult have these problems made it for you to do your work, take care of your home and get along with others -       Abuse Screening Questionnaire 9/14/2021   Do you ever feel afraid of your partner? N   Are you in a relationship with someone who physically or mentally threatens you? N   Is it safe for you to go home?  Y       ADL Assessment 9/14/2021   Feeding yourself No Help Needed   Getting from bed to chair No Help Needed   Getting dressed No Help Needed   Bathing or showering No Help Needed   Walk across the room (includes cane/walker) No Help Needed   Using the telphone No Help Needed   Taking your medications No Help Needed   Preparing meals No Help Needed   Managing money (expenses/bills) No Help Needed   Moderately strenuous housework (laundry) No Help Needed   Shopping for personal items (toiletries/medicines) Help Needed   Shopping for groceries Help Needed   Driving Help Needed   Climbing a flight of stairs No Help Needed   Getting to places beyond walking distances Help Needed

## 2022-11-17 ENCOUNTER — TELEPHONE (OUTPATIENT)
Dept: INTERNAL MEDICINE CLINIC | Age: 62
End: 2022-11-17

## 2022-11-17 NOTE — TELEPHONE ENCOUNTER
----- Message from RITESH PICKETT sent at 11/16/2022  1:34 PM EST -----  Subject: Message to Provider    QUESTIONS  Information for Provider? Patient is having left knee surgery on December 5th with Dr. Sandra Moseley. Patient is wondering if she could do virtual visit   instead of coming in for her Pre-Op appointment. Please advise.  ---------------------------------------------------------------------------  --------------  Vidal Hirsch [facility]  [phone]; OK to leave message on voicemail  ---------------------------------------------------------------------------  --------------  SCRIPT ANSWERS  Relationship to Patient? Self  Do you have questions for your provider that need to be answered prior to   scheduling your pre-op appointment?  Yes

## 2022-11-21 ENCOUNTER — HOSPITAL ENCOUNTER (EMERGENCY)
Age: 62
Discharge: HOME | End: 2022-11-21
Attending: EMERGENCY MEDICINE
Payer: MEDICAID

## 2022-11-21 ENCOUNTER — TELEPHONE (OUTPATIENT)
Dept: ORTHOPEDIC SURGERY | Age: 62
End: 2022-11-21

## 2022-11-21 ENCOUNTER — APPOINTMENT (OUTPATIENT)
Dept: GENERAL RADIOLOGY | Age: 62
End: 2022-11-21
Attending: EMERGENCY MEDICINE
Payer: MEDICAID

## 2022-11-21 VITALS
HEIGHT: 67 IN | HEART RATE: 79 BPM | SYSTOLIC BLOOD PRESSURE: 186 MMHG | WEIGHT: 165 LBS | BODY MASS INDEX: 25.9 KG/M2 | DIASTOLIC BLOOD PRESSURE: 87 MMHG | TEMPERATURE: 98.3 F | RESPIRATION RATE: 18 BRPM | OXYGEN SATURATION: 99 %

## 2022-11-21 DIAGNOSIS — M17.0 PRIMARY OSTEOARTHRITIS OF BOTH KNEES: ICD-10-CM

## 2022-11-21 DIAGNOSIS — M17.0 BILATERAL PRIMARY OSTEOARTHRITIS OF KNEE: ICD-10-CM

## 2022-11-21 DIAGNOSIS — S89.90XA: Primary | ICD-10-CM

## 2022-11-21 PROCEDURE — 99283 EMERGENCY DEPT VISIT LOW MDM: CPT

## 2022-11-21 PROCEDURE — 75810000054 HC ARTHOCENTISIS JOINT

## 2022-11-21 PROCEDURE — 74011000250 HC RX REV CODE- 250: Performed by: EMERGENCY MEDICINE

## 2022-11-21 PROCEDURE — 74011250637 HC RX REV CODE- 250/637: Performed by: EMERGENCY MEDICINE

## 2022-11-21 PROCEDURE — 73562 X-RAY EXAM OF KNEE 3: CPT

## 2022-11-21 RX ORDER — OXYCODONE HYDROCHLORIDE 5 MG/1
5 TABLET ORAL
Status: COMPLETED | OUTPATIENT
Start: 2022-11-21 | End: 2022-11-21

## 2022-11-21 RX ORDER — OXYCODONE HYDROCHLORIDE 5 MG/1
5 TABLET ORAL
Qty: 8 TABLET | Refills: 0 | Status: SHIPPED | OUTPATIENT
Start: 2022-11-21 | End: 2022-11-24

## 2022-11-21 RX ORDER — LIDOCAINE HYDROCHLORIDE 10 MG/ML
10 INJECTION INFILTRATION; PERINEURAL ONCE
Status: COMPLETED | OUTPATIENT
Start: 2022-11-21 | End: 2022-11-21

## 2022-11-21 RX ADMIN — OXYCODONE 5 MG: 5 TABLET ORAL at 18:16

## 2022-11-21 RX ADMIN — LIDOCAINE HYDROCHLORIDE 10 ML: 10 INJECTION, SOLUTION INFILTRATION; PERINEURAL at 18:17

## 2022-11-21 NOTE — TELEPHONE ENCOUNTER
Patient called and wanted the doctor to know that she fell off a barstool and injured her knee and back. She states she will be heading to the ED and will have medical records and x-rays sent over.

## 2022-11-21 NOTE — ED TRIAGE NOTES
Pt arrived by EMS after a fall. Pt fell three days ago and injured her left knee and is having back pain.   Pt is awake alert and oriented X 4, pt educated on ER flow

## 2022-11-22 ENCOUNTER — TELEPHONE (OUTPATIENT)
Dept: ORTHOPEDIC SURGERY | Age: 62
End: 2022-11-22

## 2022-11-22 RX ORDER — DICLOFENAC SODIUM 10 MG/G
2 GEL TOPICAL 4 TIMES DAILY
Qty: 100 G | Refills: 5 | Status: SHIPPED | OUTPATIENT
Start: 2022-11-22

## 2022-11-22 RX ORDER — TRAMADOL HYDROCHLORIDE 50 MG/1
50 TABLET ORAL
Qty: 28 TABLET | Refills: 0 | Status: SHIPPED | OUTPATIENT
Start: 2022-11-22 | End: 2022-11-30

## 2022-11-22 NOTE — TELEPHONE ENCOUNTER
Patient is scheduled to have her knee replaced in the near future however she recently fell off of a barstool and had to been seen at the ED. She had fluid drawn off of her knee that is to be replaced. She would like to know if getting fluid drawn off the knee or falling onto that knee will have any impact on her upcoming surgery. The patient can be reached at [phone].

## 2022-11-22 NOTE — TELEPHONE ENCOUNTER
Patient has been made of the below information and understands she has yet to be scheduled for surgery and to be on the lookout for a phone call from Valdo Barnett to schedule said surgery.

## 2022-11-22 NOTE — ED PROVIDER NOTES
EMERGENCY DEPARTMENT HISTORY AND PHYSICAL EXAM          Date: 2022  Patient Name: Dana Singletary    History of Presenting Illness     Chief Complaint   Patient presents with    Fall     3 days ago       History Provided By: Patient    HPI: Dana Singletary is a 62 y.o. female, pmhx listed below, who presents to the ED c/o knee pain. Pt reports she has a history of knee arthritis and is supposed to get a knee replacement. Had a fall yesterday and now has knee swelling and pain. Unable to bear weight due to pain. Took a tramadol with no relief, now has no tramadol. Denies fever. Reports some low back pain with fall also, states pain is mild. PCP: Paradise Moyer MD    There are no other complaints, changes, or physical findings at this time.          Past History       Past Medical History:  Past Medical History:   Diagnosis Date    Arthritis     Asthma     COPD (chronic obstructive pulmonary disease) ([facility] [address]     Depression     Hemorrhoids     Occult blood positive stool     Spinal stenosis        Past Surgical History:  Past Surgical History:   Procedure Laterality Date    COLONOSCOPY N/A 2019    COLONOSCOPY performed by Kelby Salazar MD at [facility] MAIN OR    HX [address]  2016    laminectomy    HX CERVICAL FUSION      HX COLONOSCOPY  2019    4 polyps- 2 tubular adenoma 2 hyperplastic polyp    HX HYSTERECTOMY      OK ABDOMEN SURGERY PROC UNLISTED      hystrectomy       Family History:  Family History   Problem Relation Age of Onset    Cancer Mother         colon    Cancer Father 79        lung    Diabetes Sister     Cancer Sister     Breast Cancer Sister         age 39    Cancer Brother 27        lymphoma       Social History:  Social History     Tobacco Use    Smoking status: Every Day     Packs/day: 0.50     Years: 45.00     Pack years: 22.50     Types: Cigarettes     Start date: 1975     Last attempt to quit: 2022     Years since quittin.8    Smokeless tobacco: Never   Substance Use Topics    Alcohol use: Yes     Alcohol/week: 19.0 standard drinks     Types: 14 Glasses of wine, 5 Cans of beer per week    Drug use: Not Currently     Types: Cocaine       Current Outpatient Medications   Medication Sig Dispense Refill    traMADoL (ULTRAM) 50 mg tablet Take 1 Tablet by mouth every six (6) hours as needed for Pain for up to 7 days. Max Daily Amount: 200 mg. 28 Tablet 0    diclofenac (VOLTAREN) 1 % gel Apply 2 g to affected area four (4) times daily. 100 g 5    oxyCODONE IR (Roxicodone) 5 mg immediate release tablet Take 1 Tablet by mouth every six (6) hours as needed for Pain for up to 3 days. Max Daily Amount: 20 mg. 8 Tablet 0    azithromycin (ZITHROMAX) 250 mg tablet Take 1 Tablet by mouth See Admin Instructions. Take two tablets today then one tablet daily for next 4 days 6 Tablet 0    gabapentin (NEURONTIN) 600 mg tablet Take 1 Tablet by mouth three (3) times daily. 90 Tablet 1    DULoxetine (CYMBALTA) 60 mg capsule Take 1 capsule by mouth once daily 30 Capsule 5    umeclidinium-vilanteroL (ANORO ELLIPTA) 62.5-25 mcg/actuation inhaler Take 1 Puff by inhalation daily. 1 Each 5    glimepiride (AMARYL) 2 mg tablet Take 1 Tablet by mouth Daily (before breakfast). 30 Tablet 5    nicotine (NICODERM CQ) 14 mg/24 hr patch APPLY 1 PATCH TOPICALLY EVERY 24 HOURS FOR 30 DAYS 30 Patch 0    busPIRone (BUSPAR) 5 mg tablet Take 1 Tablet by mouth three (3) times daily (with meals). As needed 90 Tablet 1    Insulin Needles, Disposable, 31 gauge x 5/16\" ndle Victoza pen needle use 1 daily 1 Each 11    albuterol (PROVENTIL HFA, VENTOLIN HFA, PROAIR HFA) 90 mcg/actuation inhaler INHALE ONE PUFF BY MOUTH EVERY 6 HOURS AS NEEDED FOR WHEEZING 1 Each 2    naproxen (NAPROSYN) 500 mg tablet Take 1 Tablet by mouth every twelve (12) hours as needed for Pain.  20 Tablet 0    methylPREDNISolone (Medrol, Magdy,) 4 mg tablet Take as directed 1 Dose Pack 0    predniSONE (DELTASONE) 20 mg tablet Take 2 tablets by mouth once daily for 5 days 10 Tablet 0    liraglutide (VICTOZA) 0.6 mg/0.1 mL (18 mg/3 mL) pnij 0.6 mg by SubCUTAneous route daily. 0.6 mg 3    varenicline (CHANTIX) 1 mg tablet Take 1 tablet by mouth twice daily 60 Tablet 0    cholecalciferol (VITAMIN D3) (1000 Units /25 mcg) tablet Take 1 Tablet by mouth daily. 90 Tablet 3    albuterol-ipratropium (DUO-NEB) 2.5 mg-0.5 mg/3 ml nebu 3 mL by Nebulization route every six (6) hours as needed for Wheezing, Shortness of Breath or Cough. Use with a flare up of COPD. Indications: bronchi muscle spasm resulting from COPD 50 Nebule 5    Nebulizer & Compressor machine 1 Each by Does Not Apply route every four (4) hours as needed for Wheezing or Shortness of Breath. 1 Each 0    calcium combo no.2-vitamin D3 600 mg calcium- 500 unit TbER Take 1 Each by mouth daily. 90 Tab 1       Allergies: Allergies   Allergen Reactions    Zolpidem Unknown (comments)         Review of Systems   Review of Systems   Constitutional:  Negative for chills and fever. HENT:  Negative for congestion. Eyes:  Negative for pain. Respiratory:  Negative for shortness of breath. Cardiovascular:  Negative for chest pain. Gastrointestinal:  Negative for abdominal pain. Genitourinary:  Negative for flank pain. Musculoskeletal:  Positive for back pain. Neurological:  Negative for headaches. Psychiatric/Behavioral:  Negative for agitation. Physical Exam     Vital Signs-Reviewed the patient's vital signs. No data found. Physical Exam  Constitutional:       Appearance: Normal appearance. HENT:      Head: Normocephalic and atraumatic. Mouth/Throat:      Mouth: Mucous membranes are moist.   Cardiovascular:      Rate and Rhythm: Normal rate. Pulmonary:      Effort: Pulmonary effort is normal.   Abdominal:      Tenderness: There is no abdominal tenderness. Musculoskeletal:      Comments: No midline spinal tenderness.   L knee effusion with no warmth or induration Skin:     General: Skin is warm. Neurological:      Mental Status: She is alert and oriented to person, place, and time. Sensory: No sensory deficit. Motor: No weakness. Diagnostic Study Results     Labs -   No results found for this or any previous visit (from the past 12 hour(s)). Radiologic Studies -   XR KNEE LT 3 V   Final Result   No acute abnormality. CT Results  (Last 48 hours)      None          CXR Results  (Last 48 hours)      None            Medical Decision Making   I am the first provider for this patient. I reviewed the vital signs, available nursing notes, past medical history, past surgical history, family history and social history. Records Reviewed: Nursing Notes and Old Medical Records    Provider Notes (Medical Decision Making):   MDM: 62 y.o. F with traumatic effusion after fall. We will plan for x-ray for possible fracture. Patient may benefit from drainage of effusion for pain relief. Initial assessment performed. The patients presenting problems have been discussed, and they are in agreement with the care plan formulated and outlined with them. I have encouraged them to ask questions as they arise throughout their visit. PROGRESS NOTE:  No acute fracture on x-ray. Successful arthrocentesis of approximately 30 cc of fluid. Will give short course of pain medication and recommend follow-up with orthopedics. Discharge note:  Pt re-evaluated and noted to be feeling better, ready for discharge. Updated pt on all final results. Will follow up as instructed. All questions have been answered, pt voiced understanding and agreement with plan. Specific return precautions provided as well as instructions to return to the ED should sx worsen at any time. Vital signs stable for discharge.        Arthrocentesis    Date/Time: 11/22/2022 7:39 AM  Performed by: Danielle Toribio MD  Authorized by: Danielle Toribio MD     Consent:     Consent obtained: Verbal    Consent given by:  Patient    Risks discussed:  Bleeding, infection and incomplete drainage    Alternatives discussed:  No treatment  Universal protocol:     Patient identity confirmed:  Verbally with patient  Location:     Location:  Knee    Knee:  L knee  Anesthesia:     Anesthesia method:  Local infiltration    Local anesthetic:  Lidocaine 1% w/o epi  Procedure details:     Needle gauge:  18 G    Ultrasound guidance: yes      Approach:  Medial    Aspirate characteristics:  Blood-tinged and serous    Steroid injected: no      Specimen collected: no    Post-procedure details:     Dressing:  Adhesive bandage    Procedure completion:  Tolerated      Diagnosis     Clinical Impression:   1. Traumatic injury of knee without tenderness or effusion            Disposition:  Discharged    Discharge Medication List as of 11/21/2022  6:57 PM        START taking these medications    Details   oxyCODONE IR (Roxicodone) 5 mg immediate release tablet Take 1 Tablet by mouth every six (6) hours as needed for Pain for up to 3 days. Max Daily Amount: 20 mg., Normal, Disp-8 Tablet, R-0           CONTINUE these medications which have NOT CHANGED    Details   azithromycin (ZITHROMAX) 250 mg tablet Take 1 Tablet by mouth See Admin Instructions. Take two tablets today then one tablet daily for next 4 days, Normal, Disp-6 Tablet, R-0      gabapentin (NEURONTIN) 600 mg tablet Take 1 Tablet by mouth three (3) times daily. , Normal, Disp-90 Tablet, R-1      DULoxetine (CYMBALTA) 60 mg capsule Take 1 capsule by mouth once daily, Normal, Disp-30 Capsule, R-5      umeclidinium-vilanteroL (ANORO ELLIPTA) 62.5-25 mcg/actuation inhaler Take 1 Puff by inhalation daily. , Normal, Disp-1 Each, R-5      glimepiride (AMARYL) 2 mg tablet Take 1 Tablet by mouth Daily (before breakfast). , Normal, Disp-30 Tablet, R-5      traMADoL (ULTRAM) 50 mg tablet Take 1 Tablet by mouth every six (6) hours as needed for Pain for up to 7 days.  Max Daily Amount: 200 mg., Normal, Disp-28 Tablet, R-0      nicotine (NICODERM CQ) 14 mg/24 hr patch APPLY 1 PATCH TOPICALLY EVERY 24 HOURS FOR 30 DAYS, Normal, Disp-30 Patch, R-0      busPIRone (BUSPAR) 5 mg tablet Take 1 Tablet by mouth three (3) times daily (with meals). As needed, Normal, Disp-90 Tablet, R-1      Insulin Needles, Disposable, 31 gauge x 5/16\" ndle Victoza pen needle use 1 daily, Normal, Disp-1 Each, R-11      albuterol (PROVENTIL HFA, VENTOLIN HFA, PROAIR HFA) 90 mcg/actuation inhaler INHALE ONE PUFF BY MOUTH EVERY 6 HOURS AS NEEDED FOR WHEEZING, Normal, Disp-1 Each, R-2Please consider 90 day supplies to promote better adherence      naproxen (NAPROSYN) 500 mg tablet Take 1 Tablet by mouth every twelve (12) hours as needed for Pain., Normal, Disp-20 Tablet, R-0      methylPREDNISolone (Medrol, Magdy,) 4 mg tablet Take as directed, Normal, Disp-1 Dose Pack, R-0      predniSONE (DELTASONE) 20 mg tablet Take 2 tablets by mouth once daily for 5 days, Normal, Disp-10 Tablet, R-0      liraglutide (VICTOZA) 0.6 mg/0.1 mL (18 mg/3 mL) pnij 0.6 mg by SubCUTAneous route daily. , Normal, Disp-0.6 mg, R-3      diclofenac (VOLTAREN) 1 % gel APPLY   TOPICALLY TO AFFECTED AREA 4 TIMES DAILY, Normal, Disp-100 g, R-5      varenicline (CHANTIX) 1 mg tablet Take 1 tablet by mouth twice daily, Normal, Disp-60 Tablet, R-0      cholecalciferol (VITAMIN D3) (1000 Units /25 mcg) tablet Take 1 Tablet by mouth daily. , Normal, Disp-90 Tablet, R-3      albuterol-ipratropium (DUO-NEB) 2.5 mg-0.5 mg/3 ml nebu 3 mL by Nebulization route every six (6) hours as needed for Wheezing, Shortness of Breath or Cough. Use with a flare up of COPD. Indications: bronchi muscle spasm resulting from COPD, Normal, Disp-50 Nebule, R-5      Nebulizer & Compressor machine 1 Each by Does Not Apply route every four (4) hours as needed for Wheezing or Shortness of Breath., Normal, Disp-1 Each,R-0Pt needs it.  Has been very ill.      calcium combo no.2-vitamin D3 600 mg calcium- 500 unit TbER Take 1 Each by mouth daily. , Normal, Disp-90 Tab, R-1               Please note, this dictation was completed with [employer], the computer voice recognition software. Quite often unanticipated grammatical, syntax, homophones, and other interpretive errors are inadvertently transcribed by the computer software. Please disregard these errors. Please excuse any errors that have escaped final proof reading.

## 2022-11-23 ENCOUNTER — TELEPHONE (OUTPATIENT)
Dept: INTERNAL MEDICINE CLINIC | Age: 62
End: 2022-11-23

## 2022-11-23 NOTE — TELEPHONE ENCOUNTER
----- Message from Jeninfer Caslte sent at 11/23/2022  1:23 PM EST -----  Subject: Message to Provider    QUESTIONS  Information for Provider? PT would like to have a sugar meter called in,   Please call her to discuss  ---------------------------------------------------------------------------  --------------  [employer] [employer]  [phone]; OK to leave message on voicemail  ---------------------------------------------------------------------------  --------------  SCRIPT ANSWERS  Relationship to Patient?  Self

## 2022-11-30 ENCOUNTER — OFFICE VISIT (OUTPATIENT)
Dept: INTERNAL MEDICINE CLINIC | Age: 62
End: 2022-11-30
Payer: MEDICAID

## 2022-11-30 ENCOUNTER — HOSPITAL ENCOUNTER (OUTPATIENT)
Dept: PREADMISSION TESTING | Age: 62
Discharge: HOME | End: 2022-11-30
Attending: ORTHOPAEDIC SURGERY
Payer: MEDICAID

## 2022-11-30 VITALS
DIASTOLIC BLOOD PRESSURE: 69 MMHG | OXYGEN SATURATION: 96 % | TEMPERATURE: 98.3 F | SYSTOLIC BLOOD PRESSURE: 144 MMHG | HEART RATE: 72 BPM | RESPIRATION RATE: 18 BRPM

## 2022-11-30 VITALS
BODY MASS INDEX: 26.06 KG/M2 | SYSTOLIC BLOOD PRESSURE: 139 MMHG | TEMPERATURE: 98.3 F | WEIGHT: 166 LBS | DIASTOLIC BLOOD PRESSURE: 79 MMHG | HEIGHT: 67 IN | OXYGEN SATURATION: 95 % | HEART RATE: 80 BPM | RESPIRATION RATE: 18 BRPM

## 2022-11-30 DIAGNOSIS — E11.65 TYPE 2 DIABETES MELLITUS WITH HYPERGLYCEMIA, WITHOUT LONG-TERM CURRENT USE OF INSULIN (HCC): Primary | ICD-10-CM

## 2022-11-30 DIAGNOSIS — J44.9 CHRONIC OBSTRUCTIVE PULMONARY DISEASE, UNSPECIFIED COPD TYPE (HCC): ICD-10-CM

## 2022-11-30 DIAGNOSIS — M17.12 ARTHRITIS OF KNEE, LEFT: ICD-10-CM

## 2022-11-30 DIAGNOSIS — E11.65 TYPE 2 DIABETES MELLITUS WITH HYPERGLYCEMIA, WITHOUT LONG-TERM CURRENT USE OF INSULIN (HCC): ICD-10-CM

## 2022-11-30 DIAGNOSIS — Z01.818 PREOPERATIVE GENERAL PHYSICAL EXAMINATION: Primary | ICD-10-CM

## 2022-11-30 LAB
ABO + RH BLD: NORMAL
ALBUMIN SERPL-MCNC: 3.4 G/DL (ref 3.5–5)
ALBUMIN/GLOB SERPL: 0.7 {RATIO} (ref 1.1–2.2)
ALP SERPL-CCNC: 134 U/L (ref 45–117)
ALT SERPL-CCNC: 138 U/L (ref 12–78)
AMPHET UR QL SCN: NEGATIVE
ANION GAP SERPL CALC-SCNC: 3 MMOL/L (ref 5–15)
APPEARANCE UR: CLEAR
AST SERPL-CCNC: 106 U/L (ref 15–37)
BACTERIA URNS QL MICRO: NEGATIVE /HPF
BARBITURATES UR QL SCN: NEGATIVE
BENZODIAZ UR QL: NEGATIVE
BILIRUB SERPL-MCNC: 0.3 MG/DL (ref 0.2–1)
BILIRUB UR QL: NEGATIVE
BLOOD GROUP ANTIBODIES SERPL: NORMAL
BUN SERPL-MCNC: 12 MG/DL (ref 6–20)
BUN/CREAT SERPL: 17 (ref 12–20)
CALCIUM SERPL-MCNC: 9.3 MG/DL (ref 8.5–10.1)
CANNABINOIDS UR QL SCN: NEGATIVE
CAOX CRY URNS QL MICRO: ABNORMAL
CHLORIDE SERPL-SCNC: 106 MMOL/L (ref 97–108)
CO2 SERPL-SCNC: 30 MMOL/L (ref 21–32)
COCAINE UR QL SCN: POSITIVE
COLOR UR: ABNORMAL
CREAT SERPL-MCNC: 0.72 MG/DL (ref 0.55–1.02)
DRUG SCRN COMMENT,DRGCM: ABNORMAL
EPITH CASTS URNS QL MICRO: ABNORMAL /LPF
ERYTHROCYTE [DISTWIDTH] IN BLOOD BY AUTOMATED COUNT: 13 % (ref 11.5–14.5)
GLOBULIN SER CALC-MCNC: 4.7 G/DL (ref 2–4)
GLUCOSE SERPL-MCNC: 157 MG/DL (ref 65–100)
GLUCOSE UR STRIP.AUTO-MCNC: NEGATIVE MG/DL
HCT VFR BLD AUTO: 44.9 % (ref 35–47)
HGB BLD-MCNC: 14.9 G/DL (ref 11.5–16)
HGB UR QL STRIP: NEGATIVE
INR PPP: 1.2 (ref 0.9–1.1)
KETONES UR QL STRIP.AUTO: NEGATIVE MG/DL
LEUKOCYTE ESTERASE UR QL STRIP.AUTO: NEGATIVE
MCH RBC QN AUTO: 32.9 PG (ref 26–34)
MCHC RBC AUTO-ENTMCNC: 33.2 G/DL (ref 30–36.5)
MCV RBC AUTO: 99.1 FL (ref 80–99)
METHADONE UR QL: NEGATIVE
NITRITE UR QL STRIP.AUTO: NEGATIVE
NRBC # BLD: 0 K/UL (ref 0–0.01)
NRBC BLD-RTO: 0 PER 100 WBC
OPIATES UR QL: NEGATIVE
PCP UR QL: NEGATIVE
PH UR STRIP: 6 [PH] (ref 5–8)
PLATELET # BLD AUTO: 231 K/UL (ref 150–400)
PMV BLD AUTO: 10.1 FL (ref 8.9–12.9)
POTASSIUM SERPL-SCNC: 3.8 MMOL/L (ref 3.5–5.1)
PROT SERPL-MCNC: 8.1 G/DL (ref 6.4–8.2)
PROT UR STRIP-MCNC: NEGATIVE MG/DL
PROTHROMBIN TIME: 12.1 SEC (ref 9–11.1)
RBC # BLD AUTO: 4.53 M/UL (ref 3.8–5.2)
RBC #/AREA URNS HPF: ABNORMAL /HPF (ref 0–5)
SODIUM SERPL-SCNC: 139 MMOL/L (ref 136–145)
SP GR UR REFRACTOMETRY: 1.03 (ref 1–1.03)
SPECIMEN EXP DATE BLD: NORMAL
UA: UC IF INDICATED,UAUC: ABNORMAL
UROBILINOGEN UR QL STRIP.AUTO: 0.2 EU/DL (ref 0.2–1)
WBC # BLD AUTO: 9.2 K/UL (ref 3.6–11)
WBC URNS QL MICRO: ABNORMAL /HPF (ref 0–4)

## 2022-11-30 PROCEDURE — 80053 COMPREHEN METABOLIC PANEL: CPT

## 2022-11-30 PROCEDURE — 97161 PT EVAL LOW COMPLEX 20 MIN: CPT

## 2022-11-30 PROCEDURE — 86900 BLOOD TYPING SEROLOGIC ABO: CPT

## 2022-11-30 PROCEDURE — 85610 PROTHROMBIN TIME: CPT

## 2022-11-30 PROCEDURE — 36415 COLL VENOUS BLD VENIPUNCTURE: CPT

## 2022-11-30 PROCEDURE — 85027 COMPLETE CBC AUTOMATED: CPT

## 2022-11-30 PROCEDURE — 81001 URINALYSIS AUTO W/SCOPE: CPT

## 2022-11-30 PROCEDURE — 97116 GAIT TRAINING THERAPY: CPT

## 2022-11-30 PROCEDURE — 80307 DRUG TEST PRSMV CHEM ANLYZR: CPT

## 2022-11-30 NOTE — PERIOP NOTES
Faxed ADRIANA Stop Bang score of 4 to patients primary provider Dr. Sonia Dow requesting follow up with patient or additional recommendations regarding sleep apnea evaluation; fax confirmation received.

## 2022-11-30 NOTE — PROGRESS NOTES
Preoperative Evaluation    Date of Exam: 2022    Igor Pabon is a 62 y.o. female (:1960) who presents for preoperative evaluation. Left knee replacement on Mon. Sean    Reports taking blood pressure medications without side affects. No complaints of exertional chest pain, excessive shortness of breath or focal weakness. Minimal swelling in lower legs or dizziness with standing. Latex Allergy: no    Problem List:     Patient Active Problem List    Diagnosis Date Noted    Hepatitis C virus carrier state ([facility] 2021    Hyperglycemia due to type 2 diabetes mellitus ([facility] [address] 2021    Anxiety state 2020    Chronic obstructive pulmonary disease ([facility] 2020    Hyperlipidemia 2020    Neck pain 2020    Osteoarthritis of knee 2020    Tobacco dependence syndrome 2020    Adenomatous colon polyp 2020    Narcotic dependence ([facility] 2019    Encounter for diagnostic colonoscopy due to change in bowel habits 2019    Elevated liver enzymes 2019    Depression, major, recurrent, mild ([facility] [address] 2017    Restless legs 2017    Spinal stenosis 2017     Medical History:     Past Medical History:   Diagnosis Date    Arthritis     Asthma     COPD (chronic obstructive pulmonary disease) ([facility] [address]     Depression     Hemorrhoids     Occult blood positive stool     Spinal stenosis      Allergies: Allergies   Allergen Reactions    Zolpidem Unknown (comments)      Medications:     Current Outpatient Medications   Medication Sig    diclofenac (VOLTAREN) 1 % gel Apply 2 g to affected area four (4) times daily. gabapentin (NEURONTIN) 600 mg tablet Take 1 Tablet by mouth three (3) times daily. umeclidinium-vilanteroL (ANORO ELLIPTA) 62.5-25 mcg/actuation inhaler Take 1 Puff by inhalation daily. glimepiride (AMARYL) 2 mg tablet Take 1 Tablet by mouth Daily (before breakfast).     nicotine (NICODERM CQ) 14 mg/24 hr patch APPLY 1 PATCH TOPICALLY EVERY 24 HOURS FOR 30 DAYS    busPIRone (BUSPAR) 5 mg tablet Take 1 Tablet by mouth three (3) times daily (with meals). As needed    Insulin Needles, Disposable, 31 gauge x 5/16\" ndle Victoza pen needle use 1 daily    albuterol (PROVENTIL HFA, VENTOLIN HFA, PROAIR HFA) 90 mcg/actuation inhaler INHALE ONE PUFF BY MOUTH EVERY 6 HOURS AS NEEDED FOR WHEEZING    naproxen (NAPROSYN) 500 mg tablet Take 1 Tablet by mouth every twelve (12) hours as needed for Pain.    liraglutide (VICTOZA) 0.6 mg/0.1 mL (18 mg/3 mL) pnij 0.6 mg by SubCUTAneous route daily. varenicline (CHANTIX) 1 mg tablet Take 1 tablet by mouth twice daily    albuterol-ipratropium (DUO-NEB) 2.5 mg-0.5 mg/3 ml nebu 3 mL by Nebulization route every six (6) hours as needed for Wheezing, Shortness of Breath or Cough. Use with a flare up of COPD. Indications: bronchi muscle spasm resulting from COPD    Nebulizer & Compressor machine 1 Each by Does Not Apply route every four (4) hours as needed for Wheezing or Shortness of Breath. calcium combo no.2-vitamin D3 600 mg calcium- 500 unit TbER Take 1 Each by mouth daily. DULoxetine (CYMBALTA) 60 mg capsule Take 1 capsule by mouth once daily    cholecalciferol (VITAMIN D3) (1000 Units /25 mcg) tablet Take 1 Tablet by mouth daily. No current facility-administered medications for this visit.      Surgical History:     Past Surgical History:   Procedure Laterality Date    COLONOSCOPY N/A 5/7/2019    COLONOSCOPY performed by Valentino Aldrich, MD at [facility] 18  09/2016    laminectomy    HX CERVICAL FUSION      HX COLONOSCOPY  05/07/2019    4 polyps- 2 tubular adenoma 2 hyperplastic polyp    HX HYSTERECTOMY      MA ABDOMEN SURGERY PROC UNLISTED      hystrectomy     Social History:     Social History     Socioeconomic History    Marital status: LEGALLY     Number of children: 2    Highest education level: 10th grade   Occupational History    Occupation: disabled   Tobacco Use    Smoking status: Every Day     Packs/day: 0.50     Years: 45.00     Pack years: 22.50     Types: Cigarettes     Start date: 1975     Last attempt to quit: 2022     Years since quittin.8    Smokeless tobacco: Never   Substance and Sexual Activity    Alcohol use: Not Currently     Alcohol/week: 19.0 standard drinks     Types: 14 Glasses of wine, 5 Cans of beer per week    Drug use: Not Currently     Types: Cocaine    Sexual activity: Not Currently     Partners: Male   Other Topics Concern     Service No    Blood Transfusions No    Seat Belt Yes    Self-Exams Yes   Social History Narrative    Lives with friends       Anesthesia Complications: None  History of abnormal bleeding : None  History of Blood Transfusions: no  Health Care Directive or Living Will: no    Objective:     Visit Vitals  /79 (BP 1 Location: Left arm, BP Patient Position: Sitting, BP Cuff Size: Adult)   Pulse 80   Temp 98.3 °F (36.8 °C) (Temporal)   Resp 18   Ht 5' 7\" (1.702 m)   Wt 166 lb (75.3 kg)   LMP  (LMP Unknown)   SpO2 95%   BMI 26.00 kg/m²     WD WN female NAD  Heart RRR without murmers clicks or rubs  Lungs CTA  Abdo soft nontender  Ext no edema        DIAGNOSTICS:   1. EKG: EKG FINDINGS - normal EKG, normal sinus rhythm, nonspecific ST and T waves changes  2. CXR: NI  3.  Labs:   Lab Results   Component Value Date/Time    Hemoglobin A1c 7.6 (H) 2022 12:00 AM    Hemoglobin A1c 7.0 (H) 2021 12:00 AM    Glucose 183 (H) 2022 12:00 AM    Microalb/Creat ratio (ug/mg creat.) 6 2021 12:00 AM    LDL, calculated 118 (H) 2022 12:00 AM    Creatinine 0.58 2022 12:00 AM      Lab Results   Component Value Date/Time    GFR est non-AA 96 2021 12:00 AM    GFR est  2021 12:00 AM    Creatinine 0.58 2022 12:00 AM    BUN 7 (L) 2022 12:00 AM    Sodium 139 2022 12:00 AM    Potassium 4.1 2022 12:00 AM    Chloride 101 2022 12:00 AM    CO2 25 2022 12:00 AM     Lab Results   Component Value Date/Time    Glucose 183 (H) 11/11/2022 12:00 AM        IMPRESSION:   Low risk for planned surgery  No contraindications to planned surgery    Ankur Servin MD   11/30/2022

## 2022-11-30 NOTE — PERIOP NOTES
The [facility] [address] \"Your Path to a More Active Life\" orthopedic total knee or total hip educational video and the [facility] patient handbook provided & reviewed during the patients pre-admission testing (PAT) appointment. An opportunity for questions was provided, patient verbalized understanding.

## 2022-11-30 NOTE — PERIOP NOTES
Hibiclens/Chlorhexidine    Preventing Infections Before and After - Your Surgery    IMPORTANT INSTRUCTIONS    Please read and follow these instructions carefully. If you are unable to comply with the below instructions your procedure will be cancelled. Every Night for Three (3) nights before your surgery:  Shower with an antibacterial soap, such as Dial, or the soap provided at your preassessment appointment. A shower is better than a bath for cleaning your skin. If needed, ask someone to help you reach all areas of your body. Dont forget to clean your belly button with every shower. The night before your surgery: If you lose your Hibiclens/chlorhexidine please contact surgery center or you can purchase it at a local pharmacy  On the night before your surgery, shower with an antibacterial soap, such as Dial, or the soap provided at your preassessment appointment. With one packet of Hibiclens/Chlorhexidine in hand, turn water off. Apply Hibiclens antiseptic skin cleanser with a clean, freshly washed washcloth. Gently apply to your body from chin to toes (except the genital area) and especially the area(s) where your incision(s) will be. Leave Hibiclens/Chlorhexidine on your skin for at least 20 seconds. CAUTION: If needed, Hibiclens/chlorhexidine may be used to clean the folds of skin of the legs (such as in the area of the groin) and on your buttocks and hips. However, do not use Hibiclens/Chlorhexidine above the neck or in the genital area (your bottom) or put inside any area of your body. Turn the water back on and rinse. Dry gently with a clean, freshly washed towel. After your shower, do not use any powder, deodorant, perfumes or lotion. Use clean, freshly washed towels and washcloths every time you shower. Wear clean, freshly washed pajamas to bed the night before surgery. Sleep on clean, freshly washed sheets. Do not allow pets to sleep in your bed with you.         The Morning of your surgery:  Shower again thoroughly with an antibacterial soap, such as Dial or the soap provided at your preassessment appointment. If needed, ask someone for help to reach all areas of your body. Dont forget to clean your belly button! Rinse. Dry gently with a clean, freshly washed towel. After your shower, do not use any powder, deodorant, perfumes or lotion prior to surgery. Put on clean, freshly washed clothing. Tips to help prevent infections after your surgery:  Protect your surgical wound from germs:  Hand washing is the most important thing you and your caregivers can do to prevent infections. Keep your bandage clean and dry! Do not touch your surgical wound. Use clean, freshly washed towels and washcloths every time you shower; do not share bath linens with others. Until your surgical wound is healed, wear clothing and sleep on bed linens each day that are clean and freshly washed. Do not allow pets to sleep in your bed with you or touch your surgical wound. Do not smoke - smoking delays wound healing. This may be a good time to stop smoking. If you have diabetes, it is important for you to manage your blood sugar levels properly before your surgery as well as after your surgery. Poorly managed blood sugar levels slow down wound healing and prevent you from healing completely. If you lose your Hibiclens/chlorhexidine, please call the [facility], or it is available for purchase at your pharmacy.                ___________________      ___________________      ________________  (Signature of Patient)          (Witness)                   (Date and Time)

## 2022-11-30 NOTE — PERIOP NOTES
[facility]  Joint/Spine Preoperative Instructions        Surgery Date Monday, 12/5          Time of Arrival TBD/TBC @ [phone] Naz Casper)    1. On the day of your surgery, please report to the Surgical Services Registration Desk and sign in at your designated time. The Surgery Center is located to the right of the Emergency Room. 2. You must have someone with you to drive you home. You should not drive a car for 24 hours following surgery. Please make arrangements for a friend or family member to stay with you for the first 24 hours after your surgery. 3. No food after midnight Sunday, 12/4. Medications morning of surgery should be taken with a sip of water. 4. We recommend you do not drink any alcoholic beverages for 24 hours before and after your surgery. 5. Contact your surgeons office for instructions on the following medications: non-steroidal anti-inflammatory drugs (i.e. diclofenac, Advil, Aleve), vitamins, and supplements. (Some surgeons will want you to stop these medications prior to surgery and others may allow you to take them)  **If you are currently taking Plavix, Coumadin, Aspirin and/or other blood-thinning agents, contact your surgeon for instructions. ** Your surgeon will partner with the physician prescribing these medications to determine if it is safe to stop or if you need to continue taking. Please do not stop taking these medications without instructions from your surgeon    6. Wear comfortable clothes. Wear glasses instead of contacts. Do not bring any money or jewelry. Please bring picture ID, insurance card, and any prearranged co-payment or hospital payment. Do not wear make-up, particularly mascara the morning of your surgery. Do not wear nail polish, particularly if you are having foot /hand surgery. Wear your hair loose or down, no ponytails, buns, dov pins or clips. All body piercings must be removed.   Please shower with antibacterial soap for three consecutive days before and on the morning of surgery, but do not apply any lotions, powders or deodorants after the shower on the day of surgery. Please use a fresh towels after each shower. Please sleep in clean clothes and change bed linens the night before surgery. Please do not shave for 48 hours prior to surgery. Shaving of the face is acceptable. 7. You should understand that if you do not follow these instructions your surgery may be cancelled. If your physical condition changes (I.e. fever, cold or flu) please contact your surgeon as soon as possible. 8. It is important that you be on time. If a situation occurs where you may be late, please call [phone] (OR Holding Area). 9. If you have any questions and or problems, please call [phone] (Pre-admission Testing). 10. Your surgery time may be subject to change. You will receive a phone call the evening prior with your time of arrival.    11.  If having outpatient surgery, you must have someone to drive you here, stay with you during the duration of your stay, and to drive you home at time of discharge. 12. The following link is for the educational video for patients and/or families. http://briones-st.org/. [email or web address]-wjfhdxq-[email or web address]-materials    Special Instructions: bring inhaler day of surgery      TAKE ALL MEDICATIONS THE DAY OF SURGERY EXCEPT: Victoza, glimeperide      I understand a pre-operative phone call will be made to verify my surgery time. In the event that I am not available, I give permission for a message to be left on my answering service and/or with another person?   yes         ___________________      __________   _________    (Signature of Patient)             (Witness)                (Date and Time)

## 2022-11-30 NOTE — ADVANCED PRACTICE NURSE
PAT Nurse Practitioner   Pre-Operative Chart Review/Assessment:-ORTHOPEDIC/NEUROSURGICAL SPINE                Patient Name:  Clara Hernandez                                                           Age:   62 y.o.    :  1960     Today's Date:  2022     Date of PAT:   22      Date of Surgery:    2022      Procedure(s):  Left  Total Knee Arthroplasty     Surgeon:   Michela Chavarria     Medical Clearance:  Dr. Eddie Lee:      1)  Cardiac Clearance:  Not requested        2)  Program for Diabetes Health Consult:  Ordered-A1C 7.6 on 22. No presurgery drinks provided. 3)  Sleep Apnea evaluation:   AT RISK FOR ADRIANA.  STOP BANG Score 4;  Snoring -admits, Apnea -admits (Results sent to PCP for FU)               4) Treatment for MRSA/Staph Aureus:  Negative       5) Additional Concerns:  T2DM, at risk for ADRIANA, + UDS (cocaine), hx of narcotic dependence, elevated LFTs, HCV, COPD, current smoker, multiple falls, + ETOH abuse                 Vital Signs:         Visit Vitals  BP (!) 144/69 (BP 1 Location: Left upper arm, BP Patient Position: Sitting)   Pulse 72   Temp 98.3 °F (36.8 °C)   Resp 18   LMP  (LMP Unknown)   SpO2 96%                        ____________________________________________  PAST MEDICAL HISTORY  Past Medical History:   Diagnosis Date    Arthritis     Asthma     At risk for sleep apnea     COPD (chronic obstructive pulmonary disease) (HCC)     Depression     Diabetes ([address]     Hemorrhoids     Hepatitis C     Occult blood positive stool     Spinal stenosis       ____________________________________________  PAST SURGICAL HISTORY  Past Surgical History:   Procedure Laterality Date    COLONOSCOPY N/A 2019    COLONOSCOPY performed by Darron Villar MD at [place] [address]  2016    laminectomy    HX CERVICAL FUSION      HX COLONOSCOPY  2019    4 polyps- 2 tubular adenoma 2 hyperplastic polyp    HX HYSTERECTOMY      HX TONSILLECTOMY ____________________________________________  HOME MEDICATIONS    Current Outpatient Medications   Medication Sig    diclofenac (VOLTAREN) 1 % gel Apply 2 g to affected area four (4) times daily. gabapentin (NEURONTIN) 600 mg tablet Take 1 Tablet by mouth three (3) times daily. DULoxetine (CYMBALTA) 60 mg capsule Take 1 capsule by mouth once daily    umeclidinium-vilanteroL (ANORO ELLIPTA) 62.5-25 mcg/actuation inhaler Take 1 Puff by inhalation daily. glimepiride (AMARYL) 2 mg tablet Take 1 Tablet by mouth Daily (before breakfast). nicotine (NICODERM CQ) 14 mg/24 hr patch APPLY 1 PATCH TOPICALLY EVERY 24 HOURS FOR 30 DAYS    Insulin Needles, Disposable, 31 gauge x 5/16\" ndle Victoza pen needle use 1 daily    albuterol (PROVENTIL HFA, VENTOLIN HFA, PROAIR HFA) 90 mcg/actuation inhaler INHALE ONE PUFF BY MOUTH EVERY 6 HOURS AS NEEDED FOR WHEEZING    liraglutide (VICTOZA) 0.6 mg/0.1 mL (18 mg/3 mL) pnij 0.6 mg by SubCUTAneous route daily. varenicline (CHANTIX) 1 mg tablet Take 1 tablet by mouth twice daily    albuterol-ipratropium (DUO-NEB) 2.5 mg-0.5 mg/3 ml nebu 3 mL by Nebulization route every six (6) hours as needed for Wheezing, Shortness of Breath or Cough. Use with a flare up of COPD. Indications: bronchi muscle spasm resulting from COPD    Nebulizer & Compressor machine 1 Each by Does Not Apply route every four (4) hours as needed for Wheezing or Shortness of Breath. No current facility-administered medications for this encounter.      ____________________________________________  ALLERGIES  Allergies   Allergen Reactions    Oxycodone Hives and Itching    Zolpidem Itching      ____________________________________________  SOCIAL HISTORY  Social History     Tobacco Use    Smoking status: Every Day     Packs/day: 0.50     Years: 45.00     Pack years: 22.50     Types: Cigarettes     Start date: 1/1/1975    Smokeless tobacco: Never   Substance Use Topics    Alcohol use:  Yes     Alcohol/week: 32.0 standard drinks     Types: 32 Drinks containing 0.5 oz of alcohol per week      ____________________________________________  COVID VACCINATION STATUS:      Internal Administration   First Dose COVID-19, DESMOND JOHN border, Primary or Immunocompromised, (age 18y+), IM, 100 mcg/0.5mL  03/31/2021   Second Dose COVID-19, DESMOND JOHN border, Primary or Immunocompromised, (age 18y+), IM, 100 mcg/0.5mL  04/28/2021      Last COVID Lab SARS-CoV-2, TRACIE (no units)   Date Value   02/26/2021 Not Detected                      Labs:     Hospital Outpatient Visit on 11/30/2022   Component Date Value Ref Range Status    WBC 11/30/2022 9.2  3.6 - 11.0 K/uL Final    RBC 11/30/2022 4.53  3.80 - 5.20 M/uL Final    HGB 11/30/2022 14.9  11.5 - 16.0 g/dL Final    HCT 11/30/2022 44.9  35.0 - 47.0 % Final    MCV 11/30/2022 99.1 (A)  80.0 - 99.0 FL Final    MCH 11/30/2022 32.9  26.0 - 34.0 PG Final    MCHC 11/30/2022 33.2  30.0 - 36.5 g/dL Final    RDW 11/30/2022 13.0  11.5 - 14.5 % Final    PLATELET 82/88/4339 185  150 - 400 K/uL Final    MPV 11/30/2022 10.1  8.9 - 12.9 FL Final    NRBC 11/30/2022 0.0  0  WBC Final    ABSOLUTE NRBC 11/30/2022 0.00  0.00 - 0.01 K/uL Final    INR 11/30/2022 1.2 (A)  0.9 - 1.1   Final    A single therapeutic range for Vit K antagonists may not be optimal for all indications - see June, 2008 issue of Chest, American College of Chest Physicians Evidence-Based Clinical Practice Guidelines, 8th Edition.     Prothrombin time 11/30/2022 12.1 (A)  9.0 - 11.1 sec Final    Instrument and technical errors have been ruled out    Color 11/30/2022 YELLOW/STRAW    Final    Color Reference Range: Straw, Yellow or Dark Yellow    Appearance 11/30/2022 CLEAR  CLEAR   Final    Specific gravity 11/30/2022 1.030  1.003 - 1.030   Final    pH (UA) 11/30/2022 6.0  5.0 - 8.0   Final    Protein 11/30/2022 Negative  NEG mg/dL Final    Glucose 11/30/2022 Negative  NEG mg/dL Final    Ketone 11/30/2022 Negative  NEG mg/dL Final    Bilirubin 11/30/2022 Negative  NEG   Final    Blood 11/30/2022 Negative  NEG   Final    Urobilinogen 11/30/2022 0.2  0.2 - 1.0 EU/dL Final    Nitrites 11/30/2022 Negative  NEG   Final    Leukocyte Esterase 11/30/2022 Negative  NEG   Final    WBC 11/30/2022 0-4  0 - 4 /hpf Final    RBC 11/30/2022 0-5  0 - 5 /hpf Final    Epithelial cells 11/30/2022 FEW  FEW /lpf Final    Epithelial cell category consists of squamous cells and /or transitional urothelial cells. Renal tubular cells, if present, are separately identified as such. Bacteria 11/30/2022 Negative  NEG /hpf Final    UA:UC IF INDICATED 11/30/2022 CULTURE NOT INDICATED BY UA RESULT  CNI   Final    CA Oxalate crystals 11/30/2022 1+ (A)  NEG Final    Special Requests: 11/30/2022 NO SPECIAL REQUESTS    Final    Culture result: 11/30/2022 MRSA NOT PRESENT    Final    Culture result: 11/30/2022     Final                    Value:Screening of patient nares for MRSA is for surveillance purposes and, if positive, to facilitate isolation considerations in high risk settings. It is not intended for automatic decolonization interventions per se as regimens are not sufficiently effective to warrant routine use. Sodium 11/30/2022 139  136 - 145 mmol/L Final    Potassium 11/30/2022 3.8  3.5 - 5.1 mmol/L Final    Chloride 11/30/2022 106  97 - 108 mmol/L Final    CO2 11/30/2022 30  21 - 32 mmol/L Final    Anion gap 11/30/2022 3 (A)  5 - 15 mmol/L Final    Glucose 11/30/2022 157 (A)  65 - 100 mg/dL Final    BUN 11/30/2022 12  6 - 20 MG/DL Final    Creatinine 11/30/2022 0.72  0.55 - 1.02 MG/DL Final    BUN/Creatinine ratio 11/30/2022 17  12 - 20   Final    eGFR 11/30/2022 >60  >60 ml/min/1.73m2 Final    Comment:      Pediatric calculator link: Gracie.[email or web address] org/professionals/kdoqi/gfr_calculatorped       Effective Oct 3, 2022       These results are not intended for use in patients <25years of age.        eGFR results are calculated without a race factor using  the 2021 CKD-EPI equation. Careful clinical correlation is recommended, particularly when comparing to results calculated using previous equations. The CKD-EPI equation is less accurate in patients with extremes of muscle mass, extra-renal metabolism of creatinine, excessive creatine ingestion, or following therapy that affects renal tubular secretion. Calcium 11/30/2022 9.3  8.5 - 10.1 MG/DL Final    Bilirubin, total 11/30/2022 0.3  0.2 - 1.0 MG/DL Final    ALT (SGPT) 11/30/2022 138 (A)  12 - 78 U/L Final    AST (SGOT) 11/30/2022 106 (A)  15 - 37 U/L Final    Alk. phosphatase 11/30/2022 134 (A)  45 - 117 U/L Final    Protein, total 11/30/2022 8.1  6.4 - 8.2 g/dL Final    Albumin 11/30/2022 3.4 (A)  3.5 - 5.0 g/dL Final    Globulin 11/30/2022 4.7 (A)  2.0 - 4.0 g/dL Final    A-G Ratio 11/30/2022 0.7 (A)  1.1 - 2.2   Final    Crossmatch Expiration 11/30/2022 12/08/2022,2359   Final    ABO/Rh(D) 11/30/2022 O NEGATIVE   Final    Antibody screen 11/30/2022 NEG   Final    AMPHETAMINES 11/30/2022 Negative  NEG   Final    BARBITURATES 11/30/2022 Negative  NEG   Final    BENZODIAZEPINES 11/30/2022 Negative  NEG   Final    COCAINE 11/30/2022 Positive (A)  NEG   Final    METHADONE 11/30/2022 Negative  NEG   Final    OPIATES 11/30/2022 Negative  NEG   Final    PCP(PHENCYCLIDINE) 11/30/2022 Negative  NEG   Final    THC (TH-CANNABINOL) 11/30/2022 Negative  NEG   Final    Drug screen comment 11/30/2022 (NOTE)   Final    Comment: This test is a screen for drugs of abuse in a medical setting only   (i.e., they are unconfirmed results and as such must not be used for   non-medical purposes e.g., employment testing, legal testing). Due to   its inherent nature, false positive (FP) and false negative (FN)   results may be obtained. Therefore, if necessary for medical care,   recommend confirmation of positive findings by GC/MS.  The cutoff   values (i.e., the level at which this screening test becomes positive   for a given drug group) are:    Amphetamine/Methamphetamine: 300 ng/mL  Barbiturates:                200 ng/mL  Benzodiazepines:             200 ng/mL  Cocaine:                     150 ng/mL  Methadone:                   300 ng/mL  Opiates:                     300 ng/mL   Phencyclidine, PCP:           25 ng/mL  Marijuana, THC:               50 ng/mL    This screening test can identify the presence of the following drugs   when above the cutoff value; see list posted on the intranet. It can   be viewed by viviana                           ecting in sequence the following from the [address] home   page: [place]; [address] [place] , Resources; [facility], Physician Resources Q to Z; \"UDS (Urine Drug Screen   Automated) List of Detectable Drugs. \"     Or use web address:   [email or web address]/[email or web address]/  UDS%20List%20of%20Detectable%20Drugs. pdf          XR Results (most recent):    Results from Hospital Encounter encounter on 11/21/22    XR KNEE LT 3 V    Narrative  EXAM: XR KNEE LT 3 V    INDICATION: knee injury. COMPARISON: September 10. FINDINGS: Three views of the left knee demonstrate no fracture or other acute  osseous or articular abnormality. There is severe tricompartmental DJD. There is  a joint effusion. The bones are osteopenic. Impression  No acute abnormality. Skin:   Denies open wounds, cuts, sores, rashes or other areas of concern in PAT assessment. Gisela Merchant NP     11/30/22 EKG from 11/30/22 reviewed with Dr. Kailey Day, anesthesiologist. No prior EKG for comparison. Planned procedure, PMHx, functional status reviewed. Pt ok to proceed with planned procedure per Dr. Kailey Day. EKG reviewed w/ Dr. Humza Hazel, anesthesiologist, after discovery of + UDS for cocaine. Pt does not need cardiac clearance prior to DOS but UDS will be repeated on DOS as stated by Dr. Elsa Cranker and pt will be canceled if + on DOS.      11/30/22 1450 UDS + for cocaine. Per surgeon, pt was advised that UDS would be repeated on DOS and if + surgery would be canceled and would not be rescheduled. Pt states she was \"not aware that the cocaine was in the joint I smoked the other day until just now\" and would refrain from using again. States she \"doesn't do cocaine and that it won't be a problem. \"  Pt advised that surgeon would be requiring adhering to a pain contract as well. Pt states \"it won't be a problem. \"

## 2022-11-30 NOTE — PERIOP NOTES

## 2022-11-30 NOTE — PROGRESS NOTES
[facility]  Physical Therapy Pre-surgery evaluation  [address] [facility], [address] [facility]    PHYSICAL THERAPY PRE TKR SURGERY EVALUATION  Patient: Rhonda Sánchez (77 y.o. female)  Date: 11/30/2022  Primary Diagnosis: pat  Procedure(s) (LRB):  LEFT TOTAL KNEE ARTHROPLASTY (SPINAL W/ADDUCTOR CANAL BLOCK) (Left)     Precautions:  Fall    ASSESSMENT :  Based on the objective data described below, the patient presents with impaired gait, balance, pain, and overall high level functional mobility due to end stage degenerative joint disease in the left knee. Discussed anticipated disposition to home with possible discharge within a 1 to 2 day time frame post-surgery. Patient and  in agreement, patients daughter will be able to provide assist as needed after discharge. GOALS: (Goals have been discussed and agreed upon with patient.)  DISCHARGE GOALS: Time Frame: 1 DAY  Patient will demonstrate increased strength, range of motion, and pain control via a home exercise program in order to minimize functional deficits in preparation for their upcoming surgery. This will be achieved by using education, demonstration and through the use of an informational handout including a home exercise program.  REHABILITATION POTENTIAL FOR STATED GOALS: Fair     RECOMMENDATIONS AND PLANNED INTERVENTIONS: (Benefits and precautions of physical therapy have been discussed with the patient.)  Home Exercise Program  TREATMENT PLAN EFFECTIVE DATES: 11/30/2022 TO 11/30/2022  FREQUENCY/DURATION: Patient to continue to perform home exercise program at least twice daily until her surgery. SUBJECTIVE:   Patient stated I fall all the time, last one was about 2 weeks ago because my legs just gave way.     OBJECTIVE DATA SUMMARY:   HISTORY:    Past Medical History:   Diagnosis Date    Arthritis     Asthma     COPD (chronic obstructive pulmonary disease) ([facility] [address]     Depression     Hemorrhoids     Occult blood positive stool     Spinal stenosis      Past Surgical History:   Procedure Laterality Date    COLONOSCOPY N/A 5/7/2019    COLONOSCOPY performed by Yeni Perez MD at [facility]  09/2016    laminectomy    HX CERVICAL FUSION      HX COLONOSCOPY  05/07/2019    4 polyps- 2 tubular adenoma 2 hyperplastic polyp    HX HYSTERECTOMY      ND ABDOMEN SURGERY PROC UNLISTED      hystrectomy     Prior Level of Function/Home Situation: Ambulates with SPC occasionally depending on the day as patient says \"I have good days\". Daughter has to assist patient getting in/out (especially over the past 2 weeks), patient able to complete all other ADLs indep in seated position. Experiences a lot of L knee pain with majority if activity. Over 10 falls this year, patient states \"I fall all the time\"; fell about 2 weeks because L knee gave away. No longer driving. Personal factors and/or comorbidities impacting plan of care: Chronic back pain, bilat knee pain, COPD    Home Situation  Home Environment: Trailer/mobile home  # Steps to Enter: 5  Rails to Enter: Yes  Hand Rails : Bilateral  One/Two Story Residence: One story  Living Alone: No  Support Systems: Child(marcus)  Current DME Used/Available at Home: Walker, rolling, Commode, bedside, Cane, straight  Tub or Shower Type: Tub/Shower combination    EXAMINATION/PRESENTATION/DECISION MAKING:     ADLs (Current Functional Status):    Bathing/Showering:   [] Independent  [x] Requires Assistance from Someone  [] Sponge Bath Only   Ambulation:  [] Independent  [x] Walk Indoors Only  [] Walk Outdoors  [x] Use Assistive Device  [] Use Wheelchair Only     Dressing:  [x] Independent    Requires Assistance from Someone for:  [] Sock/Shoes  [] Pants  [] Everything   Household Activities:  [] Routine house and yard work  [x] Light Housework Only  [] None     Strength:    Strength: Generally decreased, functional (Bilat LE 4-/5)  Tone & Sensation:   Tone: Normal  Sensation: Intact  Range Of Motion:  AROM: Generally decreased, functional    LLE AROM  L Knee Flexion: 100  L Knee Extension: -15  Coordination:  Coordination: Within functional limits    Functional Mobility:  Transfers:  Sit to Stand: Modified independent  Stand to Sit: Modified independent  Balance:   Sitting: Intact  Standing: Impaired  Standing - Static: Good, Constant support  Standing - Dynamic : Fair, Constant support  Ambulation/Gait Training:  Distance (ft): 20 Feet (ft)  Assistive Device: Cane, straight  Ambulation - Level of Assistance: Supervision  Gait Abnormalities: Decreased step clearance, Antalgic, Other (Laurel gait due to excessive bilat knee flexion)  Base of Support: Widened  Speed/Lolly: Slow     Therapeutic Exercises:   The patient was educated in, has demonstrated, and has received written instructions to complete for their home exercise program per total knee replacement protocol. Functional Measure:  Timed up and go: (with SPC)    Timed Get Up And Go Test: 22.69     < than 10 seconds=Normal  Greater then 13.5 seconds (in elderly)=Increased fall risk   Galo JAIMES, Emperatriz Fermin. Predicting the probability for falls in community dwelling older adults using the Timed Up and Go Test. Phys Ther. 2000;80:896-903. Pain:  Pain Scale 1: Numeric (0 - 10)  Pain Intensity 1: 8  Pain Location 1: Knee  Pain Orientation 1: Left  Pain Description 1: Aching     Activity Tolerance:   Fair, limited by severe L knee pain   Patient []   does  [x]   does not demonstrate signs/symptoms of shortness of breath/dyspnea on exertion/respiratory distress.   COMMUNICATION/EDUCATION:   The patient was educated on:  [x]         Importance of post-operative mobility to achieve their desired outcomes and restore biological function  [x]         The key post-operative time frame to address ROM to prevent additional complications    The patients plan of care was discussed with:   [x]         The patient verbalized understanding of her plan in preparation for their upcoming surgery  [x]         The patient's  was present for this session  []         The patient reports that he/she does not have a  identified at this time  [x]         The  verbalized understanding of the education regarding the patient's upcoming surgery  [x]         Patient/family agree to work toward stated goals and plan of care. []         Patient understands intent and goals of therapy, but is neutral about his/her participation. []         Patient is unable to participate in goal setting and plan of care.     Thank you for this referral.  Paulina Barrett, PT   Time Calculation: 23 mins

## 2022-12-01 LAB
BACTERIA SPEC CULT: NORMAL
BACTERIA SPEC CULT: NORMAL
SERVICE CMNT-IMP: NORMAL

## 2022-12-02 ENCOUNTER — DOCUMENTATION ONLY (OUTPATIENT)
Dept: INTERNAL MEDICINE CLINIC | Age: 62
End: 2022-12-02

## 2022-12-05 ENCOUNTER — HOSPITAL ENCOUNTER (OUTPATIENT)
Age: 62
Discharge: HOME | End: 2022-12-05
Attending: ORTHOPAEDIC SURGERY | Admitting: ORTHOPAEDIC SURGERY
Payer: MEDICAID

## 2022-12-05 ENCOUNTER — ANESTHESIA EVENT (OUTPATIENT)
Dept: SURGERY | Age: 62
End: 2022-12-05
Payer: MEDICAID

## 2022-12-05 ENCOUNTER — ANESTHESIA (OUTPATIENT)
Dept: SURGERY | Age: 62
End: 2022-12-05
Payer: MEDICAID

## 2022-12-05 ENCOUNTER — APPOINTMENT (OUTPATIENT)
Dept: GENERAL RADIOLOGY | Age: 62
End: 2022-12-05
Attending: ORTHOPAEDIC SURGERY
Payer: MEDICAID

## 2022-12-05 VITALS
TEMPERATURE: 98 F | BODY MASS INDEX: 26.37 KG/M2 | WEIGHT: 167.99 LBS | RESPIRATION RATE: 16 BRPM | HEART RATE: 82 BPM | DIASTOLIC BLOOD PRESSURE: 84 MMHG | SYSTOLIC BLOOD PRESSURE: 117 MMHG | HEIGHT: 67 IN | OXYGEN SATURATION: 98 %

## 2022-12-05 DIAGNOSIS — Z96.652 S/P TOTAL KNEE ARTHROPLASTY, LEFT: Primary | ICD-10-CM

## 2022-12-05 PROBLEM — M17.12 UNILATERAL PRIMARY OSTEOARTHRITIS, LEFT KNEE: Status: ACTIVE | Noted: 2022-12-05

## 2022-12-05 LAB
AMPHET UR QL SCN: NEGATIVE
BARBITURATES UR QL SCN: NEGATIVE
BENZODIAZ UR QL: NEGATIVE
CANNABINOIDS UR QL SCN: NEGATIVE
COCAINE UR QL SCN: NEGATIVE
DRUG SCRN COMMENT,DRGCM: NORMAL
GLUCOSE BLD STRIP.AUTO-MCNC: 118 MG/DL (ref 65–117)
METHADONE UR QL: NEGATIVE
OPIATES UR QL: NEGATIVE
PCP UR QL: NEGATIVE
SERVICE CMNT-IMP: ABNORMAL

## 2022-12-05 PROCEDURE — 97161 PT EVAL LOW COMPLEX 20 MIN: CPT

## 2022-12-05 PROCEDURE — 77030013837 HC NERV BLK KT BBMI -B: Performed by: ANESTHESIOLOGY

## 2022-12-05 PROCEDURE — 76010000162 HC OR TIME 1.5 TO 2 HR INTENSV-TIER 1: Performed by: ORTHOPAEDIC SURGERY

## 2022-12-05 PROCEDURE — 76210000016 HC OR PH I REC 1 TO 1.5 HR: Performed by: ORTHOPAEDIC SURGERY

## 2022-12-05 PROCEDURE — 77030018673: Performed by: ORTHOPAEDIC SURGERY

## 2022-12-05 PROCEDURE — 74011000250 HC RX REV CODE- 250: Performed by: NURSE ANESTHETIST, CERTIFIED REGISTERED

## 2022-12-05 PROCEDURE — 77030010507 HC ADH SKN DERMBND J&J -B: Performed by: ORTHOPAEDIC SURGERY

## 2022-12-05 PROCEDURE — 77030007866 HC KT SPN ANES BBMI -B: Performed by: ANESTHESIOLOGY

## 2022-12-05 PROCEDURE — 74011250636 HC RX REV CODE- 250/636: Performed by: STUDENT IN AN ORGANIZED HEALTH CARE EDUCATION/TRAINING PROGRAM

## 2022-12-05 PROCEDURE — 64450 NJX AA&/STRD OTHER PN/BRANCH: CPT | Performed by: ANESTHESIOLOGY

## 2022-12-05 PROCEDURE — 74011000250 HC RX REV CODE- 250: Performed by: ORTHOPAEDIC SURGERY

## 2022-12-05 PROCEDURE — 74011250636 HC RX REV CODE- 250/636: Performed by: ANESTHESIOLOGY

## 2022-12-05 PROCEDURE — 51798 US URINE CAPACITY MEASURE: CPT

## 2022-12-05 PROCEDURE — C1776 JOINT DEVICE (IMPLANTABLE): HCPCS | Performed by: ORTHOPAEDIC SURGERY

## 2022-12-05 PROCEDURE — 77030033067 HC SUT PDO STRATFX SPIR J&J -B: Performed by: ORTHOPAEDIC SURGERY

## 2022-12-05 PROCEDURE — 77030000032 HC CUF TRNQT ZIMM -B: Performed by: ORTHOPAEDIC SURGERY

## 2022-12-05 PROCEDURE — 77030002933 HC SUT MCRYL J&J -A: Performed by: ORTHOPAEDIC SURGERY

## 2022-12-05 PROCEDURE — 74011250636 HC RX REV CODE- 250/636: Performed by: ORTHOPAEDIC SURGERY

## 2022-12-05 PROCEDURE — 77030031139 HC SUT VCRL2 J&J -A: Performed by: ORTHOPAEDIC SURGERY

## 2022-12-05 PROCEDURE — 97530 THERAPEUTIC ACTIVITIES: CPT

## 2022-12-05 PROCEDURE — C1713 ANCHOR/SCREW BN/BN,TIS/BN: HCPCS | Performed by: ORTHOPAEDIC SURGERY

## 2022-12-05 PROCEDURE — 2709999900 HC NON-CHARGEABLE SUPPLY: Performed by: ORTHOPAEDIC SURGERY

## 2022-12-05 PROCEDURE — 74011000258 HC RX REV CODE- 258: Performed by: ORTHOPAEDIC SURGERY

## 2022-12-05 PROCEDURE — 76060000034 HC ANESTHESIA 1.5 TO 2 HR: Performed by: ORTHOPAEDIC SURGERY

## 2022-12-05 PROCEDURE — 77030018723 HC ELCTRD BLD COVD -A: Performed by: ORTHOPAEDIC SURGERY

## 2022-12-05 PROCEDURE — 74011250636 HC RX REV CODE- 250/636: Performed by: NURSE ANESTHETIST, CERTIFIED REGISTERED

## 2022-12-05 PROCEDURE — 77030010785: Performed by: ORTHOPAEDIC SURGERY

## 2022-12-05 PROCEDURE — 97116 GAIT TRAINING THERAPY: CPT

## 2022-12-05 PROCEDURE — 73560 X-RAY EXAM OF KNEE 1 OR 2: CPT

## 2022-12-05 PROCEDURE — 77030006835 HC BLD SAW SAG STRY -B: Performed by: ORTHOPAEDIC SURGERY

## 2022-12-05 PROCEDURE — 74011000258 HC RX REV CODE- 258: Performed by: NURSE ANESTHETIST, CERTIFIED REGISTERED

## 2022-12-05 PROCEDURE — 74011250637 HC RX REV CODE- 250/637: Performed by: ORTHOPAEDIC SURGERY

## 2022-12-05 PROCEDURE — 74011000272 HC RX REV CODE- 272: Performed by: ORTHOPAEDIC SURGERY

## 2022-12-05 PROCEDURE — 77030038692 HC WND DEB SYS IRMX -B: Performed by: ORTHOPAEDIC SURGERY

## 2022-12-05 PROCEDURE — 82962 GLUCOSE BLOOD TEST: CPT

## 2022-12-05 PROCEDURE — 74011000250 HC RX REV CODE- 250: Performed by: STUDENT IN AN ORGANIZED HEALTH CARE EDUCATION/TRAINING PROGRAM

## 2022-12-05 PROCEDURE — 80307 DRUG TEST PRSMV CHEM ANLYZR: CPT

## 2022-12-05 DEVICE — FEMORAL DISTAL FIXATION PEG
Type: IMPLANTABLE DEVICE | Site: KNEE | Status: FUNCTIONAL
Brand: TRIATHLON

## 2022-12-05 DEVICE — TIBIAL BEARING INSERT - PS
Type: IMPLANTABLE DEVICE | Site: KNEE | Status: FUNCTIONAL
Brand: TRIATHLON

## 2022-12-05 DEVICE — POSTERIOR STABILIZED FEMORAL
Type: IMPLANTABLE DEVICE | Site: KNEE | Status: FUNCTIONAL
Brand: TRIATHLON

## 2022-12-05 DEVICE — SYMMETRIC PATELLA
Type: IMPLANTABLE DEVICE | Site: KNEE | Status: FUNCTIONAL
Brand: TRIATHLON

## 2022-12-05 DEVICE — UNIVERSAL TIBIAL BASEPLATE
Type: IMPLANTABLE DEVICE | Site: KNEE | Status: FUNCTIONAL
Brand: TRIATHLON

## 2022-12-05 DEVICE — KNEE K1 TOT HEMI STD CEM -- IMPL CAPPED K1: Type: IMPLANTABLE DEVICE | Status: FUNCTIONAL

## 2022-12-05 DEVICE — CEMENT BNE 20ML 40GM FULL DOSE PMMA W/O ANTIBIO M VISC: Type: IMPLANTABLE DEVICE | Site: KNEE | Status: FUNCTIONAL

## 2022-12-05 RX ORDER — FACIAL-BODY WIPES
10 EACH TOPICAL DAILY PRN
Status: DISCONTINUED | OUTPATIENT
Start: 2022-12-07 | End: 2022-12-05 | Stop reason: HOSPADM

## 2022-12-05 RX ORDER — SODIUM CHLORIDE, SODIUM LACTATE, POTASSIUM CHLORIDE, CALCIUM CHLORIDE 600; 310; 30; 20 MG/100ML; MG/100ML; MG/100ML; MG/100ML
25 INJECTION, SOLUTION INTRAVENOUS CONTINUOUS
Status: DISCONTINUED | OUTPATIENT
Start: 2022-12-05 | End: 2022-12-05 | Stop reason: HOSPADM

## 2022-12-05 RX ORDER — CELECOXIB 200 MG/1
200 CAPSULE ORAL ONCE
Status: COMPLETED | OUTPATIENT
Start: 2022-12-05 | End: 2022-12-05

## 2022-12-05 RX ORDER — SODIUM CHLORIDE 9 MG/ML
125 INJECTION, SOLUTION INTRAVENOUS CONTINUOUS
Status: DISCONTINUED | OUTPATIENT
Start: 2022-12-05 | End: 2022-12-05 | Stop reason: HOSPADM

## 2022-12-05 RX ORDER — IBUPROFEN 200 MG
1 TABLET ORAL DAILY
Status: DISCONTINUED | OUTPATIENT
Start: 2022-12-06 | End: 2022-12-05 | Stop reason: HOSPADM

## 2022-12-05 RX ORDER — NALOXONE HYDROCHLORIDE 0.4 MG/ML
0.4 INJECTION, SOLUTION INTRAMUSCULAR; INTRAVENOUS; SUBCUTANEOUS AS NEEDED
Status: DISCONTINUED | OUTPATIENT
Start: 2022-12-05 | End: 2022-12-05 | Stop reason: HOSPADM

## 2022-12-05 RX ORDER — ASPIRIN 325 MG
325 TABLET, DELAYED RELEASE (ENTERIC COATED) ORAL 2 TIMES DAILY
Status: DISCONTINUED | OUTPATIENT
Start: 2022-12-05 | End: 2022-12-05 | Stop reason: HOSPADM

## 2022-12-05 RX ORDER — ROPIVACAINE HYDROCHLORIDE 5 MG/ML
INJECTION, SOLUTION EPIDURAL; INFILTRATION; PERINEURAL AS NEEDED
Status: DISCONTINUED | OUTPATIENT
Start: 2022-12-05 | End: 2022-12-05 | Stop reason: HOSPADM

## 2022-12-05 RX ORDER — DEXAMETHASONE SODIUM PHOSPHATE 4 MG/ML
10 INJECTION, SOLUTION INTRA-ARTICULAR; INTRALESIONAL; INTRAMUSCULAR; INTRAVENOUS; SOFT TISSUE ONCE
Status: COMPLETED | OUTPATIENT
Start: 2022-12-05 | End: 2022-12-05

## 2022-12-05 RX ORDER — ACETAMINOPHEN 500 MG
1000 TABLET ORAL ONCE
Status: COMPLETED | OUTPATIENT
Start: 2022-12-05 | End: 2022-12-05

## 2022-12-05 RX ORDER — IPRATROPIUM BROMIDE AND ALBUTEROL SULFATE 2.5; .5 MG/3ML; MG/3ML
3 SOLUTION RESPIRATORY (INHALATION)
Status: DISCONTINUED | OUTPATIENT
Start: 2022-12-05 | End: 2022-12-05 | Stop reason: HOSPADM

## 2022-12-05 RX ORDER — HYDROMORPHONE HYDROCHLORIDE 1 MG/ML
0.5 INJECTION, SOLUTION INTRAMUSCULAR; INTRAVENOUS; SUBCUTANEOUS
Status: DISCONTINUED | OUTPATIENT
Start: 2022-12-05 | End: 2022-12-05 | Stop reason: HOSPADM

## 2022-12-05 RX ORDER — ARFORMOTEROL TARTRATE 15 UG/2ML
15 SOLUTION RESPIRATORY (INHALATION)
Status: DISCONTINUED | OUTPATIENT
Start: 2022-12-05 | End: 2022-12-05 | Stop reason: HOSPADM

## 2022-12-05 RX ORDER — SODIUM CHLORIDE 0.9 % (FLUSH) 0.9 %
5-40 SYRINGE (ML) INJECTION EVERY 8 HOURS
Status: DISCONTINUED | OUTPATIENT
Start: 2022-12-05 | End: 2022-12-05 | Stop reason: HOSPADM

## 2022-12-05 RX ORDER — KETOROLAC TROMETHAMINE 30 MG/ML
15 INJECTION, SOLUTION INTRAMUSCULAR; INTRAVENOUS EVERY 6 HOURS
Status: DISCONTINUED | OUTPATIENT
Start: 2022-12-05 | End: 2022-12-05 | Stop reason: HOSPADM

## 2022-12-05 RX ORDER — POLYETHYLENE GLYCOL 3350 17 G/17G
17 POWDER, FOR SOLUTION ORAL DAILY
Status: DISCONTINUED | OUTPATIENT
Start: 2022-12-06 | End: 2022-12-05 | Stop reason: HOSPADM

## 2022-12-05 RX ORDER — POLYETHYLENE GLYCOL 3350 17 G/17G
17 POWDER, FOR SOLUTION ORAL DAILY
Qty: 7 PACKET | Refills: 0 | Status: SHIPPED | OUTPATIENT
Start: 2022-12-06 | End: 2022-12-13

## 2022-12-05 RX ORDER — OXYCODONE HYDROCHLORIDE 5 MG/1
10 TABLET ORAL
Status: DISCONTINUED | OUTPATIENT
Start: 2022-12-05 | End: 2022-12-05 | Stop reason: HOSPADM

## 2022-12-05 RX ORDER — MIDAZOLAM HYDROCHLORIDE 1 MG/ML
INJECTION, SOLUTION INTRAMUSCULAR; INTRAVENOUS
Status: COMPLETED | OUTPATIENT
Start: 2022-12-05 | End: 2022-12-05

## 2022-12-05 RX ORDER — BUPIVACAINE HYDROCHLORIDE 5 MG/ML
INJECTION, SOLUTION EPIDURAL; INTRACAUDAL
Status: COMPLETED | OUTPATIENT
Start: 2022-12-05 | End: 2022-12-05

## 2022-12-05 RX ORDER — AMOXICILLIN 250 MG
1 CAPSULE ORAL 2 TIMES DAILY
Status: DISCONTINUED | OUTPATIENT
Start: 2022-12-05 | End: 2022-12-05 | Stop reason: HOSPADM

## 2022-12-05 RX ORDER — GABAPENTIN 300 MG/1
600 CAPSULE ORAL 3 TIMES DAILY
Status: DISCONTINUED | OUTPATIENT
Start: 2022-12-05 | End: 2022-12-05 | Stop reason: HOSPADM

## 2022-12-05 RX ORDER — IPRATROPIUM BROMIDE 0.5 MG/2.5ML
0.5 SOLUTION RESPIRATORY (INHALATION)
Status: DISCONTINUED | OUTPATIENT
Start: 2022-12-05 | End: 2022-12-05 | Stop reason: HOSPADM

## 2022-12-05 RX ORDER — ONDANSETRON 2 MG/ML
4 INJECTION INTRAMUSCULAR; INTRAVENOUS
Status: DISCONTINUED | OUTPATIENT
Start: 2022-12-05 | End: 2022-12-05 | Stop reason: HOSPADM

## 2022-12-05 RX ORDER — CELECOXIB 200 MG/1
200 CAPSULE ORAL 2 TIMES DAILY
Qty: 60 CAPSULE | Refills: 0 | Status: SHIPPED | OUTPATIENT
Start: 2022-12-06 | End: 2023-01-05

## 2022-12-05 RX ORDER — FAMOTIDINE 20 MG/1
20 TABLET, FILM COATED ORAL 2 TIMES DAILY
Status: DISCONTINUED | OUTPATIENT
Start: 2022-12-05 | End: 2022-12-05 | Stop reason: HOSPADM

## 2022-12-05 RX ORDER — SODIUM CHLORIDE 0.9 % (FLUSH) 0.9 %
5-40 SYRINGE (ML) INJECTION AS NEEDED
Status: DISCONTINUED | OUTPATIENT
Start: 2022-12-05 | End: 2022-12-05 | Stop reason: HOSPADM

## 2022-12-05 RX ORDER — DULOXETIN HYDROCHLORIDE 20 MG/1
20 CAPSULE, DELAYED RELEASE ORAL 2 TIMES DAILY
Status: DISCONTINUED | OUTPATIENT
Start: 2022-12-05 | End: 2022-12-05 | Stop reason: HOSPADM

## 2022-12-05 RX ORDER — ACETAMINOPHEN 325 MG/1
650 TABLET ORAL
Status: DISCONTINUED | OUTPATIENT
Start: 2022-12-05 | End: 2022-12-05 | Stop reason: HOSPADM

## 2022-12-05 RX ORDER — DIPHENHYDRAMINE HYDROCHLORIDE 50 MG/ML
12.5 INJECTION, SOLUTION INTRAMUSCULAR; INTRAVENOUS
Status: DISCONTINUED | OUTPATIENT
Start: 2022-12-05 | End: 2022-12-05 | Stop reason: HOSPADM

## 2022-12-05 RX ORDER — FAMOTIDINE 20 MG/1
20 TABLET, FILM COATED ORAL 2 TIMES DAILY
Qty: 60 TABLET | Refills: 0 | Status: SHIPPED | OUTPATIENT
Start: 2022-12-05 | End: 2023-01-04

## 2022-12-05 RX ORDER — ROPIVACAINE HYDROCHLORIDE 5 MG/ML
INJECTION, SOLUTION EPIDURAL; INFILTRATION; PERINEURAL
Status: COMPLETED | OUTPATIENT
Start: 2022-12-05 | End: 2022-12-05

## 2022-12-05 RX ORDER — TRAMADOL HYDROCHLORIDE 50 MG/1
50-100 TABLET ORAL
Qty: 30 TABLET | Refills: 0 | Status: SHIPPED | OUTPATIENT
Start: 2022-12-05 | End: 2022-12-12

## 2022-12-05 RX ORDER — ASPIRIN 325 MG
325 TABLET, DELAYED RELEASE (ENTERIC COATED) ORAL 2 TIMES DAILY
Qty: 60 TABLET | Refills: 0 | Status: SHIPPED | OUTPATIENT
Start: 2022-12-05 | End: 2023-01-04

## 2022-12-05 RX ORDER — GLIPIZIDE 5 MG/1
5 TABLET ORAL DAILY
Status: DISCONTINUED | OUTPATIENT
Start: 2022-12-06 | End: 2022-12-05 | Stop reason: HOSPADM

## 2022-12-05 RX ORDER — AMOXICILLIN 250 MG
1 CAPSULE ORAL 2 TIMES DAILY
Qty: 14 TABLET | Refills: 0 | Status: SHIPPED | OUTPATIENT
Start: 2022-12-05 | End: 2022-12-12

## 2022-12-05 RX ORDER — TRAMADOL HYDROCHLORIDE 50 MG/1
50 TABLET ORAL
Status: DISCONTINUED | OUTPATIENT
Start: 2022-12-05 | End: 2022-12-05 | Stop reason: HOSPADM

## 2022-12-05 RX ORDER — ACETAMINOPHEN 500 MG
1000 TABLET ORAL EVERY 6 HOURS
Status: DISCONTINUED | OUTPATIENT
Start: 2022-12-05 | End: 2022-12-05 | Stop reason: HOSPADM

## 2022-12-05 RX ORDER — ONDANSETRON 2 MG/ML
INJECTION INTRAMUSCULAR; INTRAVENOUS AS NEEDED
Status: DISCONTINUED | OUTPATIENT
Start: 2022-12-05 | End: 2022-12-05 | Stop reason: HOSPADM

## 2022-12-05 RX ADMIN — ASPIRIN 325 MG: 325 TABLET, COATED ORAL at 17:53

## 2022-12-05 RX ADMIN — TRANEXAMIC ACID 1 G: 100 INJECTION, SOLUTION INTRAVENOUS at 12:18

## 2022-12-05 RX ADMIN — FAMOTIDINE 20 MG: 20 TABLET, FILM COATED ORAL at 17:53

## 2022-12-05 RX ADMIN — WATER 2 G: 1 INJECTION INTRAMUSCULAR; INTRAVENOUS; SUBCUTANEOUS at 12:16

## 2022-12-05 RX ADMIN — ROPIVACAINE HYDROCHLORIDE 20 ML: 5 INJECTION, SOLUTION EPIDURAL; INFILTRATION; PERINEURAL at 11:34

## 2022-12-05 RX ADMIN — ACETAMINOPHEN 1000 MG: 500 TABLET ORAL at 17:53

## 2022-12-05 RX ADMIN — SODIUM CHLORIDE, POTASSIUM CHLORIDE, SODIUM LACTATE AND CALCIUM CHLORIDE 25 ML/HR: 600; 310; 30; 20 INJECTION, SOLUTION INTRAVENOUS at 11:49

## 2022-12-05 RX ADMIN — MIDAZOLAM 5 MG: 1 INJECTION INTRAMUSCULAR; INTRAVENOUS at 11:24

## 2022-12-05 RX ADMIN — ONDANSETRON HYDROCHLORIDE 4 MG: 2 INJECTION, SOLUTION INTRAMUSCULAR; INTRAVENOUS at 12:35

## 2022-12-05 RX ADMIN — ACETAMINOPHEN 1000 MG: 500 TABLET ORAL at 10:00

## 2022-12-05 RX ADMIN — DULOXETINE HYDROCHLORIDE 20 MG: 20 CAPSULE, DELAYED RELEASE ORAL at 17:53

## 2022-12-05 RX ADMIN — CEFAZOLIN 2 G: 1 INJECTION, POWDER, FOR SOLUTION INTRAMUSCULAR; INTRAVENOUS at 17:55

## 2022-12-05 RX ADMIN — SENNOSIDES AND DOCUSATE SODIUM 1 TABLET: 50; 8.6 TABLET ORAL at 17:53

## 2022-12-05 RX ADMIN — BUPIVACAINE HYDROCHLORIDE 10 MG: 5 INJECTION, SOLUTION EPIDURAL; INTRACAUDAL; PERINEURAL at 11:29

## 2022-12-05 RX ADMIN — DEXAMETHASONE SODIUM PHOSPHATE 10 MG: 4 INJECTION, SOLUTION INTRAMUSCULAR; INTRAVENOUS at 12:23

## 2022-12-05 RX ADMIN — OXYCODONE 10 MG: 5 TABLET ORAL at 17:52

## 2022-12-05 RX ADMIN — GABAPENTIN 600 MG: 300 CAPSULE ORAL at 17:53

## 2022-12-05 RX ADMIN — OXYCODONE 10 MG: 5 TABLET ORAL at 15:09

## 2022-12-05 RX ADMIN — KETOROLAC TROMETHAMINE 15 MG: 30 INJECTION, SOLUTION INTRAMUSCULAR; INTRAVENOUS at 17:54

## 2022-12-05 RX ADMIN — SODIUM CHLORIDE, POTASSIUM CHLORIDE, SODIUM LACTATE AND CALCIUM CHLORIDE: 600; 310; 30; 20 INJECTION, SOLUTION INTRAVENOUS at 13:21

## 2022-12-05 RX ADMIN — DEXMEDETOMIDINE HYDROCHLORIDE 0.6 MCG/KG/HR: 100 INJECTION, SOLUTION, CONCENTRATE INTRAVENOUS at 12:10

## 2022-12-05 RX ADMIN — CELECOXIB 200 MG: 200 CAPSULE ORAL at 10:00

## 2022-12-05 RX ADMIN — Medication 3 AMPULE: at 11:49

## 2022-12-05 NOTE — PROGRESS NOTES
PHYSICAL THERAPY EVALUATION/DISCHARGE  Patient: Marquis Castle (71 y.o. female)  Date: 12/5/2022  Primary Diagnosis: Unilateral primary osteoarthritis, left knee [M17.12]  Procedure(s) (LRB):  LEFT TOTAL KNEE ARTHROPLASTY (Left) Day of Surgery   Precautions: WBAT         ASSESSMENT  Based on the objective data described below, the patient presents with expected post op pain, antalgic gait, decreased activity tolerance and increased falls risk following left TKR. Patient received in bed and agreeable to participate, family present in room. Instructed in bed exercises and patient able to perform with good tolerance, note lacking approx 10 degrees knee extension, educated on importance of avoiding pillow underneath and working on [employer]. Patient ambulated x approx 200 feet with RW. Gait is antalgic with step to pattern and tends to keep left knee flexed through gait cycle with toe as initial contact. Performed stairs with CGA and car transfer, son present for teaching. Patient ambulated into bathroom and was able to get on and off toilet with SBA, urinated a small amount in hat, RN is aware. Left supine in bed with call bell in reach, patient is cleared for discharge and recommend HHPT to follow. Functional Outcome Measure: The patient scored 70/100 on the Barthel  outcome measure which is indicative of minimally indep. Other factors to consider for discharge: history of 10 falls, substance abuse     Further skilled acute physical therapy is not indicated at this time.      PLAN :  Recommendation for discharge: (in order for the patient to meet his/her long term goals)  Physical therapy at least 2 days/week in the home AND ensure assist and/or supervision for safety with mobility    This discharge recommendation:  Has been made in collaboration with the attending provider and/or case management    IF patient discharges home will need the following DME: patient owns DME required for discharge SUBJECTIVE:   Patient stated I have to get home to see my grand daughter.     OBJECTIVE DATA SUMMARY:   HISTORY:    Past Medical History:   Diagnosis Date    Arthritis     Asthma     At risk for sleep apnea     COPD (chronic obstructive pulmonary disease) ([facility]     Depression     Diabetes [facility]     Hemorrhoids     Hepatitis C     Occult blood positive stool     Spinal stenosis      Past Surgical History:   Procedure Laterality Date    COLONOSCOPY N/A 05/07/2019    COLONOSCOPY performed by Kelby Salazar MD at [place] [address]    HX [address] [facility]  09/2016    laminectomy    HX CERVICAL FUSION      HX COLONOSCOPY  05/07/2019    4 polyps- 2 tubular adenoma 2 hyperplastic polyp    HX HYSTERECTOMY      HX TONSILLECTOMY         Prior level of function: ambulated with cane, history of 10 falls  Personal factors and/or comorbidities impacting plan of care: history of back and neck surgery         EXAMINATION/PRESENTATION/DECISION MAKING:   Critical Behavior:  Neurologic State: Alert           Hearing:     Skin:    Edema:   Range Of Motion:  AROM: Generally decreased, functional                       Strength:    Strength: Generally decreased, functional                    Tone & Sensation:   Tone: Normal              Sensation: Intact               Coordination:  Coordination: Within functional limits  Vision:      Functional Mobility:  Bed Mobility:  Rolling: Stand-by assistance  Supine to Sit: Stand-by assistance  Sit to Supine: Stand-by assistance     Transfers:  Sit to Stand: Stand-by assistance  Stand to Sit: Stand-by assistance                       Balance:   Sitting: Intact  Standing: Intact; With support  Ambulation/Gait Training:  Distance (ft): 200 Feet (ft)  Assistive Device: Gait belt;Walker, rolling  Ambulation - Level of Assistance: Contact guard assistance        Gait Abnormalities: Antalgic;Decreased step clearance; Step to gait; Other (tends to keep left knee flexed through gait cycle and has toe as initital contact)        Base of Support: Widened     Speed/Lolly: Pace decreased (<100 feet/min)                        Stairs:  Number of Stairs Trained: 4  Stairs - Level of Assistance: Contact guard assistance   Rail Use: Both    Therapeutic Exercises: Ankle pumps, quad set, heel slides x 10    Functional Measure:  Barthel Index:    Bathin  Bladder: 5  Bowels: 10  Groomin  Dressin  Feeding: 10  Mobility: 10  Stairs: 5  Toilet Use: 10  Transfer (Bed to Chair and Back): 10  Total: 70/100       The Barthel ADL Index: Guidelines  1. The index should be used as a record of what a patient does, not as a record of what a patient could do. 2. The main aim is to establish degree of independence from any help, physical or verbal, however minor and for whatever reason. 3. The need for supervision renders the patient not independent. 4. A patient's performance should be established using the best available evidence. Asking the patient, friends/relatives and nurses are the usual sources, but direct observation and common sense are also important. However direct testing is not needed. 5. Usually the patient's performance over the preceding 24-48 hours is important, but occasionally longer periods will be relevant. 6. Middle categories imply that the patient supplies over 50 per cent of the effort. 7. Use of aids to be independent is allowed. Score Interpretation (from [address] [place] [address])    Independent   60-79 Minimally independent   40-59 Partially dependent   20-39 Very dependent   <20 Totally dependent     -Purvi Toure., Barthel, D.W. (1965). Functional evaluation: the Barthel Index. [address] [facility] ([address] (1997). The Barthel activities of daily living index: self-reporting versus actual performance in the old (> or = 75 years). Journal of [address] [place] [address] 45(7), [facility], J.J.M.F, Beverly Paul., Vince Kumar. ().  Measuring the change in disability after inpatient rehabilitation; comparison of the responsiveness of the Barthel Index and Functional [place] Measure. Journal of Neurology, Neurosurgery, and Psychiatry, 66(4), 530-899. SILVERIO River, BOBBY King, & Cindy Ames M.A. (2004) Assessment of post-stroke quality of life in cost-effectiveness studies: The usefulness of the Barthel Index and the EuroQoL-5D. Quality of Life Research, 15, 227-60           Physical Therapy Evaluation Charge Determination   History Examination Presentation Decision-Making   MEDIUM  Complexity : 1-2 comorbidities / personal factors will impact the outcome/ POC  LOW Complexity : 1-2 Standardized tests and measures addressing body structure, function, activity limitation and / or participation in recreation  LOW Complexity : Stable, uncomplicated  LOW Complexity : FOTO score of       Based on the above components, the patient evaluation is determined to be of the following complexity level: LOW     Pain Rating:      Activity Tolerance:   Good      After treatment patient left in no apparent distress:   Supine in bed, Call bell within reach, Bed / chair alarm activated, Caregiver / family present, and Side rails x 3    COMMUNICATION/EDUCATION:   The patients plan of care was discussed with: Registered nurse. Fall prevention education was provided and the patient/caregiver indicated understanding. and Patient/family agree to work toward stated goals and plan of care.     Thank you for this referral.  Jennifer Rodriguez, PT   Time Calculation: 41 mins

## 2022-12-05 NOTE — OP NOTES
Date of Procedure: 12/5/2022  PREOPERATIVE DIAGNOSIS: Left knee osteoarthrosis. POSTOPERATIVE DIAGNOSIS: Left knee osteoarthrosis. OPERATION: Left total knee arthroplasty. ASSISTANT SURGEON: none    ANESTHESIA: spinal with adductor canal block  COMPLICATIONS: None. SPECIMENS: None. DRAINS: None. ESTIMATED BLOOD LOSS: 100 mL. IMPLANTS:     [place] Triathlon PS femoral component size #3  Triathlon tibial bearing insert PS size #3 with a 9 mm thickness   Triathlon total knee universal tibial baseplate size #3 Triathlon symmetric patellar size S 27 mm diameter with 8 mm thickness. Preoperative range of motion: 15 degrees extension to 100 degrees flexion  Postoperative range of motion: 2 degrees extension to 130 degrees flexion     OPERATIVE INDICATIONS: This is a 62 y. o.female who failed nonoperative management of arthrosis of the left knee. They did elect for a total knee arthroplasty. We did discuss the risks of surgery which include but are not limited to infection, nerve or blood vessel damage, need for reoperation, death, disability, DVT, PE, postoperative pain, swelling and stiffness, loss of range of motion, implant failure, knee instability, need for reoperation, wound healing complications and all other organ dysfunctions. The patient did freely consent for surgery. DESCRIPTION OF PROCEDURE IN DETAIL: After the correct site and side were identified by myself in the holding area, the patient was transported to a surgical suite and successful induction of anesthesia was performed. The patient was then transported to a surgical table where all bony prominences were padded and the left leg was prepped and draped in the usual sterile orthopedic fashion. After an appropriate time-out and confirmation that antibiotics had been infused prior to any incision, the leg was exsanguinated and tourniquet taken to 250 mmHg.  With that, the knee was taken into flexion and a midline incision was made over the patella, tracking to the medial aspect of the tibial tubercle. Dissection was carried down to the capsule and a median parapatellar arthrotomy was performed. The knee was taken to extension and the medial deep MCL peel was performed to the midcoronal plane. The patella was everted and the knee was taken into flexion and the patella fat pad was removed to the extent necessary to visualize the knee. The ACL and PCL were then resected. Notch osteophytes were removed with use of an osteotome. Osteophytes were then removed from about the distal femur. With that ,[place]'s line as well as the transepicondylar axis were identified and marked on the knee. Just medial to the Sisseb line at the level of the femoral shaft, a canal entry reamer was then used to gain entry into the canal. This was then irrigated and aspirated. The intramedullary guide was then placed into the knee. A jig was then placed onto the knee and the jig was set at 8 mm of resection and 5 degrees of valgus. This was pinned into place and then a distal femoral resection was performed. With that, retractors were removed and attention was turned to the proximal tibia were the Ran-Dean maneuver was performed and retractors were placed about the proximal tibia. An entry drill was used in the central tibia to enter the diaphysis. This was evacuated of marrow. The intramedullary guide was then placed in 0 degrees of varus and valgus as well as 0 degrees of slope. We did plan to resect 2 mm from the diseased side. This was then pinned into place and the resection was performed with the use of an oscillating blade. A trial of the gap  was performed and this was found to be adequate at 9 mm of resection. The pins were then removed. The  knee was taken into flexion again and the femoral sizer was used. Then the retractors were again placed about the distal femur.  The posterior condyles were resected and the gap  was again used and 9 mm was found to be a very good balance. The remaining chamfer cuts were then performed. The box cutting jig was then pinned into place. Care was taken to ensure that there was no lateral overhang. The box was then cut with an oscillating blade as well as a chisel through the chisel slot. The box cutting jig was then removed and the posterior osteophytes were removed with the chisel. The knee was then taken into extension and the meniscal remnants were then removed under tension provided by a lamina . Then 0.5% ropivacaine was then infused throughout the posterior capsule. Care was taken to avoid any intravascular injection. Trials were then placed for a size 9 mm insert. These were found to have excellent coverage and sizing. Stability was excellent throughout the range of motion and range of motion was from 0-130 degrees of flexion. There was no coronal plane instability in any degree of flexion. The rotation of the tibia was then marked. The patella was everted and with the resection guide, I did resect the cartilaginous surface to the appropriate depth for patella implant. This did have an even cut surface at the end. I confirmed this with calipers. I then chose the appropriate size patellar button size and drilled the lugholes. I trialed the patellar button and this did track centrally through all range of motion of flexion. There was a no thumb sign. There was no coronal plane instability. Therefore, the trials were removed and the proximal tibia was exposed and retractors were placed about the proximal tibia where the tibial baseplate was pinned into place. Then, the drill and punch were used. This was all removed and the knee was copiously irrigated with normal saline mixed with Betadine through a pulsed lavage. The knee was then taken into flexion and dried thoroughly. With that, we began cementing.  I pre-coated the tibial component with cement and used a cement gun to infuse cement into the prepared proximal tibia. I did use digital pressurization for this. I then impacted into place the tibia with primary and then secondary impactor and all excess cement was removed. Care was taken to respect of rotation. The same process was performed on the femur and all excess bone was removed as well. A trial insert was then placed and the knee was taken into extension and pre-coated patellar component was cemented into place on a digitally pressurized patellar surface. Excess bone cement was removed and the clamp was secured. The knee was left in full extension and allow to cure entirely prior to any movement. Once I was satisfied with the curing process, I again ranged the knee and was very satisfied and therefore I removed the trial and impacted into place a final polyethylene spacer. I confirmed that the locking mechanism had engaged, I then irrigated the knee thoroughly. The tourniquet was taken down and all bleeders were coagulated. With that, the capsule was enclosed with #1 Vicryl suture and a Stratafix. The deep fat stitches were placed, 2-0 Vicryl, 3-0 Monocryl, Dermabond and Steri-Strips were applied. The patient was then taken to PACU in stable condition with no obvious intraoperative complications. POSTOPERATIVE: The patient will be weightbearing as tolerated. They will have ecotrin for DVT prophylaxis, Percocet for pain management and Colace for bowel maintenance. She will be seen at 2 weeks and then 6 weeks from our standard total joint protocol.

## 2022-12-05 NOTE — PROGRESS NOTES
TRANSFER - IN REPORT:    Verbal report received from [facility] RN(name) on Dante Medel  being received from [employer]) for routine post - op      Report consisted of patients Situation, Background, Assessment and   Recommendations(SBAR). Information from the following report(s) SBAR and Kardex was reviewed with the receiving nurse. Opportunity for questions and clarification was provided. Assessment completed upon patients arrival to unit and care assumed.

## 2022-12-05 NOTE — PERIOP NOTES
Handoff Report from Operating Room to PACU    Report received from [address] and [address] [facility] regarding Belkis Sanchez. Surgeon(s):  Rajwinder Carney DO  And Procedure(s) (LRB):  LEFT TOTAL KNEE ARTHROPLASTY (Left)  confirmed   with allergies and dressings discussed. Anesthesia type, drugs, patient history, complications, estimated blood loss, vital signs, intake and output, and last pain medication, lines, and temperature were reviewed. 2:13 PM  Radiology notified of STAT x-ray order.     2:30 PM  Report off to primary nurse Di Jiang RN

## 2022-12-05 NOTE — ANESTHESIA PREPROCEDURE EVALUATION
Relevant Problems   No relevant active problems       Anesthetic History   No history of anesthetic complications            Review of Systems / Medical History  Patient summary reviewed, nursing notes reviewed and pertinent labs reviewed    Pulmonary    COPD      Smoker  Asthma        Neuro/Psych         Psychiatric history (depression)    Comments: Spinal stenosis, chronic pain  RLS Cardiovascular                  Exercise tolerance: >4 METS     GI/Hepatic/Renal       Hepatitis: type C    Liver disease (elevated LFTs)     Endo/Other        Arthritis     Other Findings   Comments: Change in bowel habit, Positive FIT  Substance abuse            Physical Exam    Airway  Mallampati: I  TM Distance: > 6 cm  Neck ROM: normal range of motion   Mouth opening: Normal     Cardiovascular    Rhythm: regular  Rate: normal         Dental    Dentition: Edentulous     Pulmonary  Breath sounds clear to auscultation               Abdominal  GI exam deferred       Other Findings            Anesthetic Plan    ASA: 3  Anesthesia type: MAC, spinal and regional          Induction: Intravenous  Anesthetic plan and risks discussed with: Patient

## 2022-12-05 NOTE — ANESTHESIA POSTPROCEDURE EVALUATION
Procedure(s):  LEFT TOTAL KNEE ARTHROPLASTY. MAC, spinal, regional    Anesthesia Post Evaluation        Patient location during evaluation: PACU  Note status: Adequate. Level of consciousness: responsive to verbal stimuli and sleepy but conscious  Pain management: satisfactory to patient  Airway patency: patent  Anesthetic complications: no  Cardiovascular status: acceptable  Respiratory status: acceptable  Hydration status: acceptable  Comments: +Post-Anesthesia Evaluation and Assessment    Patient: Jojo Robertson MRN: [id number]  SSN: [id number]   YOB: 1960  Age: 62 y.o. Sex: female      Cardiovascular Function/Vital Signs    /67   Pulse 67   Temp 36.7 °C (98.1 °F)   Resp 21   Ht 5' 7\" (1.702 m)   Wt 76.2 kg (167 lb 15.9 oz)   SpO2 97%   BMI 26.31 kg/m²     Patient is status post Procedure(s):  LEFT TOTAL KNEE ARTHROPLASTY. Nausea/Vomiting: Controlled. Postoperative hydration reviewed and adequate. Pain:  Pain Scale 1: Numeric (0 - 10) (12/05/22 1515)  Pain Intensity 1: 5 (12/05/22 1515)   Managed. Neurological Status:   Neuro (WDL): Exceptions to WDL (12/05/22 1402)   At baseline. Mental Status and Level of Consciousness: Arousable. Pulmonary Status:   O2 Device: Nasal cannula (12/05/22 1402)   Adequate oxygenation and airway patent. Complications related to anesthesia: None    Post-anesthesia assessment completed. No concerns. Signed By: Tj Cannon MD    12/5/2022  Post anesthesia nausea and vomiting:  controlled      INITIAL Post-op Vital signs:   Vitals Value Taken Time   /67 12/05/22 1515   Temp 36.7 °C (98.1 °F) 12/05/22 1402   Pulse 70 12/05/22 1518   Resp 24 12/05/22 1518   SpO2 94 % 12/05/22 1518   Vitals shown include unvalidated device data.

## 2022-12-05 NOTE — PERIOP NOTES
Handoff Report from Operating Room to PACU    Report received from Elisabet Mclaughlin CRNA and Rabia Ramirez RN regarding Vicky Cabezas. Surgeon(s):  Jett Fry DO  And Procedure(s) (LRB):  LEFT TOTAL KNEE ARTHROPLASTY (Left)  confirmed   with allergies and dressings discussed. Anesthesia type, drugs, patient history, complications, estimated blood loss, vital signs, intake and output, and last pain medication, lines, reversal medications, and temperature were reviewed. Patient stated she has been able to take oxycodone without difficulty in the past. States she became itchy the last 4 times she took it but denies hives, swelling, SOB, or difficulty breathing. Patient agree to take medication and 10mg roxicodone given per order. TRANSFER - OUT REPORT:    Verbal report given to Pito Price RN (name) on Vicky Cabezas  being transferred to [facility] [id number] (unit) for routine post - op       Report consisted of patients Situation, Background, Assessment and   Recommendations(SBAR). Information from the following report(s) Kardex, OR Summary, Procedure Summary, Intake/Output, MAR, Accordion, Recent Results, Med Rec Status, and Cardiac Rhythm NSR  was reviewed with the receiving nurse. Lines:   Peripheral IV 12/05/22 Posterior;Right Hand (Active)   Site Assessment Clean, dry, & intact 12/05/22 1515   Phlebitis Assessment 0 12/05/22 1515   Infiltration Assessment 0 12/05/22 1515   Dressing Status Clean, dry, & intact 12/05/22 1515   Dressing Type Tape;Transparent 12/05/22 1515   Hub Color/Line Status Pink;Capped 12/05/22 1515        Opportunity for questions and clarification was provided. Patient transported with:   Tech.  Belongings sent to room with patient

## 2022-12-05 NOTE — PROGRESS NOTES
DISCHARGE NOTE FROM [facility]    Patient determined to be stable for discharge by attending provider. I have reviewed the discharge instructions with the patient. They verbalized understanding and all questions were answered to their satisfaction. No complaints or further questions were expressed. Medications sent to pharmacy. Appropriate educational materials and medication side effect teaching were provided. PIV were removed prior to discharge. Patient did not discharge with any line, haynes, or drain. Personal items and valuables accounted for at discharge by patient and/or family: YES    Post-op patient: Yes- Patient given post-op discharge kit and instructed on use.        Ania Burgess RN

## 2022-12-05 NOTE — DISCHARGE INSTRUCTIONS
Discharge Instructions:  Marah Locke    Surgery: LEFT TOTAL KNEE ARTHROPLASTY  Surgeon:   Dr. Cook Cochran  Surgery Date:  12/5/2022    To relieve pain:  Use ice/gel packs.    -Put the ice pack directly over the wound, or anywhere you are hurting or swollen.   -To control pain and swelling, keep ice on regularly, especially after physical activity.  -The packs should stay cold for 3-4 hours. When it is not cold anymore, rotate with the packs in the freezer. Elevate your leg. This will also keep swelling down. Rest for at least 20 minutes between activity or exercises. To keep track of your pain medications, write down what you take and when you take it. The last dose of pain medication you got in the hospital was:     Medication    Dose    Date & Time      Choose your medications based on the pain scale below: To keep your pain under control, take Tylenol every 6 hours for 14 days - even if you feel like you dont need it. For mild to moderate pain (4-6 on pain scale), take one pain pill every 4 hours or as instructed. For severe pain (7-10 on pain scale), take two pain pills every 4 hours or as instructed. To prevent nausea, take your pain medications with food. Pain Scale              As your pain lessens:    Slowly start taking less pain medication. You may do this by waiting longer between doses or by taking smaller doses. Stop using the pain medications as soon as you no longer need it, usually in 2-3 weeks. Aspirin  To prevent blood clots, you will need to take Aspirin 325 mg twice a day for 30 days. To prevent stomach upset or bleeding:  Do not take non-steroidal anti-inflammatory medications (Ibuprofen, Advil, Motrin, Naproxen, etc.)   Take Pepcid 20 mg twice a day, or a similar home medication, while you are taking a blood thinner. Keep your waterproof dressing in place.  It will be removed by your surgeon during your follow-up appointment in 2 weeks. You may need to change the dressing if you are having drainage to where the dressing is no longer intact. You will be given an extra dressing to use at home. You will have some swelling, warmth, and bruising around the incision and up and down the leg after surgery. This will may get worse in the first few days you are home and will slowly get better over the next few weeks. You may shower with this dressing over your wound. After showering pat the dressing dry. DO NOT's:  Do not rub your surgical wound  Do not put lotion or oils on your wound. Do not take a tub bath or go swimming until your doctor says it is ok. To increase and promote healing:  Stop Smoking (or at least cut back on smoking). Eat a well-balanced diet. High in protein and Vitamin C. If you have a poor appetite, supplement your diet by drinking Ensure, Glucerna or [place] Instant Breakfast for the next 30 days     If you are a diabetic, control you blood sugars to prevent infection and help your wound heal.                     Prevent Infection:    Wash your hands                       -This is the most important thing you or your caregiver can do. -Wash your hands with soap and water (or use the hand ) before you touch any wounds. Shower  -Use the antibacterial soap we gave you when you take a shower.   -Shower with this soap until your wounds are healed. Use Clean Sheets   -Put freshly cleaned sheets on your bed after surgery.   -To keep the surgery site clean, do not allow pets to sleep with you while your wound is still healing. To prevent constipation, stay active and drink plenty of fluid. While using pain medications, you should also take stool softeners and laxatives, such as Pericolace and Miralax.        If you are having too many bowel movements, then you may need to stop taking the laxatives. You should have a bowel movement 3-4 days after surgery and then at least every other day while on pain medication. To improve your recovery, you must be active! Use your walker and take short walks (in your home) about every 2 hours during the day. Try to increase how far you walk each day. You can put as much weight on your leg as you can tolerate while walking. Home health physical therapy will come to your home the day after you leave the hospital. The therapist will visit about 4 times over the next couple of weeks to teach you exercises, how to get out of bed and how to safely walk in your home. NO DRIVING until your surgeon tells you it is ok. You can return to work when cleared by a physician. Please call your physician immediately if you have:  Constant bleeding from your wound. Increasing redness or swelling around your wound (some warmth, bruising and swelling is normal). Change in wound drainage (increase in amount, color, or bad smell). Change in mental status (unusual behavior). Temperature over 101.5 degrees Fahrenheit   Pain or redness in the calf (back of your lower leg)  Increased swelling of the thigh, ankle, calf, or foot. Emergency! CALL 911 if you have:  Shortness of breath  Chest pain when you cough or taking a deep breath        Please call your surgeons office to make your follow up appointment in 2 weeks. If you have questions or concerns during normal business hours, you may reach Dr. Sandi Willams Team at [phone].                              Instructions for 24 hours after receiving General Anesthesia or Intravenous Sedation,   and while taking prescription Narcotics    Common side effects associated with each of these medications includes:  - Drowsiness, dizziness, euphoria, sleepiness or confusion  - Impaired memory recall  - Unsteady gait, loss of fine muscle control and delayed reaction time  - Visual disturbances, difficulty focusing, blurred vision    You may experience some of these side effects or you may not be aware of subtle changes in your behavior or reaction time. Because you received these medications, we are giving you the following instructions. Discharge Instructions:   - Someone should be with you for the next 24 hours  - For your own safety, a responsible adult must drive you home  - Do not consume alcoholic beverages   - Do not make important personal, legal or business decisions for 24 hours  - Move slowly and carefully, do not make sudden position changes. Be alert for dizziness or lightheadedness and move accordingly. - Do not operate equipment for 24 hours - [employer], power tools, Kitchen accessories: stove, etc.  - If you have not urinated within 8 hours after discharge, please contact your surgeon's office.

## 2022-12-05 NOTE — ANESTHESIA PROCEDURE NOTES
Peripheral Block    Start time: 12/5/2022 11:32 AM  End time: 12/5/2022 11:34 AM  Performed by: Zayra Turpin DO  Authorized by: Zayra Turpin DO       Pre-procedure: Indications: at surgeon's request and post-op pain management    Preanesthetic Checklist: patient identified, risks and benefits discussed, site marked, timeout performed and patient being monitored      Block Type:   Block Type:   Adductor canal  Laterality:  Left  Monitoring:  Standard ASA monitoring, continuous pulse ox, frequent vital sign checks, heart rate, responsive to questions and oxygen  Injection Technique:  Single shot  Procedures: ultrasound guided    Patient Position: supine  Prep: chlorhexidine    Location:  Mid thigh  Needle Type:  Stimuplex  Needle Gauge:  21 G  Needle Localization:  Ultrasound guidance and anatomical landmarks  Medication Injected:  Ropivacaine (PF) (NAROPIN)(0.5%) 5 mg/mL injection - Peripheral Nerve Block   20 mL - 12/5/2022 11:34:00 AM  Med Admin Time: 12/5/2022 11:34 AM    Assessment:  Number of attempts:  1  Injection Assessment:  Incremental injection every 5 mL, local visualized surrounding nerve on ultrasound, negative aspiration for blood, no paresthesia, no intravascular symptoms and ultrasound image on chart  Patient tolerance:  Patient tolerated the procedure well with no immediate complications

## 2022-12-05 NOTE — ANESTHESIA PROCEDURE NOTES
Spinal Block    Start time: 12/5/2022 11:24 AM  End time: 12/5/2022 11:29 AM  Performed by: Jess Delgado DO  Authorized by: Jess Delgado DO     Pre-procedure:   Indications: primary anesthetic  Preanesthetic Checklist: patient identified, risks and benefits discussed, site marked, patient being monitored and timeout performed      Spinal Block:   Patient Position:  Seated  Prep Region:  Lumbar  Prep: chlorhexidine and patient draped      Location:  L3-4  Technique:  Single shot  Local: midazolam (VERSED) injection sedation - IntraVENous   5 mg - 12/5/2022 11:24:00 AM  bupivacaine (PF) (MARCAINE) 0.5 % (5 mg/mL) intrathecal - Intrathecal   10 mg - 12/5/2022 11:29:00 AM    Med Admin Time: 12/5/2022 11:29 AM    Needle:   Needle Type:  Pencil-tip  Needle Gauge:  25 G  Attempts:  3      Events: CSF confirmed, no blood with aspiration and no paresthesia        Assessment:  Insertion:  Uncomplicated  Patient tolerance:  Patient tolerated the procedure well with no immediate complications  Excess arthritis changes in spine making block placement more challenging

## 2022-12-05 NOTE — DISCHARGE SUMMARY
Ortho Discharge Summary    Patient ID:  Yonas Clark  [id number]  female  62 y.o.  1960    Admit date: 2022    Discharge date: 2022    Admitting Physician: Mora Morocho DO     Consulting Physician(s):   Treatment Team: Attending Provider: Shannon Quintanilla DO    Date of Surgery:   2022     Preoperative Diagnosis:  Unilateral primary osteoarthritis, left knee [M17.12]    Postoperative Diagnosis:    Unilateral primary osteoarthritis, left knee [M17.12]    Procedure(s):   LEFT TOTAL KNEE ARTHROPLASTY     Anesthesia Type:   Spinal     Surgeon: Shannon Quintanilla DO                            HPI:  Pt is a 62 y.o. female who has a history of Unilateral primary osteoarthritis, left knee [M17.12]  with pain and limitations of activities of daily living who presents at this time for a LEFT TOTAL KNEE ARTHROPLASTY following the failure of conservative management. PMH:   Past Medical History:   Diagnosis Date    Arthritis     Asthma     At risk for sleep apnea     COPD (chronic obstructive pulmonary disease) (HCC)     Depression     Diabetes (HCC)     Hemorrhoids     Hepatitis C     Occult blood positive stool     Spinal stenosis        Body mass index is 26.31 kg/m². : A BMI > 30 is classified as obesity and > 40 is classified as morbid obesity. Medications upon admission :   Prior to Admission Medications   Prescriptions Last Dose Informant Patient Reported? Taking? DULoxetine (CYMBALTA) 60 mg capsule 12/3/2022  No No   Sig: Take 1 capsule by mouth once daily   Insulin Needles, Disposable, 31 gauge x \" ndle   No No   Sig: Victoza pen needle use 1 daily   Nebulizer & Compressor machine   No No   Si Each by Does Not Apply route every four (4) hours as needed for Wheezing or Shortness of Breath.    albuterol (PROVENTIL HFA, VENTOLIN HFA, PROAIR HFA) 90 mcg/actuation inhaler 2022  No Yes   Sig: INHALE ONE PUFF BY MOUTH EVERY 6 HOURS AS NEEDED FOR WHEEZING   albuterol-ipratropium (DUO-NEB) 2.5 mg-0.5 mg/3 ml nebu   No No   Sig: 3 mL by Nebulization route every six (6) hours as needed for Wheezing, Shortness of Breath or Cough. Use with a flare up of COPD. Indications: bronchi muscle spasm resulting from COPD   diclofenac (VOLTAREN) 1 % gel 12/3/2022  No No   Sig: Apply 2 g to affected area four (4) times daily. gabapentin (NEURONTIN) 600 mg tablet 12/3/2022  No No   Sig: Take 1 Tablet by mouth three (3) times daily. glimepiride (AMARYL) 2 mg tablet 12/3/2022  No No   Sig: Take 1 Tablet by mouth Daily (before breakfast). liraglutide (VICTOZA) 0.6 mg/0.1 mL (18 mg/3 mL) pnij 12/3/2022  No No   Si.6 mg by SubCUTAneous route daily. nicotine (NICODERM CQ) 14 mg/24 hr patch Not Taking  No No   Sig: APPLY 1 PATCH TOPICALLY EVERY 24 HOURS FOR 30 DAYS   umeclidinium-vilanteroL (ANORO ELLIPTA) 62.5-25 mcg/actuation inhaler 2022  No Yes   Sig: Take 1 Puff by inhalation daily. varenicline (CHANTIX) 1 mg tablet Not Taking  No No   Sig: Take 1 tablet by mouth twice daily   Patient not taking: Reported on 2022      Facility-Administered Medications: None        Allergies: Allergies   Allergen Reactions    Oxycodone Hives and Itching    Zolpidem Itching        Hospital Course: The patient underwent surgery. Complications:  None; patient tolerated the procedure well. Was taken to the PACU in stable condition and then transferred to the ortho floor. Perioperative Antibiotics:  Ancef     Postoperative Pain Management:  Tramadol, Celebrex & Tylenol     DVT Prophylaxis: Aspirin 325BID    Postoperative transfusions:    Number of units banked PRBCs =   none     Post Op complications: none    Hemoglobin at discharge:    Lab Results   Component Value Date/Time    HGB 14.9 2022 01:12 PM    INR 1.2 (H) 2022 01:12 PM       Dressing remained clean, dry and intact. No significant erythema or swelling. Wound appears to be healing without any evidence of infection. Neurovascular exam found to be within normal limits. Physical Therapy started following surgery and participated in bed mobility, transfers and ambulation. Discharged to: Home. Condition on Discharge:   stable    Discharge instructions:  - Anticoagulate with Aspirin 325 BID  - Take pain medications as prescribed  - Resume pre hospital diet      - Discharge activity: activity as tolerated  - Ambulate with assistive device as needed. - Weight bearing status - WBAT  - Wound Care Keep wound clean and dry. See discharge instruction sheet. -DISCHARGE MEDICATION LIST     Current Discharge Medication List        START taking these medications    Details   aspirin delayed-release 325 mg tablet Take 1 Tablet by mouth two (2) times a day for 30 days. Qty: 60 Tablet, Refills: 0  Start date: 12/5/2022, End date: 1/4/2023      famotidine (PEPCID) 20 mg tablet Take 1 Tablet by mouth two (2) times a day for 30 days. Qty: 60 Tablet, Refills: 0  Start date: 12/5/2022, End date: 1/4/2023      traMADoL (ULTRAM) 50 mg tablet Take 1-2 Tablets by mouth every six (6) hours as needed for Pain for up to 7 days. Max Daily Amount: 400 mg. One tab for mild to moderate pain level 1-6, or 2 tabs for severe pain level 7-10  Qty: 30 Tablet, Refills: 0  Start date: 12/5/2022, End date: 12/12/2022    Associated Diagnoses: S/P total knee arthroplasty, left      polyethylene glycol (MIRALAX) 17 gram packet Take 1 Packet by mouth daily for 7 days. Qty: 7 Packet, Refills: 0  Start date: 12/6/2022, End date: 12/13/2022      senna-docusate (PERICOLACE) 8.6-50 mg per tablet Take 1 Tablet by mouth two (2) times a day for 7 days. Qty: 14 Tablet, Refills: 0  Start date: 12/5/2022, End date: 12/12/2022      celecoxib (CELEBREX) 200 mg capsule Take 1 Capsule by mouth two (2) times a day for 30 days.   Qty: 60 Capsule, Refills: 0  Start date: 12/6/2022, End date: 1/5/2023           CONTINUE these medications which have NOT CHANGED Details   umeclidinium-vilanteroL (ANORO ELLIPTA) 62.5-25 mcg/actuation inhaler Take 1 Puff by inhalation daily. Qty: 1 Each, Refills: 5    Associated Diagnoses: Chronic obstructive pulmonary disease, unspecified COPD type ([facility])      albuterol (PROVENTIL HFA, VENTOLIN HFA, PROAIR HFA) 90 mcg/actuation inhaler INHALE ONE PUFF BY MOUTH EVERY 6 HOURS AS NEEDED FOR WHEEZING  Qty: 1 Each, Refills: 2    Comments: Please consider 90 day supplies to promote better adherence  Associated Diagnoses: Chronic obstructive pulmonary disease, unspecified COPD type ([facility])      diclofenac (VOLTAREN) 1 % gel Apply 2 g to affected area four (4) times daily. Qty: 100 g, Refills: 5    Associated Diagnoses: Primary osteoarthritis of both knees      gabapentin (NEURONTIN) 600 mg tablet Take 1 Tablet by mouth three (3) times daily. Qty: 90 Tablet, Refills: 1    Associated Diagnoses: Spinal stenosis, unspecified spinal region      DULoxetine (CYMBALTA) 60 mg capsule Take 1 capsule by mouth once daily  Qty: 30 Capsule, Refills: 5    Associated Diagnoses: Depression, major, recurrent, mild ([facility])      glimepiride (AMARYL) 2 mg tablet Take 1 Tablet by mouth Daily (before breakfast). Qty: 30 Tablet, Refills: 5    Associated Diagnoses: Type 2 diabetes mellitus with hyperglycemia, without long-term current use of insulin ([facility])      nicotine (NICODERM CQ) 14 mg/24 hr patch APPLY 1 PATCH TOPICALLY EVERY 24 HOURS FOR 30 DAYS  Qty: 30 Patch, Refills: 0    Associated Diagnoses: Tobacco dependence syndrome      Insulin Needles, Disposable, 31 gauge x 5/16\" ndle Victoza pen needle use 1 daily  Qty: 1 Each, Refills: 11    Associated Diagnoses: Type 2 diabetes mellitus with hyperglycemia, without long-term current use of insulin ([facility])      liraglutide (VICTOZA) 0.6 mg/0.1 mL (18 mg/3 mL) pnij 0.6 mg by SubCUTAneous route daily.   Qty: 0.6 mg, Refills: 3    Associated Diagnoses: Type 2 diabetes mellitus with hyperglycemia, without long-term current use of insulin (HCC)      varenicline (CHANTIX) 1 mg tablet Take 1 tablet by mouth twice daily  Qty: 60 Tablet, Refills: 0      albuterol-ipratropium (DUO-NEB) 2.5 mg-0.5 mg/3 ml nebu 3 mL by Nebulization route every six (6) hours as needed for Wheezing, Shortness of Breath or Cough. Use with a flare up of COPD. Indications: bronchi muscle spasm resulting from COPD  Qty: 48 Nebule, Refills: 5    Associated Diagnoses: COPD exacerbation ([address]      Nebulizer & Compressor machine 1 Each by Does Not Apply route every four (4) hours as needed for Wheezing or Shortness of Breath. Qty: 1 Each, Refills: 0    Comments: Pt needs it. Has been very ill.   Associated Diagnoses: COPD exacerbation ([address]          per medical continuation form      -Follow up in office in 2 weeks      Signed:  Joel Bishop NP  Orthopaedic Nurse Practitioner    12/5/2022  4:23 PM

## 2022-12-05 NOTE — H&P
Date of Admission: 12/4/2022    Chief Complaint: Left knee pain    HPI: This is a 62 y.o. female who complains of left knee pain. Onset was gradual.  The patient has had activity dependent pain for years. The patient has tried activity modification, physical therapy exercises, injections have failed to provide good relief. The pain is in the knee, it is severe in intensity. The patient feels unstable with the knee, fears falling, and has significant limitation with activities of daily living, recreation, and walks with a limp. Past Medical History:   Diagnosis Date    Arthritis     Asthma     At risk for sleep apnea     COPD (chronic obstructive pulmonary disease) ([facility])     Depression     Diabetes ([facility] [address]     Hemorrhoids     Hepatitis C     Occult blood positive stool     Spinal stenosis        Past Surgical History:   Procedure Laterality Date    COLONOSCOPY N/A 05/07/2019    COLONOSCOPY performed by Elisabeth Saucedo MD at [facility] 18  09/2016    laminectomy    HX CERVICAL FUSION      HX COLONOSCOPY  05/07/2019    4 polyps- 2 tubular adenoma 2 hyperplastic polyp    HX HYSTERECTOMY      HX TONSILLECTOMY         No current facility-administered medications on file prior to encounter. Current Outpatient Medications on File Prior to Encounter   Medication Sig Dispense Refill    diclofenac (VOLTAREN) 1 % gel Apply 2 g to affected area four (4) times daily. 100 g 5    gabapentin (NEURONTIN) 600 mg tablet Take 1 Tablet by mouth three (3) times daily. 90 Tablet 1    DULoxetine (CYMBALTA) 60 mg capsule Take 1 capsule by mouth once daily 30 Capsule 5    umeclidinium-vilanteroL (ANORO ELLIPTA) 62.5-25 mcg/actuation inhaler Take 1 Puff by inhalation daily. 1 Each 5    glimepiride (AMARYL) 2 mg tablet Take 1 Tablet by mouth Daily (before breakfast).  30 Tablet 5    nicotine (NICODERM CQ) 14 mg/24 hr patch APPLY 1 PATCH TOPICALLY EVERY 24 HOURS FOR 30 DAYS 30 Patch 0    Insulin Needles, Disposable, 31 gauge x 5/16\" ndle Victoza pen needle use 1 daily 1 Each 11    albuterol (PROVENTIL HFA, VENTOLIN HFA, PROAIR HFA) 90 mcg/actuation inhaler INHALE ONE PUFF BY MOUTH EVERY 6 HOURS AS NEEDED FOR WHEEZING 1 Each 2    liraglutide (VICTOZA) 0.6 mg/0.1 mL (18 mg/3 mL) pnij 0.6 mg by SubCUTAneous route daily. 0.6 mg 3    varenicline (CHANTIX) 1 mg tablet Take 1 tablet by mouth twice daily 60 Tablet 0    albuterol-ipratropium (DUO-NEB) 2.5 mg-0.5 mg/3 ml nebu 3 mL by Nebulization route every six (6) hours as needed for Wheezing, Shortness of Breath or Cough. Use with a flare up of COPD. Indications: bronchi muscle spasm resulting from COPD 50 Nebule 5    Nebulizer & Compressor machine 1 Each by Does Not Apply route every four (4) hours as needed for Wheezing or Shortness of Breath. 1 Each 0       Allergies   Allergen Reactions    Oxycodone Hives and Itching    Zolpidem Itching       Family History   Problem Relation Age of Onset    Cancer Mother         colon    Colon Cancer Mother     Lung Disease Mother     Cancer Father 79        lung    Lung Cancer Father     Thyroid Disease Father     Diabetes Sister     Cancer Sister         uterine    Breast Cancer Sister         age 39    Breast Cancer Brother     Cancer Brother 27        colon       Social History     Socioeconomic History    Marital status: LEGALLY     Number of children: 2    Highest education level: 10th grade   Occupational History    Occupation: disabled   Tobacco Use    Smoking status: Every Day     Packs/day: 0.50     Years: 45.00     Pack years: 22.50     Types: Cigarettes     Start date: 1/1/1975    Smokeless tobacco: Never   Vaping Use    Vaping Use: Never used   Substance and Sexual Activity    Alcohol use:  Yes     Alcohol/week: 32.0 standard drinks     Types: 32 Drinks containing 0.5 oz of alcohol per week    Drug use: Not Currently   Other Topics Concern     Service No    Blood Transfusions No    Seat Belt Yes    Self-Exams Yes Social History Narrative    Lives with friends         Review of Systems:       General: Denies headache, lethargy, fever, weight loss  Ears/Nose/Throat: Denies ear discharge, drainage, nosebleeds, hoarse voice, dental problems  Cardiovascular: Denies chest pain, shortness of breath  Lungs: Denies chest pain, breathing problems, wheezing, pneumonia  Stomach: Denies stomach pain, heartburn, constipation, irritable bowel  Skin: Denies rash, sores, open wounds  Musculoskeletal: Admits to knee pain, no deformity. Genitourinary: Denies dysuria, hematuria, polyuria  Gastrointestinal: Denies constipation, obstipation, diarrhea  Neurological: Denies changes in sight, smell, hearing, taste, seizures. Denies loss of consciousness. Psychiatric: Denies depression, sleep pattern changes, anxiety, change in personality  Endocrine: Denies mood swings, heat or cold intolerance  Hematologic/Lymphatic: Denies anemia, purpura, petechia  Allergic/Immunologic: Denies swelling of throat, pain or swelling at lymph nodes      Physical Examination:    There were no vitals taken for this visit. General: AOX3, no apparent distress  Psychiatric: mood and affect appropriate  Lungs: breathing is symmetric and unlabored bilaterally  Heart: regular rate and rhythm  Abdomen: no guarding  Head: normocephalic, atraumatic  Skin: No significant abnormalities, good turgor  Sensation intact to light touch: L1-S1 dermatomes  Muscular exam: 5/5 strength in all major muscle groups unless noted in specialty exam.    Extremities:      Left upper extremity: Full active and passive range of motion without pain, deformity, no open wound, strength 5/5 in all major muscle groups. Right upper extremity: Full active and passive range of motion without pain, deformity, no open wound, strength 5/5 in all major muscle groups.     Right lower extremity: Full active and passive range of motion without pain, deformity, no open wound, strength 5/5 in all major muscle groups. Left lower extremity:  No deformity is noted. Range of motion of the knee is limited. Ligamentous testing of the knee indicates stability of the the MCL, LCL, PCL, and ACL. Lachman's, anterior and posterior drawer tests are specifically negative. Medial joint line tenderness to palpation is noted. Popliteal area is unremarkable. 1+  for effusion. No patellar crepitus. Patella tracks centrally with a negative apprehension and grind test.  Pivot shift is negative. Strength testing is indicative of 5/5 strength at hip flexion, extension, knee flexion and extension, tibialis anterior, EHL, and FHL. Sensation is intact to light touch in the L1-S1 dermatomes. Capillary refill is less than 2 seconds in the toes. Diagnostics:    Pertinent Xrays:  Xrays are available of the left knee. Bone on bone osteoarthritic changes of the left knee, osteophytes and subchondral sclerosis is noted. Assessment: Osteoarthritis left knee    Plan: This patient and I did discuss the many options in treating knee osteoarthritis. We did discuss that we could continue to seek out nonoperative modalities, such as: NSAIDs, oral and topical analgesics, knee injections, knee braces, physical therapy, stretching, strengthening, and weight loss strategies, activity modification, ambulatory assistive devices. The patient stated their understanding with this, but would like to proceed with surgical management in the form of a total knee arthroplasty. We did discuss the risks of surgery which include but are not limited to infection, nerve or blood vessel damage, failure of fixation, failure of any possible implant, need for reoperation, postoperative pain and swelling, intra-or postoperative fracture, postoperative dislocation, leg length inequality, need for reoperation, implant failure, death, disability, organ dysfunction, wound healing issues, DVT, PE, and the need for further procedures.   The patient did freely state their understanding and satisfaction with our discussion. We will proceed after medical clearances. The patient was counseled at length about the risks of carmine Covid-19 during their perioperative period and any recovery window from their procedure. The patient was made aware that carmine Covid-19  may worsen their prognosis for recovering from their procedure and lend to a higher morbidity and/or mortality risk. All material risks, benefits, and reasonable alternatives including postponing the procedure were discussed. The patient does  wish to proceed with the procedure at this time.

## 2022-12-05 NOTE — PROGRESS NOTES
Ortho / Neurosurgery NP Note    POD# 0  s/p LEFT TOTAL KNEE ARTHROPLASTY   Pt seen with RN at bedside in PACU. Pt resting on stretcher, in NAD. Reports BLE numbness has resolved, reports some tingling  Post-op pain  tolerable, recently medicated with oxycodone 10 mg   Dr. Roxane Jackson has discussed with her pain management for discharge using prn Tramadol given history of heavy daily alcohol intake and positive drug screen for cocaine   She is very motivated and determined to return home tonight   Denies nausea. Tolerating clears. VSS Afebrile. 2L NC     Visit Vitals  /67 (BP 1 Location: Left upper arm, BP Patient Position: At rest)   Pulse 67   Temp 97.9 °F (36.6 °C)   Resp 21   Ht 5' 7\" (1.702 m)   Wt 76.2 kg (167 lb 15.9 oz)   LMP  (LMP Unknown)   SpO2 97%   BMI 26.31 kg/m²       Voiding status: due to void   Unmeasurable Output  Urine Occurrence(s): 1 (Patient voided in preop & sample sent to lab) (12/05/22 1007)      Labs    Lab Results   Component Value Date/Time    HGB 14.9 11/30/2022 01:12 PM      Lab Results   Component Value Date/Time    INR 1.2 (H) 11/30/2022 01:12 PM      Lab Results   Component Value Date/Time    Sodium 139 11/30/2022 01:12 PM    Potassium 3.8 11/30/2022 01:12 PM    Chloride 106 11/30/2022 01:12 PM    CO2 30 11/30/2022 01:12 PM    Glucose 157 (H) 11/30/2022 01:12 PM    BUN 12 11/30/2022 01:12 PM    Creatinine 0.72 11/30/2022 01:12 PM    Calcium 9.3 11/30/2022 01:12 PM     Recent Glucose Results:   Lab Results   Component Value Date/Time    GLUCPOC 118 (H) 12/05/2022 11:15 AM           Body mass index is 26.31 kg/m². : A BMI > 30 is classified as obesity and > 40 is classified as morbid obesity. Dressing c. d. I - ace wrap in place   Cryotherapy in place over incision  Calves soft and supple; No pain with passive stretch  Sensation and motor intact.  +PF/DF/EHL intact 5/5  SCDs for mechanical DVT proph while in bed     PLAN:  1) PT BID - WBAT  2) Aspirin 325 mg PO BID for DVT Prophylaxis   3) GI Prophylaxis - Pepcid  4) Pain control - scheduled tylenol, prn tramadol   5) Readniess for discharge:     [x] Vital Signs stable    [] Hgb stable    [] + Voiding    [x] Wound intact, drainage minimal    [x] Tolerating PO intake     [] Cleared by PT (OT if applicable)     [] Stair training completed (if applicable)    [] Independent / Contact Guard Assist (household distance)     [] Bed mobility     [] Car transfers     [] ADLs    [x] Adequate pain control on oral medication alone     Discharge home later today if able to void and clear PT. Plans to return home with [facility] and family's support.      Cayla Hopkins NP

## 2022-12-07 ENCOUNTER — TELEPHONE (OUTPATIENT)
Dept: ORTHOPEDIC SURGERY | Age: 62
End: 2022-12-07

## 2022-12-07 NOTE — TELEPHONE ENCOUNTER
Spoke with patient's daughter in law. Advised per Dr. Abernathy Denton that she should keep incision site dry and not to shower.  I scheduled her to come in tomorrow for wound ck

## 2022-12-07 NOTE — TELEPHONE ENCOUNTER
I returned the patient's phone call and the patient informed me she has blisters under the tape she received during surgery. Patient states she is allergic to latex and believes that this is a reaction to the tape.  Patient can be reached at [phone]

## 2022-12-07 NOTE — TELEPHONE ENCOUNTER
Patient's daughter in law called to say Patient's bandages began to peel off and under that exposed fluid filled blisters around the surgical site. Her daughter in law applied a new  bandage but when the patient got in the shower it and the surgical site got wet and now the bandages are peeling off. Patient's daughter in law  is requesting a return phone call [phone].

## 2022-12-08 ENCOUNTER — OFFICE VISIT (OUTPATIENT)
Dept: ORTHOPEDIC SURGERY | Age: 62
End: 2022-12-08
Payer: MEDICAID

## 2022-12-08 ENCOUNTER — TELEPHONE (OUTPATIENT)
Dept: ORTHOPEDIC SURGERY | Age: 62
End: 2022-12-08

## 2022-12-08 VITALS
TEMPERATURE: 98.7 F | HEIGHT: 67 IN | DIASTOLIC BLOOD PRESSURE: 77 MMHG | HEART RATE: 87 BPM | SYSTOLIC BLOOD PRESSURE: 155 MMHG | BODY MASS INDEX: 26.31 KG/M2 | OXYGEN SATURATION: 99 %

## 2022-12-08 DIAGNOSIS — Z96.652 AFTERCARE FOLLOWING LEFT KNEE JOINT REPLACEMENT SURGERY: ICD-10-CM

## 2022-12-08 DIAGNOSIS — M17.0 BILATERAL PRIMARY OSTEOARTHRITIS OF KNEE: Primary | ICD-10-CM

## 2022-12-08 DIAGNOSIS — Z47.1 AFTERCARE FOLLOWING LEFT KNEE JOINT REPLACEMENT SURGERY: ICD-10-CM

## 2022-12-08 PROCEDURE — 99024 POSTOP FOLLOW-UP VISIT: CPT | Performed by: ORTHOPAEDIC SURGERY

## 2022-12-08 RX ORDER — SULFAMETHOXAZOLE AND TRIMETHOPRIM 800; 160 MG/1; MG/1
1 TABLET ORAL 2 TIMES DAILY
Qty: 14 TABLET | Refills: 0 | Status: SHIPPED | OUTPATIENT
Start: 2022-12-08 | End: 2022-12-08 | Stop reason: CLARIF

## 2022-12-08 RX ORDER — SULFAMETHOXAZOLE AND TRIMETHOPRIM 800; 160 MG/1; MG/1
1 TABLET ORAL 2 TIMES DAILY
Qty: 14 TABLET | Refills: 0 | Status: SHIPPED | OUTPATIENT
Start: 2022-12-08 | End: 2022-12-15

## 2022-12-08 RX ORDER — HYDROCODONE BITARTRATE AND ACETAMINOPHEN 5; 325 MG/1; MG/1
1 TABLET ORAL
Qty: 21 TABLET | Refills: 0 | Status: SHIPPED | OUTPATIENT
Start: 2022-12-08 | End: 2022-12-08 | Stop reason: CLARIF

## 2022-12-08 RX ORDER — HYDROCODONE BITARTRATE AND ACETAMINOPHEN 5; 325 MG/1; MG/1
1 TABLET ORAL
Qty: 21 TABLET | Refills: 0 | Status: SHIPPED | OUTPATIENT
Start: 2022-12-08 | End: 2022-12-15

## 2022-12-08 NOTE — LETTER
12/9/2022    Patient: Piyush Dick   YOB: 1960   Date of Visit: 12/8/2022     Celestino Beard MD  [address]  [place]    Dear Celestino Beard MD,      Thank you for referring Ms. Latonya Pastor to [facility] for evaluation. My notes for this consultation are attached. If you have questions, please do not hesitate to call me. I look forward to following your patient along with you.       Sincerely,    Abimbola Samuels, DO

## 2022-12-08 NOTE — PROGRESS NOTES
Identified pt with two pt identifiers (name and ). Reviewed chart in preparation for visit and have obtained necessary documentation. Mustapha Burks is a 62 y.o. female  Chief Complaint   Patient presents with    Surgical Follow-up     Wound check LT Knee     Visit Vitals  BP (!) 155/77 (BP 1 Location: Left upper arm, BP Patient Position: Sitting, BP Cuff Size: Adult)   Pulse 87   Temp 98.7 °F (37.1 °C) (Tympanic)   Ht 5' 7\" (1.702 m)   LMP  (LMP Unknown)   SpO2 99%   BMI 26.31 kg/m²     1. Have you been to the ER, urgent care clinic since your last visit? Hospitalized since your last visit? No    2. Have you seen or consulted any other health care providers outside of the [address] [place] [address] since your last visit? Include any pap smears or colon screening.  No

## 2022-12-09 NOTE — PROGRESS NOTES
is here for a follow up visit from a total knee, she has had some blistering. I asked her to follow up earlier. Current Outpatient Medications on File Prior to Visit   Medication Sig Dispense Refill    aspirin delayed-release 325 mg tablet Take 1 Tablet by mouth two (2) times a day for 30 days. 60 Tablet 0    famotidine (PEPCID) 20 mg tablet Take 1 Tablet by mouth two (2) times a day for 30 days. 60 Tablet 0    traMADoL (ULTRAM) 50 mg tablet Take 1-2 Tablets by mouth every six (6) hours as needed for Pain for up to 7 days. Max Daily Amount: 400 mg. One tab for mild to moderate pain level 1-6, or 2 tabs for severe pain level 7-10 30 Tablet 0    polyethylene glycol (MIRALAX) 17 gram packet Take 1 Packet by mouth daily for 7 days. 7 Packet 0    senna-docusate (PERICOLACE) 8.6-50 mg per tablet Take 1 Tablet by mouth two (2) times a day for 7 days. 14 Tablet 0    celecoxib (CELEBREX) 200 mg capsule Take 1 Capsule by mouth two (2) times a day for 30 days. 60 Capsule 0    diclofenac (VOLTAREN) 1 % gel Apply 2 g to affected area four (4) times daily. 100 g 5    gabapentin (NEURONTIN) 600 mg tablet Take 1 Tablet by mouth three (3) times daily. 90 Tablet 1    DULoxetine (CYMBALTA) 60 mg capsule Take 1 capsule by mouth once daily 30 Capsule 5    umeclidinium-vilanteroL (ANORO ELLIPTA) 62.5-25 mcg/actuation inhaler Take 1 Puff by inhalation daily. 1 Each 5    glimepiride (AMARYL) 2 mg tablet Take 1 Tablet by mouth Daily (before breakfast). 30 Tablet 5    nicotine (NICODERM CQ) 14 mg/24 hr patch APPLY 1 PATCH TOPICALLY EVERY 24 HOURS FOR 30 DAYS 30 Patch 0    Insulin Needles, Disposable, 31 gauge x 5/16\" ndle Victoza pen needle use 1 daily 1 Each 11    albuterol (PROVENTIL HFA, VENTOLIN HFA, PROAIR HFA) 90 mcg/actuation inhaler INHALE ONE PUFF BY MOUTH EVERY 6 HOURS AS NEEDED FOR WHEEZING 1 Each 2    liraglutide (VICTOZA) 0.6 mg/0.1 mL (18 mg/3 mL) pnij 0.6 mg by SubCUTAneous route daily.  0.6 mg 3 albuterol-ipratropium (DUO-NEB) 2.5 mg-0.5 mg/3 ml nebu 3 mL by Nebulization route every six (6) hours as needed for Wheezing, Shortness of Breath or Cough. Use with a flare up of COPD. Indications: bronchi muscle spasm resulting from COPD 50 Nebule 5    Nebulizer & Compressor machine 1 Each by Does Not Apply route every four (4) hours as needed for Wheezing or Shortness of Breath. 1 Each 0    varenicline (CHANTIX) 1 mg tablet Take 1 tablet by mouth twice daily (Patient not taking: No sig reported) 60 Tablet 0     No current facility-administered medications on file prior to visit. ROS:  General: denies agitation, major chest pain, unexpected weakness  Patient states pain appropriate  Skin: healing wound has no major bleeding, though early healing still noted. Minor blistering. Strength: appropriate weakness of involved extremity is resolving since surgery      Physical Examination:    Blood pressure (!) 155/77, pulse 87, temperature 98.7 °F (37.1 °C), temperature source Tympanic, height 5' 7\" (1.702 m), SpO2 99 %.     Dressing: clean, dry, intact  Skin: ecchymosis and minor erythema over patella  Sensation intact to light touch at level of wound and distally  Strength is not tested  Range of motion is limited  Distal swelling is noted, but appropriate for postoperative course  Distal capillary refill less than 2 seconds      Imaging:    Postoperative imaging: none      Assessment: Status post left knee replacement    Plan:  Patient will work aggressively on PT, as she has a high chance of late flexion contracture  Wound care was reviewed  Weightbearing will be full through the leg  Deep Venous Thrombosis Prophylaxis  Antibiotics, prescriptions given  Follow up will be at 1 weeks from now,  left knee xrays on follow up

## 2022-12-12 DIAGNOSIS — M17.0 BILATERAL PRIMARY OSTEOARTHRITIS OF KNEE: ICD-10-CM

## 2022-12-14 ENCOUNTER — TELEPHONE (OUTPATIENT)
Dept: ORTHOPEDIC SURGERY | Age: 62
End: 2022-12-14

## 2022-12-14 DIAGNOSIS — Z47.1 AFTERCARE FOLLOWING LEFT KNEE JOINT REPLACEMENT SURGERY: Primary | ICD-10-CM

## 2022-12-14 DIAGNOSIS — Z96.652 S/P TOTAL KNEE ARTHROPLASTY, LEFT: ICD-10-CM

## 2022-12-14 DIAGNOSIS — Z96.652 AFTERCARE FOLLOWING LEFT KNEE JOINT REPLACEMENT SURGERY: Primary | ICD-10-CM

## 2022-12-14 NOTE — TELEPHONE ENCOUNTER
Spoke with the patient she agreed on [facility] in [facility] for OP PT.     This referral has been faxed out

## 2022-12-15 RX ORDER — TRAMADOL HYDROCHLORIDE 50 MG/1
50-100 TABLET ORAL
Qty: 30 TABLET | Refills: 0 | Status: SHIPPED | OUTPATIENT
Start: 2022-12-15 | End: 2022-12-22

## 2022-12-16 ENCOUNTER — DOCUMENTATION ONLY (OUTPATIENT)
Dept: ORTHOPEDIC SURGERY | Age: 62
End: 2022-12-16

## 2022-12-16 DIAGNOSIS — Z47.1 AFTERCARE FOLLOWING LEFT KNEE JOINT REPLACEMENT SURGERY: Primary | ICD-10-CM

## 2022-12-16 DIAGNOSIS — Z96.652 AFTERCARE FOLLOWING LEFT KNEE JOINT REPLACEMENT SURGERY: Primary | ICD-10-CM

## 2022-12-16 RX ORDER — HYDROCODONE BITARTRATE AND ACETAMINOPHEN 5; 325 MG/1; MG/1
1 TABLET ORAL
Qty: 21 TABLET | Refills: 0 | Status: SHIPPED | OUTPATIENT
Start: 2022-12-16 | End: 2022-12-23

## 2022-12-16 NOTE — TELEPHONE ENCOUNTER
As she and I spoke, the norco is only for sleep and breakthrough pain. This will be the last norco prescription, as she also has tramadol.

## 2022-12-16 NOTE — PROGRESS NOTES
Per Dr. Elizabeth Milton she and I spoke, the norco is only for sleep and breakthrough pain. This will be the last norco prescription, as she also has tramadol. \" 12/16/22

## 2022-12-20 ENCOUNTER — VIRTUAL VISIT (OUTPATIENT)
Dept: INTERNAL MEDICINE CLINIC | Age: 62
End: 2022-12-20
Payer: MEDICAID

## 2022-12-20 DIAGNOSIS — J11.1 INFLUENZA: Primary | ICD-10-CM

## 2022-12-20 DIAGNOSIS — J44.0 COPD (CHRONIC OBSTRUCTIVE PULMONARY DISEASE) WITH ACUTE BRONCHITIS (HCC): ICD-10-CM

## 2022-12-20 DIAGNOSIS — M17.0 PRIMARY OSTEOARTHRITIS OF BOTH KNEES: ICD-10-CM

## 2022-12-20 DIAGNOSIS — J20.9 COPD (CHRONIC OBSTRUCTIVE PULMONARY DISEASE) WITH ACUTE BRONCHITIS (HCC): ICD-10-CM

## 2022-12-20 PROCEDURE — 99214 OFFICE O/P EST MOD 30 MIN: CPT | Performed by: FAMILY MEDICINE

## 2022-12-20 RX ORDER — OSELTAMIVIR PHOSPHATE 75 MG/1
75 CAPSULE ORAL 2 TIMES DAILY
Qty: 10 CAPSULE | Refills: 0 | Status: SHIPPED | OUTPATIENT
Start: 2022-12-20 | End: 2022-12-25

## 2022-12-20 RX ORDER — PREDNISONE 20 MG/1
40 TABLET ORAL
Qty: 10 TABLET | Refills: 0 | Status: SHIPPED | OUTPATIENT
Start: 2022-12-20 | End: 2022-12-25

## 2022-12-20 RX ORDER — AZITHROMYCIN 250 MG/1
250 TABLET, FILM COATED ORAL SEE ADMIN INSTRUCTIONS
Qty: 6 TABLET | Refills: 0 | Status: SHIPPED | OUTPATIENT
Start: 2022-12-20

## 2022-12-20 RX ORDER — DICLOFENAC SODIUM 10 MG/G
2 GEL TOPICAL 4 TIMES DAILY
Qty: 100 G | Refills: 5 | Status: SHIPPED | OUTPATIENT
Start: 2022-12-20

## 2022-12-20 NOTE — PROGRESS NOTES
Chief Complaint   Patient presents with    Cough     Patient is aware that this is a Virtual Visit or Phone Call Only doctor's visit. Patient has not been out of the country in (14 months), NO diarrhea, NO cough, NO chest conjestion, NO temp. Pt has not been around anyone with these symptoms. Health Maintenance reviewed. I have reviewed the patient's medical history in detail and updated the computerized patient record. Have you been to the ER, urgent care clinic since your last visit? No Hospitalized since your last visit? No     2. Have you seen or consulted any other health care providers outside of the [address] [place] [address] since your last visit? No Include any pap smears or colon screening. Encouraged pt to discuss pt's wishes with spouse/partner/family and bring them in the next appt to follow thru with the Advanced Directive      Fall Risk Assessment, last 12 mths 4/2/2021   Able to walk? Yes   Fall in past 12 months? 0   Do you feel unsteady? 1   Are you worried about falling 0   Number of falls in past 12 months -   Fall with injury?  -       3 most recent PHQ Screens 12/8/2022   Little interest or pleasure in doing things Not at all   Feeling down, depressed, irritable, or hopeless Not at all   Total Score PHQ 2 0   Trouble falling or staying asleep, or sleeping too much -   Feeling tired or having little energy -   Poor appetite, weight loss, or overeating -   Feeling bad about yourself - or that you are a failure or have let yourself or your family down -   Trouble concentrating on things such as school, work, reading, or watching TV -   Moving or speaking so slowly that other people could have noticed; or the opposite being so fidgety that others notice -   Thoughts of being better off dead, or hurting yourself in some way -   How difficult have these problems made it for you to do your work, take care of your home and get along with others -       Abuse Screening Questionnaire 9/14/2021   Do you ever feel afraid of your partner? N   Are you in a relationship with someone who physically or mentally threatens you? N   Is it safe for you to go home?  Y       ADL Assessment 9/14/2021   Feeding yourself No Help Needed   Getting from bed to chair No Help Needed   Getting dressed No Help Needed   Bathing or showering No Help Needed   Walk across the room (includes cane/walker) No Help Needed   Using the telphone No Help Needed   Taking your medications No Help Needed   Preparing meals No Help Needed   Managing money (expenses/bills) No Help Needed   Moderately strenuous housework (laundry) No Help Needed   Shopping for personal items (toiletries/medicines) Help Needed   Shopping for groceries Help Needed   Driving Help Needed   Climbing a flight of stairs No Help Needed   Getting to places beyond walking distances Help Needed

## 2022-12-20 NOTE — PROGRESS NOTES
Subjective:   Kori Walker is a 65 y.o. female who complains of congestion, cough described as productive of yellow sputum, fevers up to 101 degrees, yellow nasal discharge, and diarrhea for 4 days, stable since that time. She denies a history of anorexia, rash on body. Couple of her family members have been diagnosed with influenza  Evaluation to date: Home COVID test was negative  Treatment to date: OTC products. Patient does smoke cigarettes. Relevant PMH: Asthma and COPD. Recovering from recent knee replacement surgery, says minimal usage of narcotic agents. Allergies   Allergen Reactions    Oxycodone Hives and Itching    Zolpidem Itching         Patient Active Problem List    Diagnosis Date Noted    Unilateral primary osteoarthritis, left knee 12/05/2022    Hepatitis C virus carrier state ([facility] [address] 04/26/2021    Hyperglycemia due to type 2 diabetes mellitus ([facility] [address] 04/14/2021    Anxiety state 07/21/2020    Chronic obstructive pulmonary disease ([facility] [address] 07/21/2020    Hyperlipidemia 07/21/2020    Neck pain 07/21/2020    Osteoarthritis of knee 07/21/2020    Tobacco dependence syndrome 07/21/2020    Adenomatous colon polyp 05/16/2020    Narcotic dependence ([address] 04/07/2019    Encounter for diagnostic colonoscopy due to change in bowel habits 03/04/2019    Elevated liver enzymes 02/19/2019    Depression, major, recurrent, mild ([address] 12/21/2017    Restless legs 12/21/2017    Spinal stenosis 08/14/2017       Past Medical History:   Diagnosis Date    Arthritis     Asthma     At risk for sleep apnea     COPD (chronic obstructive pulmonary disease) (HCC)     Depression     Diabetes ([address]     Hemorrhoids     Hepatitis C     Occult blood positive stool     Spinal stenosis      Social History     Tobacco Use    Smoking status: Every Day     Packs/day: 0.50     Years: 45.00     Pack years: 22.50     Types: Cigarettes     Start date: 1/1/1975    Smokeless tobacco: Never   Substance Use Topics    Alcohol use:  Yes Alcohol/week: 32.0 standard drinks     Types: 32 Drinks containing 0.5 oz of alcohol per week        Review of Systems  Pertinent items are noted in HPI. Objective:     Visit Vitals  LMP  (LMP Unknown)     General:  alert, cooperative, no distress   Eyes:    Ears:    Sinuses:      Assessment/Plan:   influenza and asthma  Antibiotics per orders. RTC prn. Encounter Diagnoses   Name Primary? Influenza Yes    COPD (chronic obstructive pulmonary disease) with acute bronchitis (HCC)     Primary osteoarthritis of both knees      Orders Placed This Encounter    oseltamivir (TAMIFLU) 75 mg capsule    predniSONE (DELTASONE) 20 mg tablet    azithromycin (ZITHROMAX) 250 mg tablet    diclofenac (VOLTAREN) 1 % gel   . The patient was counseled on the dangers of tobacco use, and was advised to quit. Reviewed strategies to maximize success, including stress management.       Follow-up as needed

## 2022-12-28 ENCOUNTER — TELEPHONE (OUTPATIENT)
Dept: ORTHOPEDIC SURGERY | Age: 62
End: 2022-12-28

## 2022-12-28 NOTE — TELEPHONE ENCOUNTER
Attempted to call the patient to reschedule her 12/28/22 appointment as she had not arrived to x-rays by the requested time of 12:00 pm to get them completed in time for her 1:30 pm appointment with Dr. Analia Pinto. Patient did not answer her phone and her mailbox was full. Her appointment has been cancelled.

## 2022-12-30 DIAGNOSIS — Z96.652 S/P TOTAL KNEE ARTHROPLASTY, LEFT: ICD-10-CM

## 2022-12-30 RX ORDER — TRAMADOL HYDROCHLORIDE 50 MG/1
50-100 TABLET ORAL
Qty: 30 TABLET | Refills: 0 | Status: SHIPPED | OUTPATIENT
Start: 2022-12-30 | End: 2023-01-06

## 2023-01-09 DIAGNOSIS — Z96.652 S/P TOTAL KNEE ARTHROPLASTY, LEFT: ICD-10-CM

## 2023-01-09 RX ORDER — TRAMADOL HYDROCHLORIDE 50 MG/1
50-100 TABLET ORAL
Qty: 30 TABLET | Refills: 0 | Status: SHIPPED | OUTPATIENT
Start: 2023-01-09 | End: 2023-01-16

## 2023-01-09 NOTE — TELEPHONE ENCOUNTER
Returned call and scheduled pt an appt with Dr. Larry Cunha 10:30AM Friday. PAST SURGICAL HISTORY:  Bilateral cataracts Left 11/2020, Right 5/21    H/O lumpectomy Right breast 2013    History of tonsillectomy as a child

## 2023-01-17 DIAGNOSIS — J20.9 COPD (CHRONIC OBSTRUCTIVE PULMONARY DISEASE) WITH ACUTE BRONCHITIS (HCC): ICD-10-CM

## 2023-01-17 DIAGNOSIS — J44.9 CHRONIC OBSTRUCTIVE PULMONARY DISEASE, UNSPECIFIED COPD TYPE (HCC): ICD-10-CM

## 2023-01-17 DIAGNOSIS — J11.1 INFLUENZA: ICD-10-CM

## 2023-01-17 DIAGNOSIS — J44.0 COPD (CHRONIC OBSTRUCTIVE PULMONARY DISEASE) WITH ACUTE BRONCHITIS (HCC): ICD-10-CM

## 2023-01-17 DIAGNOSIS — M48.00 SPINAL STENOSIS, UNSPECIFIED SPINAL REGION: ICD-10-CM

## 2023-01-17 RX ORDER — ALBUTEROL SULFATE 90 UG/1
AEROSOL, METERED RESPIRATORY (INHALATION)
Qty: 18 G | Refills: 0 | Status: SHIPPED | OUTPATIENT
Start: 2023-01-17

## 2023-01-17 RX ORDER — PREDNISONE 20 MG/1
TABLET ORAL
Qty: 10 TABLET | Refills: 0 | Status: SHIPPED | OUTPATIENT
Start: 2023-01-17

## 2023-01-17 RX ORDER — OSELTAMIVIR PHOSPHATE 75 MG/1
CAPSULE ORAL
Qty: 10 CAPSULE | Refills: 0 | Status: SHIPPED | OUTPATIENT
Start: 2023-01-17

## 2023-01-17 RX ORDER — GABAPENTIN 600 MG/1
TABLET ORAL
Qty: 90 TABLET | Refills: 0 | Status: SHIPPED | OUTPATIENT
Start: 2023-01-17

## 2023-01-18 DIAGNOSIS — Z96.652 S/P TOTAL KNEE ARTHROPLASTY, LEFT: ICD-10-CM

## 2023-01-18 RX ORDER — TRAMADOL HYDROCHLORIDE 50 MG/1
50-100 TABLET ORAL
Qty: 30 TABLET | Refills: 0 | Status: SHIPPED | OUTPATIENT
Start: 2023-01-18 | End: 2023-01-25

## 2023-01-23 ENCOUNTER — OFFICE VISIT (OUTPATIENT)
Dept: ORTHOPEDIC SURGERY | Age: 63
End: 2023-01-23
Payer: MEDICAID

## 2023-01-23 ENCOUNTER — HOSPITAL ENCOUNTER (OUTPATIENT)
Dept: GENERAL RADIOLOGY | Age: 63
Discharge: HOME | End: 2023-01-23
Payer: MEDICAID

## 2023-01-23 VITALS
DIASTOLIC BLOOD PRESSURE: 90 MMHG | OXYGEN SATURATION: 97 % | HEIGHT: 67 IN | TEMPERATURE: 98 F | SYSTOLIC BLOOD PRESSURE: 142 MMHG | WEIGHT: 172 LBS | HEART RATE: 72 BPM | BODY MASS INDEX: 27 KG/M2

## 2023-01-23 DIAGNOSIS — Z96.652 AFTERCARE FOLLOWING LEFT KNEE JOINT REPLACEMENT SURGERY: ICD-10-CM

## 2023-01-23 DIAGNOSIS — Z96.652 S/P TOTAL KNEE ARTHROPLASTY, LEFT: ICD-10-CM

## 2023-01-23 DIAGNOSIS — Z47.1 AFTERCARE FOLLOWING LEFT KNEE JOINT REPLACEMENT SURGERY: ICD-10-CM

## 2023-01-23 PROCEDURE — 73560 X-RAY EXAM OF KNEE 1 OR 2: CPT

## 2023-01-23 NOTE — PROGRESS NOTES
Identified pt with two pt identifiers (name and ). Reviewed chart in preparation for visit and have obtained necessary documentation. Romero James is a 65 y.o. female  Chief Complaint   Patient presents with    Surgical Follow-up     LT Knee     Visit Vitals  BP (!) 142/90 (BP 1 Location: Left upper arm, BP Patient Position: Sitting, BP Cuff Size: Large adult)   Pulse 72   Temp 98 °F (36.7 °C) (Tympanic)   Ht 5' 7\" (1.702 m)   Wt 172 lb (78 kg)   LMP  (LMP Unknown)   SpO2 97%   BMI 26.94 kg/m²     1. Have you been to the ER, urgent care clinic since your last visit? Hospitalized since your last visit? No    2. Have you seen or consulted any other health care providers outside of the [address] [place] [address] since your last visit? Include any pap smears or colon screening.  No

## 2023-01-23 NOTE — LETTER
1/24/2023    Patient: Татьяна Guevara   YOB: 1960   Date of Visit: 1/23/2023     Fred Rayo MD  [address]  [place]    Dear Fred Rayo MD,      Thank you for referring Ms. Ghazala Mary to [facility] for evaluation. My notes for this consultation are attached. If you have questions, please do not hesitate to call me. I look forward to following your patient along with you.       Sincerely,    Jim Reyna, DO

## 2023-01-24 RX ORDER — TRAMADOL HYDROCHLORIDE 50 MG/1
50-100 TABLET ORAL
Qty: 30 TABLET | Refills: 0 | Status: SHIPPED | OUTPATIENT
Start: 2023-01-24 | End: 2023-01-31

## 2023-01-24 NOTE — PROGRESS NOTES
1/24/2023    Chief Complaint: Right knee pain    Assessment: Osteoarthritis right knee    Plan: This patient and I did discuss the many options in treating knee osteoarthritis. We did discuss that we could continue to seek out nonoperative modalities, such as: NSAIDs, oral and topical analgesics, knee injections, knee braces, physical therapy, stretching, strengthening, and weight loss strategies, activity modification, ambulatory assistive devices. The patient stated their understanding with this, but would like to proceed with surgical management in the form of a total knee arthroplasty. We did discuss the risks of surgery which include but are not limited to infection, nerve or blood vessel damage, failure of fixation, failure of any possible implant, need for reoperation, postoperative pain and swelling, intra-or postoperative fracture, postoperative dislocation, leg length inequality, need for reoperation, implant failure, death, disability, organ dysfunction, wound healing issues, DVT, PE, and the need for further procedures. The patient did freely state their understanding and satisfaction with our discussion. We will proceed after medical clearances. The patient was counseled at length about the risks of carmine Covid-19 during their perioperative period and any recovery window from their procedure. The patient was made aware that carmine Covid-19  may worsen their prognosis for recovering from their procedure and lend to a higher morbidity and/or mortality risk. All material risks, benefits, and reasonable alternatives including postponing the procedure were discussed. The patient does  wish to proceed with the procedure at this time. HPI: This is a 65 y.o. female who complains of right knee pain. Onset was gradual.  The patient has had activity dependent pain for years.     The patient has tried activity modification, physical therapy exercises, injections have failed to provide relief. The pain is in the knee, it is severe in intensity. The patient feels unstable with the knee, fears falling, and has significant limitation with activities of daily living, recreation, and walks with a limp. Past Medical History:   Diagnosis Date    Arthritis     Asthma     At risk for sleep apnea     COPD (chronic obstructive pulmonary disease) (HCC)     Depression     Diabetes ([address]     Hemorrhoids     Hepatitis C     Occult blood positive stool     Spinal stenosis        Past Surgical History:   Procedure Laterality Date    COLONOSCOPY N/A 05/07/2019    COLONOSCOPY performed by Madan Loja MD at [place] [address]    HX [address] [facility]  09/2016    laminectomy    HX CERVICAL FUSION      HX COLONOSCOPY  05/07/2019    4 polyps- 2 tubular adenoma 2 hyperplastic polyp    HX HYSTERECTOMY      HX TONSILLECTOMY         Current Outpatient Medications on File Prior to Visit   Medication Sig Dispense Refill    predniSONE (DELTASONE) 20 mg tablet TAKE 2 TABLETS BY MOUTH ONCE DAILY WITH BREAKFAST FOR 5 DAYS 10 Tablet 0    oseltamivir (TAMIFLU) 75 mg capsule Take 1 capsule by mouth twice daily for 5 days 10 Capsule 0    gabapentin (NEURONTIN) 600 mg tablet TAKE 1 TABLET BY MOUTH THREE TIMES DAILY 90 Tablet 0    Ventolin HFA 90 mcg/actuation inhaler INHALE 1 PUFF BY MOUTH EVERY 6 HOURS AS NEEDED FOR WHEEZING 18 g 0    albuterol-ipratropium (DUO-NEB) 2.5 mg-0.5 mg/3 ml nebu 3 mL by Nebulization route every six (6) hours as needed for Wheezing, Shortness of Breath or Cough. Use with a flare up of COPD. Indications: bronchi muscle spasm resulting from COPD 100 Each 5    azithromycin (ZITHROMAX) 250 mg tablet Take 1 Tablet by mouth See Admin Instructions. Take two tablets today then one tablet daily for next 4 days 6 Tablet 0    diclofenac (VOLTAREN) 1 % gel Apply 2 g to affected area four (4) times daily.  100 g 5    budesonide (PULMICORT) 0.5 mg/2 mL nbsp USE 1 VIAL IN NEBULIZER THREE TIMES DAILY 120 mL 3    DULoxetine (CYMBALTA) 60 mg capsule Take 1 capsule by mouth once daily 30 Capsule 5    umeclidinium-vilanteroL (ANORO ELLIPTA) 62.5-25 mcg/actuation inhaler Take 1 Puff by inhalation daily. 1 Each 5    glimepiride (AMARYL) 2 mg tablet Take 1 Tablet by mouth Daily (before breakfast). 30 Tablet 5    nicotine (NICODERM CQ) 14 mg/24 hr patch APPLY 1 PATCH TOPICALLY EVERY 24 HOURS FOR 30 DAYS 30 Patch 0    Insulin Needles, Disposable, 31 gauge x 5/16\" ndle Victoza pen needle use 1 daily 1 Each 11    liraglutide (VICTOZA) 0.6 mg/0.1 mL (18 mg/3 mL) pnij 0.6 mg by SubCUTAneous route daily. 0.6 mg 3    varenicline (CHANTIX) 1 mg tablet Take 1 tablet by mouth twice daily 60 Tablet 0    Nebulizer & Compressor machine 1 Each by Does Not Apply route every four (4) hours as needed for Wheezing or Shortness of Breath. 1 Each 0     No current facility-administered medications on file prior to visit. Allergies   Allergen Reactions    Oxycodone Hives and Itching    Zolpidem Itching       Family History   Problem Relation Age of Onset    Cancer Mother         colon    Colon Cancer Mother     Lung Disease Mother     Cancer Father 79        lung    Lung Cancer Father     Thyroid Disease Father     Diabetes Sister     Cancer Sister         uterine    Breast Cancer Sister         age 39    Breast Cancer Brother     Cancer Brother 27        colon       Social History     Socioeconomic History    Marital status: LEGALLY     Number of children: 2    Highest education level: 10th grade   Occupational History    Occupation: disabled   Tobacco Use    Smoking status: Every Day     Packs/day: 0.50     Years: 45.00     Pack years: 22.50     Types: Cigarettes     Start date: 1/1/1975    Smokeless tobacco: Never   Vaping Use    Vaping Use: Never used   Substance and Sexual Activity    Alcohol use:  Yes     Alcohol/week: 32.0 standard drinks     Types: 32 Drinks containing 0.5 oz of alcohol per week    Drug use: Not Currently   Other Topics Concern     Service No    Blood Transfusions No    Seat Belt Yes    Self-Exams Yes   Social History Narrative    Lives with friends         Review of Systems:       General: Denies headache, lethargy, fever, weight loss  Ears/Nose/Throat: Denies ear discharge, drainage, nosebleeds, hoarse voice, dental problems  Cardiovascular: Denies chest pain, shortness of breath  Lungs: Denies chest pain, breathing problems, wheezing, pneumonia  Stomach: Denies stomach pain, heartburn, constipation, irritable bowel  Skin: Denies rash, sores, open wounds  Musculoskeletal: Admits to knee pain  Genitourinary: Denies dysuria, hematuria, polyuria  Gastrointestinal: Denies constipation, obstipation, diarrhea  Neurological: Denies changes in sight, smell, hearing, taste, seizures. Denies loss of consciousness. Psychiatric: Denies depression, sleep pattern changes, anxiety, change in personality  Endocrine: Denies mood swings, heat or cold intolerance  Hematologic/Lymphatic: Denies anemia, purpura, petechia  Allergic/Immunologic: Denies swelling of throat, pain or swelling at lymph nodes      Physical Examination:    Visit Vitals  BP (!) 142/90 (BP 1 Location: Left upper arm, BP Patient Position: Sitting, BP Cuff Size: Large adult)   Pulse 72   Temp 98 °F (36.7 °C) (Tympanic)   Ht 5' 7\" (1.702 m)   Wt 172 lb (78 kg)   SpO2 97%   BMI 26.94 kg/m²        General: AOX3, no apparent distress  Psychiatric: mood and affect appropriate  Lungs: breathing is symmetric and unlabored bilaterally  Heart: regular rate and rhythm  Abdomen: no guarding  Head: normocephalic, atraumatic  Skin: No significant abnormalities, good turgor  Sensation intact to light touch: L1-S1 dermatomes  Muscular exam: 5/5 strength in all major muscle groups unless noted in specialty exam.    Extremities:      Left upper extremity: Full active and passive range of motion without pain, deformity, no open wound, strength 5/5 in all major muscle groups.     Right upper extremity: Full active and passive range of motion without pain, deformity, no open wound, strength 5/5 in all major muscle groups. Left lower extremity: Full active and passive range of motion without pain, deformity, no open wound, strength 5/5 in all major muscle groups. Right lower extremity:  No deformity is noted. Range of motion of the knee is 10-90. Ligamentous testing of the knee indicates stability of the the MCL, LCL, PCL, and ACL. Lachman's, anterior and posterior drawer tests are specifically negative. Medial joint line tenderness to palpation. Popliteal area is unremarkable. 1+  for effusion. + patellar crepitus. Patella tracks centrally + grind test.  Pivot shift is negative. Strength testing is indicative of 5/5 strength at hip flexion, extension, knee flexion and extension, tibialis anterior, EHL, and FHL. Sensation is intact to light touch in the L1-S1 dermatomes. Capillary refill is less than 2 seconds in the toes. Diagnostics:    Pertinent Xrays:  Xrays are available of the right knee. Bone on bone osteoarthritic changes of the right knee, osteophytes and subchondral sclerosis is noted. Ms. Adam Romero has a reminder for a \"due or due soon\" health maintenance. I have asked that she contact her primary care provider for follow-up on this health maintenance.

## 2023-01-30 ENCOUNTER — TELEPHONE (OUTPATIENT)
Dept: ORTHOPEDIC SURGERY | Age: 63
End: 2023-01-30

## 2023-01-30 DIAGNOSIS — Z96.652 S/P TOTAL KNEE ARTHROPLASTY, LEFT: ICD-10-CM

## 2023-01-30 RX ORDER — TRAMADOL HYDROCHLORIDE 50 MG/1
50-100 TABLET ORAL
Qty: 30 TABLET | Refills: 0 | Status: SHIPPED | OUTPATIENT
Start: 2023-01-30 | End: 2023-02-06

## 2023-01-30 NOTE — TELEPHONE ENCOUNTER
Contacted patient to schedule surgery. Scheduled for 2/20/23. Advised patient that clearances from Dr. Peggy Singh would be required, and would need to be received no less than 2 business days before surgery. Patient advised to contact the office(s) to make pre op appts as soon as possible. Patient verbalized understanding and was encouraged to contact our office with any questions or concerns regarding upcoming surgery or required clearances. Clearance letters faxed to [phone].  Confirmation was received.         ----- Message from Jia Carroll DO sent at 1/24/2023  1:34 PM EST -----  Diagnosis: Unilateral Primary Osteoarthritis, right knee M17.11  Procedure: Right total knee arthroplasty   CPT: 22384  Operative minutes: 110  Inpatient  Location: [facility] Main OR  PAT: Yes  Class: Yes  Special Equipment: Regular table, Budny foot long, Jackman Triathlon, Plan for cemented TKA, foot long for prepping  Staffing: Retractor long  Anesthesia: spinal with adductor canal block

## 2023-01-31 ENCOUNTER — DOCUMENTATION ONLY (OUTPATIENT)
Dept: INTERNAL MEDICINE CLINIC | Age: 63
End: 2023-01-31

## 2023-01-31 ENCOUNTER — VIRTUAL VISIT (OUTPATIENT)
Dept: INTERNAL MEDICINE CLINIC | Age: 63
End: 2023-01-31

## 2023-01-31 NOTE — PROGRESS NOTES
Chief Complaint   Patient presents with    Positive For Covid-19     Extreme SOB, refuses to go to the ER     Patient is aware that this is a Virtual Visit or Phone Call Only doctor's visit. Patient has not been out of the country in (14 months), NO diarrhea, NO cough, NO chest conjestion, NO temp. Pt has not been around anyone with these symptoms. Health Maintenance reviewed. I have reviewed the patient's medical history in detail and updated the computerized patient record. Have you been to the ER, urgent care clinic since your last visit? no  Hospitalized since your last visit?  no    2. Have you seen or consulted any other health care providers outside of the [address] [place] [address] since your last visit? No Include any pap smears or colon screening. Encouraged pt to discuss pt's wishes with spouse/partner/family and bring them in the next appt to follow thru with the Advanced Directive      Fall Risk Assessment, last 12 mths 4/2/2021   Able to walk? Yes   Fall in past 12 months? 0   Do you feel unsteady? 1   Are you worried about falling 0   Number of falls in past 12 months -   Fall with injury?  -       3 most recent PHQ Screens 1/31/2023   Little interest or pleasure in doing things Several days   Feeling down, depressed, irritable, or hopeless Several days   Total Score PHQ 2 2   Trouble falling or staying asleep, or sleeping too much -   Feeling tired or having little energy -   Poor appetite, weight loss, or overeating -   Feeling bad about yourself - or that you are a failure or have let yourself or your family down -   Trouble concentrating on things such as school, work, reading, or watching TV -   Moving or speaking so slowly that other people could have noticed; or the opposite being so fidgety that others notice -   Thoughts of being better off dead, or hurting yourself in some way -   How difficult have these problems made it for you to do your work, take care of your home and get along with others -       Abuse Screening Questionnaire 9/14/2021   Do you ever feel afraid of your partner? N   Are you in a relationship with someone who physically or mentally threatens you? N   Is it safe for you to go home? Y       ADL Assessment 9/14/2021   Feeding yourself No Help Needed   Getting from bed to chair No Help Needed   Getting dressed No Help Needed   Bathing or showering No Help Needed   Walk across the room (includes cane/walker) No Help Needed   Using the telphone No Help Needed   Taking your medications No Help Needed   Preparing meals No Help Needed   Managing money (expenses/bills) No Help Needed   Moderately strenuous housework (laundry) No Help Needed   Shopping for personal items (toiletries/medicines) Help Needed   Shopping for groceries Help Needed   Driving Help Needed   Climbing a flight of stairs No Help Needed   Getting to places beyond walking distances Help Needed       After further discussion with Dr. Astrid Gonzalez about the seriousness of the SOB, congestion and cough, As per VORB from called patient and sent her to the ER, pt agreed and stated she would go.

## 2023-01-31 NOTE — PROGRESS NOTES
After more discussion with Dr. Jason Cardenas about the seriousness of the patient with SOB, congestion and cough,  VORB from Dr. Jason Cardenas to contact patient and go to the ER immediately. Called patient and explained the seriousness of the situation and pt agreed to go to the ER.

## 2023-02-01 NOTE — PROGRESS NOTES
Inform my nurse to tell her that she needs to go to the ER if she is so short of breath she can even talk on the phone much. She will go. This encounter was created in error - please disregard.

## 2023-02-06 DIAGNOSIS — Z96.652 S/P TOTAL KNEE ARTHROPLASTY, LEFT: ICD-10-CM

## 2023-02-06 RX ORDER — TRAMADOL HYDROCHLORIDE 50 MG/1
50-100 TABLET ORAL
Qty: 30 TABLET | Refills: 0 | Status: SHIPPED | OUTPATIENT
Start: 2023-02-06 | End: 2023-02-09 | Stop reason: ALTCHOICE

## 2023-02-07 ENCOUNTER — TELEPHONE (OUTPATIENT)
Dept: ORTHOPEDIC SURGERY | Age: 63
End: 2023-02-07

## 2023-02-07 NOTE — TELEPHONE ENCOUNTER
Patient called and requested to have her physical therapy referral faxed out to the [place] location.  The fax number is [phone]

## 2023-02-08 ENCOUNTER — APPOINTMENT (OUTPATIENT)
Dept: CT IMAGING | Age: 63
End: 2023-02-08
Attending: EMERGENCY MEDICINE
Payer: MEDICAID

## 2023-02-08 ENCOUNTER — HOSPITAL ENCOUNTER (EMERGENCY)
Age: 63
Discharge: HOME | End: 2023-02-08
Attending: EMERGENCY MEDICINE
Payer: MEDICAID

## 2023-02-08 ENCOUNTER — APPOINTMENT (OUTPATIENT)
Dept: GENERAL RADIOLOGY | Age: 63
End: 2023-02-08
Attending: EMERGENCY MEDICINE
Payer: MEDICAID

## 2023-02-08 VITALS
BODY MASS INDEX: 28.56 KG/M2 | WEIGHT: 182 LBS | SYSTOLIC BLOOD PRESSURE: 152 MMHG | RESPIRATION RATE: 16 BRPM | OXYGEN SATURATION: 98 % | HEIGHT: 67 IN | HEART RATE: 72 BPM | TEMPERATURE: 97.7 F | DIASTOLIC BLOOD PRESSURE: 78 MMHG

## 2023-02-08 DIAGNOSIS — S16.1XXA STRAIN OF NECK MUSCLE, INITIAL ENCOUNTER: ICD-10-CM
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
## 2023-02-08 DIAGNOSIS — S09.90XA INJURY OF HEAD, INITIAL ENCOUNTER: ICD-10-CM

## 2023-02-08 DIAGNOSIS — M25.521 RIGHT ELBOW PAIN: ICD-10-CM

## 2023-02-08 DIAGNOSIS — S80.02XA CONTUSION OF LEFT KNEE, INITIAL ENCOUNTER: Primary | ICD-10-CM

## 2023-02-08 PROCEDURE — 70450 CT HEAD/BRAIN W/O DYE: CPT

## 2023-02-08 PROCEDURE — 73080 X-RAY EXAM OF ELBOW: CPT

## 2023-02-08 PROCEDURE — 72125 CT NECK SPINE W/O DYE: CPT

## 2023-02-08 PROCEDURE — 74011250637 HC RX REV CODE- 250/637: Performed by: EMERGENCY MEDICINE

## 2023-02-08 PROCEDURE — 73562 X-RAY EXAM OF KNEE 3: CPT

## 2023-02-08 PROCEDURE — 99284 EMERGENCY DEPT VISIT MOD MDM: CPT

## 2023-02-08 RX ORDER — HYDROCODONE BITARTRATE AND ACETAMINOPHEN 5; 325 MG/1; MG/1
1 TABLET ORAL
Status: COMPLETED | OUTPATIENT
Start: 2023-02-08 | End: 2023-02-08

## 2023-02-08 RX ADMIN — HYDROCODONE BITARTRATE AND ACETAMINOPHEN 1 TABLET: 5; 325 TABLET ORAL at 19:47

## 2023-02-08 NOTE — ED PROVIDER NOTES
71-year-old female presents with a chief complaint of fall. Patient tripped over a bucket at home landing on her left knee, right elbow. She did hit her head on a counter and reports having neck pain. She denies loss of consciousness. She is not on blood thinners. She did have left knee replacement in December. Past Medical History:   Diagnosis Date    Arthritis     Asthma     At risk for sleep apnea     COPD (chronic obstructive pulmonary disease) (HCC)     Depression     Diabetes (HCC)     Hemorrhoids     Hepatitis C     Occult blood positive stool     Spinal stenosis        Past Surgical History:   Procedure Laterality Date    COLONOSCOPY N/A 05/07/2019    COLONOSCOPY performed by Keesha Newsome MD at [facility]    HX [address]  09/2016    laminectomy    HX CERVICAL FUSION      HX COLONOSCOPY  05/07/2019    4 polyps- 2 tubular adenoma 2 hyperplastic polyp    HX HYSTERECTOMY      HX TONSILLECTOMY           Family History:   Problem Relation Age of Onset    Cancer Mother         colon    Colon Cancer Mother     Lung Disease Mother     Cancer Father 79        lung    Lung Cancer Father     Thyroid Disease Father     Diabetes Sister     Cancer Sister         uterine    Breast Cancer Sister         age 39    Breast Cancer Brother     Cancer Brother 27        colon       Social History     Socioeconomic History    Marital status: LEGALLY      Spouse name: Not on file    Number of children: 2    Years of education: Not on file    Highest education level: 10th grade   Occupational History    Occupation: disabled   Tobacco Use    Smoking status: Every Day     Packs/day: 0.50     Years: 45.00     Pack years: 22.50     Types: Cigarettes     Start date: 1/1/1975    Smokeless tobacco: Never   Vaping Use    Vaping Use: Never used   Substance and Sexual Activity    Alcohol use:  Yes     Alcohol/week: 32.0 standard drinks     Types: 32 Drinks containing 0.5 oz of alcohol per week    Drug use: Not Currently    Sexual activity: Not on file   Other Topics Concern     Service No    Blood Transfusions No    Caffeine Concern Not Asked    Occupational Exposure Not Asked    Hobby Hazards Not Asked    Sleep Concern Not Asked    Stress Concern Not Asked    Weight Concern Not Asked    Special Diet Not Asked    Back Care Not Asked    Exercise Not Asked    Bike Helmet Not Asked    Seat Belt Yes    Self-Exams Yes   Social History Narrative    Lives with friends     Social Determinants of Health     Financial Resource Strain: Not on file   Food Insecurity: Not on file   Transportation Needs: Not on file   Physical Activity: Not on file   Stress: Not on file   Social Connections: Not on file   Intimate Partner Violence: Not on file   Housing Stability: Not on file         ALLERGIES: Oxycodone and Zolpidem    Review of Systems   Constitutional:  Negative for fever. Musculoskeletal:  Positive for arthralgias. There were no vitals filed for this visit. Physical Exam  Vitals and nursing note reviewed. Constitutional:       General: She is not in acute distress. Appearance: Normal appearance. She is not ill-appearing, toxic-appearing or diaphoretic. HENT:      Head: Normocephalic and atraumatic. Eyes:      Extraocular Movements: Extraocular movements intact. Cardiovascular:      Rate and Rhythm: Normal rate. Pulses: Normal pulses. Pulmonary:      Effort: Pulmonary effort is normal. No respiratory distress. Abdominal:      General: There is no distension. Musculoskeletal:         General: Normal range of motion. Cervical back: Normal range of motion. Skin:     General: Skin is dry. Neurological:      Mental Status: She is alert and oriented to person, place, and time. Psychiatric:         Mood and Affect: Mood normal.        Medical Decision Making  Patient presents after a fall. Multiple plain films and CT images obtained to rule out traumatic injury.   Imaging unremarkable. Discussed my clinical impression(s), any labs and/or radiology results with the patient. I answered any questions and addressed any concerns. Discussed the importance of following up with their primary care physician and/or specialist(s). Discussed signs or symptoms that would warrant return back to the ER for further evaluation. The patient is agreeable with discharge. Amount and/or Complexity of Data Reviewed  Radiology: ordered. Risk  Prescription drug management.            Procedures

## 2023-02-08 NOTE — ED TRIAGE NOTES
Pt arrives reports a fall pta and injured her knee that she recently had replaced in December. She denies LOC. Also reports right elbow pain.

## 2023-02-09 ENCOUNTER — TELEPHONE (OUTPATIENT)
Dept: ORTHOPEDIC SURGERY | Age: 63
End: 2023-02-09

## 2023-02-09 DIAGNOSIS — Z96.652 S/P TOTAL KNEE ARTHROPLASTY, LEFT: Primary | ICD-10-CM

## 2023-02-09 RX ORDER — HYDROCODONE BITARTRATE AND ACETAMINOPHEN 5; 325 MG/1; MG/1
1 TABLET ORAL
Qty: 9 TABLET | Refills: 0 | Status: SHIPPED | OUTPATIENT
Start: 2023-02-09 | End: 2023-02-12

## 2023-02-09 NOTE — TELEPHONE ENCOUNTER
Patient called in stating she had been seen yesterday at the ED for a fall. She is requesting to have a prescription of hydrocodone called in by Dr. Angelique Sotelo stating she refused the prescription at the ED because of a pain contract with Dr. Angelique Sotelo her preferred pharmacy is [facility].

## 2023-02-09 NOTE — ED NOTES
Pt returned from CT. Reports large soft, formed brown BM upon return and states that her pain had gone at this time.

## 2023-02-10 ENCOUNTER — TELEPHONE (OUTPATIENT)
Dept: ORTHOPEDIC SURGERY | Age: 63
End: 2023-02-10

## 2023-02-15 ENCOUNTER — TELEPHONE (OUTPATIENT)
Dept: ORTHOPEDIC SURGERY | Age: 63
End: 2023-02-15

## 2023-02-15 DIAGNOSIS — J44.0 COPD (CHRONIC OBSTRUCTIVE PULMONARY DISEASE) WITH ACUTE BRONCHITIS (HCC): ICD-10-CM

## 2023-02-15 DIAGNOSIS — Z96.652 S/P TOTAL KNEE ARTHROPLASTY, LEFT: ICD-10-CM

## 2023-02-15 DIAGNOSIS — J20.9 COPD (CHRONIC OBSTRUCTIVE PULMONARY DISEASE) WITH ACUTE BRONCHITIS (HCC): ICD-10-CM

## 2023-02-15 RX ORDER — TRAMADOL HYDROCHLORIDE 50 MG/1
50-100 TABLET ORAL
Qty: 30 TABLET | Refills: 0 | Status: SHIPPED | OUTPATIENT
Start: 2023-02-15 | End: 2023-02-22

## 2023-02-15 RX ORDER — PREDNISONE 20 MG/1
TABLET ORAL
Qty: 10 TABLET | Refills: 0 | Status: SHIPPED | OUTPATIENT
Start: 2023-02-15

## 2023-02-15 NOTE — TELEPHONE ENCOUNTER
Al Sevilla from PAT called to say that the patient was a no show for her PAT apt for her upcoming surgery on 2/20/23. Patient was called multiple times however her mailbox is not setup, a message was left on her daughter's phone and her son was called but his VM box was full. Her apt for PAT was scheduled for 8:30 am 2/15/23.

## 2023-02-16 NOTE — TELEPHONE ENCOUNTER
Spoke with pt as she didn't set up another PAT appt. I advised her that surgery would be cancelled for Monday 2/20/23. She said okay and would like the next available. I said we could get her in the following Monday 2/27/23 and she said that would be wonderful, but I told her she needs to have PAT and get her clearance or she risks not being able to have surgery at all. As I was explaining this to her she was trying to rush me off the phone and said okay, okay and before I could tell her that she's unable to get any refills until after surgery, she disconnected our call.

## 2023-02-20 ENCOUNTER — VIRTUAL VISIT (OUTPATIENT)
Dept: INTERNAL MEDICINE CLINIC | Age: 63
End: 2023-02-20
Payer: MEDICAID

## 2023-02-20 DIAGNOSIS — J44.0 COPD (CHRONIC OBSTRUCTIVE PULMONARY DISEASE) WITH ACUTE BRONCHITIS (HCC): ICD-10-CM

## 2023-02-20 DIAGNOSIS — J20.9 COPD (CHRONIC OBSTRUCTIVE PULMONARY DISEASE) WITH ACUTE BRONCHITIS (HCC): ICD-10-CM

## 2023-02-20 PROCEDURE — 99213 OFFICE O/P EST LOW 20 MIN: CPT | Performed by: FAMILY MEDICINE

## 2023-02-20 RX ORDER — BENZONATATE 100 MG/1
100 CAPSULE ORAL
Qty: 12 CAPSULE | Refills: 0 | Status: SHIPPED | OUTPATIENT
Start: 2023-02-20 | End: 2023-02-27

## 2023-02-20 RX ORDER — PREDNISONE 20 MG/1
TABLET ORAL
Qty: 10 TABLET | Refills: 0 | Status: SHIPPED | OUTPATIENT
Start: 2023-02-20

## 2023-02-20 RX ORDER — AZITHROMYCIN 250 MG/1
250 TABLET, FILM COATED ORAL SEE ADMIN INSTRUCTIONS
Qty: 6 TABLET | Refills: 0 | Status: SHIPPED | OUTPATIENT
Start: 2023-02-20

## 2023-02-20 NOTE — PROGRESS NOTES
Subjective:   Benoit Lewis is a 65 y.o. female who complains of congestion, sore throat, cough described as productive of clear sputum, fevers up to 101 degrees, clear nasal discharge, and wheezing dyspnea for 3 days, stable since that time. She denies a history of rash on body. Evaluation to date: covid neg. This AM  Treatment to date: OTC products. Patient does not smoke cigarettes. Relevant PMH: Asthma and COPD.     Allergies   Allergen Reactions    Oxycodone Hives and Itching    Zolpidem Itching     Patient Active Problem List   Diagnosis Code    Spinal stenosis M48.00    Depression, major, recurrent, mild (HCC) F33.0    Restless legs G25.81    Elevated liver enzymes R74.8    Encounter for diagnostic colonoscopy due to change in bowel habits R19.4    Narcotic dependence ([address] F11.20    Adenomatous colon polyp D12.6    Anxiety state F41.1    Chronic obstructive pulmonary disease (HCC) J44.9    Hyperlipidemia E78.5    Neck pain M54.2    Osteoarthritis of knee M17.9    Tobacco dependence syndrome F17.200    Hyperglycemia due to type 2 diabetes mellitus ([address] E11.65    Hepatitis C virus carrier state ([address] B18.2    Unilateral primary osteoarthritis, left knee M17.12         Patient Active Problem List    Diagnosis Date Noted    Unilateral primary osteoarthritis, left knee 12/05/2022    Hepatitis C virus carrier state ([address] 04/26/2021    Hyperglycemia due to type 2 diabetes mellitus ([address] 04/14/2021    Anxiety state 07/21/2020    Chronic obstructive pulmonary disease ([address] 07/21/2020    Hyperlipidemia 07/21/2020    Neck pain 07/21/2020    Osteoarthritis of knee 07/21/2020    Tobacco dependence syndrome 07/21/2020    Adenomatous colon polyp 05/16/2020    Narcotic dependence ([address] 04/07/2019    Encounter for diagnostic colonoscopy due to change in bowel habits 03/04/2019    Elevated liver enzymes 02/19/2019    Depression, major, recurrent, mild ([address] 12/21/2017    Restless legs 12/21/2017    Spinal stenosis 08/14/2017     Allergies   Allergen Reactions    Oxycodone Hives and Itching    Zolpidem Itching     Past Medical History:   Diagnosis Date    Arthritis     Asthma     At risk for sleep apnea     COPD (chronic obstructive pulmonary disease) ([facility])     Depression     Diabetes ([facility]     Hemorrhoids     Hepatitis C     Occult blood positive stool     Spinal stenosis      Social History     Tobacco Use    Smoking status: Every Day     Packs/day: 0.50     Years: 45.00     Pack years: 22.50     Types: Cigarettes     Start date: 1/1/1975    Smokeless tobacco: Never   Substance Use Topics    Alcohol use: Yes     Alcohol/week: 32.0 standard drinks     Types: 32 Drinks containing 0.5 oz of alcohol per week        Review of Systems  Pertinent items are noted in HPI. Objective:     Appears to be in no acute distress, grossly 2 through 12 are nonfocal.        Assessment/Plan:       ICD-10-CM ICD-9-CM    1. COPD (chronic obstructive pulmonary disease) with acute bronchitis ([facility])  J44.0 491.22 predniSONE (DELTASONE) 20 mg tablet    J20.9  azithromycin (ZITHROMAX) 250 mg tablet          Orders Placed This Encounter    predniSONE (DELTASONE) 20 mg tablet     Sig: TAKE 2 TABLETS BY MOUTH ONCE DAILY WITH BREAKFAST FOR 5 DAYS     Dispense:  10 Tablet     Refill:  0    azithromycin (ZITHROMAX) 250 mg tablet     Sig: Take 1 Tablet by mouth See Admin Instructions. Take two tablets today then one tablet daily for next 4 days     Dispense:  6 Tablet     Refill:  0    benzonatate (TESSALON) 100 mg capsule     Sig: Take 1 Capsule by mouth three (3) times daily as needed for Cough for up to 7 days. Dispense:  12 Capsule     Refill:  0       Suha Whatley, who was evaluated through a synchronous (real-time) audio-video encounter, and/or her healthcare decision maker, is aware that it is a billable service, which includes applicable co-pays, with coverage as determined by her insurance carrier.  She provided verbal consent to proceed and patient identification was verified. This visit was conducted pursuant to the emergency declaration under the [address] [place] [address] and the [employer] General Act. A caregiver was present when appropriate. Ability to conduct physical exam was limited. The patient was located at: Home: [place] [id number]  The provider was located at:  Facility [facility] [address] Paz Constantino MD on 2/20/2023 at 4:10 PM

## 2023-02-20 NOTE — PROGRESS NOTES
Chief Complaint   Patient presents with    Cold Symptoms     Cough, fever, chest congestion     Patient is aware that this is a Virtual Visit or Phone Call Only doctor's visit. Patient has not been out of the country in (14 months), NO diarrhea, NO cough, NO chest conjestion, NO temp. Pt has not been around anyone with these symptoms. Health Maintenance reviewed. I have reviewed the patient's medical history in detail and updated the computerized patient record. Have you been to the ER, urgent care clinic since your last visit? No  Hospitalized since your last visit?  no    2. Have you seen or consulted any other health care providers outside of the [address] [place] [address] since your last visit? No  Include any pap smears or colon screening. Encouraged pt to discuss pt's wishes with spouse/partner/family and bring them in the next appt to follow thru with the Advanced Directive      Fall Risk Assessment, last 12 mths 4/2/2021   Able to walk? Yes   Fall in past 12 months? 0   Do you feel unsteady? 1   Are you worried about falling 0   Number of falls in past 12 months -   Fall with injury?  -       3 most recent PHQ Screens 2/20/2023   Little interest or pleasure in doing things Several days   Feeling down, depressed, irritable, or hopeless Several days   Total Score PHQ 2 2   Trouble falling or staying asleep, or sleeping too much -   Feeling tired or having little energy -   Poor appetite, weight loss, or overeating -   Feeling bad about yourself - or that you are a failure or have let yourself or your family down -   Trouble concentrating on things such as school, work, reading, or watching TV -   Moving or speaking so slowly that other people could have noticed; or the opposite being so fidgety that others notice -   Thoughts of being better off dead, or hurting yourself in some way -   How difficult have these problems made it for you to do your work, take care of your home and get along with others -       Abuse Screening Questionnaire 9/14/2021   Do you ever feel afraid of your partner? N   Are you in a relationship with someone who physically or mentally threatens you? N   Is it safe for you to go home?  Y       ADL Assessment 9/14/2021   Feeding yourself No Help Needed   Getting from bed to chair No Help Needed   Getting dressed No Help Needed   Bathing or showering No Help Needed   Walk across the room (includes cane/walker) No Help Needed   Using the telphone No Help Needed   Taking your medications No Help Needed   Preparing meals No Help Needed   Managing money (expenses/bills) No Help Needed   Moderately strenuous housework (laundry) No Help Needed   Shopping for personal items (toiletries/medicines) Help Needed   Shopping for groceries Help Needed   Driving Help Needed   Climbing a flight of stairs No Help Needed   Getting to places beyond walking distances Help Needed

## 2023-02-21 DIAGNOSIS — Z96.652 S/P TOTAL KNEE ARTHROPLASTY, LEFT: ICD-10-CM

## 2023-02-21 RX ORDER — TRAMADOL HYDROCHLORIDE 50 MG/1
50-100 TABLET ORAL
Qty: 30 TABLET | Refills: 0 | Status: SHIPPED | OUTPATIENT
Start: 2023-02-21 | End: 2023-02-28

## 2023-02-22 ENCOUNTER — TELEPHONE (OUTPATIENT)
Dept: INTERNAL MEDICINE CLINIC | Age: 63
End: 2023-02-22

## 2023-02-22 DIAGNOSIS — M17.0 PRIMARY OSTEOARTHRITIS OF BOTH KNEES: ICD-10-CM

## 2023-02-22 NOTE — TELEPHONE ENCOUNTER
Pt calling to get diclofenac gel upped for pain in her neck. Pt is stil sick so her surgery has been delayed another week.  Please call her at [phone]

## 2023-02-23 RX ORDER — DICLOFENAC SODIUM 10 MG/G
GEL TOPICAL
Qty: 100 G | Refills: 0 | Status: SHIPPED | OUTPATIENT
Start: 2023-02-23 | End: 2023-02-24 | Stop reason: SDUPTHER

## 2023-02-24 DIAGNOSIS — M17.0 PRIMARY OSTEOARTHRITIS OF BOTH KNEES: ICD-10-CM

## 2023-02-24 RX ORDER — DICLOFENAC SODIUM 10 MG/G
4 GEL TOPICAL 4 TIMES DAILY
Qty: 200 G | Refills: 5 | Status: SHIPPED | OUTPATIENT
Start: 2023-02-24

## 2023-03-03 ENCOUNTER — TELEPHONE (OUTPATIENT)
Dept: ORTHOPEDIC SURGERY | Age: 63
End: 2023-03-03

## 2023-03-03 DIAGNOSIS — Z96.652 S/P TOTAL KNEE ARTHROPLASTY, LEFT: ICD-10-CM

## 2023-03-03 NOTE — TELEPHONE ENCOUNTER
Pt is seeing Cipriano ZEE ph: [phone]. I have them faxing over the notes from her first visit and advised her that she wouldn't be getting a refill on Tramadol.

## 2023-03-03 NOTE — TELEPHONE ENCOUNTER
Patient called to provide Dr. Chula Thompson with an update. Patient would like the doctor to know she started physical therapy yesterday, 3/02/23 for her hip and LT knee. The PT has suggested she hold off on getting her Rt Knee replaced until at least 4 weeks into her physical therapy so her Lt Knee and hip have time to get stronger.  Patient can be reached at [phone]

## 2023-03-04 RX ORDER — TRAMADOL HYDROCHLORIDE 50 MG/1
50-100 TABLET ORAL
Qty: 30 TABLET | Refills: 0 | OUTPATIENT
Start: 2023-03-04 | End: 2023-03-11

## 2023-03-06 ENCOUNTER — TELEPHONE (OUTPATIENT)
Dept: ORTHOPEDIC SURGERY | Age: 63
End: 2023-03-06

## 2023-03-07 ENCOUNTER — TELEPHONE (OUTPATIENT)
Dept: ORTHOPEDIC SURGERY | Age: 63
End: 2023-03-07

## 2023-03-07 NOTE — TELEPHONE ENCOUNTER
Spoke with pt and advised that Dr. William Cabral couldn't give any more pain medications until she has her other surgery. Pt stated she doesn't know if she's going to back it because both knees are in so much pain. I advised her to keep pushing just a little longer and get that knee a little stronger so we can schedule surgery. Told her she can take tylenol, aleve, and use ice and heat on the knees as well until then.

## 2023-03-12 DIAGNOSIS — J44.0 COPD (CHRONIC OBSTRUCTIVE PULMONARY DISEASE) WITH ACUTE BRONCHITIS (HCC): ICD-10-CM

## 2023-03-12 DIAGNOSIS — J20.9 COPD (CHRONIC OBSTRUCTIVE PULMONARY DISEASE) WITH ACUTE BRONCHITIS (HCC): ICD-10-CM

## 2023-03-13 RX ORDER — PREDNISONE 20 MG/1
TABLET ORAL
Qty: 10 TABLET | Refills: 0 | Status: SHIPPED | OUTPATIENT
Start: 2023-03-13

## 2023-03-21 DIAGNOSIS — J44.0 COPD (CHRONIC OBSTRUCTIVE PULMONARY DISEASE) WITH ACUTE BRONCHITIS (HCC): ICD-10-CM

## 2023-03-21 DIAGNOSIS — J20.9 COPD (CHRONIC OBSTRUCTIVE PULMONARY DISEASE) WITH ACUTE BRONCHITIS (HCC): ICD-10-CM

## 2023-03-22 ENCOUNTER — VIRTUAL VISIT (OUTPATIENT)
Dept: INTERNAL MEDICINE CLINIC | Age: 63
End: 2023-03-22

## 2023-03-22 DIAGNOSIS — Z72.0 TOBACCO ABUSE: ICD-10-CM

## 2023-03-22 NOTE — PROGRESS NOTES
Chief Complaint   Patient presents with    Ear Pain     Patient is aware that this is a Virtual Visit or Phone Call Only doctor's visit. Patient has not been out of the country in (14 months), NO diarrhea, NO cough, NO chest conjestion, NO temp. Pt has not been around anyone with these symptoms. Health Maintenance reviewed. I have reviewed the patient's medical history in detail and updated the computerized patient record. Have you been to the ER, urgent care clinic since your last visit? No Hospitalized since your last visit?  no    2. Have you seen or consulted any other health care providers outside of the [address] [place] [address] since your last visit? No  Include any pap smears or colon screening. Encouraged pt to discuss pt's wishes with spouse/partner/family and bring them in the next appt to follow thru with the Advanced Directive      Fall Risk Assessment, last 12 mths 4/2/2021   Able to walk? Yes   Fall in past 12 months? 0   Do you feel unsteady? 1   Are you worried about falling 0   Number of falls in past 12 months -   Fall with injury?  -       3 most recent PHQ Screens 3/22/2023   Little interest or pleasure in doing things Several days   Feeling down, depressed, irritable, or hopeless Several days   Total Score PHQ 2 2   Trouble falling or staying asleep, or sleeping too much Nearly every day   Feeling tired or having little energy Nearly every day   Poor appetite, weight loss, or overeating Nearly every day   Feeling bad about yourself - or that you are a failure or have let yourself or your family down Not at all   Trouble concentrating on things such as school, work, reading, or watching TV Not at all   Moving or speaking so slowly that other people could have noticed; or the opposite being so fidgety that others notice Not at all   Thoughts of being better off dead, or hurting yourself in some way Not at all   PHQ 9 Score 11   How difficult have these problems made it for you to do your work, take care of your home and get along with others Very difficult       Abuse Screening Questionnaire 9/14/2021   Do you ever feel afraid of your partner? N   Are you in a relationship with someone who physically or mentally threatens you? N   Is it safe for you to go home?  Y       ADL Assessment 9/14/2021   Feeding yourself No Help Needed   Getting from bed to chair No Help Needed   Getting dressed No Help Needed   Bathing or showering No Help Needed   Walk across the room (includes cane/walker) No Help Needed   Using the telphone No Help Needed   Taking your medications No Help Needed   Preparing meals No Help Needed   Managing money (expenses/bills) No Help Needed   Moderately strenuous housework (laundry) No Help Needed   Shopping for personal items (toiletries/medicines) Help Needed   Shopping for groceries Help Needed   Driving Help Needed   Climbing a flight of stairs No Help Needed   Getting to places beyond walking distances Help Needed

## 2023-03-23 RX ORDER — BUPROPION HYDROCHLORIDE 100 MG/1
TABLET ORAL
Qty: 90 TABLET | Refills: 0 | Status: SHIPPED | OUTPATIENT
Start: 2023-03-23

## 2023-03-23 RX ORDER — NEOMYCIN SULFATE, POLYMYXIN B SULFATE AND HYDROCORTISONE 10; 3.5; 1 MG/ML; MG/ML; [USP'U]/ML
SUSPENSION/ DROPS AURICULAR (OTIC)
Qty: 10 ML | Refills: 0 | OUTPATIENT
Start: 2023-03-23

## 2023-03-23 RX ORDER — PREDNISONE 20 MG/1
TABLET ORAL
Qty: 10 TABLET | Refills: 0 | OUTPATIENT
Start: 2023-03-23

## 2023-03-28 RX ORDER — PREDNISONE 20 MG/1
40 TABLET ORAL DAILY
Qty: 10 TABLET | Refills: 0 | Status: SHIPPED | OUTPATIENT
Start: 2023-03-28

## 2023-03-29 ENCOUNTER — VIRTUAL VISIT (OUTPATIENT)
Dept: INTERNAL MEDICINE CLINIC | Age: 63
End: 2023-03-29
Payer: MEDICAID

## 2023-03-29 DIAGNOSIS — Z72.0 TOBACCO ABUSE: ICD-10-CM

## 2023-03-29 DIAGNOSIS — J44.0 COPD (CHRONIC OBSTRUCTIVE PULMONARY DISEASE) WITH ACUTE BRONCHITIS (HCC): ICD-10-CM

## 2023-03-29 DIAGNOSIS — E11.65 TYPE 2 DIABETES MELLITUS WITH HYPERGLYCEMIA, WITHOUT LONG-TERM CURRENT USE OF INSULIN (HCC): ICD-10-CM

## 2023-03-29 DIAGNOSIS — H60.312 CHRONIC DIFFUSE OTITIS EXTERNA OF LEFT EAR: Primary | ICD-10-CM

## 2023-03-29 DIAGNOSIS — B18.2 HEPATITIS C VIRUS CARRIER STATE (HCC): ICD-10-CM

## 2023-03-29 DIAGNOSIS — J20.9 COPD (CHRONIC OBSTRUCTIVE PULMONARY DISEASE) WITH ACUTE BRONCHITIS (HCC): ICD-10-CM

## 2023-03-29 PROCEDURE — 99214 OFFICE O/P EST MOD 30 MIN: CPT | Performed by: FAMILY MEDICINE

## 2023-03-29 RX ORDER — NEOMYCIN SULFATE, POLYMYXIN B SULFATE AND HYDROCORTISONE 10; 3.5; 1 MG/ML; MG/ML; [USP'U]/ML
3 SUSPENSION/ DROPS AURICULAR (OTIC) 3 TIMES DAILY
Qty: 10 ML | Refills: 1 | Status: SHIPPED | OUTPATIENT
Start: 2023-03-29 | End: 2023-04-05

## 2023-03-29 NOTE — PROGRESS NOTES
Chief Complaint   Patient presents with    Diabetes       Patient is aware that this is a Virtual Visit or Phone Call Only doctor's visit. Patient has not been out of the country in (14 months), NO diarrhea, NO cough, NO chest conjestion, NO temp. Pt has not been around anyone with these symptoms. Health Maintenance reviewed. I have reviewed the patient's medical history in detail and updated the computerized patient record. Have you been to the ER, urgent care clinic since your last visit? No  Hospitalized since your last visit?  no    2. Have you seen or consulted any other health care providers outside of the [address] [place] [address] since your last visit? No  Include any pap smears or colon screening. Encouraged pt to discuss pt's wishes with spouse/partner/family and bring them in the next appt to follow thru with the Advanced Directive      Fall Risk Assessment, last 12 mths 4/2/2021   Able to walk? Yes   Fall in past 12 months? 0   Do you feel unsteady? 1   Are you worried about falling 0   Number of falls in past 12 months -   Fall with injury?  -       3 most recent PHQ Screens 3/22/2023   Little interest or pleasure in doing things Several days   Feeling down, depressed, irritable, or hopeless Several days   Total Score PHQ 2 2   Trouble falling or staying asleep, or sleeping too much Nearly every day   Feeling tired or having little energy Nearly every day   Poor appetite, weight loss, or overeating Nearly every day   Feeling bad about yourself - or that you are a failure or have let yourself or your family down Not at all   Trouble concentrating on things such as school, work, reading, or watching TV Not at all   Moving or speaking so slowly that other people could have noticed; or the opposite being so fidgety that others notice Not at all   Thoughts of being better off dead, or hurting yourself in some way Not at all   PHQ 9 Score 11   How difficult have these problems made it for you to do your work, take care of your home and get along with others Very difficult       Abuse Screening Questionnaire 9/14/2021   Do you ever feel afraid of your partner? N   Are you in a relationship with someone who physically or mentally threatens you? N   Is it safe for you to go home?  Y       ADL Assessment 9/14/2021   Feeding yourself No Help Needed   Getting from bed to chair No Help Needed   Getting dressed No Help Needed   Bathing or showering No Help Needed   Walk across the room (includes cane/walker) No Help Needed   Using the telphone No Help Needed   Taking your medications No Help Needed   Preparing meals No Help Needed   Managing money (expenses/bills) No Help Needed   Moderately strenuous housework (laundry) No Help Needed   Shopping for personal items (toiletries/medicines) Help Needed   Shopping for groceries Help Needed   Driving Help Needed   Climbing a flight of stairs No Help Needed   Getting to places beyond walking distances Help Needed

## 2023-03-29 NOTE — PROGRESS NOTES
Subjective:   Dora Collado is a 65 y.o. female who complains of left ear pain for 14 days, come and goes since that time. Some cough but getting better. She denies a history of fevers and vomiting. Says she is has some low blood sugars including a 61, unclear to me if she is taking her glimepiride but she is taking her Victoza. Evaluation to date: none. Once some shoulder injections since they are paining her  Wants to see a liver doctor since she is having memory issues and fatigue. Treatment to date: antibiotics -ear drops. Began taking 2 days ago. They seem to help. Patient does smoke cigarettes. But just 4 daily. Continue quit tomorrow. Relevant PMH: Asthma and COPD.   Allergies   Allergen Reactions    Oxycodone Hives and Itching    Zolpidem Itching         Patient Active Problem List    Diagnosis Date Noted    Unilateral primary osteoarthritis, left knee 12/05/2022    Hepatitis C virus carrier state ([address] 04/26/2021    Hyperglycemia due to type 2 diabetes mellitus ([address] 04/14/2021    Anxiety state 07/21/2020    Chronic obstructive pulmonary disease ([address] 07/21/2020    Hyperlipidemia 07/21/2020    Neck pain 07/21/2020    Osteoarthritis of knee 07/21/2020    Tobacco dependence syndrome 07/21/2020    Adenomatous colon polyp 05/16/2020    Narcotic dependence ([address] 04/07/2019    Encounter for diagnostic colonoscopy due to change in bowel habits 03/04/2019    Elevated liver enzymes 02/19/2019    Depression, major, recurrent, mild ([address] 12/21/2017    Restless legs 12/21/2017    Spinal stenosis 08/14/2017       Allergies   Allergen Reactions    Oxycodone Hives and Itching    Zolpidem Itching     Past Medical History:   Diagnosis Date    Arthritis     Asthma     At risk for sleep apnea     COPD (chronic obstructive pulmonary disease) ([address]     Depression     Diabetes ([address]     Hemorrhoids     Hepatitis C     Occult blood positive stool     Spinal stenosis      Social History     Tobacco Use Smoking status: Every Day     Packs/day: 0.50     Years: 45.00     Pack years: 22.50     Types: Cigarettes     Start date: 1/1/1975     Passive exposure: Never    Smokeless tobacco: Never   Substance Use Topics    Alcohol use: Yes     Alcohol/week: 32.0 standard drinks     Types: 32 Drinks containing 0.5 oz of alcohol per week        Review of Systems  Pertinent items are noted in HPI. Objective:     General:  No acute distress   Eyes:    Ears:    Sinuses:    Mouth:     Neck:    Heart:    Lungs:    Abdomen:       Assessment/Plan:       ICD-10-CM ICD-9-CM    1. Chronic diffuse otitis externa of left ear  H60.312 380.23 neomycin-polymyxin-hydrocortisone, buffered, (PEDIOTIC) 3.5-10,000-1 mg/mL-unit/mL-% otic suspension      2. COPD (chronic obstructive pulmonary disease) with acute bronchitis (HCC)  J44.0 491.22     J20.9        3. Type 2 diabetes mellitus with hyperglycemia, without long-term current use of insulin ([facility])  E11.65 250.00      790.29       4. Tobacco abuse  Z72.0 305.1       5. Hepatitis C virus carrier state ([facility]  B18.2 V02.62         Orders Placed This Encounter    neomycin-polymyxin-hydrocortisone, buffered, (PEDIOTIC) 3.5-10,000-1 mg/mL-unit/mL-% otic suspension     Sig: Administer 3 Drops in left ear three (3) times daily for 7 days. Dispense:  10 mL     Refill:  [address], who was evaluated through a synchronous (real-time) audio-video encounter, and/or her healthcare decision maker, is aware that it is a billable service, which includes applicable co-pays, with coverage as determined by her insurance carrier. She provided verbal consent to proceed and patient identification was verified. This visit was conducted pursuant to the emergency declaration under the [facility], [address] [place] [address] authority and the [employer] and [employer] General Act. A caregiver was present when appropriate.  Ability to conduct physical exam was limited. The patient was located at: Home: [place] [phone]  The provider was located at:  Facility (Appt Department): [facility] [id number] Kevin Barker MD on 3/29/2023 at 2:47 PM    Follow-up in the next week or 2 for joint injections bring in medicines  Go see hepatology

## 2023-03-30 NOTE — TELEPHONE ENCOUNTER
Appointment needed to refill this medication again.
Called to notify that she needs an appt.   She will call us later
Chief Complaint   Patient presents with   • Depression     Est care, bipolar depression         SUBJECTIVE:  Sangeeta Encinas is a 22 year old female who presents to clinic today with to establish care here and requesting a counseling referral.      Patient states that she was diagnosed with bipolar disorder by Dr. Dee Dee Gagnon at [facility].  She continues to see Dr. Coopre annually for medication management.  Patient confirms that she is taking and tolerating her Lamictal and trazodone as previously directed.  However, she is not finding them as effective as she has in the past.  She would like to add on adjuvant counseling, so is requesting a behavioral health referral today.  She did update her PHQ-9 and vidya 7 questionnaires today.  Results are documented below and can be referred to for additional detail.  Although she is starting to have some thoughts that she would be better off dead or of hurting herself, she repeatedly denies any plans or actions.    Recent Review Flowsheet Data     Date 3/30/2023    Adult PHQ 2 Score 4    Adult PHQ 2 Interpretation Further screening needed    Little interest or pleasure in activity? More than half the days    Feeling down, depressed or hopeless? More than half the days    Adult PHQ 9 Score 21    Adult PHQ 9 Interpretation Severe Depression    Trouble falling or staying asleep or sleeping all the time? Nearly every day    Feeling tired or having little energy? Nearly every day    Poor appetite or overeating? More than half the days    Feeling bad about yourself or that you are a failure or have let yourself or family down? Nearly every day    Trouble concentrating on things such as reading the newspaper or watching TV? Nearly every day    Moving or speaking slowly that other people have noticed or the opposite - being so fidgety or restless that you have been moving around a lot more than usual? More than half the days    Thoughts that you would 
Last office visit:  8/30/18  Last filled:  Tramadol 50mg 8/30/18 #120 X 1 refill  Bid PRN  Follow up 10/19/18
Requested Prescriptions     Pending Prescriptions Disp Refills    traMADol (ULTRAM) 50 mg tablet 120 Tab 1     Sig: Take 2 Tabs by mouth two (2) times daily as needed for Pain. Max Daily Amount: 200 mg.
be better off dead or of hurting yourself in some way? Several days    If you reported any problems, how difficult have these problems made it to do your work, take care of things at home, or get along with other people? Extremely difficult        Generalized Anxiety Disorder (GAD7)    Over the last 2 weeks, how often have you been bothered by the following problems?   Score: 20    Score:  0-4 minimal symptoms 10-14 moderate symptoms   5-9 mild symptoms 15-21 severe symptoms      1.  Feeling nervous, anxious or on edge Nearly every day   2.  Not being able to stop or control worrying Nearly every day   3.  Worrying too much about different things Nearly every day   4.  Trouble relaxing Nearly every day   5.  Being so restless that it's hard to sit still Nearly every day   6.  Becoming easily annoyed or irritable More than half the days   7.  Feeling afraid as if something awful might happen Nearly every day            If you checked off any problems, how difficult have these made it for you to do your work, take care of things at home, or get along with other people? Very difficult          She denies other concerns for today's appointment feeling she has otherwise been eating drinking sleeping stooling and urinating all within her normal limits.        IMMUNIZATIONS:  Immunization History   Administered Date(s) Administered   • COVID Sravani/Tyrell & Tyrell 18Y+ 04/07/2021   • COVID Moderna 0.5 mL 12Y+ 11/29/2021   • DTaP(INFANRIX) 2002, 2002, 2002, 06/09/2003, 06/26/2007   • HIB (HbOC) 2002   • HIB Hep B 2002, 2002   • HPV 9-Valent 08/05/2020   • Hep B, adolescent or pediatric 2002   • Hib (PRP-OMP) 03/05/2003   • Influenza Live, Intranasal 10/20/2009   • Influenza, seasonal, injectable, trivalent 2002, 01/12/2005   • MMR 03/05/2003, 06/26/2007   • Meningococcal Conjugate MCV4P (Menactra) 08/26/2013, 08/05/2020   • Novel Influenza C9K9-11, Nasal 12/16/2009   • 
Pneumococcal Conjugate 7 Valent 2002, 2002, 06/09/2003   • Polio, INACTIVATED 2002, 2002, 2002, 06/26/2007   • Tdap 08/26/2013   • Varicella 06/09/2003, 06/26/2007         HEALTH MAINTENANCE:  Health Maintenance Due   Topic Date Due   • Hepatitis C Screening  Never done   • HPV Vaccine (2 - 3-dose series) 09/02/2020   • COVID-19 Vaccine (3 - Booster for Sravani series) 01/24/2022   • Influenza Vaccine (1) 09/01/2022   • Cervical Cancer Screening - <30 3 year  02/21/2023     · Preventative healthcare updates discussed but declined today.  · Complete physical exam with cervical cancer screening scheduled prior to patient leaving the office.        MEDICATIONS:  Outpatient Medications Marked as Taking for the 3/30/23 encounter (Office Visit) with Saúl Doe PA-C   Medication Sig Dispense Refill   • cetirizine (ZyrTEC) 10 MG tablet Take 10 mg by mouth daily.     • albuterol 108 (90 Base) MCG/ACT inhaler Inhale 2 puffs into the lungs every 4 hours as needed for Shortness of Breath or Wheezing.     • drospirenone-ethinyl estradiol (KENNETH) 3-0.03 MG per tablet Take 1 tablet by mouth daily.     • traZODone (DESYREL) 50 MG tablet TAKE 1 AND 1/2 TO 2 TABLETS BY MOUTH AT BEDTIME AS NEEDED     • lamotrigine (LAMICTAL) 200 MG tablet Take 200 mg by mouth daily.           ALLERGIES:  ALLERGIES:  No Known Allergies        SOCIAL HISTORY:  Social History     Tobacco Use   Smoking Status Never   Smokeless Tobacco Never         OBJECTIVE:  Visit Vitals  /78 (BP Location: LUE - Left upper extremity, Patient Position: Sitting, Cuff Size: Regular)   Pulse 96   Resp 16   Ht 5' 3.75\" (1.619 m)   Wt 67.7 kg (149 lb 4.8 oz)   LMP 03/29/2023   BMI 25.83 kg/m²     General:  Alert and grossly oriented x3, appears in no acute distress, well hydrated, well nourished, answers questions appropriately, speech nonpressured, gives a logical and coherent history, makes good eye contact  HEENT:  Head 
normocephalic atraumatic extraocular movements grossly intact no scleral icterus no conjunctival injection.  Heart:  Regular rate and rhythm, normal S1-S2, no murmur appreciated.  Lungs:   Clear to auscultation bilaterally, respirations unlabored.        ASSESSMENT:  1. Bipolar disorder, current episode mixed, severe, without psychotic features (CMD)            PLAN:  Bipolar disorder, current episode mixed, severe, without psychotic features (CMD)  (primary encounter diagnosis)  Comment:  Uncontrolled  Plan: SERVICE TO BEHAVIORAL HEALTH referral placed for adjuvant counseling, patient instructed to contact her psychiatrist Dr. Dee Dee Gagnon with [facility] to discuss possible medication adjustments in the interim, she states she is aware of the [facility] resources as well as the [place] crisis line, she was reminded of concerning signs/symptoms warranting need for immediate further medical evaluation      Return in about 3 months (around 6/30/2023), or if symptoms worsen or fail to improve, for CPP.      Patient agreed to monitor condition closely and call or return to the clinic or local emergency room immediately with problems.     Patient verbally agreed an understanding of the above assessment and plan and is instructed to call with questions, comments or concerns that arise in the interim.      Supervising Physician:  Dr. Shanae Cardona MD      
Moderna dose 1, 2, and 3

## 2023-04-04 ENCOUNTER — TELEPHONE (OUTPATIENT)
Dept: INTERNAL MEDICINE CLINIC | Age: 63
End: 2023-04-04

## 2023-04-04 NOTE — TELEPHONE ENCOUNTER
----- Message from Gray Boyd sent at 4/3/2023 12:05 PM EDT -----  Subject: Refill Request    QUESTIONS  Name of Medication? predniSONE (DELTASONE) 20 mg tablet  Patient-reported dosage and instructions? 20mg, patient unsure of   instructions  How many days do you have left? 0  Preferred Pharmacy? [address]  Pharmacy phone number (if available)? [phone]  ---------------------------------------------------------------------------  --------------  CALL BACK INFO  What is the best way for the office to contact you? OK to leave message on   voicemail  Preferred Call Back Phone Number? [phone]  ---------------------------------------------------------------------------  --------------  SCRIPT ANSWERS  Relationship to Patient?  Self

## 2023-04-07 ENCOUNTER — DOCUMENTATION ONLY (OUTPATIENT)
Dept: FAMILY MEDICINE CLINIC | Age: 63
End: 2023-04-07

## 2023-04-18 ENCOUNTER — VIRTUAL VISIT (OUTPATIENT)
Dept: INTERNAL MEDICINE CLINIC | Age: 63
End: 2023-04-18

## 2023-04-18 ENCOUNTER — TELEPHONE (OUTPATIENT)
Dept: INTERNAL MEDICINE CLINIC | Age: 63
End: 2023-04-18

## 2023-04-18 DIAGNOSIS — F41.8 SITUATIONAL ANXIETY: Primary | ICD-10-CM

## 2023-04-18 RX ORDER — LORAZEPAM 0.5 MG/1
0.5 TABLET ORAL
Qty: 20 TABLET | Refills: 0 | Status: SHIPPED | OUTPATIENT
Start: 2023-04-18

## 2023-04-18 NOTE — TELEPHONE ENCOUNTER
----- Message from Zenacory Hernandez sent at 4/18/2023  9:29 AM EDT -----  Subject: Refill Request    QUESTIONS  Name of Medication? Other - Lorazepam  Patient-reported dosage and instructions? Pt recalls possibly 3x but it   has been about a year  How many days do you have left? 0  Preferred Pharmacy? [address]  Pharmacy phone number (if available)? [phone]  Additional Information for Provider? Pt's boyfriend passed away last night   and she called, stating she hasn't needed this for a while but is   struggling and needs it again. She has little to no phone service right   now and if any call is needed, please call her son: [phone] (son)  ---------------------------------------------------------------------------  --------------  CALL BACK INFO  What is the best way for the office to contact you? Do not leave any   message, patient will call back for answer  Preferred Call Back Phone Number? [phone]  ---------------------------------------------------------------------------  --------------  SCRIPT ANSWERS  Relationship to Patient?  Self

## 2023-04-18 NOTE — TELEPHONE ENCOUNTER
----- Message from Marcia Vela sent at 4/18/2023  9:29 AM EDT -----  Subject: Refill Request    QUESTIONS  Name of Medication? Other - Lorazepam  Patient-reported dosage and instructions? Pt recalls possibly 3x but it   has been about a year  How many days do you have left? 0  Preferred Pharmacy? [address]  Pharmacy phone number (if available)? [phone]  Additional Information for Provider? Pt's boyfriend passed away last night   and she called, stating she hasn't needed this for a while but is   struggling and needs it again. She has little to no phone service right   now and if any call is needed, please call her son: [phone] (son)  ---------------------------------------------------------------------------  --------------  CALL BACK INFO  What is the best way for the office to contact you? Do not leave any   message, patient will call back for answer  Preferred Call Back Phone Number? [phone]  ---------------------------------------------------------------------------  --------------  SCRIPT ANSWERS  Relationship to Patient?  Self

## 2023-05-08 ENCOUNTER — TELEPHONE (OUTPATIENT)
Facility: CLINIC | Age: 63
End: 2023-05-08

## 2023-05-09 RX ORDER — PREDNISONE 20 MG/1
20 TABLET ORAL 2 TIMES DAILY
Qty: 10 TABLET | Refills: 0 | OUTPATIENT
Start: 2023-05-09

## 2023-05-12 ENCOUNTER — TELEPHONE (OUTPATIENT)
Facility: CLINIC | Age: 63
End: 2023-05-12

## 2023-05-15 RX ORDER — PREDNISONE 20 MG/1
20 TABLET ORAL 2 TIMES DAILY
Qty: 10 TABLET | Refills: 0 | Status: SHIPPED | OUTPATIENT
Start: 2023-05-15 | End: 2023-05-17 | Stop reason: SDUPTHER

## 2023-05-17 RX ORDER — PREDNISONE 20 MG/1
20 TABLET ORAL 2 TIMES DAILY
Qty: 10 TABLET | Refills: 0 | Status: SHIPPED | OUTPATIENT
Start: 2023-05-17 | End: 2023-05-18

## 2023-05-18 RX ORDER — PREDNISONE 20 MG/1
TABLET ORAL
Qty: 10 TABLET | Refills: 0 | Status: SHIPPED | OUTPATIENT
Start: 2023-05-18

## 2023-05-24 ENCOUNTER — TELEMEDICINE (OUTPATIENT)
Facility: CLINIC | Age: 63
End: 2023-05-24
Payer: COMMERCIAL

## 2023-05-24 DIAGNOSIS — M48.061 SPINAL STENOSIS OF LUMBAR REGION, UNSPECIFIED WHETHER NEUROGENIC CLAUDICATION PRESENT: ICD-10-CM

## 2023-05-24 DIAGNOSIS — Z79.4 TYPE 2 DIABETES MELLITUS WITH HYPERGLYCEMIA, WITH LONG-TERM CURRENT USE OF INSULIN (HCC): ICD-10-CM

## 2023-05-24 DIAGNOSIS — E78.2 MIXED HYPERLIPIDEMIA: ICD-10-CM

## 2023-05-24 DIAGNOSIS — J44.1 CHRONIC OBSTRUCTIVE PULMONARY DISEASE WITH ACUTE EXACERBATION (HCC): Primary | ICD-10-CM

## 2023-05-24 DIAGNOSIS — E11.65 TYPE 2 DIABETES MELLITUS WITH HYPERGLYCEMIA, WITH LONG-TERM CURRENT USE OF INSULIN (HCC): ICD-10-CM

## 2023-05-24 DIAGNOSIS — B18.2 HEPATITIS C VIRUS CARRIER STATE (HCC): ICD-10-CM

## 2023-05-24 DIAGNOSIS — A09 DIARRHEA OF INFECTIOUS ORIGIN: ICD-10-CM

## 2023-05-24 PROCEDURE — 99214 OFFICE O/P EST MOD 30 MIN: CPT | Performed by: FAMILY MEDICINE

## 2023-05-24 RX ORDER — AZITHROMYCIN 250 MG/1
250 TABLET, FILM COATED ORAL SEE ADMIN INSTRUCTIONS
Qty: 6 TABLET | Refills: 0 | Status: SHIPPED | OUTPATIENT
Start: 2023-05-24 | End: 2023-05-29

## 2023-05-24 RX ORDER — ALBUTEROL SULFATE 90 UG/1
1 AEROSOL, METERED RESPIRATORY (INHALATION) EVERY 6 HOURS PRN
Qty: 18 G | Refills: 5 | Status: SHIPPED | OUTPATIENT
Start: 2023-05-24

## 2023-05-24 RX ORDER — GABAPENTIN 600 MG/1
600 TABLET ORAL 3 TIMES DAILY
Qty: 90 TABLET | Refills: 0 | Status: SHIPPED | OUTPATIENT
Start: 2023-05-24 | End: 2023-06-23

## 2023-05-24 RX ORDER — IPRATROPIUM BROMIDE AND ALBUTEROL SULFATE 2.5; .5 MG/3ML; MG/3ML
3 SOLUTION RESPIRATORY (INHALATION) EVERY 6 HOURS PRN
Qty: 360 ML | Refills: 3 | Status: SHIPPED | OUTPATIENT
Start: 2023-05-24

## 2023-05-24 RX ORDER — BUDESONIDE 0.5 MG/2ML
1 INHALANT ORAL 2 TIMES DAILY
Qty: 100 EACH | Refills: 5 | Status: SHIPPED | OUTPATIENT
Start: 2023-05-24

## 2023-05-24 NOTE — PROGRESS NOTES
Alana Castillo, was evaluated through a synchronous (real-time) audio-video encounter. The patient (or guardian if applicable) is aware that this is a billable service, which includes applicable co-pays. This Virtual Visit was conducted with patient's (and/or legal guardian's) consent. Patient identification was verified, and a caregiver was present when appropriate. The patient was located at Home: [address] [address] [facility]  Provider was located at [facility] (Appt Dept): [address]  [address] [place] [address]         Total time spent for this encounter: Not billed by time    --Jeevan Shine MD on 5/24/2023 at 2:08 PM    An electronic signature was used to authenticate this note. Passed worm from her stool. Exposed        Subjective:   Alana Castillo is a 65 y.o. female who complains of congestion, sore throat, cough described as productive of brown sputum, and wheezing dyspnea HA  for 14 days, coming going since that time. She denies a history of fevers and rash on body. Son checked her for Covid was Neg  Evaluation to date: as above. Treatment to date: OTC products. Patient does smoke cigarettes. Relevant PMH: Asthma and COPD.   C/o loose bowels no blood, saw a worm    Allergies   Allergen Reactions    Oxycodone Hives and Itching    Zolpidem Itching     Patient Active Problem List   Diagnosis    Tobacco dependence syndrome    Chronic obstructive pulmonary disease (HCC)    Osteoarthritis of knee    Depression, major, recurrent, mild (HCC)    Elevated liver enzymes    Adenomatous colon polyp    Encounter for diagnostic colonoscopy due to change in bowel habits    Neck pain    Spinal stenosis    Narcotic dependence ([address]    Hepatitis C virus carrier state ([address]    Hyperlipidemia    Hyperglycemia due to type 2 diabetes mellitus ([address]    Anxiety state    Restless legs    Unilateral primary osteoarthritis, left knee         Patient Active Problem List    Diagnosis

## 2023-05-24 NOTE — PROGRESS NOTES
Chief Complaint   Patient presents with    Stool Color Change     Pt stated might have worms in stool     Patient is aware that this is a Virtual Visit or Phone Call Only doctor's visit. Patient has not been out of the country in (14 months), NO diarrhea, NO cough, NO chest conjestion, NO temp. Pt has not been around anyone with these symptoms. Health Maintenance reviewed. I have reviewed the patient's medical history in detail and updated the computerized patient record. Have you been to the ER, urgent care clinic since your last visit? No Hospitalized since your last visit? No     2. Have you seen or consulted any other health care providers outside of the [address] [place] [address] since your last visit? No Include any pap smears or colon screening.      Encouraged pt to discuss pt's wishes with spouse/partner/family and bring them in the next appt to follow thru with the Advanced Directive

## 2023-06-06 DIAGNOSIS — M48.061 SPINAL STENOSIS OF LUMBAR REGION, UNSPECIFIED WHETHER NEUROGENIC CLAUDICATION PRESENT: ICD-10-CM

## 2023-06-06 RX ORDER — TRAMADOL HYDROCHLORIDE 50 MG/1
TABLET ORAL
COMMUNITY
End: 2023-07-24 | Stop reason: SDUPTHER

## 2023-06-06 RX ORDER — TRAMADOL HYDROCHLORIDE 50 MG/1
TABLET ORAL
OUTPATIENT
Start: 2023-06-06

## 2023-06-06 RX ORDER — PREDNISONE 20 MG/1
20 TABLET ORAL 2 TIMES DAILY
Qty: 10 TABLET | Refills: 0 | Status: SHIPPED | OUTPATIENT
Start: 2023-06-06 | End: 2023-07-06

## 2023-06-06 RX ORDER — GABAPENTIN 600 MG/1
600 TABLET ORAL 3 TIMES DAILY
Qty: 90 TABLET | Refills: 0 | Status: SHIPPED | OUTPATIENT
Start: 2023-06-06 | End: 2023-07-24 | Stop reason: SDUPTHER

## 2023-06-06 NOTE — TELEPHONE ENCOUNTER
Pt calling in to get the following refills   Gabapentin 600mg  Prednisone 20mg  Tramadol   Please call these in to [address]

## 2023-06-16 ENCOUNTER — TELEPHONE (OUTPATIENT)
Facility: CLINIC | Age: 63
End: 2023-06-16

## 2023-06-16 NOTE — TELEPHONE ENCOUNTER
Pt wants to get \"something called in to make me stop drinking\" I told her that Dr Brittany Rodriguez was not in the office on [address] and that he is off next week, but I would see what we can do for her.

## 2023-06-22 ENCOUNTER — CLINICAL DOCUMENTATION (OUTPATIENT)
Facility: CLINIC | Age: 63
End: 2023-06-22

## 2023-07-06 RX ORDER — PREDNISONE 20 MG/1
TABLET ORAL
Qty: 10 TABLET | Refills: 0 | Status: SHIPPED | OUTPATIENT
Start: 2023-07-06

## 2023-07-20 ENCOUNTER — TELEPHONE (OUTPATIENT)
Facility: CLINIC | Age: 63
End: 2023-07-20

## 2023-07-23 NOTE — PROGRESS NOTES
Ansley Ivey, was evaluated through a synchronous (real-time) audio-video encounter. The patient (or guardian if applicable) is aware that this is a billable service, which includes applicable co-pays. This Virtual Visit was conducted with patient's (and/or legal guardian's) consent. Patient identification was verified, and a caregiver was present when appropriate. The patient was located at Home: [address] [place] [address] N [facility]  Provider was located at [facility] (Appt Dept): [address],  [address] [place] [address] Drive         Total time spent for this encounter: Not billed by time    --India Wynne MD on 7/23/2023 at 9:54 AM    An electronic signature was used to authenticate this note. Subjective:     Ansley Ivey is a 65 y.o. female seen for follow-up of diabetes. She has had hypoglycemic attacks. .no  Blood sugar control has been good, in general she is feeling well, asks for refills of her pain medication. Hemoglobin A1C   Date Value Ref Range Status   11/11/2022 7.6 (H) 4.8 - 5.6 % Final     Comment:              Prediabetes: 5.7 - 6.4           Diabetes: >6.4           Glycemic control for adults with diabetes: <7.0       Lab Results   Component Value Date/Time     11/30/2022 01:12 PM    K 3.8 11/30/2022 01:12 PM     11/30/2022 01:12 PM    CO2 30 11/30/2022 01:12 PM    BUN 12 11/30/2022 01:12 PM    CREATININE 0.72 11/30/2022 01:12 PM    GLUCOSE 157 11/30/2022 01:12 PM    CALCIUM 9.3 11/30/2022 01:12 PM    LABGLOM 104 11/11/2022 12:00 AM          She has diabetes and hypertension. Ansley Ivey has the additional concern of has Appt with hep C physician  Spots on her breast 1 is bleeding  Has had memory issues for a while now wants to be evaluated for that. Reports taking diabetes medications without side affects. Diet and Lifestyle:  Still smoking, we discussed this .     Patient Active Problem List    Diagnosis Date Noted

## 2023-07-24 ENCOUNTER — OFFICE VISIT (OUTPATIENT)
Age: 63
End: 2023-07-24
Payer: MEDICAID

## 2023-07-24 ENCOUNTER — HOSPITAL ENCOUNTER (OUTPATIENT)
Facility: HOSPITAL | Age: 63
Setting detail: SPECIMEN
Discharge: HOME | End: 2023-07-27

## 2023-07-24 ENCOUNTER — TELEMEDICINE (OUTPATIENT)
Facility: CLINIC | Age: 63
End: 2023-07-24
Payer: MEDICAID

## 2023-07-24 VITALS
OXYGEN SATURATION: 98 % | HEART RATE: 63 BPM | SYSTOLIC BLOOD PRESSURE: 125 MMHG | HEIGHT: 67 IN | TEMPERATURE: 97 F | DIASTOLIC BLOOD PRESSURE: 59 MMHG | BODY MASS INDEX: 24.96 KG/M2 | WEIGHT: 159 LBS

## 2023-07-24 DIAGNOSIS — E11.40 TYPE 2 DIABETES MELLITUS WITH DIABETIC NEUROPATHY, WITHOUT LONG-TERM CURRENT USE OF INSULIN (HCC): Primary | ICD-10-CM

## 2023-07-24 DIAGNOSIS — E53.8 B12 DEFICIENCY: ICD-10-CM

## 2023-07-24 DIAGNOSIS — R76.8 HEPATITIS C ANTIBODY TEST POSITIVE: ICD-10-CM

## 2023-07-24 DIAGNOSIS — E11.65 TYPE 2 DIABETES MELLITUS WITH HYPERGLYCEMIA, WITHOUT LONG-TERM CURRENT USE OF INSULIN (HCC): ICD-10-CM

## 2023-07-24 DIAGNOSIS — M17.12 UNILATERAL PRIMARY OSTEOARTHRITIS, LEFT KNEE: ICD-10-CM

## 2023-07-24 DIAGNOSIS — F33.0 DEPRESSION, MAJOR, RECURRENT, MILD (HCC): ICD-10-CM

## 2023-07-24 DIAGNOSIS — Z12.31 ENCOUNTER FOR SCREENING MAMMOGRAM FOR MALIGNANT NEOPLASM OF BREAST: ICD-10-CM

## 2023-07-24 DIAGNOSIS — L98.9 SKIN LESION: ICD-10-CM

## 2023-07-24 DIAGNOSIS — J44.9 CHRONIC OBSTRUCTIVE PULMONARY DISEASE, UNSPECIFIED COPD TYPE (HCC): ICD-10-CM

## 2023-07-24 DIAGNOSIS — M48.061 SPINAL STENOSIS OF LUMBAR REGION, UNSPECIFIED WHETHER NEUROGENIC CLAUDICATION PRESENT: ICD-10-CM

## 2023-07-24 DIAGNOSIS — F11.20 NARCOTIC DEPENDENCE (HCC): ICD-10-CM

## 2023-07-24 PROBLEM — B18.2 HEPATITIS C VIRUS CARRIER STATE (HCC): Status: RESOLVED | Noted: 2021-04-26 | Resolved: 2023-07-24

## 2023-07-24 LAB
AMMONIA PLAS-SCNC: 15 UMOL/L (ref 11–32)
LABCORP SPECIMEN COLLECTION: NORMAL

## 2023-07-24 PROCEDURE — 99204 OFFICE O/P NEW MOD 45 MIN: CPT | Performed by: NURSE PRACTITIONER

## 2023-07-24 PROCEDURE — 82140 ASSAY OF AMMONIA: CPT

## 2023-07-24 PROCEDURE — 99214 OFFICE O/P EST MOD 30 MIN: CPT | Performed by: FAMILY MEDICINE

## 2023-07-24 RX ORDER — DULOXETIN HYDROCHLORIDE 60 MG/1
60 CAPSULE, DELAYED RELEASE ORAL DAILY
Qty: 90 CAPSULE | Refills: 1 | Status: SHIPPED | OUTPATIENT
Start: 2023-07-24

## 2023-07-24 RX ORDER — GABAPENTIN 600 MG/1
600 TABLET ORAL 3 TIMES DAILY
Qty: 90 TABLET | Refills: 0 | Status: SHIPPED | OUTPATIENT
Start: 2023-07-24 | End: 2023-08-23

## 2023-07-24 RX ORDER — BUPROPION HYDROCHLORIDE 100 MG/1
100 TABLET ORAL 3 TIMES DAILY
Qty: 90 TABLET | Refills: 5 | Status: SHIPPED | OUTPATIENT
Start: 2023-07-24

## 2023-07-24 RX ORDER — PREDNISONE 20 MG/1
40 TABLET ORAL DAILY PRN
Qty: 10 TABLET | Refills: 1 | Status: SHIPPED | OUTPATIENT
Start: 2023-07-24 | End: 2023-07-24

## 2023-07-24 RX ORDER — FLUTICASONE PROPIONATE AND SALMETEROL 500; 50 UG/1; UG/1
POWDER RESPIRATORY (INHALATION)
COMMUNITY
Start: 2020-01-15

## 2023-07-24 RX ORDER — TRAMADOL HYDROCHLORIDE 50 MG/1
50 TABLET ORAL DAILY PRN
Qty: 30 TABLET | Refills: 0 | Status: SHIPPED | OUTPATIENT
Start: 2023-07-24 | End: 2023-08-23

## 2023-07-24 RX ORDER — BUDESONIDE 0.5 MG/2ML
1 INHALANT ORAL 2 TIMES DAILY
Qty: 100 EACH | Refills: 5 | Status: SHIPPED | OUTPATIENT
Start: 2023-07-24

## 2023-07-24 RX ORDER — GLIMEPIRIDE 2 MG/1
2 TABLET ORAL
Qty: 180 TABLET | Refills: 1 | Status: SHIPPED | OUTPATIENT
Start: 2023-07-24 | End: 2023-07-24

## 2023-07-24 ASSESSMENT — PATIENT HEALTH QUESTIONNAIRE - PHQ9
2. FEELING DOWN, DEPRESSED OR HOPELESS: 2
SUM OF ALL RESPONSES TO PHQ9 QUESTIONS 1 & 2: 4
1. LITTLE INTEREST OR PLEASURE IN DOING THINGS: 2
SUM OF ALL RESPONSES TO PHQ QUESTIONS 1-9: 4

## 2023-07-24 NOTE — PROGRESS NOTES
Chief Complaint   Patient presents with    Other     Pt requesting medication for memory   Pt would like a Rx for Flexoril as well. Pt states she goes to liver doctor today; has bumps on chest, needs to see a dermatologist.      1. Have you been to the ER, urgent care clinic since your last visit? Hospitalized since your last visit? No    2. Have you seen or consulted any other health care providers outside of the [address] [place] [address] Avenue since your last visit? Include any pap smears or colon screening.  No

## 2023-07-24 NOTE — PROGRESS NOTES
understanding of the above. [place] in 8 weeks for Fibroscan. She should be on antiviral drug therapy by then.     JARON Spicer-PALMER  [facility]  [address], [address] [place], [place] [address], [address] [place] [address]   [phone]

## 2023-07-25 LAB
ALBUMIN SERPL-MCNC: 4 G/DL (ref 3.9–4.9)
ALP SERPL-CCNC: 125 IU/L (ref 44–121)
ALT SERPL-CCNC: 136 IU/L (ref 0–32)
AMMONIA PLAS-MCNC: 79 UG/DL (ref 34–178)
AST SERPL-CCNC: 105 IU/L (ref 0–40)
BASOPHILS # BLD AUTO: 0 X10E3/UL (ref 0–0.2)
BASOPHILS NFR BLD AUTO: 0 %
BILIRUB DIRECT SERPL-MCNC: <0.1 MG/DL (ref 0–0.4)
BILIRUB SERPL-MCNC: 0.2 MG/DL (ref 0–1.2)
BUN SERPL-MCNC: 14 MG/DL (ref 8–27)
BUN/CREAT SERPL: 17 (ref 12–28)
CALCIUM SERPL-MCNC: 8.7 MG/DL (ref 8.7–10.3)
CHLORIDE SERPL-SCNC: 102 MMOL/L (ref 96–106)
CO2 SERPL-SCNC: 23 MMOL/L (ref 20–29)
CREAT SERPL-MCNC: 0.82 MG/DL (ref 0.57–1)
EGFRCR SERPLBLD CKD-EPI 2021: 81 ML/MIN/1.73
EOSINOPHIL # BLD AUTO: 0.3 X10E3/UL (ref 0–0.4)
EOSINOPHIL NFR BLD AUTO: 3 %
ERYTHROCYTE [DISTWIDTH] IN BLOOD BY AUTOMATED COUNT: 13.1 % (ref 11.7–15.4)
GLUCOSE SERPL-MCNC: 205 MG/DL (ref 70–99)
HAV AB SER QL IA: NEGATIVE
HBV CORE AB SERPL QL IA: POSITIVE
HBV SURFACE AB SER QL: REACTIVE
HCT VFR BLD AUTO: 46.3 % (ref 34–46.6)
HGB BLD-MCNC: 15.3 G/DL (ref 11.1–15.9)
IMM GRANULOCYTES # BLD AUTO: 0 X10E3/UL (ref 0–0.1)
IMM GRANULOCYTES NFR BLD AUTO: 0 %
INR PPP: 1 (ref 0.9–1.2)
LYMPHOCYTES # BLD AUTO: 2.9 X10E3/UL (ref 0.7–3.1)
LYMPHOCYTES NFR BLD AUTO: 28 %
MCH RBC QN AUTO: 31.2 PG (ref 26.6–33)
MCHC RBC AUTO-ENTMCNC: 33 G/DL (ref 31.5–35.7)
MCV RBC AUTO: 94 FL (ref 79–97)
MONOCYTES # BLD AUTO: 0.8 X10E3/UL (ref 0.1–0.9)
MONOCYTES NFR BLD AUTO: 8 %
NEUTROPHILS # BLD AUTO: 6.3 X10E3/UL (ref 1.4–7)
NEUTROPHILS NFR BLD AUTO: 61 %
PLATELET # BLD AUTO: 223 X10E3/UL (ref 150–450)
POTASSIUM SERPL-SCNC: 3.8 MMOL/L (ref 3.5–5.2)
PROT SERPL-MCNC: 6.9 G/DL (ref 6–8.5)
PROTHROMBIN TIME: 10.9 SEC (ref 9.1–12)
RBC # BLD AUTO: 4.91 X10E6/UL (ref 3.77–5.28)
SODIUM SERPL-SCNC: 139 MMOL/L (ref 134–144)
SPECIMEN STATUS REPORT: NORMAL
WBC # BLD AUTO: 10.3 X10E3/UL (ref 3.4–10.8)

## 2023-07-26 ENCOUNTER — TRANSCRIBE ORDERS (OUTPATIENT)
Facility: HOSPITAL | Age: 63
End: 2023-07-26

## 2023-07-26 DIAGNOSIS — Z12.31 VISIT FOR SCREENING MAMMOGRAM: Primary | ICD-10-CM

## 2023-07-26 LAB
A2 MACROGLOB SERPL-MCNC: 326 MG/DL (ref 110–276)
AFP L3 MFR SERPL: 3.7 % (ref 0–9.9)
AFP SERPL-MCNC: 10.9 NG/ML (ref 0–9.2)
ALT SERPL W P-5'-P-CCNC: 162 IU/L (ref 0–40)
APO A-I SERPL-MCNC: 123 MG/DL (ref 116–209)
BILIRUB SERPL-MCNC: 0.3 MG/DL (ref 0–1.2)
FIBROSIS SCORING:: ABNORMAL
FIBROSIS STAGE SERPL QL: ABNORMAL
GGT SERPL-CCNC: 185 IU/L (ref 0–60)
HAPTOGLOB SERPL-MCNC: 85 MG/DL (ref 37–355)
INTERPRETATIONS:: ABNORMAL
LABORATORY COMMENT REPORT: ABNORMAL
LIVER FIBR SCORE SERPL CALC.FIBROSURE: 0.66 (ref 0–0.21)
NECROINFLAMM ACTIVITY SCORING:: ABNORMAL
NECROINFLAMMATORY ACT GRADE SERPL QL: ABNORMAL
NECROINFLAMMATORY ACT SCORE SERPL: 0.84 (ref 0–0.17)
SERVICE CMNT-IMP: ABNORMAL
TEST PERFORMANCE INFO SPEC: ABNORMAL

## 2023-07-27 LAB
HCV GENTYP SERPL NAA+PROBE: NORMAL
HCV GENTYP SERPL NAA+PROBE: NORMAL
HCV RNA SERPL NAA+PROBE-ACNC: NORMAL IU/ML
HCV RNA SERPL NAA+PROBE-LOG IU: 5.87 LOG10 IU/ML
LABORATORY COMMENT REPORT: NORMAL
LABORATORY COMMENT REPORT: NORMAL

## 2023-07-28 RX ORDER — VELPATASVIR AND SOFOSBUVIR 100; 400 MG/1; MG/1
1 TABLET, FILM COATED ORAL DAILY
Qty: 28 TABLET | Refills: 2 | Status: SHIPPED | OUTPATIENT
Start: 2023-07-28 | End: 2023-10-20

## 2023-08-09 ENCOUNTER — TRANSCRIBE ORDERS (OUTPATIENT)
Facility: HOSPITAL | Age: 63
End: 2023-08-09

## 2023-08-09 ENCOUNTER — HOSPITAL ENCOUNTER (OUTPATIENT)
Facility: HOSPITAL | Age: 63
Discharge: HOME | End: 2023-08-12
Payer: MEDICAID

## 2023-08-09 DIAGNOSIS — Z12.31 VISIT FOR SCREENING MAMMOGRAM: ICD-10-CM

## 2023-08-09 DIAGNOSIS — R92.8 ABNORMAL MAMMOGRAM: Primary | ICD-10-CM

## 2023-08-09 DIAGNOSIS — R76.8 HEPATITIS C ANTIBODY TEST POSITIVE: ICD-10-CM

## 2023-08-09 PROCEDURE — 77063 BREAST TOMOSYNTHESIS BI: CPT

## 2023-08-09 PROCEDURE — 76705 ECHO EXAM OF ABDOMEN: CPT

## 2023-08-22 DIAGNOSIS — M17.12 UNILATERAL PRIMARY OSTEOARTHRITIS, LEFT KNEE: ICD-10-CM

## 2023-08-22 NOTE — TELEPHONE ENCOUNTER
Pt would like to have refills of tramadol 50mg, \"diarrhea med\" and \"nausea med\" to [facility].     Pt has appt 8/28/2023

## 2023-08-26 RX ORDER — TRAMADOL HYDROCHLORIDE 50 MG/1
50 TABLET ORAL DAILY PRN
Qty: 30 TABLET | Refills: 0 | OUTPATIENT
Start: 2023-08-26 | End: 2023-09-25

## 2023-08-27 PROBLEM — J43.1 PANLOBULAR EMPHYSEMA (HCC): Status: ACTIVE | Noted: 2023-08-27

## 2023-08-28 ENCOUNTER — TELEPHONE (OUTPATIENT)
Facility: CLINIC | Age: 63
End: 2023-08-28

## 2023-08-28 NOTE — TELEPHONE ENCOUNTER
Home care delivered calling for status of letter of medical necessity for incontinence supplies sent on 8/11. They are requiring what type of incontinence it is and statement of why extra supplies are needed on office/provider letterhead. They can be reached at [phone], please call pt when this has been done.

## 2023-09-08 ENCOUNTER — TELEPHONE (OUTPATIENT)
Facility: CLINIC | Age: 63
End: 2023-09-08

## 2023-09-08 NOTE — TELEPHONE ENCOUNTER
Radha from [facility] calling to speak to david about pt getting additional xrays.  Please call her at [phone], she will be there until 3pm today

## 2023-09-09 DIAGNOSIS — E11.40 TYPE 2 DIABETES MELLITUS WITH DIABETIC NEUROPATHY, WITHOUT LONG-TERM CURRENT USE OF INSULIN (HCC): ICD-10-CM

## 2023-09-11 ENCOUNTER — TELEPHONE (OUTPATIENT)
Facility: CLINIC | Age: 63
End: 2023-09-11

## 2023-09-11 NOTE — TELEPHONE ENCOUNTER
Pt needs \"nerve pills\" called in to [address].  She says her brother as been diagnosed with stage 4 cancer and says she is overwhelmed with anxiety, pt can be reached at [phone]

## 2023-09-12 RX ORDER — PREDNISONE 20 MG/1
TABLET ORAL
Qty: 10 TABLET | Refills: 0 | Status: SHIPPED | OUTPATIENT
Start: 2023-09-12

## 2023-09-12 NOTE — TELEPHONE ENCOUNTER
Called patient and unable to leave a voice message for callback.      Rc Castillo, CCT  9:24 AM 9/12/23

## 2023-09-12 NOTE — TELEPHONE ENCOUNTER
Called patient and verified name and date of birth. Pt requesting tramadol, pt reports testing on breasts and is continuing treatment. Pt's brother is in stage 4 cancer, and requesting lorazepam, under a lot of anxiety.    Both meds not on current med list.     Allergies   Allergen Reactions    Oxycodone Hives and Itching     Patient reports not allergic 7/24/23    Zolpidem Itching     Current Outpatient Medications   Medication Instructions    albuterol sulfate HFA (PROVENTIL;VENTOLIN;PROAIR) 108 (90 Base) MCG/ACT inhaler 1 puff, Inhalation, EVERY 6 HOURS PRN    budesonide (PULMICORT) 500 mcg, Nebulization, 2 TIMES DAILY    buPROPion (WELLBUTRIN) 100 mg, Oral, 3 TIMES DAILY    diclofenac sodium (VOLTAREN) 4 g, Topical, 4 TIMES DAILY    DULoxetine (CYMBALTA) 60 mg, Oral, DAILY    fluticasone-salmeterol (ADVAIR) 500-50 MCG/ACT AEPB diskus inhaler INHALE ONE DOSE BY MOUTH EVERY 12 HOURS    gabapentin (NEURONTIN) 600 mg, Oral, 3 TIMES DAILY    ipratropium 0.5 mg-albuterol 2.5 mg (DUONEB) 0.5-2.5 (3) MG/3ML SOLN nebulizer solution 3 mLs, Inhalation, EVERY 6 HOURS PRN    Liraglutide (VICTOZA) 0.6 mg, SubCUTAneous, DAILY    nicotine (NICODERM CQ) 14 MG/24HR APPLY 1 PATCH TOPICALLY EVERY 24 HOURS FOR 30 DAYS    Sofosbuvir-Velpatasvir 400-100 MG TABS 1 tablet, Oral, DAILY    umeclidinium-vilanterol (ANORO ELLIPTA) 62.5-25 MCG/ACT inhaler 1 puff, Inhalation, DAILY         Signed By: Nicholas Dalton Clinical Care Technician    09/12/23  9:41 AM

## 2023-09-14 ENCOUNTER — TELEPHONE (OUTPATIENT)
Facility: CLINIC | Age: 63
End: 2023-09-14

## 2023-09-14 NOTE — TELEPHONE ENCOUNTER
Renetta Dates from [address] calling in reference to forms faxed on 8/11, please contact her at [phone]

## 2023-09-21 ENCOUNTER — CLINICAL DOCUMENTATION (OUTPATIENT)
Facility: HOSPITAL | Age: 63
End: 2023-09-21

## 2023-09-21 ENCOUNTER — HOSPITAL ENCOUNTER (OUTPATIENT)
Facility: HOSPITAL | Age: 63
Discharge: HOME | End: 2023-09-21
Payer: MEDICAID

## 2023-09-21 ENCOUNTER — TELEPHONE (OUTPATIENT)
Facility: CLINIC | Age: 63
End: 2023-09-21

## 2023-09-21 DIAGNOSIS — R92.8 ABNORMAL MAMMOGRAM: ICD-10-CM

## 2023-09-21 PROCEDURE — 77065 DX MAMMO INCL CAD UNI: CPT

## 2023-09-21 NOTE — PROGRESS NOTES
I have spoken with the patient in regards to a LT breast stereo bx that has been recommended. Patient would like this to be done at [facility]. I have contacted [facility] to call and set this up for the patient. I also spoke with Chela(nurse) at Dr. Lynae Gowers office stating the need for this biopsy.

## 2023-09-26 DIAGNOSIS — R92.8 ABNORMALITY OF RIGHT BREAST ON SCREENING MAMMOGRAM: Primary | ICD-10-CM

## 2023-09-29 ENCOUNTER — TELEPHONE (OUTPATIENT)
Facility: CLINIC | Age: 63
End: 2023-09-29

## 2023-10-05 ENCOUNTER — TELEPHONE (OUTPATIENT)
Facility: CLINIC | Age: 63
End: 2023-10-05

## 2023-10-05 NOTE — TELEPHONE ENCOUNTER
Pt calling in for refill of lnerve pill (believes the name is lorazepam) called into [facility] [employer].  Pt can be reached at  [phone]

## 2023-10-12 ENCOUNTER — APPOINTMENT (OUTPATIENT)
Facility: HOSPITAL | Age: 63
End: 2023-10-12
Payer: MEDICAID

## 2023-10-12 ENCOUNTER — HOSPITAL ENCOUNTER (EMERGENCY)
Facility: HOSPITAL | Age: 63
Discharge: HOME | End: 2023-10-12
Attending: EMERGENCY MEDICINE
Payer: MEDICAID

## 2023-10-12 VITALS
HEART RATE: 85 BPM | TEMPERATURE: 98.3 F | DIASTOLIC BLOOD PRESSURE: 71 MMHG | HEIGHT: 69 IN | SYSTOLIC BLOOD PRESSURE: 140 MMHG | BODY MASS INDEX: 26.66 KG/M2 | WEIGHT: 180 LBS | OXYGEN SATURATION: 95 % | RESPIRATION RATE: 19 BRPM

## 2023-10-12 DIAGNOSIS — U07.1 COVID: Primary | ICD-10-CM

## 2023-10-12 DIAGNOSIS — J44.1 COPD EXACERBATION (HCC): ICD-10-CM

## 2023-10-12 LAB
FLUAV RNA SPEC QL NAA+PROBE: NOT DETECTED
FLUBV RNA SPEC QL NAA+PROBE: NOT DETECTED
SARS-COV-2 RNA RESP QL NAA+PROBE: DETECTED

## 2023-10-12 PROCEDURE — 71046 X-RAY EXAM CHEST 2 VIEWS: CPT

## 2023-10-12 PROCEDURE — 94640 AIRWAY INHALATION TREATMENT: CPT

## 2023-10-12 PROCEDURE — 99284 EMERGENCY DEPT VISIT MOD MDM: CPT

## 2023-10-12 PROCEDURE — 6370000000 HC RX 637 (ALT 250 FOR IP): Performed by: EMERGENCY MEDICINE

## 2023-10-12 PROCEDURE — 87636 SARSCOV2 & INF A&B AMP PRB: CPT

## 2023-10-12 RX ORDER — PREDNISONE 20 MG/1
40 TABLET ORAL DAILY
Status: DISCONTINUED | OUTPATIENT
Start: 2023-10-12 | End: 2023-10-12 | Stop reason: HOSPADM

## 2023-10-12 RX ORDER — PROCHLORPERAZINE MALEATE 10 MG
10 TABLET ORAL EVERY 6 HOURS PRN
Qty: 20 TABLET | Refills: 0 | Status: SHIPPED | OUTPATIENT
Start: 2023-10-12 | End: 2023-10-12

## 2023-10-12 RX ORDER — PROCHLORPERAZINE MALEATE 10 MG
10 TABLET ORAL ONCE
Status: COMPLETED | OUTPATIENT
Start: 2023-10-12 | End: 2023-10-12

## 2023-10-12 RX ORDER — BUTALBITAL, ACETAMINOPHEN AND CAFFEINE 50; 325; 40 MG/1; MG/1; MG/1
2 TABLET ORAL
Status: DISCONTINUED | OUTPATIENT
Start: 2023-10-12 | End: 2023-10-12 | Stop reason: HOSPADM

## 2023-10-12 RX ORDER — BUTALBITAL, ACETAMINOPHEN AND CAFFEINE 300; 40; 50 MG/1; MG/1; MG/1
1 CAPSULE ORAL EVERY 4 HOURS PRN
Qty: 20 CAPSULE | Refills: 0 | Status: SHIPPED | OUTPATIENT
Start: 2023-10-12 | End: 2023-10-12

## 2023-10-12 RX ORDER — IPRATROPIUM BROMIDE AND ALBUTEROL SULFATE 2.5; .5 MG/3ML; MG/3ML
1 SOLUTION RESPIRATORY (INHALATION)
Status: COMPLETED | OUTPATIENT
Start: 2023-10-12 | End: 2023-10-12

## 2023-10-12 RX ORDER — AZITHROMYCIN 250 MG/1
250 TABLET, FILM COATED ORAL SEE ADMIN INSTRUCTIONS
Qty: 6 TABLET | Refills: 0 | Status: SHIPPED | OUTPATIENT
Start: 2023-10-12 | End: 2023-10-17

## 2023-10-12 RX ORDER — METOCLOPRAMIDE 10 MG/1
10 TABLET ORAL 4 TIMES DAILY
Qty: 15 TABLET | Refills: 0 | Status: SHIPPED | OUTPATIENT
Start: 2023-10-12

## 2023-10-12 RX ORDER — DIPHENHYDRAMINE HCL 25 MG
25 CAPSULE ORAL
Status: COMPLETED | OUTPATIENT
Start: 2023-10-12 | End: 2023-10-12

## 2023-10-12 RX ORDER — IPRATROPIUM BROMIDE AND ALBUTEROL SULFATE 2.5; .5 MG/3ML; MG/3ML
1 SOLUTION RESPIRATORY (INHALATION)
Status: CANCELLED | OUTPATIENT
Start: 2023-10-12 | End: 2023-10-12

## 2023-10-12 RX ORDER — NAPROXEN 250 MG/1
500 TABLET ORAL ONCE
Status: COMPLETED | OUTPATIENT
Start: 2023-10-12 | End: 2023-10-12

## 2023-10-12 RX ORDER — PREDNISONE 20 MG/1
40 TABLET ORAL DAILY
Qty: 8 TABLET | Refills: 0 | Status: SHIPPED | OUTPATIENT
Start: 2023-10-12 | End: 2023-10-16

## 2023-10-12 RX ADMIN — PREDNISONE 40 MG: 20 TABLET ORAL at 12:27

## 2023-10-12 RX ADMIN — DIPHENHYDRAMINE HYDROCHLORIDE 25 MG: 25 CAPSULE ORAL at 12:54

## 2023-10-12 RX ADMIN — NAPROXEN SODIUM 500 MG: 250 TABLET ORAL at 12:40

## 2023-10-12 RX ADMIN — PROCHLORPERAZINE MALEATE 10 MG: 10 TABLET ORAL at 12:54

## 2023-10-12 RX ADMIN — IPRATROPIUM BROMIDE AND ALBUTEROL SULFATE 1 DOSE: 2.5; .5 SOLUTION RESPIRATORY (INHALATION) at 11:27

## 2023-10-12 ASSESSMENT — PAIN DESCRIPTION - LOCATION
LOCATION: BACK
LOCATION: HEAD

## 2023-10-12 ASSESSMENT — PAIN - FUNCTIONAL ASSESSMENT: PAIN_FUNCTIONAL_ASSESSMENT: 0-10

## 2023-10-12 ASSESSMENT — PAIN SCALES - GENERAL
PAINLEVEL_OUTOF10: 10
PAINLEVEL_OUTOF10: 6

## 2023-10-12 ASSESSMENT — LIFESTYLE VARIABLES
HOW OFTEN DO YOU HAVE A DRINK CONTAINING ALCOHOL: NEVER
HOW MANY STANDARD DRINKS CONTAINING ALCOHOL DO YOU HAVE ON A TYPICAL DAY: PATIENT DOES NOT DRINK

## 2023-10-12 ASSESSMENT — PAIN DESCRIPTION - DESCRIPTORS
DESCRIPTORS: ACHING
DESCRIPTORS: THROBBING

## 2023-10-12 NOTE — ED TRIAGE NOTES
Pt arrived by POV for cough. Pt reports her cough started two days ago. Productive cough with yellow phlegm. Pt reports from the coughing her back is hurting. Pt reports she did a neb PTA, pt with history of COPD/Asthma. Pt is awake alert and oriented X 4, pt educated on ER flow.   No MIR note when pt ambulate to room 9

## 2023-10-12 NOTE — ED NOTES
RT at bedside for treatment.      Thelma Cervantes RN  10/12/23 1125 Lot # (Optional): [id number] Detail Level: None Performed By: Lavinia Wasserman Expiration Date (Optional): 5-31-20 Urine Pregnancy Test Result: negative

## 2023-10-12 NOTE — ED NOTES
I have reviewed discharge instructions with the patient. The patient verbalized understanding. Discharge medications discussed with patient. No questions at this time. Ambulated without difficulty.       Mio Maurice RN  10/12/23 [id number]

## 2023-10-16 NOTE — ED PROVIDER NOTES
EMERGENCY DEPARTMENT HISTORY AND PHYSICAL EXAM      Date: 10/12/2023  Patient Name: Andi Saldana    History of Presenting Illness     Chief Complaint   Patient presents with    Cough       History Provided By: Patient    HPI: Andi Saldana, 65 y.o. female with PMHx as noted below presents emergency department for evaluation of cough, wheezing and shortness of breath. Reported onset of symptoms 2 to 3 days ago. Is also productive yellow phlegm with the cough. She has some associated chills and body aches and HA. Headache was gradual in onset, no visual changes or neurologic deficits. Pt denies any other alleviating or exacerbating factors. Additionally, pt specifically denies any chest pain, severe dyspnea, AMS    PCP: Ashlie Holbrook MD    No current facility-administered medications for this encounter. Current Outpatient Medications   Medication Sig Dispense Refill    predniSONE (DELTASONE) 20 MG tablet Take 2 tablets by mouth daily for 4 days 8 tablet 0    azithromycin (ZITHROMAX) 250 MG tablet Take 1 tablet by mouth See Admin Instructions for 5 days 500mg on day 1 followed by 250mg on days 2 - 5 6 tablet 0    nirmatrelvir/ritonavir 300/100 (PAXLOVID, 300/100,) 20 x 150 MG & 10 x 100MG TBPK Take 3 tablets (two 150 mg nirmatrelvir and one 100 mg ritonavir tablets) by mouth every 12 hours for 5 days. 30 tablet 0    metoclopramide (REGLAN) 10 MG tablet Take 1 tablet by mouth 4 times daily 15 tablet 0    Sofosbuvir-Velpatasvir 400-100 MG TABS Take 1 tablet by mouth daily 28 tablet 2    diclofenac sodium (VOLTAREN) 1 % GEL Apply 4 g topically 4 times daily 200 g 5    budesonide (PULMICORT) 0.5 MG/2ML nebulizer suspension Take 2 mLs by nebulization 2 times daily 100 each 5    gabapentin (NEURONTIN) 600 MG tablet Take 1 tablet by mouth 3 times daily for 30 days.  Max Daily Amount: 1,800 mg 90 tablet 0    buPROPion (WELLBUTRIN) 100 MG tablet Take 1 tablet by mouth 3 times daily 90 tablet 5

## 2023-10-25 NOTE — TELEPHONE ENCOUNTER
Called patient and unable to leave a voice message for callback.      Geovani Calvillo, CCT  5:51 PM 10/25/23

## 2023-10-26 ENCOUNTER — TELEPHONE (OUTPATIENT)
Facility: CLINIC | Age: 63
End: 2023-10-26

## 2023-10-27 RX ORDER — PREDNISONE 20 MG/1
40 TABLET ORAL DAILY
Qty: 10 TABLET | Refills: 0 | Status: SHIPPED | OUTPATIENT
Start: 2023-10-27 | End: 2023-11-01

## 2023-11-16 ENCOUNTER — TELEMEDICINE (OUTPATIENT)
Facility: CLINIC | Age: 63
End: 2023-11-16
Payer: MEDICAID

## 2023-11-16 DIAGNOSIS — J44.1 CHRONIC OBSTRUCTIVE PULMONARY DISEASE WITH ACUTE EXACERBATION (HCC): Primary | ICD-10-CM

## 2023-11-16 DIAGNOSIS — E11.40 TYPE 2 DIABETES MELLITUS WITH DIABETIC NEUROPATHY, WITHOUT LONG-TERM CURRENT USE OF INSULIN (HCC): ICD-10-CM

## 2023-11-16 DIAGNOSIS — M48.061 SPINAL STENOSIS OF LUMBAR REGION, UNSPECIFIED WHETHER NEUROGENIC CLAUDICATION PRESENT: ICD-10-CM

## 2023-11-16 DIAGNOSIS — F33.0 DEPRESSION, MAJOR, RECURRENT, MILD (HCC): ICD-10-CM

## 2023-11-16 PROCEDURE — 99214 OFFICE O/P EST MOD 30 MIN: CPT | Performed by: FAMILY MEDICINE

## 2023-11-16 RX ORDER — GABAPENTIN 600 MG/1
600 TABLET ORAL 3 TIMES DAILY
Qty: 90 TABLET | Refills: 1 | Status: SHIPPED | OUTPATIENT
Start: 2023-11-16 | End: 2024-01-15

## 2023-11-16 RX ORDER — BUPROPION HYDROCHLORIDE 100 MG/1
100 TABLET ORAL 3 TIMES DAILY
Qty: 90 TABLET | Refills: 5 | Status: SHIPPED | OUTPATIENT
Start: 2023-11-16

## 2023-11-16 RX ORDER — ALBUTEROL SULFATE 90 UG/1
1 AEROSOL, METERED RESPIRATORY (INHALATION) EVERY 6 HOURS PRN
Qty: 18 G | Refills: 5 | Status: SHIPPED | OUTPATIENT
Start: 2023-11-16

## 2023-11-16 RX ORDER — FLUTICASONE PROPIONATE 113 UG/1
2 POWDER, METERED RESPIRATORY (INHALATION) DAILY
Qty: 1 EACH | Refills: 5 | Status: SHIPPED | OUTPATIENT
Start: 2023-11-16

## 2023-11-16 RX ORDER — PREDNISONE 20 MG/1
TABLET ORAL
Qty: 15 TABLET | Refills: 0 | Status: SHIPPED | OUTPATIENT
Start: 2023-11-16

## 2023-11-16 RX ORDER — FLUTICASONE PROPIONATE 113 UG/1
2 POWDER, METERED RESPIRATORY (INHALATION) DAILY
COMMUNITY
Start: 2023-10-20 | End: 2023-11-16 | Stop reason: SDUPTHER

## 2023-11-16 RX ORDER — DULOXETIN HYDROCHLORIDE 60 MG/1
60 CAPSULE, DELAYED RELEASE ORAL DAILY
Qty: 90 CAPSULE | Refills: 1 | Status: SHIPPED | OUTPATIENT
Start: 2023-11-16

## 2023-11-16 ASSESSMENT — PATIENT HEALTH QUESTIONNAIRE - PHQ9
SUM OF ALL RESPONSES TO PHQ9 QUESTIONS 1 & 2: 4
SUM OF ALL RESPONSES TO PHQ QUESTIONS 1-9: 4
10. IF YOU CHECKED OFF ANY PROBLEMS, HOW DIFFICULT HAVE THESE PROBLEMS MADE IT FOR YOU TO DO YOUR WORK, TAKE CARE OF THINGS AT HOME, OR GET ALONG WITH OTHER PEOPLE: 1
1. LITTLE INTEREST OR PLEASURE IN DOING THINGS: 2
2. FEELING DOWN, DEPRESSED OR HOPELESS: 2
SUM OF ALL RESPONSES TO PHQ QUESTIONS 1-9: 4

## 2023-11-16 NOTE — PROGRESS NOTES
Chief Complaint   Patient presents with    Medication Refill     Patient is aware that this is a Virtual Visit or Phone Call Only doctor's visit. Patient has not been out of the country in (14 months), NO diarrhea, NO cough, NO chest conjestion, NO temp. Pt has not been around anyone with these symptoms. Health Maintenance reviewed. I have reviewed the patient's medical history in detail and updated the computerized patient record. Have you been to the ER, urgent care clinic since your last visit? Yes  Hospitalized since your last visit? No     2. Have you seen or consulted any other health care providers outside of the [address] [place] [address] since your last visit? No  Include any pap smears or colon screening.      Encouraged pt to discuss pt's wishes with spouse/partner/family and bring them in the next appt to follow thru with the Advanced Directive

## 2023-11-16 NOTE — PROGRESS NOTES
Sushant Hernández, was evaluated through a synchronous (real-time) audio-video encounter. The patient (or guardian if applicable) is aware that this is a billable service, which includes applicable co-pays. This Virtual Visit was conducted with patient's (and/or legal guardian's) consent. Patient identification was verified, and a caregiver was present when appropriate. The patient was located at Home: [address] [place] [address] [address] [facility] [address]  Provider was located at [facility] (Appt Dept): [address],  [address] [place] [address] Drive         Total time spent for this encounter: Not billed by time    --Ann Riley MD on 11/16/2023 at 1:38 PM    An electronic signature was used to authenticate this note. Subjective:     Sushant Hernández is a 65 y.o. female seen for follow-up of diabetes. She has had hypoglycemic attacks. .no  Blood sugar control has been fair    Hemoglobin A1C   Date Value Ref Range Status   11/11/2022 7.6 (H) 4.8 - 5.6 % Final     Comment:              Prediabetes: 5.7 - 6.4           Diabetes: >6.4           Glycemic control for adults with diabetes: <7.0       Lab Results   Component Value Date/Time     07/24/2023 12:00 AM    K 3.8 07/24/2023 12:00 AM     07/24/2023 12:00 AM    CO2 23 07/24/2023 12:00 AM    BUN 14 07/24/2023 12:00 AM    CREATININE 0.82 07/24/2023 12:00 AM    GLUCOSE 205 07/24/2023 12:00 AM    CALCIUM 8.7 07/24/2023 12:00 AM    LABGLOM 81 07/24/2023 12:00 AM          She has diabetes and hypertension. Sushant Hernández has the additional concern of Woreneing breathing and wheezing. Says A inhaler though really helps, once again has quit smoking. Reports taking blood pressure medications without side affects. No complaints of exertional chest pain, for a week or so has had excessive shortness of breath no focal weakness. Minimal swelling in lower legs or dizziness with standing.       Diet and Lifestyle:  re-started tob but quit

## 2023-11-28 ENCOUNTER — HOSPITAL ENCOUNTER (OUTPATIENT)
Facility: HOSPITAL | Age: 63
Discharge: HOME | End: 2023-12-01
Payer: MEDICAID

## 2023-11-28 VITALS
RESPIRATION RATE: 20 BRPM | HEART RATE: 78 BPM | DIASTOLIC BLOOD PRESSURE: 67 MMHG | OXYGEN SATURATION: 98 % | TEMPERATURE: 98.5 F | SYSTOLIC BLOOD PRESSURE: 134 MMHG

## 2023-11-28 DIAGNOSIS — R92.8 ABNORMAL MAMMOGRAM OF LEFT BREAST: ICD-10-CM

## 2023-11-28 DIAGNOSIS — R92.8 ABNORMAL MAMMOGRAM: ICD-10-CM

## 2023-11-28 PROCEDURE — 2500000003 HC RX 250 WO HCPCS: Performed by: RADIOLOGY

## 2023-11-28 PROCEDURE — 77065 DX MAMMO INCL CAD UNI: CPT

## 2023-11-28 PROCEDURE — 19081 BX BREAST 1ST LESION STRTCTC: CPT

## 2023-11-28 PROCEDURE — 88305 TISSUE EXAM BY PATHOLOGIST: CPT

## 2023-11-28 RX ORDER — LIDOCAINE HYDROCHLORIDE 10 MG/ML
12 INJECTION, SOLUTION EPIDURAL; INFILTRATION; INTRACAUDAL; PERINEURAL ONCE
Status: COMPLETED | OUTPATIENT
Start: 2023-11-28 | End: 2023-11-28

## 2023-11-28 RX ADMIN — LIDOCAINE HYDROCHLORIDE 12 ML: 10 INJECTION, SOLUTION EPIDURAL; INFILTRATION; INTRACAUDAL; PERINEURAL at 11:27

## 2023-11-28 RX ADMIN — LIDOCAINE HYDROCHLORIDE 8 ML: 10; .005 INJECTION, SOLUTION EPIDURAL; INFILTRATION; INTRACAUDAL; PERINEURAL at 11:27

## 2023-11-28 NOTE — DISCHARGE INSTRUCTIONS
[address]  [place], Idania Cueva  [phone]      Breast Biopsy Discharge Instructions      1. After the biopsy, we will place a clean covered ice pack over the biopsy site, within the bra - you should leave the ice pack on 30 minutes and then remove the ice pack for 1-2 hours until bedtime. If needed you can continue applying ice the following day. It is a good idea to wear your bra for support, both day and night unless this causes you discomfort. 2. You may take Extra-Strength Tylenol (two tablets) every 4 to 6 hours as needed for pain. Do not take aspirin or aspirin products (e.g. ibuprofen, Advil, Motrin) as these may cause more bleeding. 3. You may expect some bruising and skin discoloration in the biopsy area. This is normal and generally should resolve in 5 to 7 days. 4. Most women do not find it necessary to restrict their activities after the procedure. You should rest as needed on the day of your biopsy. The next day, if you are feeling okay, you may resume your regular work/activity schedule. Avoid strenuous activity and heavy lifting, jogging, aerobics, or vacuuming for 48 hours after the procedure. 5. 48 hours after your biopsy, remove the large outer dressing and leave the steri-strips (tiny pieces of tape) in place. The steri-strips will usually fall off in a few days. You may shower 48 hours after your biopsy and you may get the steri-strips wet. If still present after 4 days, you may gently peel the strips off. Keep the area clean and dry and shower daily. 6. If you have bleeding from the incision area, hold firm pressure on the area for 20 minutes. This should control any slight oozing that might occur.   If you develop persistent bleeding or pain which does not respond to the above measures or if you develop a fever, excessive swelling, redness, heat or drainage, please call the [facility] Breast Health Navigator at

## 2023-11-30 ENCOUNTER — TELEPHONE (OUTPATIENT)
Facility: HOSPITAL | Age: 63
End: 2023-11-30

## 2023-11-30 NOTE — TELEPHONE ENCOUNTER
Dr.Kenneth Rodriguez reviewed pathology result and recommended her to continue her yearly mammograms.  The pt. was notified of the benign results and recommendations for a follow up mammogram in 1 year.  Advised pt to follow up with her doctor for any changes. Pt with understanding.

## 2024-01-28 NOTE — PROGRESS NOTES
Subjective:     Zuleika Torres is a 63 y.o. female seen for follow-up of diabetes.  Chronic pain issues, much more severe lately.  Asks to restart tramadol  Diabetes has been diet controlled  She has had hypoglycemic attacks. .no  Blood sugar control has been unknown    Hemoglobin A1C   Date Value Ref Range Status   11/11/2022 7.6 (H) 4.8 - 5.6 % Final     Comment:              Prediabetes: 5.7 - 6.4           Diabetes: >6.4           Glycemic control for adults with diabetes: <7.0       Lab Results   Component Value Date/Time     07/24/2023 12:00 AM    K 3.8 07/24/2023 12:00 AM     07/24/2023 12:00 AM    CO2 23 07/24/2023 12:00 AM    BUN 14 07/24/2023 12:00 AM    CREATININE 0.82 07/24/2023 12:00 AM    GLUCOSE 205 07/24/2023 12:00 AM    CALCIUM 8.7 07/24/2023 12:00 AM    LABGLOM 81 07/24/2023 12:00 AM          She has diabetes.    Zuleika Torres has the additional concern of pain neck back both shoulders and knee, want to re-start tramdol, we discussed indications for narcotics which she has been on in the past and a somewhat chronic basis.  NO.but I'm miserable.  Most of her pain is both shoulders and right knee.  Asks for steroid injections which has been somewhat helpful in the past.  Lab works reviewed notable for elevated rheumatoid factor.  Had chronic hepatitis C.  Tells me she has received treatment.    Reports taking blood pressure medications without side affects. No complaints of exertional chest pain, excessive shortness of breath or focal weakness. Minimal swelling in lower legs or dizziness with standing.      Diet and Lifestyle:  Continues to refrain from smoking and alcohol .    Patient Active Problem List    Diagnosis Date Noted    Panlobular emphysema (HCC) 08/27/2023    Hepatitis C antibody test positive 07/24/2023    Hyperglycemia due to type 2 diabetes mellitus (HCC) 04/14/2021    Tobacco dependence syndrome 07/21/2020    Chronic obstructive pulmonary disease (HCC)

## 2024-01-29 ENCOUNTER — OFFICE VISIT (OUTPATIENT)
Facility: CLINIC | Age: 64
End: 2024-01-29

## 2024-01-29 VITALS
SYSTOLIC BLOOD PRESSURE: 156 MMHG | BODY MASS INDEX: 25.99 KG/M2 | HEART RATE: 73 BPM | DIASTOLIC BLOOD PRESSURE: 93 MMHG | WEIGHT: 176 LBS | TEMPERATURE: 97.7 F | RESPIRATION RATE: 16 BRPM

## 2024-01-29 DIAGNOSIS — M19.019 ARTHRITIS OF SHOULDER: ICD-10-CM

## 2024-01-29 DIAGNOSIS — E78.5 HYPERLIPIDEMIA, UNSPECIFIED HYPERLIPIDEMIA TYPE: ICD-10-CM

## 2024-01-29 DIAGNOSIS — F11.20 NARCOTIC DEPENDENCE (HCC): ICD-10-CM

## 2024-01-29 DIAGNOSIS — E11.65 TYPE 2 DIABETES MELLITUS WITH HYPERGLYCEMIA, WITHOUT LONG-TERM CURRENT USE OF INSULIN (HCC): ICD-10-CM

## 2024-01-29 DIAGNOSIS — F33.0 DEPRESSION, MAJOR, RECURRENT, MILD (HCC): ICD-10-CM

## 2024-01-29 DIAGNOSIS — M17.11 OSTEOARTHRITIS OF RIGHT KNEE, UNSPECIFIED OSTEOARTHRITIS TYPE: ICD-10-CM

## 2024-01-29 DIAGNOSIS — J44.9 CHRONIC OBSTRUCTIVE PULMONARY DISEASE, UNSPECIFIED COPD TYPE (HCC): ICD-10-CM

## 2024-01-29 DIAGNOSIS — Z72.0 TOBACCO ABUSE: ICD-10-CM

## 2024-01-29 DIAGNOSIS — E78.00 ELEVATED CHOLESTEROL: ICD-10-CM

## 2024-01-29 DIAGNOSIS — M48.00 SPINAL STENOSIS, UNSPECIFIED SPINAL REGION: Primary | ICD-10-CM

## 2024-01-29 RX ORDER — NICOTINE 21 MG/24HR
1 PATCH, TRANSDERMAL 24 HOURS TRANSDERMAL DAILY
Qty: 42 PATCH | Refills: 0 | Status: SHIPPED | OUTPATIENT
Start: 2024-01-29 | End: 2024-03-11

## 2024-01-29 RX ORDER — TRIAMCINOLONE ACETONIDE 40 MG/ML
40 INJECTION, SUSPENSION INTRA-ARTICULAR; INTRAMUSCULAR ONCE
Status: COMPLETED | OUTPATIENT
Start: 2024-01-29 | End: 2024-01-29

## 2024-01-29 RX ORDER — PREGABALIN 50 MG/1
50 CAPSULE ORAL 3 TIMES DAILY
Qty: 90 CAPSULE | Refills: 0 | Status: SHIPPED | OUTPATIENT
Start: 2024-01-29 | End: 2024-02-28

## 2024-01-29 RX ADMIN — TRIAMCINOLONE ACETONIDE 120 MG: 40 INJECTION, SUSPENSION INTRA-ARTICULAR; INTRAMUSCULAR at 15:29

## 2024-01-29 ASSESSMENT — PATIENT HEALTH QUESTIONNAIRE - PHQ9
SUM OF ALL RESPONSES TO PHQ QUESTIONS 1-9: 2
SUM OF ALL RESPONSES TO PHQ QUESTIONS 1-9: 2
2. FEELING DOWN, DEPRESSED OR HOPELESS: 1
1. LITTLE INTEREST OR PLEASURE IN DOING THINGS: 1
SUM OF ALL RESPONSES TO PHQ QUESTIONS 1-9: 2
SUM OF ALL RESPONSES TO PHQ9 QUESTIONS 1 & 2: 2
SUM OF ALL RESPONSES TO PHQ QUESTIONS 1-9: 2

## 2024-01-29 NOTE — PROGRESS NOTES
Chief Complaint   Patient presents with    Shoulder Pain     L/R shoulder pain  R/knee pain     Patient has not been out of the country in (14 months), NO diarrhea, NO cough, NO chest conjestion, NO temp.  Pt has not been around anyone with these symptoms.     Health Maintenance reviewed.    I have reviewed the patient's medical history in detail and updated the computerized patient record.    \"Have you been to the ER, urgent care clinic since your last visit?  No Hospitalized since your last visit?\"    no    “Have you seen or consulted any other health care providers outside of [facility] since your last visit?”    no     [unfilled]  OFFICE PROCEDURE PROGRESS NOTE        Chart reviewed for the following:   Chela RUZI LPN, have reviewed the History, Physical and updated the Allergic reactions for Zuleika Torres     TIME OUT performed immediately prior to start of procedure:   Kalpesh RUIZ MD have performed the following reviews on Zuleika Torres prior to the start of the procedure:            * Patient was identified by name and date of birth   * Agreement on procedure being performed was verified  * Risks and Benefits explained to the patient by Kalpesh Cisneros MD  * Procedure site verified and marked as necessary  * Patient was positioned for comfort  * Consent was signed and verified     Time: 3:00 pm     Date of procedure:    Procedure performed by:  Kalpesh Cisneros MD    Provider assisted by: Chela Anton LPN    Patient assisted by:  Chela Anton LPN    How tolerated by patient: Well    Post Procedural Pain Scale: 8/10    Comments: None    Dr. Cisneros did the procedure

## 2024-02-07 ENCOUNTER — TELEPHONE (OUTPATIENT)
Facility: CLINIC | Age: 64
End: 2024-02-07

## 2024-02-07 LAB
ALBUMIN SERPL-MCNC: 4.4 G/DL (ref 3.9–4.9)
ALBUMIN/GLOB SERPL: 1.7 {RATIO} (ref 1.2–2.2)
ALP SERPL-CCNC: 94 IU/L (ref 44–121)
ALT SERPL-CCNC: 31 IU/L (ref 0–32)
AST SERPL-CCNC: 24 IU/L (ref 0–40)
BASOPHILS # BLD AUTO: 0 X10E3/UL (ref 0–0.2)
BASOPHILS NFR BLD AUTO: 0 %
BILIRUB SERPL-MCNC: 0.4 MG/DL (ref 0–1.2)
BUN SERPL-MCNC: 12 MG/DL (ref 8–27)
BUN/CREAT SERPL: 16 (ref 12–28)
CALCIUM SERPL-MCNC: 9.3 MG/DL (ref 8.7–10.3)
CHLORIDE SERPL-SCNC: 100 MMOL/L (ref 96–106)
CHOLEST SERPL-MCNC: 208 MG/DL (ref 100–199)
CO2 SERPL-SCNC: 27 MMOL/L (ref 20–29)
CREAT SERPL-MCNC: 0.76 MG/DL (ref 0.57–1)
CRP SERPL-MCNC: 3 MG/L (ref 0–10)
EGFRCR SERPLBLD CKD-EPI 2021: 89 ML/MIN/1.73
EOSINOPHIL # BLD AUTO: 0.1 X10E3/UL (ref 0–0.4)
EOSINOPHIL NFR BLD AUTO: 1 %
ERYTHROCYTE [DISTWIDTH] IN BLOOD BY AUTOMATED COUNT: 12.8 % (ref 11.7–15.4)
ERYTHROCYTE [SEDIMENTATION RATE] IN BLOOD BY WESTERGREN METHOD: 8 MM/HR (ref 0–40)
GLOBULIN SER CALC-MCNC: 2.6 G/DL (ref 1.5–4.5)
GLUCOSE SERPL-MCNC: 172 MG/DL (ref 70–99)
HBA1C MFR BLD: 7.6 % (ref 4.8–5.6)
HCT VFR BLD AUTO: 44.1 % (ref 34–46.6)
HDLC SERPL-MCNC: 47 MG/DL
HGB BLD-MCNC: 14.7 G/DL (ref 11.1–15.9)
IMM GRANULOCYTES # BLD AUTO: 0.1 X10E3/UL (ref 0–0.1)
IMM GRANULOCYTES NFR BLD AUTO: 1 %
IMP & REVIEW OF LAB RESULTS: NORMAL
LDLC SERPL CALC-MCNC: 145 MG/DL (ref 0–99)
LYMPHOCYTES # BLD AUTO: 2 X10E3/UL (ref 0.7–3.1)
LYMPHOCYTES NFR BLD AUTO: 15 %
Lab: NORMAL
MCH RBC QN AUTO: 30.8 PG (ref 26.6–33)
MCHC RBC AUTO-ENTMCNC: 33.3 G/DL (ref 31.5–35.7)
MCV RBC AUTO: 92 FL (ref 79–97)
MONOCYTES # BLD AUTO: 1.3 X10E3/UL (ref 0.1–0.9)
MONOCYTES NFR BLD AUTO: 10 %
NEUTROPHILS # BLD AUTO: 9.3 X10E3/UL (ref 1.4–7)
NEUTROPHILS NFR BLD AUTO: 73 %
PLATELET # BLD AUTO: 265 X10E3/UL (ref 150–450)
POTASSIUM SERPL-SCNC: 4.3 MMOL/L (ref 3.5–5.2)
PROT SERPL-MCNC: 7 G/DL (ref 6–8.5)
RBC # BLD AUTO: 4.78 X10E6/UL (ref 3.77–5.28)
SODIUM SERPL-SCNC: 140 MMOL/L (ref 134–144)
TRIGL SERPL-MCNC: 91 MG/DL (ref 0–149)
VLDLC SERPL CALC-MCNC: 16 MG/DL (ref 5–40)
WBC # BLD AUTO: 12.7 X10E3/UL (ref 3.4–10.8)

## 2024-02-09 ENCOUNTER — TELEPHONE (OUTPATIENT)
Facility: CLINIC | Age: 64
End: 2024-02-09

## 2024-02-09 NOTE — TELEPHONE ENCOUNTER
Pt cannot get into rheum until 4/2 and needs something for pain. First available is 2/21. Please call pt [phone]

## 2024-02-15 ENCOUNTER — TELEMEDICINE (OUTPATIENT)
Facility: CLINIC | Age: 64
End: 2024-02-15
Payer: MEDICAID

## 2024-02-15 DIAGNOSIS — M19.90 ARTHRITIS: Primary | ICD-10-CM

## 2024-02-15 PROCEDURE — 99213 OFFICE O/P EST LOW 20 MIN: CPT | Performed by: FAMILY MEDICINE

## 2024-02-15 NOTE — PROGRESS NOTES
Chief Complaint   Patient presents with    Medication Refill     Patient is aware that this is a Virtual Visit or Phone Call Only doctor's visit.    Patient has not been out of the country in (14 months), NO diarrhea, NO cough, NO chest conjestion, NO temp.  Pt has not been around anyone with these symptoms.     Health Maintenance reviewed.    I have reviewed the patient's medical history in detail and updated the computerized patient record.    Have you been to the ER, urgent care clinic since your last visit?  No Hospitalized since your last visit? no     2.  Have you seen or consulted any other health care providers outside of the [facility] since your last visit? no  Include any pap smears or colon screening.     Encouraged pt to discuss pt's wishes with spouse/partner/family and bring them in the next appt to follow thru with the Advanced Directive                              
 Start date: 1975     Quit date: 2023     Years since quittin.2    Smokeless tobacco: Never   Substance Use Topics    Alcohol use: Not Currently     Alcohol/week: 32.0 standard drinks of alcohol        OBJECTIVE:    Appears to be in no acute distress, grossly 2 through 12 are nonfocal.      Reviewed: Medications, allergies, clinical lab test results and imaging results have been reviewed. Any abnormal findings have been addressed.     ASSESSMENT:       Patient is 63 y.o. with diagnosis of :   Patient Active Problem List   Diagnosis    Tobacco dependence syndrome    Chronic obstructive pulmonary disease (HCC)    Osteoarthritis of knee    Depression, major, recurrent, mild (HCC)    Elevated liver enzymes    Adenomatous colon polyp    Encounter for diagnostic colonoscopy due to change in bowel habits    Neck pain    Spinal stenosis    Narcotic dependence (HCC)    Hyperlipidemia    Hyperglycemia due to type 2 diabetes mellitus (HCC)    Anxiety state    Restless legs    Hepatitis C antibody test positive    Panlobular emphysema (HCC)       PLAN:     No Narcs over VV, must come in to get Rx can OK 1 Rx till sees rheum  But must come in to the office    F/up prn

## 2024-02-26 NOTE — PROGRESS NOTES
Subjective:     Zuleika Torres is a 63 y.o. female seen for follow-up of diabetes.  And chronic pain, requesting narcotics until she can get into the rheumatologist.    She has had hypoglycemic attacks. .no  Blood sugar control has been good    Hemoglobin A1C   Date Value Ref Range Status   02/06/2024 7.6 (H) 4.8 - 5.6 % Final     Comment:                 Prediabetes: 5.7 - 6.4           Diabetes: >6.4           Glycemic control for adults with diabetes: <7.0       Lab Results   Component Value Date/Time     02/06/2024 11:00 AM    K 4.3 02/06/2024 11:00 AM     02/06/2024 11:00 AM    CO2 27 02/06/2024 11:00 AM    BUN 12 02/06/2024 11:00 AM    CREATININE 0.76 02/06/2024 11:00 AM    GLUCOSE 172 02/06/2024 11:00 AM    CALCIUM 9.3 02/06/2024 11:00 AM    LABGLOM 89 02/06/2024 11:00 AM          She has diabetes.    Zuleika Torres has the additional concern of fell and got hit in right eye, pain x years, waiting to see rheum 4/2/24     Reports taking blood pressure medications without side affects. No complaints of exertional chest pain, excessive shortness of breath or focal weakness. Minimal swelling in lower legs or dizziness with standing.      Diet and Lifestyle:  Restarted smoking .    Patient Active Problem List    Diagnosis Date Noted    Panlobular emphysema (HCC) 08/27/2023    Hepatitis C antibody test positive 07/24/2023    Hyperglycemia due to type 2 diabetes mellitus (HCC) 04/14/2021    Tobacco dependence syndrome 07/21/2020    Chronic obstructive pulmonary disease (HCC) 07/21/2020    Osteoarthritis of knee 07/21/2020    Neck pain 07/21/2020    Hyperlipidemia 07/21/2020    Anxiety state 07/21/2020    Adenomatous colon polyp 05/16/2020    Narcotic dependence (HCC) 04/07/2019    Encounter for diagnostic colonoscopy due to change in bowel habits 03/04/2019    Elevated liver enzymes 02/19/2019    Depression, major, recurrent, mild (HCC) 12/21/2017    Restless legs 12/21/2017    Spinal

## 2024-02-28 ENCOUNTER — OFFICE VISIT (OUTPATIENT)
Facility: CLINIC | Age: 64
End: 2024-02-28
Payer: MEDICAID

## 2024-02-28 VITALS
OXYGEN SATURATION: 96 % | HEART RATE: 86 BPM | DIASTOLIC BLOOD PRESSURE: 60 MMHG | RESPIRATION RATE: 18 BRPM | BODY MASS INDEX: 25.7 KG/M2 | TEMPERATURE: 98 F | WEIGHT: 174 LBS | SYSTOLIC BLOOD PRESSURE: 126 MMHG

## 2024-02-28 DIAGNOSIS — J44.9 CHRONIC OBSTRUCTIVE PULMONARY DISEASE, UNSPECIFIED COPD TYPE (HCC): ICD-10-CM

## 2024-02-28 DIAGNOSIS — M19.90 ARTHRITIS: Primary | ICD-10-CM

## 2024-02-28 DIAGNOSIS — F11.20 NARCOTIC DEPENDENCE (HCC): ICD-10-CM

## 2024-02-28 DIAGNOSIS — E11.65 TYPE 2 DIABETES MELLITUS WITH HYPERGLYCEMIA, WITHOUT LONG-TERM CURRENT USE OF INSULIN (HCC): ICD-10-CM

## 2024-02-28 PROCEDURE — 99214 OFFICE O/P EST MOD 30 MIN: CPT | Performed by: FAMILY MEDICINE

## 2024-02-28 RX ORDER — TRAMADOL HYDROCHLORIDE 50 MG/1
50 TABLET ORAL 2 TIMES DAILY PRN
Qty: 80 TABLET | Refills: 0 | Status: SHIPPED | OUTPATIENT
Start: 2024-02-28 | End: 2024-04-08

## 2024-03-20 ENCOUNTER — TELEPHONE (OUTPATIENT)
Facility: CLINIC | Age: 64
End: 2024-03-20

## 2024-03-25 NOTE — PROGRESS NOTES
Case Management Assessment  Initial Evaluation    Date/Time of Evaluation: 3/25/2024 8:56 AM  Assessment Completed by: Kira Rachel RN    If patient is discharged prior to next notation, then this note serves as note for discharge by case management.    Patient Name: Elaina Bolden                   YOB: 1954  Diagnosis: Diarrhea [R19.7]  Dehydration [E86.0]  Elevated troponin [R79.89]  Acute kidney injury (HCC) [N17.9]  Diarrhea, unspecified type [R19.7]                   Date / Time: 3/22/2024  7:32 PM  Location: [facility]02/002-A     Patient Admission Status: Inpatient   Readmission Risk Low 0-14, Mod 15-19), High > 20: Readmission Risk Score: 9.5    Current PCP: Julio Cesar Caceres MD  PCP verified by CM? Yes    Chart Reviewed: Yes      History Provided by: Patient, Medical Record  Patient Orientation: Alert and Oriented, Person, Place, Situation, Self    Patient Cognition: Alert    Hospitalization in the last 30 days (Readmission):  No    If yes, Readmission Assessment in CM Navigator will be completed.    Advance Directives:      Code Status: Full Code   Patient's Primary Decision Maker is: Patient Declined (Legal Next of Kin Remains as Decision Maker)    Primary Decision Maker: Florian Bolden - [phone]    Primary Decision Maker: Jovany Bolden - [phone]    Discharge Planning:    Patient lives with: Children Type of Home: House  Primary Care Giver: Self  Patient Support Systems include: Children   Current Financial resources: Medicare, Other (Comment) (BCBS as secondary)  Current community resources: None  Current services prior to admission: None            Current DME:              Type of Home Care services:  None    ADLS  Prior functional level: Independent in ADLs/IADLs, Bathing, Dressing, Toileting, Feeding, Cooking, Housework, Shopping, Mobility  Current functional level: Independent in ADLs/IADLs, Bathing, Dressing, Toileting, Feeding, Cooking, Housework, Shopping, 
PA request for Key: ([id number] ) Ozempic not listed on current medication list.Please send cancellation of this drug if not needed  Rx #: [id number]  Ozempic (0.25 or 0.5 MG/DOSE) 2MG/1.5ML pen-injectors. Medication was denied on 8/16/22. Plan called @ [phone],OO rep Liberty Villalobos claim was denied due to no trial of at least two preferred drugs. Please follow up as needed. Thanks
Detail Level: Detailed
Quality 431: Preventive Care And Screening: Unhealthy Alcohol Use - Screening: Patient not identified as an unhealthy alcohol user when screened for unhealthy alcohol use using a systematic screening method
Quality 226: Preventive Care And Screening: Tobacco Use: Screening And Cessation Intervention: Patient screened for tobacco use, is a smoker AND received Cessation Counseling within measurement period or in the six months prior to the measurement period
Quality 130: Documentation Of Current Medications In The Medical Record: Current Medications Documented

## 2024-03-27 RX ORDER — PEN NEEDLE, DIABETIC 31 GX5/16"
NEEDLE, DISPOSABLE MISCELLANEOUS
Qty: 100 EACH | Refills: 1 | Status: SHIPPED | OUTPATIENT
Start: 2024-03-27

## 2024-04-17 DIAGNOSIS — J44.1 CHRONIC OBSTRUCTIVE PULMONARY DISEASE WITH ACUTE EXACERBATION (HCC): ICD-10-CM

## 2024-04-18 ENCOUNTER — TELEPHONE (OUTPATIENT)
Facility: CLINIC | Age: 64
End: 2024-04-18

## 2024-04-18 NOTE — TELEPHONE ENCOUNTER
----- Message from Tejal Small sent at 4/18/2024 11:13 AM EDT -----  Subject: Refill Request    QUESTIONS  Name of Medication? predniSONE (DELTASONE) 20 MG tablet  Patient-reported dosage and instructions? 20 MG Tablet / 1 Tablet twice   per day.  How many days do you have left? 0  Preferred Pharmacy? [id number]  Pharmacy phone number (if available)? [phone]  Additional Information for Provider? The patient called requesting this   medication be refilled as she is having allergies and she will be outside   on Saturday 4/20/2024 for her daughters wedding.  ---------------------------------------------------------------------------  --------------  CALL BACK INFO  What is the best way for the office to contact you? OK to leave message on   voicemail  Preferred Call Back Phone Number? [phone]  ---------------------------------------------------------------------------  --------------  SCRIPT ANSWERS  Relationship to Patient? Self

## 2024-04-19 RX ORDER — PREDNISONE 20 MG/1
20 TABLET ORAL DAILY
Qty: 7 TABLET | Refills: 0 | Status: SHIPPED | OUTPATIENT
Start: 2024-04-19 | End: 2024-04-26

## 2024-05-06 DIAGNOSIS — J44.1 CHRONIC OBSTRUCTIVE PULMONARY DISEASE WITH ACUTE EXACERBATION (HCC): ICD-10-CM

## 2024-05-06 RX ORDER — PREDNISONE 20 MG/1
TABLET ORAL
Qty: 7 TABLET | Refills: 0 | Status: SHIPPED | OUTPATIENT
Start: 2024-05-06

## 2024-05-16 ENCOUNTER — TELEMEDICINE (OUTPATIENT)
Facility: CLINIC | Age: 64
End: 2024-05-16
Payer: MEDICAID

## 2024-05-16 DIAGNOSIS — E11.40 TYPE 2 DIABETES MELLITUS WITH DIABETIC NEUROPATHY, WITHOUT LONG-TERM CURRENT USE OF INSULIN (HCC): ICD-10-CM

## 2024-05-16 DIAGNOSIS — E11.65 TYPE 2 DIABETES MELLITUS WITH HYPERGLYCEMIA, WITHOUT LONG-TERM CURRENT USE OF INSULIN (HCC): ICD-10-CM

## 2024-05-16 DIAGNOSIS — J44.1 CHRONIC OBSTRUCTIVE PULMONARY DISEASE WITH ACUTE EXACERBATION (HCC): Primary | ICD-10-CM

## 2024-05-16 DIAGNOSIS — F33.0 DEPRESSION, MAJOR, RECURRENT, MILD (HCC): ICD-10-CM

## 2024-05-16 PROCEDURE — 3051F HG A1C>EQUAL 7.0%<8.0%: CPT | Performed by: FAMILY MEDICINE

## 2024-05-16 PROCEDURE — 99214 OFFICE O/P EST MOD 30 MIN: CPT | Performed by: FAMILY MEDICINE

## 2024-05-16 RX ORDER — DOXYCYCLINE HYCLATE 100 MG
100 TABLET ORAL 2 TIMES DAILY
Qty: 20 TABLET | Refills: 0 | Status: SHIPPED | OUTPATIENT
Start: 2024-05-16 | End: 2024-05-26

## 2024-05-16 RX ORDER — PREDNISONE 20 MG/1
40 TABLET ORAL DAILY
Qty: 10 TABLET | Refills: 0 | Status: SHIPPED | OUTPATIENT
Start: 2024-05-16 | End: 2024-05-21

## 2024-05-16 SDOH — ECONOMIC STABILITY: HOUSING INSECURITY
IN THE LAST 12 MONTHS, WAS THERE A TIME WHEN YOU DID NOT HAVE A STEADY PLACE TO SLEEP OR SLEPT IN A SHELTER (INCLUDING NOW)?: NO

## 2024-05-16 SDOH — ECONOMIC STABILITY: FOOD INSECURITY: WITHIN THE PAST 12 MONTHS, YOU WORRIED THAT YOUR FOOD WOULD RUN OUT BEFORE YOU GOT MONEY TO BUY MORE.: NEVER TRUE

## 2024-05-16 SDOH — ECONOMIC STABILITY: INCOME INSECURITY: HOW HARD IS IT FOR YOU TO PAY FOR THE VERY BASICS LIKE FOOD, HOUSING, MEDICAL CARE, AND HEATING?: NOT HARD AT ALL

## 2024-05-16 SDOH — ECONOMIC STABILITY: FOOD INSECURITY: WITHIN THE PAST 12 MONTHS, THE FOOD YOU BOUGHT JUST DIDN'T LAST AND YOU DIDN'T HAVE MONEY TO GET MORE.: NEVER TRUE

## 2024-05-16 NOTE — PROGRESS NOTES
Subjective:   Subjective   History of Present Illness  The patient presents via virtual visit for evaluation of a cold.    The patient reports a severe cold and has been self-medicating with Robitussin-DM for cough relief. She expresses a desire for an antibiotic prescription, a medication previously prescribed during her last visit. She has a scheduled appointment with her ophthalmologist on Monday to discuss cataract surgery, followed by a gum procedure the following week for denture fitting. Her cough is productive, which she attributes to her smoking habit, and she has resumed smoking due to stress related to her daughter's hospitalization. The cough, which has been present for an extended period, has been progressively worsening over the past week. She denies having a fever and has not sought urgent care or emergency room visits. Her medical history includes asthma and COPD, for which she was prescribed doxycycline and Anoro, with 9 doses remaining. She reports that Anoro is the only effective treatment for her condition. She tested negative for COVID-19 prior to her doctor's visit.    Supplemental Information  She is still taking her Victoza shots.   She is not allergic to any AB, She is allergic to OXYCODONE and ZOLPIDEM.    Zuleika Torres is a 63 y.o. female who complains of productive cough for 7 days, gradually worsening since that time.  She denies a history of fevers and wheezing.    Evaluation to date: none.   Treatment to date: OTC products.  Patient does smoke cigarettes.  Relevant PMH: Asthma and COPD.  She has diabetes  Hemoglobin A1C   Date Value Ref Range Status   02/06/2024 7.6 (H) 4.8 - 5.6 % Final     Comment:                 Prediabetes: 5.7 - 6.4           Diabetes: >6.4           Glycemic control for adults with diabetes: <7.0           Allergies   Allergen Reactions    Oxycodone Hives and Itching     Patient reports not allergic 7/24/23    Zolpidem Itching     Patient Active

## 2024-05-16 NOTE — PROGRESS NOTES
Chief Complaint   Patient presents with    Cough     Cold symptoms, cough, chest congestion     Patient is aware that this is a Virtual Visit or Phone Call Only doctor's visit.    Patient has not been out of the country in (14 months), NO diarrhea, NO cough, NO chest conjestion, NO temp.  Pt has not been around anyone with these symptoms.     Health Maintenance reviewed.    I have reviewed the patient's medical history in detail and updated the computerized patient record.    Have you been to the ER, urgent care clinic since your last visit? No  Hospitalized since your last visit?  No     2.  Have you seen or consulted any other health care providers outside of the [facility] since your last visit? No   Include any pap smears or colon screening.     Encouraged pt to discuss pt's wishes with spouse/partner/family and bring them in the next appt to follow thru with the Advanced Directive

## 2024-06-03 DIAGNOSIS — J44.1 CHRONIC OBSTRUCTIVE PULMONARY DISEASE WITH ACUTE EXACERBATION (HCC): ICD-10-CM

## 2024-06-03 NOTE — TELEPHONE ENCOUNTER
Requested Prescriptions     Pending Prescriptions Disp Refills    predniSONE (DELTASONE) 20 MG tablet [Pharmacy Med Name: predniSONE 20 MG Oral Tablet] 10 tablet 0     Sig: Take 2 tablets by mouth once daily for 5 days     Last fill 5/6/2024 for #7 w/ no addtnl  Last OV 5/16/2024  No appts scheduled at this time    Bailey Frank LPN

## 2024-06-04 RX ORDER — PREDNISONE 20 MG/1
TABLET ORAL
Qty: 10 TABLET | Refills: 0 | Status: SHIPPED | OUTPATIENT
Start: 2024-06-04

## 2024-06-10 PROBLEM — J43.1 PANLOBULAR EMPHYSEMA (HCC): Status: RESOLVED | Noted: 2023-08-27 | Resolved: 2024-06-10

## 2024-06-10 PROBLEM — E11.65 TYPE 2 DIABETES MELLITUS WITH HYPERGLYCEMIA, WITHOUT LONG-TERM CURRENT USE OF INSULIN (HCC): Status: ACTIVE | Noted: 2021-04-14

## 2024-06-10 PROBLEM — J43.2 CENTRILOBULAR EMPHYSEMA (HCC): Status: ACTIVE | Noted: 2020-07-21

## 2024-06-10 NOTE — PROGRESS NOTES
Assessment/Plan:        Assessment & Plan  1. Chronic obstructive pulmonary disease and wheezing.  A prescription for Chantix has been issued. Additionally, a prescription for Ceftin, to be taken twice daily for a week, has been issued.    2. Seborrheic keratoses on breasts.   A referral to a dermatologist has been made.    Follow-up  A follow-up appointment is scheduled for 6 weeks from now.    Results    1. Centrilobular emphysema (HCC)  -     cefUROXime (CEFTIN) 500 MG tablet; Take 1 tablet by mouth 2 times daily for 7 days, Disp-14 tablet, R-0Normal  -     guaiFENesin-codeine (TUSSI-ORGANIDIN NR) 100-10 MG/5ML syrup; Take 5 mLs by mouth 4 times daily as needed for Cough for up to 5 days. Max Daily Amount: 20 mLs, Disp-100 mL, R-0Normal  2. Depression, major, recurrent, mild (HCC)  3. Narcotic dependence (HCC)  4. Type 2 diabetes mellitus with hyperglycemia, without long-term current use of insulin (HCC)  5. Tobacco dependence syndrome  -     varenicline (CHANTIX) 0.5 MG tablet; Take 1-2 tablets by mouth See Admin Instructions 0.5mg DAILY for 3 days followed by 0.5mg TWICE DAILY for 4 days followed by 1mg TWICE DAILY, Disp-57 tablet, R-0Normal  -     varenicline (CHANTIX) 1 MG tablet; Take 1 tablet by mouth 2 times daily, Disp-60 tablet, R-0Normal  6. Screening for colon cancer  -     Occult Blood Stool Immunoassay; Future  7. SK (seborrheic keratosis)  -     Amb External Referral To Dermatology           Orders Placed This Encounter    Occult Blood Stool Immunoassay     Standing Status:   Future     Standing Expiration Date:   6/11/2025    Amb External Referral To Dermatology     Referral Priority:   Routine     Referral Type:   Eval and Treat     Referral Reason:   Specialty Services Required     Referred to Provider:   Nunu Matamoros MD     Requested Specialty:   Dermatology     Number of Visits Requested:   1    varenicline (CHANTIX) 0.5 MG tablet     Sig: Take 1-2 tablets by mouth See Admin

## 2024-06-11 ENCOUNTER — OFFICE VISIT (OUTPATIENT)
Facility: CLINIC | Age: 64
End: 2024-06-11
Payer: MEDICAID

## 2024-06-11 VITALS
HEIGHT: 69 IN | WEIGHT: 171.6 LBS | TEMPERATURE: 97.5 F | OXYGEN SATURATION: 98 % | BODY MASS INDEX: 25.42 KG/M2 | SYSTOLIC BLOOD PRESSURE: 130 MMHG | RESPIRATION RATE: 18 BRPM | DIASTOLIC BLOOD PRESSURE: 76 MMHG | HEART RATE: 79 BPM

## 2024-06-11 DIAGNOSIS — F17.200 TOBACCO DEPENDENCE SYNDROME: ICD-10-CM

## 2024-06-11 DIAGNOSIS — Z12.11 SCREENING FOR COLON CANCER: ICD-10-CM

## 2024-06-11 DIAGNOSIS — E11.65 TYPE 2 DIABETES MELLITUS WITH HYPERGLYCEMIA, WITHOUT LONG-TERM CURRENT USE OF INSULIN (HCC): ICD-10-CM

## 2024-06-11 DIAGNOSIS — F33.0 DEPRESSION, MAJOR, RECURRENT, MILD (HCC): ICD-10-CM

## 2024-06-11 DIAGNOSIS — J43.2 CENTRILOBULAR EMPHYSEMA (HCC): Primary | ICD-10-CM

## 2024-06-11 DIAGNOSIS — L82.1 SK (SEBORRHEIC KERATOSIS): ICD-10-CM

## 2024-06-11 PROBLEM — F11.20 NARCOTIC DEPENDENCE (HCC): Status: RESOLVED | Noted: 2019-04-07 | Resolved: 2024-06-11

## 2024-06-11 PROCEDURE — 3051F HG A1C>EQUAL 7.0%<8.0%: CPT | Performed by: FAMILY MEDICINE

## 2024-06-11 PROCEDURE — 99214 OFFICE O/P EST MOD 30 MIN: CPT | Performed by: FAMILY MEDICINE

## 2024-06-11 RX ORDER — CODEINE PHOSPHATE/GUAIFENESIN 10-100MG/5
5 LIQUID (ML) ORAL 4 TIMES DAILY PRN
Qty: 100 ML | Refills: 0 | Status: SHIPPED | OUTPATIENT
Start: 2024-06-11 | End: 2024-06-16

## 2024-06-11 RX ORDER — CEFUROXIME AXETIL 500 MG/1
500 TABLET ORAL 2 TIMES DAILY
Qty: 14 TABLET | Refills: 0 | Status: SHIPPED | OUTPATIENT
Start: 2024-06-11 | End: 2024-06-18

## 2024-06-11 RX ORDER — VARENICLINE TARTRATE 1 MG/1
1 TABLET, FILM COATED ORAL 2 TIMES DAILY
Qty: 60 TABLET | Refills: 0 | Status: SHIPPED | OUTPATIENT
Start: 2024-06-11

## 2024-06-11 RX ORDER — VARENICLINE TARTRATE 0.5 MG/1
.5-1 TABLET, FILM COATED ORAL SEE ADMIN INSTRUCTIONS
Qty: 57 TABLET | Refills: 0 | Status: SHIPPED | OUTPATIENT
Start: 2024-06-11

## 2024-06-11 NOTE — PROGRESS NOTES
Chief Complaint   Patient presents with    Cough         Health Maintenance Due   Topic Date Due    Diabetic retinal exam  Never done    Hepatitis A vaccine (1 of 2 - Risk 2-dose series) Never done    Low dose CT lung screening &/or counseling  Never done    Shingles vaccine (2 of 2) 05/02/2019    Hepatitis B vaccine (1 of 3 - Risk 3-dose series) Never done    Respiratory Syncytial Virus (RSV) Pregnant or age 60 yrs+ (1 - 1-dose 60+ series) Never done    Diabetic Alb to Cr ratio (uACR) test  04/14/2022    COVID-19 Vaccine (3 - 2023-24 season) 09/01/2023    Colorectal Cancer Screen  05/07/2024         \"Have you been to the ER, urgent care clinic since your last visit?  Hospitalized since your last visit?\"    NO    “Have you seen or consulted any other health care providers outside of [facility] since your last visit?”    NO    “Have you had a colorectal cancer screening such as a colonoscopy/FIT/Cologuard?    YES - Type: She remembered it was done @ [facility]     Date of last Colonoscopy: 5/7/2019  No cologuard on file  Date of last FIT: 3/4/2020   No flexible sigmoidoscopy on file

## 2024-06-12 ENCOUNTER — TELEPHONE (OUTPATIENT)
Facility: CLINIC | Age: 64
End: 2024-06-12

## 2024-06-12 DIAGNOSIS — N39.46 MIXED INCONTINENCE URGE AND STRESS: Primary | ICD-10-CM

## 2024-06-20 ENCOUNTER — TELEPHONE (OUTPATIENT)
Facility: CLINIC | Age: 64
End: 2024-06-20

## 2024-06-20 NOTE — TELEPHONE ENCOUNTER
Pt calling in for refill of ear drops with pain med in it and also cough med for her copd. She says she started the Nicoderm CQ for quitting smoking but that her cough is still unbearable. [facility]

## 2024-06-24 ENCOUNTER — TELEPHONE (OUTPATIENT)
Facility: CLINIC | Age: 64
End: 2024-06-24

## 2024-06-24 ENCOUNTER — TELEMEDICINE (OUTPATIENT)
Facility: CLINIC | Age: 64
End: 2024-06-24
Payer: MEDICAID

## 2024-06-24 DIAGNOSIS — E11.65 TYPE 2 DIABETES MELLITUS WITH HYPERGLYCEMIA, WITHOUT LONG-TERM CURRENT USE OF INSULIN (HCC): ICD-10-CM

## 2024-06-24 DIAGNOSIS — J44.1 CHRONIC OBSTRUCTIVE PULMONARY DISEASE WITH ACUTE EXACERBATION (HCC): ICD-10-CM

## 2024-06-24 DIAGNOSIS — F17.200 TOBACCO DEPENDENCE SYNDROME: ICD-10-CM

## 2024-06-24 DIAGNOSIS — H92.02 ACUTE OTALGIA, LEFT: Primary | ICD-10-CM

## 2024-06-24 PROCEDURE — 3051F HG A1C>EQUAL 7.0%<8.0%: CPT | Performed by: FAMILY MEDICINE

## 2024-06-24 PROCEDURE — 99214 OFFICE O/P EST MOD 30 MIN: CPT | Performed by: FAMILY MEDICINE

## 2024-06-24 RX ORDER — HYDROCORTISONE AND ACETIC ACID 20.75; 10.375 MG/ML; MG/ML
3 SOLUTION AURICULAR (OTIC) 2 TIMES DAILY
Qty: 1 EACH | Refills: 0 | Status: SHIPPED | OUTPATIENT
Start: 2024-06-24 | End: 2024-07-04

## 2024-06-24 RX ORDER — PREDNISONE 20 MG/1
40 TABLET ORAL DAILY
Qty: 10 TABLET | Refills: 0 | Status: SHIPPED | OUTPATIENT
Start: 2024-06-24 | End: 2024-06-29

## 2024-06-24 RX ORDER — CEFUROXIME AXETIL 500 MG/1
500 TABLET ORAL 2 TIMES DAILY
Qty: 6 TABLET | Refills: 0 | Status: SHIPPED | OUTPATIENT
Start: 2024-06-24 | End: 2024-06-27

## 2024-06-24 RX ORDER — CODEINE PHOSPHATE/GUAIFENESIN 10-100MG/5
5 LIQUID (ML) ORAL 3 TIMES DAILY PRN
Qty: 75 ML | Refills: 0 | Status: SHIPPED | OUTPATIENT
Start: 2024-06-24 | End: 2024-06-29

## 2024-06-24 ASSESSMENT — PATIENT HEALTH QUESTIONNAIRE - PHQ9
SUM OF ALL RESPONSES TO PHQ QUESTIONS 1-9: 2
1. LITTLE INTEREST OR PLEASURE IN DOING THINGS: SEVERAL DAYS
SUM OF ALL RESPONSES TO PHQ QUESTIONS 1-9: 2
SUM OF ALL RESPONSES TO PHQ9 QUESTIONS 1 & 2: 2
SUM OF ALL RESPONSES TO PHQ QUESTIONS 1-9: 2
SUM OF ALL RESPONSES TO PHQ QUESTIONS 1-9: 2
2. FEELING DOWN, DEPRESSED OR HOPELESS: SEVERAL DAYS

## 2024-06-24 NOTE — PROGRESS NOTES
into the lungs daily 1 each 5    albuterol sulfate HFA (PROVENTIL;VENTOLIN;PROAIR) 108 (90 Base) MCG/ACT inhaler Inhale 1 puff into the lungs every 6 hours as needed for Wheezing 18 g 5    DULoxetine (CYMBALTA) 60 MG extended release capsule Take 1 capsule by mouth daily 90 capsule 1    Liraglutide (VICTOZA) 18 MG/3ML SOPN SC injection Inject 0.6 mg into the skin daily 2 Adjustable Dose Pre-filled Pen Syringe 5    diclofenac sodium (VOLTAREN) 1 % GEL Apply 4 g topically 4 times daily 200 g 5    budesonide (PULMICORT) 0.5 MG/2ML nebulizer suspension Take 2 mLs by nebulization 2 times daily 100 each 5    ipratropium 0.5 mg-albuterol 2.5 mg (DUONEB) 0.5-2.5 (3) MG/3ML SOLN nebulizer solution Inhale 3 mLs into the lungs every 6 hours as needed for Wheezing 360 mL 3    nicotine (NICODERM CQ) 21 MG/24HR Place 1 patch onto the skin daily 42 patch 0     No current facility-administered medications for this visit.     Zuleika Torres, was evaluated through a synchronous (real-time) audio-video encounter. The patient (or guardian if applicable) is aware that this is a billable service, which includes applicable co-pays. This Virtual Visit was conducted with patient's (and/or legal guardian's) consent. Patient identification was verified, and a caregiver was present when appropriate.   The patient was located at Home: [facility] [facility] [address]  Provider was located at Home (Appt Dept State): [place]  Confirm you are appropriately licensed, registered, or certified to deliver care in the state where the patient is located as indicated above. If you are not or unsure, please re-schedule the visit: Yes, I confirm.        Total time spent for this encounter: Not billed by time    --Kalpesh Cisneros MD on 6/24/2024 at 6:05 PM    An electronic signature was used to authenticate this note.      --Kalpesh Cisneros MD

## 2024-07-22 NOTE — PROGRESS NOTES
Assessment/Plan:        Assessment & Plan  1. Arthritis.  A combination of lidocaine and diclofenac has been prescribed.    2. Chronic obstructive pulmonary disease.  Prescriptions for her asthma and COPD medications have been refilled.    Results    1. Centrilobular emphysema (HCC)  2. Type 2 diabetes mellitus with hyperglycemia, without long-term current use of insulin ([facility])  -     Hemoglobin A1C; Future  -     Microalbumin / Creatinine Urine Ratio  -     Comprehensive Metabolic Panel; Future  -     CBC; Future  3. Depression, major, recurrent, mild (HCC)  -     DULoxetine (CYMBALTA) 60 MG extended release capsule; Take 1 capsule by mouth daily, Disp-90 capsule, R-1Normal  4. Hepatitis C antibody test positive  5. Arthritis  -     Diclofenac Sod-Lidocaine HCl 1-4.5 % GEL; Apply 1 g topically 4 times daily as needed (pain), Disp-60 g, R-5Normal  -     diclofenac (VOLTAREN) 50 MG EC tablet; Take 1 tablet by mouth 2 times daily As needed, Disp-60 tablet, R-3Normal  6. Chronic obstructive pulmonary disease, unspecified COPD type ([facility])  -     budesonide (PULMICORT) 0.5 MG/2ML nebulizer suspension; Take 2 mLs by nebulization 2 times daily, Disp-100 each, R-5Normal  -     albuterol (PROVENTIL) (2.5 MG/3ML) 0.083% nebulizer solution; Take 3 mLs by nebulization 4 times daily as needed for Wheezing, Disp-120 each, R-3Normal           Orders Placed This Encounter    Hemoglobin A1C     Standing Status:   Future     Standing Expiration Date:   7/23/2025    Microalbumin / Creatinine Urine Ratio    Comprehensive Metabolic Panel     Standing Status:   Future     Number of Occurrences:   1     Standing Expiration Date:   7/23/2025    CBC     Standing Status:   Future     Number of Occurrences:   1     Standing Expiration Date:   7/23/2025    Diclofenac Sod-Lidocaine HCl 1-4.5 % GEL     Sig: Apply 1 g topically 4 times daily as needed (pain)     Dispense:  60 g     Refill:  5    diclofenac (VOLTAREN) 50 MG EC tablet     Sig:

## 2024-07-23 ENCOUNTER — TELEMEDICINE (OUTPATIENT)
Facility: CLINIC | Age: 64
End: 2024-07-23
Payer: MEDICAID

## 2024-07-23 ENCOUNTER — TELEPHONE (OUTPATIENT)
Facility: CLINIC | Age: 64
End: 2024-07-23

## 2024-07-23 DIAGNOSIS — F33.0 DEPRESSION, MAJOR, RECURRENT, MILD (HCC): ICD-10-CM

## 2024-07-23 DIAGNOSIS — J44.9 CHRONIC OBSTRUCTIVE PULMONARY DISEASE, UNSPECIFIED COPD TYPE (HCC): ICD-10-CM

## 2024-07-23 DIAGNOSIS — E11.65 TYPE 2 DIABETES MELLITUS WITH HYPERGLYCEMIA, WITHOUT LONG-TERM CURRENT USE OF INSULIN (HCC): ICD-10-CM

## 2024-07-23 DIAGNOSIS — J43.2 CENTRILOBULAR EMPHYSEMA (HCC): Primary | ICD-10-CM

## 2024-07-23 DIAGNOSIS — M19.90 ARTHRITIS: ICD-10-CM

## 2024-07-23 DIAGNOSIS — R76.8 HEPATITIS C ANTIBODY TEST POSITIVE: ICD-10-CM

## 2024-07-23 PROCEDURE — 3051F HG A1C>EQUAL 7.0%<8.0%: CPT | Performed by: FAMILY MEDICINE

## 2024-07-23 PROCEDURE — 99214 OFFICE O/P EST MOD 30 MIN: CPT | Performed by: FAMILY MEDICINE

## 2024-07-23 RX ORDER — DULOXETIN HYDROCHLORIDE 60 MG/1
60 CAPSULE, DELAYED RELEASE ORAL DAILY
Qty: 90 CAPSULE | Refills: 1 | Status: SHIPPED | OUTPATIENT
Start: 2024-07-23

## 2024-07-23 RX ORDER — PREDNISONE 10 MG/1
10 TABLET ORAL DAILY
COMMUNITY

## 2024-07-23 RX ORDER — BUDESONIDE 0.5 MG/2ML
1 INHALANT ORAL 2 TIMES DAILY
Qty: 100 EACH | Refills: 5 | Status: SHIPPED | OUTPATIENT
Start: 2024-07-23

## 2024-07-23 RX ORDER — ALBUTEROL SULFATE 2.5 MG/3ML
2.5 SOLUTION RESPIRATORY (INHALATION) 4 TIMES DAILY PRN
Qty: 120 EACH | Refills: 3 | Status: SHIPPED | OUTPATIENT
Start: 2024-07-23

## 2024-07-23 NOTE — PROGRESS NOTES
Chief Complaint   Patient presents with    COPD     6 wks FU     Patient is aware that this is a Virtual Visit or Phone Call Only doctor's visit.    Patient has not been out of the country in (14 months), NO diarrhea, NO cough, NO chest conjestion, NO temp.  Pt has not been around anyone with these symptoms.     Health Maintenance reviewed.    I have reviewed the patient's medical history in detail and updated the computerized patient record.    Have you been to the ER, urgent care clinic since your last visit? No  Hospitalized since your last visit?  No     2.  Have you seen or consulted any other health care providers outside of the [facility] since your last visit? No  Include any pap smears or colon screening.     Encouraged pt to discuss pt's wishes with spouse/partner/family and bring them in the next appt to follow thru with the Advanced Directive

## 2024-07-24 ENCOUNTER — TELEPHONE (OUTPATIENT)
Facility: CLINIC | Age: 64
End: 2024-07-24

## 2024-07-26 ENCOUNTER — TELEPHONE (OUTPATIENT)
Facility: CLINIC | Age: 64
End: 2024-07-26

## 2024-08-14 ENCOUNTER — TELEPHONE (OUTPATIENT)
Facility: CLINIC | Age: 64
End: 2024-08-14

## 2024-09-13 ENCOUNTER — TELEPHONE (OUTPATIENT)
Facility: CLINIC | Age: 64
End: 2024-09-13

## 2024-09-17 ENCOUNTER — TELEMEDICINE (OUTPATIENT)
Facility: CLINIC | Age: 64
End: 2024-09-17
Payer: MEDICAID

## 2024-09-17 ENCOUNTER — TELEPHONE (OUTPATIENT)
Facility: CLINIC | Age: 64
End: 2024-09-17

## 2024-09-17 DIAGNOSIS — E11.65 TYPE 2 DIABETES MELLITUS WITH HYPERGLYCEMIA, WITHOUT LONG-TERM CURRENT USE OF INSULIN (HCC): ICD-10-CM

## 2024-09-17 DIAGNOSIS — F33.0 DEPRESSION, MAJOR, RECURRENT, MILD (HCC): ICD-10-CM

## 2024-09-17 DIAGNOSIS — F17.200 TOBACCO DEPENDENCE SYNDROME: ICD-10-CM

## 2024-09-17 DIAGNOSIS — J43.2 CENTRILOBULAR EMPHYSEMA (HCC): Primary | ICD-10-CM

## 2024-09-17 DIAGNOSIS — E11.40 TYPE 2 DIABETES MELLITUS WITH DIABETIC NEUROPATHY, WITHOUT LONG-TERM CURRENT USE OF INSULIN (HCC): ICD-10-CM

## 2024-09-17 DIAGNOSIS — R76.8 HEPATITIS C ANTIBODY TEST POSITIVE: ICD-10-CM

## 2024-09-17 DIAGNOSIS — R74.8 ELEVATED LIVER ENZYMES: ICD-10-CM

## 2024-09-17 PROCEDURE — 99214 OFFICE O/P EST MOD 30 MIN: CPT | Performed by: FAMILY MEDICINE

## 2024-09-17 PROCEDURE — 3051F HG A1C>EQUAL 7.0%<8.0%: CPT | Performed by: FAMILY MEDICINE

## 2024-09-17 RX ORDER — AZITHROMYCIN 250 MG/1
TABLET, FILM COATED ORAL
Qty: 6 TABLET | Refills: 0 | Status: SHIPPED | OUTPATIENT
Start: 2024-09-17 | End: 2024-09-27

## 2024-09-17 RX ORDER — CODEINE PHOSPHATE/GUAIFENESIN 10-100MG/5
5 LIQUID (ML) ORAL 3 TIMES DAILY PRN
Qty: 100 ML | Refills: 0 | Status: SHIPPED | OUTPATIENT
Start: 2024-09-17 | End: 2024-10-17

## 2024-09-17 RX ORDER — LIRAGLUTIDE 6 MG/ML
0.6 INJECTION SUBCUTANEOUS DAILY
Qty: 2 ADJUSTABLE DOSE PRE-FILLED PEN SYRINGE | Refills: 5 | Status: SHIPPED | OUTPATIENT
Start: 2024-09-17

## 2024-09-17 RX ORDER — FLUTICASONE PROPIONATE 113 UG/1
2 POWDER, METERED RESPIRATORY (INHALATION) DAILY
Qty: 1 EACH | Refills: 5 | Status: SHIPPED | OUTPATIENT
Start: 2024-09-17

## 2024-09-17 RX ORDER — VARENICLINE TARTRATE 1 MG/1
1 TABLET, FILM COATED ORAL 2 TIMES DAILY
Qty: 60 TABLET | Refills: 2 | Status: SHIPPED | OUTPATIENT
Start: 2024-09-17

## 2024-09-17 RX ORDER — PREDNISONE 20 MG/1
40 TABLET ORAL DAILY
Qty: 10 TABLET | Refills: 1 | Status: SHIPPED | OUTPATIENT
Start: 2024-09-17

## 2024-09-17 RX ORDER — DULOXETIN HYDROCHLORIDE 60 MG/1
60 CAPSULE, DELAYED RELEASE ORAL DAILY
Qty: 90 CAPSULE | Refills: 1 | Status: SHIPPED | OUTPATIENT
Start: 2024-09-17

## 2024-09-17 ASSESSMENT — PATIENT HEALTH QUESTIONNAIRE - PHQ9
SUM OF ALL RESPONSES TO PHQ QUESTIONS 1-9: 2
2. FEELING DOWN, DEPRESSED OR HOPELESS: SEVERAL DAYS
SUM OF ALL RESPONSES TO PHQ9 QUESTIONS 1 & 2: 2
SUM OF ALL RESPONSES TO PHQ QUESTIONS 1-9: 2
SUM OF ALL RESPONSES TO PHQ QUESTIONS 1-9: 2
1. LITTLE INTEREST OR PLEASURE IN DOING THINGS: SEVERAL DAYS
SUM OF ALL RESPONSES TO PHQ QUESTIONS 1-9: 2

## 2024-09-23 ENCOUNTER — TELEPHONE (OUTPATIENT)
Facility: CLINIC | Age: 64
End: 2024-09-23

## 2024-09-24 ENCOUNTER — TELEPHONE (OUTPATIENT)
Facility: CLINIC | Age: 64
End: 2024-09-24

## 2024-09-24 NOTE — TELEPHONE ENCOUNTER
Pt need a refill of   traMADol (ULTRAM) 50 MG tablet  called into [facility], [place] and she would like something called in for her arthritis

## 2024-09-25 ENCOUNTER — TELEPHONE (OUTPATIENT)
Facility: CLINIC | Age: 64
End: 2024-09-25

## 2024-09-25 DIAGNOSIS — M19.90 ARTHRITIS: ICD-10-CM

## 2024-09-26 ENCOUNTER — TELEPHONE (OUTPATIENT)
Facility: CLINIC | Age: 64
End: 2024-09-26

## 2024-09-26 NOTE — TELEPHONE ENCOUNTER
Pt need a refill called into [facility]. She was last seen 9/17      traMADol (ULTRAM) 50 MG tablet

## 2024-09-26 NOTE — TELEPHONE ENCOUNTER
Pt says insurance will not pay for gel, she says insurance will pay for diclofenac gel and lidocaine patches. Please update med and send to [facility] [place]

## 2024-09-27 ENCOUNTER — TELEPHONE (OUTPATIENT)
Facility: CLINIC | Age: 64
End: 2024-09-27

## 2024-09-27 NOTE — TELEPHONE ENCOUNTER
Pt would like for Chela or Dr. Cisneros to give her a call [phone]. She is not feeling well and she need some of her meds refilled. She has been calling since Monday and she has set a vv appt for Tuesday at 2pm.

## 2024-09-30 NOTE — PROGRESS NOTES
Zuleika Torres, was evaluated through a synchronous (real-time) audio-video encounter. The patient (or guardian if applicable) is aware that this is a billable service, which includes applicable co-pays. This Virtual Visit was conducted with patient's (and/or legal guardian's) consent. Patient identification was verified, and a caregiver was present when appropriate.   The patient was located at Other: Vehicle  Provider was located at Facility (Appt Dept): [address] [place] [address] [address] [place] [id number]  Confirm you are appropriately licensed, registered, or certified to deliver care in the state where the patient is located as indicated above. If you are not or unsure, please re-schedule the visit: Yes, I confirm.     Zuleika Torres (:  1960) is a Established patient, presenting virtually for evaluation of the following: Breathing    Assessment & Plan   Below is the assessment and plan developed based on review of pertinent history, physical exam, labs, studies, and medications.  Assessment & Plan      Results    1. Centrilobular emphysema (HCC)  -     umeclidinium-vilanterol (ANORO ELLIPTA) 62.5-25 MCG/ACT inhaler; Inhale 1 puff into the lungs daily, Disp-1 each, R-5Normal  -     predniSONE (DELTASONE) 20 MG tablet; Take 2 tablets by mouth daily, Disp-10 tablet, R-1Normal  2. Depression, major, recurrent, mild (HCC)  3. Hepatitis C virus carrier state (HCC)  4. Type 2 diabetes mellitus with hyperglycemia, without long-term current use of insulin (HCC)  5. Primary osteoarthritis of both knees  6. Conductive hearing loss, unspecified laterality  -     Amb External Referral To ENT  7. Arthritis  -     lidocaine (LIDODERM) 5 %; Place 1 patch onto the skin daily 12 hours on, 12 hours off., Disp-30 patch, R-5Normal  -     diclofenac sodium (VOLTAREN) 1 % GEL; Apply 4 g topically 4 times daily, Topical, 4 TIMES DAILY Starting Tue 10/1/2024, Disp-100 g, R-5, Normal  -     Amb External

## 2024-10-01 ENCOUNTER — TELEMEDICINE (OUTPATIENT)
Facility: CLINIC | Age: 64
End: 2024-10-01
Payer: MEDICAID

## 2024-10-01 DIAGNOSIS — H90.2 CONDUCTIVE HEARING LOSS, UNSPECIFIED LATERALITY: ICD-10-CM

## 2024-10-01 DIAGNOSIS — M17.0 PRIMARY OSTEOARTHRITIS OF BOTH KNEES: ICD-10-CM

## 2024-10-01 DIAGNOSIS — B18.2 HEPATITIS C VIRUS CARRIER STATE (HCC): ICD-10-CM

## 2024-10-01 DIAGNOSIS — J43.2 CENTRILOBULAR EMPHYSEMA (HCC): Primary | ICD-10-CM

## 2024-10-01 DIAGNOSIS — F33.0 DEPRESSION, MAJOR, RECURRENT, MILD (HCC): ICD-10-CM

## 2024-10-01 DIAGNOSIS — E11.65 TYPE 2 DIABETES MELLITUS WITH HYPERGLYCEMIA, WITHOUT LONG-TERM CURRENT USE OF INSULIN (HCC): ICD-10-CM

## 2024-10-01 DIAGNOSIS — M19.90 ARTHRITIS: ICD-10-CM

## 2024-10-01 PROCEDURE — 3051F HG A1C>EQUAL 7.0%<8.0%: CPT | Performed by: FAMILY MEDICINE

## 2024-10-01 PROCEDURE — G2211 COMPLEX E/M VISIT ADD ON: HCPCS | Performed by: FAMILY MEDICINE

## 2024-10-01 PROCEDURE — 99214 OFFICE O/P EST MOD 30 MIN: CPT | Performed by: FAMILY MEDICINE

## 2024-10-01 RX ORDER — LIDOCAINE 50 MG/G
1 PATCH TOPICAL DAILY
Qty: 30 PATCH | Refills: 5 | Status: SHIPPED | OUTPATIENT
Start: 2024-10-01

## 2024-10-01 RX ORDER — CHLORHEXIDINE GLUCONATE ORAL RINSE 1.2 MG/ML
15 SOLUTION DENTAL 2 TIMES DAILY
COMMUNITY
Start: 2024-09-09

## 2024-10-01 RX ORDER — BUDESONIDE 0.5 MG/2ML
1 INHALANT ORAL 2 TIMES DAILY
Qty: 100 EACH | Refills: 5 | Status: SHIPPED | OUTPATIENT
Start: 2024-10-01

## 2024-10-01 RX ORDER — PREDNISONE 20 MG/1
40 TABLET ORAL DAILY
Qty: 10 TABLET | Refills: 1 | Status: SHIPPED | OUTPATIENT
Start: 2024-10-01

## 2024-10-01 RX ORDER — UMECLIDINIUM BROMIDE AND VILANTEROL TRIFENATATE 62.5; 25 UG/1; UG/1
1 POWDER RESPIRATORY (INHALATION) DAILY
Qty: 60 EACH | Refills: 0 | OUTPATIENT
Start: 2024-10-01

## 2024-10-01 RX ORDER — FLUTICASONE PROPIONATE 113 UG/1
2 POWDER, METERED RESPIRATORY (INHALATION) DAILY
Qty: 1 EACH | Refills: 5 | Status: CANCELLED | OUTPATIENT
Start: 2024-10-01

## 2024-10-01 RX ORDER — CODEINE PHOSPHATE/GUAIFENESIN 10-100MG/5
5 LIQUID (ML) ORAL 3 TIMES DAILY PRN
Qty: 100 ML | Refills: 0 | Status: CANCELLED | OUTPATIENT
Start: 2024-10-01 | End: 2024-10-31

## 2024-10-01 NOTE — PROGRESS NOTES
Chief Complaint   Patient presents with    Discuss Medications         Health Maintenance Due   Topic Date Due    Diabetic retinal exam  Never done    Lung Cancer Screening &/or Counseling  Never done    Shingles vaccine (2 of 2) 05/02/2019    Respiratory Syncytial Virus (RSV) Pregnant or age 60 yrs+ (1 - 1-dose 60+ series) Never done    Diabetic Alb to Cr ratio (uACR) test  04/14/2022    Colorectal Cancer Screen  05/07/2024    Flu vaccine (1) 08/01/2024    COVID-19 Vaccine (3 - 2023-24 season) 09/01/2024    Breast cancer screen  09/21/2024         \"Have you been to the ER, urgent care clinic since your last visit?  Hospitalized since your last visit?\"    NO    “Have you seen or consulted any other health care providers outside of [facility] since your last visit?”    NO    “Have you had a colorectal cancer screening such as a colonoscopy/FIT/Cologuard?    NO    Date of last Colonoscopy: 5/7/2019  No cologuard on file  Date of last FIT: 3/4/2020   No flexible sigmoidoscopy on file        Have you had a mammogram?”   NO    Date of last Mammogram: 9/21/2023

## 2024-10-01 NOTE — TELEPHONE ENCOUNTER
Patient was given a prescription for Lidocaine patches today from her VV with Dr Blayne Hall, [place] 10/1/2024 5:56 PM

## 2024-10-08 ENCOUNTER — TELEPHONE (OUTPATIENT)
Facility: CLINIC | Age: 64
End: 2024-10-08

## 2024-10-08 NOTE — TELEPHONE ENCOUNTER
Pt would like a referral sent to  at [facility] for her pain management. She would also like for someone to give her a call about her incontinent products, she has been waiting for over a month.   no headache

## 2024-10-10 ENCOUNTER — TELEPHONE (OUTPATIENT)
Facility: CLINIC | Age: 64
End: 2024-10-10

## 2024-10-11 ENCOUNTER — TELEPHONE (OUTPATIENT)
Facility: CLINIC | Age: 64
End: 2024-10-11

## 2024-10-15 NOTE — PROGRESS NOTES
Zuleika Torres, was evaluated through a synchronous (real-time) audio-video encounter. The patient (or guardian if applicable) is aware that this is a billable service, which includes applicable co-pays. This Virtual Visit was conducted with patient's (and/or legal guardian's) consent. Patient identification was verified, and a caregiver was present when appropriate.   The patient was located at Home: [address] [facility] [address]  Provider was located at Facility (Appt Dept): [address]  [place] [address]  Confirm you are appropriately licensed, registered, or certified to deliver care in the state where the patient is located as indicated above. If you are not or unsure, please re-schedule the visit: Yes, I confirm.     Zuleika Torres (:  1960) is a Established patient, presenting virtually for evaluation of the following: Needs a referral to the [facility]    Assessment & Plan   Below is the assessment and plan developed based on review of pertinent history, physical exam, labs, studies, and medications.  Assessment & Plan  1. Chronic back pain.  A referral to the [facility] for neurosurgery will be provided. The neurologist at the [facility] will direct her to the appropriate resources and specialists for her back surgery. She prefers to go to [place] for the treatment as they have the necessary equipment to help her feel better.    2. Hepatitis C.  Her fatigue may be related to her hepatitis C. She has completed a 3-month treatment for hepatitis C, but she has not yet done her follow-up blood work to assess the treatment's effectiveness. She is advised to get her blood work done to evaluate her current hepatitis C status.      Results    1. Depression, major, recurrent, mild (HCC)  2. Centrilobular emphysema (HCC)  3. Type 2 diabetes mellitus with hyperglycemia, without long-term current use of insulin (HCC)  4. Spinal stenosis of lumbar

## 2024-10-16 ENCOUNTER — TELEMEDICINE (OUTPATIENT)
Facility: CLINIC | Age: 64
End: 2024-10-16
Payer: MEDICAID

## 2024-10-16 DIAGNOSIS — E11.65 TYPE 2 DIABETES MELLITUS WITH HYPERGLYCEMIA, WITHOUT LONG-TERM CURRENT USE OF INSULIN (HCC): ICD-10-CM

## 2024-10-16 DIAGNOSIS — F33.0 DEPRESSION, MAJOR, RECURRENT, MILD (HCC): Primary | ICD-10-CM

## 2024-10-16 DIAGNOSIS — J43.2 CENTRILOBULAR EMPHYSEMA (HCC): ICD-10-CM

## 2024-10-16 DIAGNOSIS — M48.061 SPINAL STENOSIS OF LUMBAR REGION, UNSPECIFIED WHETHER NEUROGENIC CLAUDICATION PRESENT: ICD-10-CM

## 2024-10-16 PROCEDURE — 3051F HG A1C>EQUAL 7.0%<8.0%: CPT | Performed by: FAMILY MEDICINE

## 2024-10-16 PROCEDURE — 99213 OFFICE O/P EST LOW 20 MIN: CPT | Performed by: FAMILY MEDICINE

## 2024-10-16 ASSESSMENT — PATIENT HEALTH QUESTIONNAIRE - PHQ9
SUM OF ALL RESPONSES TO PHQ QUESTIONS 1-9: 2
SUM OF ALL RESPONSES TO PHQ QUESTIONS 1-9: 2
1. LITTLE INTEREST OR PLEASURE IN DOING THINGS: SEVERAL DAYS
SUM OF ALL RESPONSES TO PHQ QUESTIONS 1-9: 2
SUM OF ALL RESPONSES TO PHQ9 QUESTIONS 1 & 2: 2
2. FEELING DOWN, DEPRESSED OR HOPELESS: SEVERAL DAYS
SUM OF ALL RESPONSES TO PHQ QUESTIONS 1-9: 2

## 2024-11-12 ENCOUNTER — TELEPHONE (OUTPATIENT)
Facility: CLINIC | Age: 64
End: 2024-11-12

## 2024-11-12 ENCOUNTER — TELEMEDICINE (OUTPATIENT)
Facility: CLINIC | Age: 64
End: 2024-11-12
Payer: MEDICAID

## 2024-11-12 DIAGNOSIS — J44.1 COPD EXACERBATION (HCC): Primary | ICD-10-CM

## 2024-11-12 PROCEDURE — 99213 OFFICE O/P EST LOW 20 MIN: CPT | Performed by: FAMILY MEDICINE

## 2024-11-12 RX ORDER — AZITHROMYCIN 250 MG/1
250 TABLET, FILM COATED ORAL SEE ADMIN INSTRUCTIONS
Qty: 6 TABLET | Refills: 0 | Status: SHIPPED | OUTPATIENT
Start: 2024-11-12 | End: 2024-11-17

## 2024-11-12 RX ORDER — CODEINE PHOSPHATE/GUAIFENESIN 10-100MG/5
5 LIQUID (ML) ORAL 3 TIMES DAILY PRN
Qty: 60 ML | Refills: 0 | Status: SHIPPED | OUTPATIENT
Start: 2024-11-12 | End: 2024-11-16

## 2024-11-12 RX ORDER — PREDNISONE 20 MG/1
40 TABLET ORAL DAILY
Qty: 10 TABLET | Refills: 0 | Status: SHIPPED | OUTPATIENT
Start: 2024-11-12 | End: 2024-11-17

## 2024-11-12 NOTE — PROGRESS NOTES
Zuleika Torres, was evaluated through a synchronous (real-time) audio-video encounter. The patient (or guardian if applicable) is aware that this is a billable service, which includes applicable co-pays. This Virtual Visit was conducted with patient's (and/or legal guardian's) consent. Patient identification was verified, and a caregiver was present when appropriate.   The patient was located at Home: [address] [facility] [address]  Provider was located at Facility (Appt Dept): [address] [facility] [address]  [place] [address]  Confirm you are appropriately licensed, registered, or certified to deliver care in the state where the patient is located as indicated above. If you are not or unsure, please re-schedule the visit: Yes, I confirm.     Zuleika Torres (:  1960) is a Established patient, presenting virtually for evaluation of the following: cough    Assessment & Plan   Below is the assessment and plan developed based on review of pertinent history, physical exam, labs, studies, and medications.  Assessment & Plan  1. Cough.  She reports a persistent cough with thick yellow mucus for the past 3 days. She has tested negative for Covid. She has been prescribed a Z-Yvan, codeine cough syrup, and prednisone. These prescriptions have been sent to [facility] in [place].    2. Chronic Obstructive Pulmonary Disease (COPD).  She has a history of COPD and is currently using Anoro inhaler, which she took this morning. She has finished her course of prednisone. She is wheezing slightly. She is advised to continue using her inhaler as prescribed.    3. Smoking Cessation.  She has quit smoking for 3 days and is currently taking Chantix to aid in smoking cessation. She is encouraged to continue her efforts to quit smoking.    4. Health Maintenance.  She is reminded to complete her colon cancer screening.      Results  Laboratory Studies  Covid test was negative.  1. COPD exacerbation

## 2024-11-12 NOTE — TELEPHONE ENCOUNTER
Pt calling to get an appointment or get an antibiotic and cough syrup. I did not have availability for today or tomorrow, please call pt to advise

## 2024-11-12 NOTE — PROGRESS NOTES
Chief Complaint   Patient presents with    Chest Congestion     Chest congestion and cough       Patient has not been out of the country in (14 months), NO diarrhea, NO cough, NO chest conjestion, NO temp.  Pt has not been around anyone with these symptoms.     Health Maintenance reviewed.    I have reviewed the patient's medical history in detail and updated the computerized patient record.    \"Have you been to the ER, urgent care clinic since your last visit? no  Hospitalized since your last visit?\"    no    “Have you seen or consulted any other health care providers outside of [facility] since your last visit?”    no    “Have you had a colorectal cancer screening such as a colonoscopy/FIT/Cologuard?    no    Date of last Colonoscopy: 5/7/2019  No cologuard on file  Date of last FIT: 3/4/2020   No flexible sigmoidoscopy on file        Have you had a mammogram?”   no    Date of last Mammogram: 9/21/2023

## 2024-11-13 ENCOUNTER — TELEPHONE (OUTPATIENT)
Facility: CLINIC | Age: 64
End: 2024-11-13

## 2024-11-13 ENCOUNTER — TRANSCRIBE ORDERS (OUTPATIENT)
Facility: HOSPITAL | Age: 64
End: 2024-11-13

## 2024-11-13 DIAGNOSIS — M54.16 LUMBAR RADICULOPATHY: ICD-10-CM

## 2024-11-13 DIAGNOSIS — M54.12 CERVICAL RADICULOPATHY: Primary | ICD-10-CM

## 2024-11-14 ENCOUNTER — TELEPHONE (OUTPATIENT)
Facility: CLINIC | Age: 64
End: 2024-11-14

## 2024-11-18 ENCOUNTER — HOSPITAL ENCOUNTER (OUTPATIENT)
Facility: HOSPITAL | Age: 64
Discharge: HOME | End: 2024-11-21
Payer: MEDICAID

## 2024-11-18 DIAGNOSIS — M54.16 LUMBAR RADICULOPATHY: ICD-10-CM

## 2024-11-18 DIAGNOSIS — M54.12 CERVICAL RADICULOPATHY: ICD-10-CM

## 2024-11-18 PROCEDURE — 72148 MRI LUMBAR SPINE W/O DYE: CPT

## 2024-11-19 ENCOUNTER — TELEPHONE (OUTPATIENT)
Facility: CLINIC | Age: 64
End: 2024-11-19

## 2024-11-19 DIAGNOSIS — R05.2 SUBACUTE COUGH: ICD-10-CM

## 2024-11-19 DIAGNOSIS — K59.1 FUNCTIONAL DIARRHEA: Primary | ICD-10-CM

## 2024-11-19 RX ORDER — DIPHENOXYLATE HYDROCHLORIDE AND ATROPINE SULFATE 2.5; .025 MG/1; MG/1
1 TABLET ORAL 4 TIMES DAILY PRN
Qty: 20 TABLET | Refills: 0 | Status: SHIPPED | OUTPATIENT
Start: 2024-11-19 | End: 2024-11-29

## 2024-11-26 ENCOUNTER — TELEPHONE (OUTPATIENT)
Facility: CLINIC | Age: 64
End: 2024-11-26

## 2024-11-26 DIAGNOSIS — J44.1 COPD EXACERBATION (HCC): Primary | ICD-10-CM

## 2024-11-26 RX ORDER — CODEINE PHOSPHATE AND GUAIFENESIN 10; 100 MG/5ML; MG/5ML
5 SOLUTION ORAL 3 TIMES DAILY PRN
Qty: 40 ML | Refills: 0 | Status: SHIPPED | OUTPATIENT
Start: 2024-11-26 | End: 2024-12-01

## 2024-12-06 ENCOUNTER — TELEMEDICINE (OUTPATIENT)
Facility: CLINIC | Age: 64
End: 2024-12-06

## 2024-12-06 DIAGNOSIS — J43.2 CENTRILOBULAR EMPHYSEMA (HCC): ICD-10-CM

## 2024-12-06 DIAGNOSIS — E11.65 TYPE 2 DIABETES MELLITUS WITH HYPERGLYCEMIA, WITHOUT LONG-TERM CURRENT USE OF INSULIN (HCC): ICD-10-CM

## 2024-12-06 DIAGNOSIS — R05.1 ACUTE COUGH: Primary | ICD-10-CM

## 2024-12-06 DIAGNOSIS — Z87.891 PERSONAL HISTORY OF TOBACCO USE: ICD-10-CM

## 2024-12-06 RX ORDER — DOXYCYCLINE HYCLATE 100 MG
100 TABLET ORAL 2 TIMES DAILY
Qty: 20 TABLET | Refills: 0 | Status: SHIPPED | OUTPATIENT
Start: 2024-12-06 | End: 2024-12-16

## 2024-12-06 RX ORDER — CEFUROXIME AXETIL 500 MG/1
500 TABLET ORAL 2 TIMES DAILY
Qty: 20 TABLET | Refills: 0 | Status: SHIPPED | OUTPATIENT
Start: 2024-12-06 | End: 2024-12-16

## 2024-12-06 RX ORDER — CODEINE PHOSPHATE AND GUAIFENESIN 10; 100 MG/5ML; MG/5ML
5 SOLUTION ORAL 3 TIMES DAILY PRN
Qty: 60 ML | Refills: 0 | Status: SHIPPED | OUTPATIENT
Start: 2024-12-06 | End: 2024-12-11

## 2024-12-06 RX ORDER — PREDNISONE 20 MG/1
TABLET ORAL
Qty: 15 TABLET | Refills: 0 | Status: SHIPPED | OUTPATIENT
Start: 2024-12-06

## 2024-12-07 NOTE — PROGRESS NOTES
Chief Complaint   Patient presents with    Medication Refill     Wants refill of Tramadol        
Discussed with the patient the current USPSTF guidelines released March 9, 2021 for screening for lung cancer.    For adults aged 50 to 80 years who have a 20 pack-year smoking history and currently smoke or have quit within the past 15 years the grade B recommendation is to:  Screen for lung cancer with low-dose computed tomography (LDCT) every year.  Stop screening once a person has not smoked for 15 years or has a health problem that limits life expectancy or the ability to have lung surgery.    The patient  reports that she has been smoking cigarettes. She started smoking about 49 years ago. She has a 25.4 pack-year smoking history. She has never used smokeless tobacco.. Discussed with patient the risks and benefits of screening, including over-diagnosis, false positive rate, and total radiation exposure.  The patient currently exhibits no signs or symptoms suggestive of lung cancer.  Discussed with patient the importance of compliance with yearly annual lung cancer screenings and willingness to undergo diagnosis and treatment if screening scan is positive.  In addition, the patient was counseled regarding the importance of remaining smoke free and/or total smoking cessation.    Also reviewed the following if the patient has Medicare that as of February 10, 2022, Medicare only covers LDCT screening in patients aged 50-77 with at least a 20 pack-year smoking history who currently smoke or have quit in the last 15 years  
 Answer:   No     Order Specific Question:   Smoking Status?     Answer:   Former [4]     Order Specific Question:   Date quit smoking? (must be within 15 years)     Answer:   2024     Order Specific Question:   Pack Years     Answer:   25    HI VISIT TO DISCUSS LUNG CA SCREEN W LDCT    doxycycline hyclate (VIBRA-TABS) 100 MG tablet     Sig: Take 1 tablet by mouth 2 times daily for 10 days     Dispense:  20 tablet     Refill:  0    cefUROXime (CEFTIN) 500 MG tablet     Sig: Take 1 tablet by mouth 2 times daily for 10 days     Dispense:  20 tablet     Refill:  0    predniSONE (DELTASONE) 20 MG tablet     Si daily for five days, then one daily for five days, then stop.     Dispense:  15 tablet     Refill:  0    guaiFENesin-codeine (GUAIFENESIN AC) 100-10 MG/5ML liquid     Sig: Take 5 mLs by mouth 3 times daily as needed for Cough for up to 5 days. Max Daily Amount: 15 mLs     Dispense:  60 mL     Refill:  0     Reduce doses taken as pain becomes manageable Reduce doses taken as pain becomes manageable     Current Outpatient Medications   Medication Sig Dispense Refill    doxycycline hyclate (VIBRA-TABS) 100 MG tablet Take 1 tablet by mouth 2 times daily for 10 days 20 tablet 0    cefUROXime (CEFTIN) 500 MG tablet Take 1 tablet by mouth 2 times daily for 10 days 20 tablet 0    predniSONE (DELTASONE) 20 MG tablet 2 daily for five days, then one daily for five days, then stop. 15 tablet 0    guaiFENesin-codeine (GUAIFENESIN AC) 100-10 MG/5ML liquid Take 5 mLs by mouth 3 times daily as needed for Cough for up to 5 days. Max Daily Amount: 15 mLs 60 mL 0    chlorhexidine (PERIDEX) 0.12 % solution Swish and spit 15 mLs 2 times daily      umeclidinium-vilanterol (ANORO ELLIPTA) 62.5-25 MCG/ACT inhaler Inhale 1 puff into the lungs daily 1 each 5    lidocaine (LIDODERM) 5 % Place 1 patch onto the skin daily 12 hours on, 12 hours off. 30 patch 5    diclofenac sodium (VOLTAREN) 1 % GEL Apply 4 g topically 4 times daily

## 2024-12-11 ENCOUNTER — TELEPHONE (OUTPATIENT)
Facility: CLINIC | Age: 64
End: 2024-12-11

## 2024-12-11 DIAGNOSIS — J43.2 CENTRILOBULAR EMPHYSEMA (HCC): Primary | ICD-10-CM

## 2024-12-11 NOTE — TELEPHONE ENCOUNTER
Pt calling to get a new nebulizer machine called in to [facility] pharmacy in [place]

## 2024-12-13 ENCOUNTER — TELEMEDICINE (OUTPATIENT)
Facility: CLINIC | Age: 64
End: 2024-12-13

## 2024-12-13 DIAGNOSIS — J43.2 CENTRILOBULAR EMPHYSEMA (HCC): Primary | ICD-10-CM

## 2024-12-13 PROCEDURE — 99213 OFFICE O/P EST LOW 20 MIN: CPT | Performed by: FAMILY MEDICINE

## 2024-12-13 RX ORDER — AZITHROMYCIN 250 MG/1
TABLET, FILM COATED ORAL
Qty: 6 TABLET | Refills: 0 | Status: SHIPPED | OUTPATIENT
Start: 2024-12-13 | End: 2024-12-23

## 2024-12-13 RX ORDER — CODEINE PHOSPHATE AND GUAIFENESIN 10; 100 MG/5ML; MG/5ML
5 SOLUTION ORAL 3 TIMES DAILY PRN
Qty: 60 ML | Refills: 0 | Status: SHIPPED | OUTPATIENT
Start: 2024-12-13 | End: 2024-12-18

## 2024-12-13 NOTE — PROGRESS NOTES
Chief Complaint   Patient presents with    Cough     Patient is aware that this is a Virtual Visit or Phone Call Only doctor's visit.    Patient has not been out of the country in (14 months), NO diarrhea, NO cough, NO chest conjestion, NO temp.  Pt has not been around anyone with these symptoms.     Health Maintenance reviewed.    I have reviewed the patient's medical history in detail and updated the computerized patient record.    Have you been to the ER, urgent care clinic since your last visit?  No Hospitalized since your last visit?  no    2.  Have you seen or consulted any other health care providers outside of the [facility] since your last visit? No  Include any pap smears or colon screening.     Encouraged pt to discuss pt's wishes with spouse/partner/family and bring them in the next appt to follow thru with the Advanced Directive

## 2024-12-13 NOTE — PROGRESS NOTES
Subjective:   Subjective   History of Present Illness  The patient is a 64-year-old female who presents via virtual visit for evaluation of cough.    She has been experiencing a persistent cough, rhinorrhea, and dyspnea, which she attributes to potential exposure to influenza from her grandchild. The cough is productive, yielding black sputum, which she believes is a consequence of her smoking habit. Her symptoms have been present for approximately 1 to 2 months, with periods of slight improvement followed by exacerbation. The current episode has lasted for 9 days. She reports chest pain associated with the cough. She was scheduled for a chest x-ray but has postponed it due to her grandchild's illness. She suspects that her symptoms may be due to caring for her grandchild. She has been using a nebulizer 4 to 5 times daily, but it has been non-functional for the past 2 days. She has not received albuterol treatment during this time. She is currently on a regimen of antibiotics, taken twice daily, which has resulted in some improvement. However, she reports severe coughing at night and in the morning, which she believes is due to the lack of nebulizer medication. She recently completed a course of Z-Yvan, which provided significant relief.    Supplemental Information  She is scheduled for an appointment on 12/17/2024 for clearance for a back injection. She reports that the previous injection provided significant relief for her hip pain. However, she continues to experience back pain, which is exacerbated by standing and walking.    SOCIAL HISTORY  The patient admits to smoking cigarettes.    ALLERGIES  The patient has no known allergies.    MEDICATIONS  Current: albuterol, Z-Yvan    Zuleika Torres is a 64 y.o. female who complains of congestion and productive cough for 60 off and on days, stable since that time.    Allergies   Allergen Reactions    Oxycodone Hives and Itching     Patient reports not allergic

## 2024-12-17 ENCOUNTER — OFFICE VISIT (OUTPATIENT)
Facility: CLINIC | Age: 64
End: 2024-12-17

## 2024-12-17 VITALS
RESPIRATION RATE: 18 BRPM | WEIGHT: 170.2 LBS | TEMPERATURE: 98.2 F | SYSTOLIC BLOOD PRESSURE: 132 MMHG | HEART RATE: 69 BPM | HEIGHT: 69 IN | DIASTOLIC BLOOD PRESSURE: 75 MMHG | BODY MASS INDEX: 25.21 KG/M2 | OXYGEN SATURATION: 95 %

## 2024-12-17 DIAGNOSIS — E11.40 TYPE 2 DIABETES MELLITUS WITH DIABETIC NEUROPATHY, WITHOUT LONG-TERM CURRENT USE OF INSULIN (HCC): ICD-10-CM

## 2024-12-17 DIAGNOSIS — J43.2 CENTRILOBULAR EMPHYSEMA (HCC): ICD-10-CM

## 2024-12-17 DIAGNOSIS — Z01.811 PRE-OPERATIVE RESPIRATORY EXAMINATION: Primary | ICD-10-CM

## 2024-12-17 DIAGNOSIS — J44.9 CHRONIC OBSTRUCTIVE PULMONARY DISEASE, UNSPECIFIED COPD TYPE (HCC): ICD-10-CM

## 2024-12-17 DIAGNOSIS — E78.5 HYPERLIPIDEMIA, UNSPECIFIED HYPERLIPIDEMIA TYPE: ICD-10-CM

## 2024-12-17 RX ORDER — ALBUTEROL SULFATE 0.83 MG/ML
2.5 SOLUTION RESPIRATORY (INHALATION) 4 TIMES DAILY PRN
Qty: 120 EACH | Refills: 3 | Status: SHIPPED | OUTPATIENT
Start: 2024-12-17 | End: 2024-12-17 | Stop reason: SDUPTHER

## 2024-12-17 RX ORDER — CODEINE PHOSPHATE AND GUAIFENESIN 10; 100 MG/5ML; MG/5ML
5 SOLUTION ORAL 3 TIMES DAILY PRN
Qty: 60 ML | Refills: 0 | Status: SHIPPED | OUTPATIENT
Start: 2024-12-17 | End: 2024-12-22

## 2024-12-17 RX ORDER — LIRAGLUTIDE 6 MG/ML
0.6 INJECTION SUBCUTANEOUS DAILY
Qty: 2 ADJUSTABLE DOSE PRE-FILLED PEN SYRINGE | Refills: 5 | Status: SHIPPED | OUTPATIENT
Start: 2024-12-17

## 2024-12-17 RX ORDER — ALBUTEROL SULFATE 0.83 MG/ML
2.5 SOLUTION RESPIRATORY (INHALATION) 4 TIMES DAILY PRN
Qty: 300 EACH | Refills: 2 | Status: SHIPPED | OUTPATIENT
Start: 2024-12-17

## 2024-12-17 NOTE — PROGRESS NOTES
Subjective:      Zuleika Torres is a 64 y.o. female who presents to the office today for a preoperative consultation at the request of surgeon Louis who plans on performing cataracts on January 21.  This consultation is requested for the specific conditions prompting preoperative evaluation (i.e. because of potential affect on operative risk): MI CVA.  Planned anesthesia is Local.  The patient has the following known anesthesia issues:  none   Patient has a bleeding risk of : no recent abnormal bleeding, no remote history of abnormal bleeding  Patient does not have objection to receiving blood products if needed.  Patient's medications, allergies, past medical, surgical, social and family histories were reviewed and updated as appropriate.    Review of Systems    Reports taking blood pressure medications without side affects. No complaints of exertional chest pain, excessive shortness of breath or focal weakness. Minimal swelling in lower legs or dizziness with standing.   Finally relates that her coughing shortness of breath dyspnea bronchitis symptoms are improving with recent course of antibiotics/steroids.  Chest x-ray is pending     Objective:      /75   Pulse 69   Temp 98.2 °F (36.8 °C) (Temporal)   Resp 18   Ht 1.753 m (5' 9\")   Wt 77.2 kg (170 lb 3.2 oz)   SpO2 95%   BMI 25.13 kg/m²   WD WN female NAD  Heart RRR without murmers clicks or rubs  Lungs CTA  Abdo soft nontender  Ext no edema      Imaging  Chest X-Ray:  pending      Lab Review   Lab Results   Component Value Date/Time     02/06/2024 11:00 AM    K 4.3 02/06/2024 11:00 AM     02/06/2024 11:00 AM    CO2 27 02/06/2024 11:00 AM    BUN 12 02/06/2024 11:00 AM    CREATININE 0.76 02/06/2024 11:00 AM    GLUCOSE 172 02/06/2024 11:00 AM    CALCIUM 9.3 02/06/2024 11:00 AM         Assessment:        64 y.o. female with planned surgery as above.    Known risk factors for perioperative complications: Diabetes mellitus

## 2024-12-17 NOTE — PROGRESS NOTES
Chief Complaint   Patient presents with    Pre-op Exam         Health Maintenance Due   Topic Date Due    Diabetic retinal exam  Never done    Hepatitis A vaccine (1 of 2 - Risk 2-dose series) Never done    Lung Cancer Screening &/or Counseling  Never done    Shingles vaccine (2 of 2) 05/02/2019    Hepatitis B vaccine (1 of 3 - Risk 3-dose series) Never done    Respiratory Syncytial Virus (RSV) Pregnant or age 60 yrs+ (1 - Risk 60-74 years 1-dose series) Never done    Diabetic Alb to Cr ratio (uACR) test  04/14/2022    Colorectal Cancer Screen  05/07/2024    Flu vaccine (1) 08/01/2024    COVID-19 Vaccine (3 - 2023-24 season) 09/01/2024    Breast cancer screen  09/21/2024         \"Have you been to the ER, urgent care clinic since your last visit?  Hospitalized since your last visit?\"    NO    “Have you seen or consulted any other health care providers outside of [facility] since your last visit?”    NO    “Have you had a colorectal cancer screening such as a colonoscopy/FIT/Cologuard?    NO    Date of last Colonoscopy: 5/7/2019  No cologuard on file  Date of last FIT: 3/4/2020   No flexible sigmoidoscopy on file        Have you had a mammogram?”   YES - [facility]: [facility]  Pt authorize record request Nurse/CMA to request most recent records if not in the chart    Date of last Mammogram: 9/21/2023

## 2024-12-18 ENCOUNTER — TELEPHONE (OUTPATIENT)
Facility: CLINIC | Age: 64
End: 2024-12-18

## 2024-12-18 LAB — HBA1C MFR BLD HPLC: 6.4 %

## 2024-12-18 NOTE — TELEPHONE ENCOUNTER
Pt calling to get a referral to [facility] in [place], pt says no one specific needs to be on the referral. She will call to get an appt

## 2024-12-19 DIAGNOSIS — R05.3 CHRONIC COUGH: Primary | ICD-10-CM

## 2025-01-03 ENCOUNTER — TELEPHONE (OUTPATIENT)
Facility: CLINIC | Age: 65
End: 2025-01-03

## 2025-01-03 NOTE — TELEPHONE ENCOUNTER
Patient called stating she has had cough and head congestion for 4-5 days. She had fever early on but that has gone away. Tested negative for COVID. Would like a prescription called to the [facility] in [place].

## 2025-01-07 ENCOUNTER — TELEMEDICINE (OUTPATIENT)
Facility: CLINIC | Age: 65
End: 2025-01-07
Payer: MEDICAID

## 2025-01-07 DIAGNOSIS — J43.2 CENTRILOBULAR EMPHYSEMA (HCC): Primary | ICD-10-CM

## 2025-01-07 DIAGNOSIS — E11.65 TYPE 2 DIABETES MELLITUS WITH HYPERGLYCEMIA, WITHOUT LONG-TERM CURRENT USE OF INSULIN (HCC): ICD-10-CM

## 2025-01-07 DIAGNOSIS — H92.03 OTALGIA, BILATERAL: ICD-10-CM

## 2025-01-07 DIAGNOSIS — U07.1 COVID: ICD-10-CM

## 2025-01-07 PROCEDURE — 99214 OFFICE O/P EST MOD 30 MIN: CPT | Performed by: FAMILY MEDICINE

## 2025-01-07 RX ORDER — CODEINE PHOSPHATE AND GUAIFENESIN 10; 100 MG/5ML; MG/5ML
5 SOLUTION ORAL 3 TIMES DAILY PRN
Qty: 60 ML | Refills: 0 | Status: SHIPPED | OUTPATIENT
Start: 2025-01-07 | End: 2025-01-12

## 2025-01-07 RX ORDER — HYDROCORTISONE AND ACETIC ACID 20.75; 10.375 MG/ML; MG/ML
3 SOLUTION AURICULAR (OTIC) 2 TIMES DAILY
Qty: 1 EACH | Refills: 0 | Status: SHIPPED | OUTPATIENT
Start: 2025-01-07 | End: 2025-01-17

## 2025-01-07 RX ORDER — PREDNISONE 20 MG/1
40 TABLET ORAL DAILY
Qty: 10 TABLET | Refills: 0 | Status: SHIPPED | OUTPATIENT
Start: 2025-01-07 | End: 2025-01-12

## 2025-01-07 NOTE — PROGRESS NOTES
Chief Complaint   Patient presents with    Positive For Covid-19     Tested positive yesterday - symptoms off and on X 2 weeks    Headache     Ear pain - fever yesterday of 102 none today

## 2025-01-07 NOTE — PROGRESS NOTES
Subjective:   Subjective   History of Present Illness      Zuleika Torres is a 64 y.o. female who complains of sore throat, cough described as productive of yellow sputum, myalgias, headache, and fever for 3 days, stable since that time.  She has had multiple multiple respiratory infections over the last 6 months.  Chest x-ray has been ordered which she says she has gotten we have not gotten that result.  States she went to the hospital.  Low-dose CT of the chest for lung cancer was ordered but I think they could not do it.    Allergies   Allergen Reactions    Oxycodone Hives and Itching     Patient reports not allergic 7/24/23    Zolpidem Itching     Patient Active Problem List   Diagnosis    Tobacco dependence syndrome    Centrilobular emphysema (HCC)    Osteoarthritis of knee    Depression, major, recurrent, mild (HCC)    Elevated liver enzymes    Adenomatous colon polyp    Encounter for diagnostic colonoscopy due to change in bowel habits    Neck pain    Spinal stenosis    Hyperlipidemia    Type 2 diabetes mellitus with hyperglycemia, without long-term current use of insulin (HCC)    Anxiety state    Restless legs    Hepatitis C antibody test positive         Patient Active Problem List    Diagnosis Date Noted    Hepatitis C antibody test positive 07/24/2023    Type 2 diabetes mellitus with hyperglycemia, without long-term current use of insulin (HCC) 04/14/2021    Tobacco dependence syndrome 07/21/2020    Centrilobular emphysema (HCC) 07/21/2020    Osteoarthritis of knee 07/21/2020    Neck pain 07/21/2020    Hyperlipidemia 07/21/2020    Anxiety state 07/21/2020    Adenomatous colon polyp 05/16/2020    Encounter for diagnostic colonoscopy due to change in bowel habits 03/04/2019    Elevated liver enzymes 02/19/2019    Depression, major, recurrent, mild (HCC) 12/21/2017    Restless legs 12/21/2017    Spinal stenosis 08/14/2017     Allergies   Allergen Reactions    Oxycodone Hives and Itching     Patient

## 2025-01-16 ENCOUNTER — TELEPHONE (OUTPATIENT)
Facility: CLINIC | Age: 65
End: 2025-01-16

## 2025-01-16 NOTE — TELEPHONE ENCOUNTER
Patient stated that she needed a referral for a hearing test.    [facility]  [phone]    Fax [phone]

## 2025-01-22 DIAGNOSIS — H91.90 HARD OF HEARING: Primary | ICD-10-CM

## 2025-01-27 PROBLEM — E78.2 MIXED HYPERLIPIDEMIA: Status: ACTIVE | Noted: 2020-07-21

## 2025-01-28 ENCOUNTER — TELEMEDICINE (OUTPATIENT)
Facility: CLINIC | Age: 65
End: 2025-01-28
Payer: MEDICAID

## 2025-01-28 DIAGNOSIS — F33.0 DEPRESSION, MAJOR, RECURRENT, MILD (HCC): ICD-10-CM

## 2025-01-28 DIAGNOSIS — E11.65 TYPE 2 DIABETES MELLITUS WITH HYPERGLYCEMIA, WITHOUT LONG-TERM CURRENT USE OF INSULIN (HCC): Primary | ICD-10-CM

## 2025-01-28 DIAGNOSIS — F17.200 TOBACCO DEPENDENCE SYNDROME: ICD-10-CM

## 2025-01-28 DIAGNOSIS — E78.2 MIXED HYPERLIPIDEMIA: ICD-10-CM

## 2025-01-28 DIAGNOSIS — J43.2 CENTRILOBULAR EMPHYSEMA (HCC): ICD-10-CM

## 2025-01-28 PROCEDURE — 99214 OFFICE O/P EST MOD 30 MIN: CPT | Performed by: FAMILY MEDICINE

## 2025-01-28 RX ORDER — AZITHROMYCIN 250 MG/1
TABLET, FILM COATED ORAL
Qty: 11 TABLET | Refills: 0 | Status: SHIPPED | OUTPATIENT
Start: 2025-01-28 | End: 2025-02-07

## 2025-01-28 RX ORDER — VARENICLINE TARTRATE 1 MG/1
1 TABLET, FILM COATED ORAL 2 TIMES DAILY
Qty: 60 TABLET | Refills: 2 | Status: SHIPPED | OUTPATIENT
Start: 2025-01-28

## 2025-01-28 RX ORDER — CODEINE PHOSPHATE AND GUAIFENESIN 10; 100 MG/5ML; MG/5ML
5 SOLUTION ORAL 3 TIMES DAILY PRN
Qty: 60 ML | Refills: 0 | Status: SHIPPED | OUTPATIENT
Start: 2025-01-28 | End: 2025-02-02

## 2025-01-28 RX ORDER — PREDNISONE 20 MG/1
TABLET ORAL
Qty: 15 TABLET | Refills: 0 | Status: SHIPPED | OUTPATIENT
Start: 2025-01-28

## 2025-01-28 NOTE — PROGRESS NOTES
Chief Complaint   Patient presents with    COPD     6 week followup after surgery       
without long-term current use of insulin (HCC)    Anxiety state    Restless legs    Hepatitis C antibody test positive      History of Present Illness  The patient is a 64-year-old female who presents via virtual visit for evaluation of a persistent cough.    She reports a persistent cough that has not improved despite completing a course of Slo-Bid. She also experiences rhinorrhea, dyspnea, and episodes of vomiting. A COVID-19 test conducted this morning yielded a negative result. She has been utilizing Pulmicort nebulizer therapy, which she finds beneficial, and Anoro, with a remaining supply for 9 days. She has successfully abstained from smoking for the past 6 days. She has completed her Chantix regimen. She has not yet undergone lung cancer screening but expresses interest in scheduling one due to her family history of lung cancer in her father.    Supplemental Information  She has scheduled her colonoscopy, which will be conducted in February 2025.    SOCIAL HISTORY  The patient has quit smoking and has been smoke-free for 6 days.    FAMILY HISTORY  Her father had lung cancer.    ALLERGIES  The patient is allergic to OXYCODONE and ZOLPIDEM.    MEDICATIONS  Current: codeine, prednisone, Pulmicort nebulizer, Anoro, Chantix (completed)    Social History     Socioeconomic History    Marital status: Legally      Spouse name: Not on file    Number of children: Not on file    Years of education: Not on file    Highest education level: Not on file   Occupational History    Not on file   Tobacco Use    Smoking status: Every Day     Current packs/day: 1.00     Average packs/day: 0.5 packs/day for 50.0 years (25.6 ttl pk-yrs)     Types: Cigarettes     Start date: 1/1/1975     Last attempt to quit: 11/1/2023    Smokeless tobacco: Never   Substance and Sexual Activity    Alcohol use: Not Currently     Alcohol/week: 32.0 standard drinks of alcohol    Drug use: Not Currently    Sexual activity: Not on file   Other

## 2025-02-07 ENCOUNTER — TELEPHONE (OUTPATIENT)
Facility: CLINIC | Age: 65
End: 2025-02-07

## 2025-02-10 NOTE — TELEPHONE ENCOUNTER
Pt calling in for status of meds, would also like another round of antibiotics since she is still feeling under the weather

## 2025-02-11 ENCOUNTER — TELEMEDICINE (OUTPATIENT)
Facility: CLINIC | Age: 65
End: 2025-02-11
Payer: MEDICAID

## 2025-02-11 DIAGNOSIS — R05.1 ACUTE COUGH: Primary | ICD-10-CM

## 2025-02-11 PROCEDURE — 99213 OFFICE O/P EST LOW 20 MIN: CPT | Performed by: FAMILY MEDICINE

## 2025-02-11 RX ORDER — PREDNISONE 20 MG/1
40 TABLET ORAL DAILY
Qty: 10 TABLET | Refills: 0 | Status: SHIPPED | OUTPATIENT
Start: 2025-02-11 | End: 2025-02-16

## 2025-02-11 RX ORDER — AZITHROMYCIN 250 MG/1
TABLET, FILM COATED ORAL
Qty: 6 TABLET | Refills: 0 | Status: SHIPPED | OUTPATIENT
Start: 2025-02-11 | End: 2025-02-21

## 2025-02-11 RX ORDER — CODEINE PHOSPHATE AND GUAIFENESIN 10; 100 MG/5ML; MG/5ML
5 SOLUTION ORAL 3 TIMES DAILY PRN
Qty: 60 ML | Refills: 0 | Status: SHIPPED | OUTPATIENT
Start: 2025-02-11 | End: 2025-02-16

## 2025-02-11 NOTE — PROGRESS NOTES
Chief Complaint   Patient presents with    Medication Refill     Requesting more prednisone and cough medicine

## 2025-02-11 NOTE — PROGRESS NOTES
Subjective:     Zuleika Torres, was evaluated through a synchronous (real-time) audio-video encounter. The patient (or guardian if applicable) is aware that this is a billable service, which includes applicable co-pays. This Virtual Visit was conducted with patient's (and/or legal guardian's) consent. Patient identification was verified, and a caregiver was present when appropriate.   The patient was located at Home: [address] [facility] [address]  Provider was located at Facility (Appt Dept): [address] [facility] [address]  [place] [address]  Confirm you are appropriately licensed, registered, or certified to deliver care in the state where the patient is located as indicated above. If you are not or unsure, please re-schedule the visit: Yes, I confirm.        Total time spent for this encounter: Not billed by time    --Kalpesh Cisneros MD on 2/11/2025 at 10:08 PM    An electronic signature was used to authenticate this note.    Subjective   History of Present Illness  The patient is a 64-year-old female who presents via virtual visit for evaluation of cough, vomiting, fever, sore throat, and shortness of breath.    She reports experiencing a persistent cough, which is productive of bright, thick phlegm. She also describes episodes of vomiting. She had a fever of 101 degrees yesterday but is currently afebrile. Additionally, she has been dealing with a sore throat, for which she has been using honey cough drops. Her symptoms have been fluctuating, with periods of relief followed by recurrence. She has not undergone testing for influenza or COVID-19 but suspects she may have contracted the former. She has been adhering to her Anoro medication regimen every morning and has abstained from smoking for the past 5 to 6 days. She recently refilled her prescription for her nebulizer solution. She also reports experiencing shortness of breath. She has a history of COPD and her condition typically

## 2025-02-27 ENCOUNTER — TELEPHONE (OUTPATIENT)
Facility: CLINIC | Age: 65
End: 2025-02-27

## 2025-02-27 NOTE — TELEPHONE ENCOUNTER
Patient stated that she wants a hep lab order but she needs them sent to [facility]

## 2025-03-07 ENCOUNTER — TELEPHONE (OUTPATIENT)
Facility: CLINIC | Age: 65
End: 2025-03-07

## 2025-03-13 ENCOUNTER — TELEPHONE (OUTPATIENT)
Facility: CLINIC | Age: 65
End: 2025-03-13

## 2025-03-13 NOTE — TELEPHONE ENCOUNTER
Patient stated that she would like an antibiotic for a yeast infection sent to [facility] in [place]

## 2025-03-15 DIAGNOSIS — B37.31 YEAST VAGINITIS: Primary | ICD-10-CM

## 2025-03-15 RX ORDER — FLUCONAZOLE 150 MG/1
150 TABLET ORAL ONCE
Qty: 1 TABLET | Refills: 0 | Status: SHIPPED | OUTPATIENT
Start: 2025-03-15 | End: 2025-03-15

## 2025-03-19 NOTE — TELEPHONE ENCOUNTER
LM that an appt or VV needed to refill ativan [Normal Vaginal Route] : by normal vaginal route [Age Appropriate] : Age appropriate developmental milestones met [Prematurity at ___ weeks gestation] : Patient was born at term

## 2025-03-25 ENCOUNTER — TELEPHONE (OUTPATIENT)
Facility: CLINIC | Age: 65
End: 2025-03-25

## 2025-03-25 NOTE — TELEPHONE ENCOUNTER
Patient would like predisone sent [facility] in [place]   
PAST MEDICAL HISTORY:  Acid reflux     Gestational diabetes     Obesity (BMI 30-39.9)     Umbilical hernia

## 2025-03-26 DIAGNOSIS — J43.2 CENTRILOBULAR EMPHYSEMA (HCC): Primary | ICD-10-CM

## 2025-03-26 RX ORDER — PREDNISONE 20 MG/1
40 TABLET ORAL DAILY
Qty: 10 TABLET | Refills: 0 | Status: SHIPPED | OUTPATIENT
Start: 2025-03-26 | End: 2025-03-31

## 2025-03-28 ENCOUNTER — TELEPHONE (OUTPATIENT)
Facility: CLINIC | Age: 65
End: 2025-03-28

## 2025-04-10 ENCOUNTER — OFFICE VISIT (OUTPATIENT)
Age: 65
End: 2025-04-10
Payer: MEDICAID

## 2025-04-10 VITALS
WEIGHT: 172 LBS | HEART RATE: 84 BPM | HEIGHT: 69 IN | DIASTOLIC BLOOD PRESSURE: 78 MMHG | SYSTOLIC BLOOD PRESSURE: 135 MMHG | BODY MASS INDEX: 25.48 KG/M2 | OXYGEN SATURATION: 94 % | TEMPERATURE: 98.6 F | RESPIRATION RATE: 12 BRPM

## 2025-04-10 DIAGNOSIS — Z86.0101 PERSONAL HISTORY OF ADENOMATOUS AND SERRATED COLON POLYPS: Primary | ICD-10-CM

## 2025-04-10 DIAGNOSIS — R19.4 CHANGE IN BOWEL HABITS: ICD-10-CM

## 2025-04-10 DIAGNOSIS — R01.1 HEART MURMUR: ICD-10-CM

## 2025-04-10 PROCEDURE — 99213 OFFICE O/P EST LOW 20 MIN: CPT | Performed by: SURGERY

## 2025-04-10 ASSESSMENT — PATIENT HEALTH QUESTIONNAIRE - PHQ9
SUM OF ALL RESPONSES TO PHQ QUESTIONS 1-9: 0
2. FEELING DOWN, DEPRESSED OR HOPELESS: NOT AT ALL
SUM OF ALL RESPONSES TO PHQ QUESTIONS 1-9: 0
1. LITTLE INTEREST OR PLEASURE IN DOING THINGS: NOT AT ALL

## 2025-04-10 NOTE — PROGRESS NOTES
Identified pt with two pt identifiers (name and ). Reviewed chart in preparation for visit and have obtained necessary documentation.    Zuleika Torres is a 64 y.o. female Colonoscopy  .    Vitals:    04/10/25 1328   BP: 135/78   BP Site: Right Upper Arm   Patient Position: Sitting   BP Cuff Size: Medium Adult   Pulse: 84   Resp: 12   Temp: 98.6 °F (37 °C)   TempSrc: Oral   SpO2: 94%   Weight: 78 kg (172 lb)   Height: 1.753 m (5' 9\")          1. Have you been to the ER, urgent care clinic since your last visit?  Hospitalized since your last visit?  no     2. Have you seen or consulted any other health care providers outside of the [facility] since your last visit?  Include any pap smears or colon screening.  no  
needed for Wheezing 18 g 5     No current facility-administered medications for this visit.       The above histories, medications and allergies have been reviewed.    Review of Systems      /78 (BP Site: Right Upper Arm, Patient Position: Sitting, BP Cuff Size: Medium Adult)   Pulse 84   Temp 98.6 °F (37 °C) (Oral)   Resp 12   Ht 1.753 m (5' 9\")   Wt 78 kg (172 lb)   SpO2 94%   BMI 25.40 kg/m²   Physical Exam  Constitutional:       Appearance: Normal appearance.   Cardiovascular:      Rate and Rhythm: Normal rate and regular rhythm.      Heart sounds: Murmur heard.   Pulmonary:      Comments: Decreased breath sounds but no rhonchi or wheezes  Abdominal:      General: There is no distension.      Palpations: Abdomen is soft. There is no mass.      Tenderness: There is no abdominal tenderness.   Neurological:      Mental Status: She is alert.          Assessment & Plan  Personal history of adenomatous and serrated colon polyps  Recommend repeat colonoscopy.  However we detected a murmur that she was not aware of I would like to have this evaluated first before scheduling in particular with her significant tobacco history.         Change in bowel habits  Will plan on scheduling for colonoscopy once her heart murmur has been evaluated.  She will call us when she has been seen.         Heart murmur  Will reach out to her primary care provider to notify him of our physical exam findings today.           Return for after evaluation for heart murmur.     Hesham Sanchez MD

## 2025-04-16 DIAGNOSIS — J43.2 CENTRILOBULAR EMPHYSEMA (HCC): ICD-10-CM

## 2025-04-16 RX ORDER — PREDNISONE 20 MG/1
40 TABLET ORAL DAILY
Qty: 10 TABLET | Refills: 0 | Status: SHIPPED | OUTPATIENT
Start: 2025-04-16 | End: 2025-04-21

## 2025-04-16 NOTE — TELEPHONE ENCOUNTER
Patient is requesting refill on Prednisone medication sent to [facility] (on file). -[phone]    predniSONE (DELTASONE) 20 MG tablet [[id number]]

## 2025-04-21 ENCOUNTER — TELEMEDICINE (OUTPATIENT)
Facility: CLINIC | Age: 65
End: 2025-04-21
Payer: MEDICAID

## 2025-04-21 ENCOUNTER — HOSPITAL ENCOUNTER (OUTPATIENT)
Facility: HOSPITAL | Age: 65
Discharge: HOME | End: 2025-04-24
Payer: MEDICAID

## 2025-04-21 DIAGNOSIS — F41.1 GENERALIZED ANXIETY DISORDER WITH PANIC ATTACKS: ICD-10-CM

## 2025-04-21 DIAGNOSIS — F17.200 TOBACCO DEPENDENCE SYNDROME: ICD-10-CM

## 2025-04-21 DIAGNOSIS — F41.0 GENERALIZED ANXIETY DISORDER WITH PANIC ATTACKS: ICD-10-CM

## 2025-04-21 DIAGNOSIS — F43.10 PTSD (POST-TRAUMATIC STRESS DISORDER): ICD-10-CM

## 2025-04-21 DIAGNOSIS — E03.9 HYPOTHYROIDISM, UNSPECIFIED TYPE: ICD-10-CM

## 2025-04-21 DIAGNOSIS — G47.00 INSOMNIA, UNSPECIFIED TYPE: ICD-10-CM

## 2025-04-21 DIAGNOSIS — F33.0 DEPRESSION, MAJOR, RECURRENT, MILD: ICD-10-CM

## 2025-04-21 DIAGNOSIS — E53.8 B12 DEFICIENCY: ICD-10-CM

## 2025-04-21 DIAGNOSIS — E78.2 MIXED HYPERLIPIDEMIA: ICD-10-CM

## 2025-04-21 DIAGNOSIS — J43.2 CENTRILOBULAR EMPHYSEMA (HCC): Primary | ICD-10-CM

## 2025-04-21 DIAGNOSIS — E11.65 TYPE 2 DIABETES MELLITUS WITH HYPERGLYCEMIA, WITHOUT LONG-TERM CURRENT USE OF INSULIN (HCC): ICD-10-CM

## 2025-04-21 DIAGNOSIS — F32.9 MAJOR DEPRESSIVE DISORDER WITH CURRENT ACTIVE EPISODE, UNSPECIFIED DEPRESSION EPISODE SEVERITY, UNSPECIFIED WHETHER RECURRENT: Primary | ICD-10-CM

## 2025-04-21 PROCEDURE — 90792 PSYCH DIAG EVAL W/MED SRVCS: CPT | Performed by: MIDWIFE

## 2025-04-21 PROCEDURE — 99214 OFFICE O/P EST MOD 30 MIN: CPT | Performed by: FAMILY MEDICINE

## 2025-04-21 RX ORDER — VARENICLINE TARTRATE 1 MG/1
1 TABLET, FILM COATED ORAL 2 TIMES DAILY
Qty: 60 TABLET | Refills: 2 | Status: SHIPPED | OUTPATIENT
Start: 2025-04-21

## 2025-04-21 RX ORDER — HYDROXYZINE HYDROCHLORIDE 25 MG/1
25 TABLET, FILM COATED ORAL 2 TIMES DAILY PRN
Qty: 60 TABLET | Refills: 0 | Status: SHIPPED | OUTPATIENT
Start: 2025-04-21 | End: 2025-05-21

## 2025-04-21 RX ORDER — CODEINE PHOSPHATE AND GUAIFENESIN 10; 100 MG/5ML; MG/5ML
5 SOLUTION ORAL 3 TIMES DAILY PRN
Qty: 60 ML | Refills: 0 | Status: SHIPPED | OUTPATIENT
Start: 2025-04-21 | End: 2025-04-26

## 2025-04-21 RX ORDER — BUDESONIDE 0.5 MG/2ML
1 INHALANT ORAL 2 TIMES DAILY
Qty: 200 EACH | Refills: 2 | Status: SHIPPED | OUTPATIENT
Start: 2025-04-21

## 2025-04-21 RX ORDER — DULOXETIN HYDROCHLORIDE 60 MG/1
60 CAPSULE, DELAYED RELEASE ORAL EVERY MORNING
Qty: 30 CAPSULE | Refills: 0 | Status: SHIPPED | OUTPATIENT
Start: 2025-04-21 | End: 2025-05-21

## 2025-04-21 RX ORDER — DULOXETIN HYDROCHLORIDE 30 MG/1
30 CAPSULE, DELAYED RELEASE ORAL NIGHTLY
Qty: 30 CAPSULE | Refills: 0 | Status: SHIPPED | OUTPATIENT
Start: 2025-04-21 | End: 2025-05-21

## 2025-04-21 RX ORDER — AZITHROMYCIN 250 MG/1
TABLET, FILM COATED ORAL
Qty: 6 TABLET | Refills: 0 | Status: SHIPPED | OUTPATIENT
Start: 2025-04-21 | End: 2025-05-01

## 2025-04-21 RX ORDER — PREDNISONE 20 MG/1
40 TABLET ORAL DAILY
Qty: 10 TABLET | Refills: 0 | Status: SHIPPED | OUTPATIENT
Start: 2025-04-21 | End: 2025-04-26

## 2025-04-21 RX ORDER — PREDNISONE 20 MG/1
40 TABLET ORAL DAILY
Qty: 10 TABLET | Refills: 0 | Status: SHIPPED | OUTPATIENT
Start: 2025-04-21 | End: 2025-04-21 | Stop reason: ALTCHOICE

## 2025-04-21 NOTE — BH NOTE
by mouth 2 times daily As needed 7/23/24     Kalpesh Cisneros MD   Insulin Pen Needle (B-D ULTRAFINE III SHORT PEN) 31G X 8 MM MISC USE AS DIRECTED WITH  VICTOZA 3/27/24     Kalpesh Cisneros MD   albuterol sulfate HFA (PROVENTIL;VENTOLIN;PROAIR) 108 (90 Base) MCG/ACT inhaler Inhale 1 puff into the lungs every 6 hours as needed for Wheezing 11/16/23     Kalpesh Cisneros MD                       Allergies:   Allergies         Allergies   Allergen Reactions    Oxycodone Hives and Itching       Patient reports not allergic 7/24/23    Zolpidem Itching            Mental Status Examination     I. Reliability in Providing Information: poor      II. Personal Presentation: Looks stated age; dressed appropriately      III. Motor Activity: Normal       IV. Speech Pattern:  Normal      V. Mood: sad and tearful, depressed       Vl. Eye Contact:  Appropriate       Vll. Affect: congruent with mood     VllI. Thought Processes        Thought Process:  goal-directed and logical          Thought Content: No delusions, phobias, obsessions, or compulsions        Hallucinations: None        Suicidal Ideation/Attempts: No         Homicidal Ideation/Attempts: No         Self-harm: No           IX. Cognitive Functions       Orientation Level:  Oriented x4         Neurologic State: Alert         Attention/Concentration: Attentive       Abstract Thinking: Intact        Estimate of Intelligence: Average        Judgment/insight: Good         Memory/concentration: Partial long term memory deficit                   X.Risks: None evident                 XI. Strengths and Assets Inventory:     Participates in care  Family support  Financial status: adequate   Living arrangements: stable  Interests/Hobbies     Labs ordered: No labs ordered today.                 Clinical Formulation: Zuleika is experiencing high levels of anxiety and depression, along with symptoms of ADHD. Use of stimulant is contraindicated due to substance use history, but may consider

## 2025-04-21 NOTE — PROGRESS NOTES
\"Have you been to the ER, urgent care clinic since your last visit?  Hospitalized since your last visit?\"    NO    “Have you seen or consulted any other health care providers outside our system since your last visit?”    NO    Have you had a mammogram?”   NO    Date of last Mammogram: 9/21/2023       “Have you had a colorectal cancer screening such as a colonoscopy/FIT/Cologuard?    NO    Date of last Colonoscopy: 5/7/2019  No cologuard on file  Date of last FIT: 3/4/2020   No flexible sigmoidoscopy on file     “Have you had a diabetic eye exam?”    NO     No diabetic eye exam on file

## 2025-04-21 NOTE — PROGRESS NOTES
Subjective:   Subjective   History of Present Illness      Zuleika Torres, was evaluated through a synchronous (real-time) audio-video encounter. The patient (or guardian if applicable) is aware that this is a billable service, which includes applicable co-pays. This Virtual Visit was conducted with patient's (and/or legal guardian's) consent. Patient identification was verified, and a caregiver was present when appropriate.   The patient was located at Home: [address] [facility] [address]  Provider was located at Facility (Appt Dept): [address] [facility] [address]  [place] [address]  Confirm you are appropriately licensed, registered, or certified to deliver care in the state where the patient is located as indicated above. If you are not or unsure, please re-schedule the visit: Yes, I confirm.        Total time spent for this encounter: Not billed by time    --Kalpesh Cisneros MD on 4/23/2025 at 1:51 PM    An electronic signature was used to authenticate this note.    Zuleika Torres is a 64 y.o. female who complains of cough described as productive, headache, fevers up to 101 degrees, and grey nasal discharge for 3 days, stable since that time.  Cough keeps her housemate awake all night long some things got to be done.  Complains of numbness and tingling in her extremities, comes and goes.  Symptoms times few years does not drink alcohol much any longer.  Told her it is probably her diabetes.  We need to do a more recent exam of that.    Hemoglobin A1C   Date Value Ref Range Status   02/06/2024 7.6 (H) 4.8 - 5.6 % Final     Comment:                 Prediabetes: 5.7 - 6.4           Diabetes: >6.4           Glycemic control for adults with diabetes: <7.0         Patient Active Problem List    Diagnosis Date Noted    Major depressive disorder 04/21/2025    Hepatitis C antibody test positive 07/24/2023    Type 2 diabetes mellitus with hyperglycemia, without long-term current use of insulin (HCC)

## 2025-04-22 NOTE — TELEPHONE ENCOUNTER
Age [employer]: Provide reason(s) why the patient requires medication outside the plan's age limitations.

## 2025-04-27 ENCOUNTER — APPOINTMENT (OUTPATIENT)
Facility: HOSPITAL | Age: 65
End: 2025-04-27
Payer: MEDICAID

## 2025-04-27 ENCOUNTER — HOSPITAL ENCOUNTER (EMERGENCY)
Facility: HOSPITAL | Age: 65
Discharge: HOME | End: 2025-04-27
Attending: EMERGENCY MEDICINE
Payer: MEDICAID

## 2025-04-27 VITALS
RESPIRATION RATE: 22 BRPM | TEMPERATURE: 98.4 F | OXYGEN SATURATION: 95 % | SYSTOLIC BLOOD PRESSURE: 165 MMHG | DIASTOLIC BLOOD PRESSURE: 85 MMHG | HEART RATE: 78 BPM

## 2025-04-27 DIAGNOSIS — J20.9 ACUTE BRONCHITIS, UNSPECIFIED ORGANISM: Primary | ICD-10-CM

## 2025-04-27 PROCEDURE — 6360000002 HC RX W HCPCS: Performed by: EMERGENCY MEDICINE

## 2025-04-27 PROCEDURE — 96372 THER/PROPH/DIAG INJ SC/IM: CPT

## 2025-04-27 PROCEDURE — 71045 X-RAY EXAM CHEST 1 VIEW: CPT

## 2025-04-27 PROCEDURE — 99284 EMERGENCY DEPT VISIT MOD MDM: CPT

## 2025-04-27 RX ORDER — CODEINE PHOSPHATE AND GUAIFENESIN 10; 100 MG/5ML; MG/5ML
5 SOLUTION ORAL 3 TIMES DAILY PRN
Qty: 75 ML | Refills: 0 | Status: SHIPPED | OUTPATIENT
Start: 2025-04-27 | End: 2025-05-02

## 2025-04-27 RX ORDER — DEXAMETHASONE SODIUM PHOSPHATE 10 MG/ML
10 INJECTION, SOLUTION INTRAMUSCULAR; INTRAVENOUS ONCE
Status: COMPLETED | OUTPATIENT
Start: 2025-04-27 | End: 2025-04-27

## 2025-04-27 RX ORDER — PREDNISONE 10 MG/1
1 TABLET ORAL SEE ADMIN INSTRUCTIONS
Qty: 1 EACH | Refills: 0 | Status: SHIPPED | OUTPATIENT
Start: 2025-04-27

## 2025-04-27 RX ORDER — DOXYCYCLINE HYCLATE 100 MG
100 TABLET ORAL 2 TIMES DAILY
Qty: 28 TABLET | Refills: 0 | Status: SHIPPED | OUTPATIENT
Start: 2025-04-27 | End: 2025-05-11

## 2025-04-27 RX ADMIN — DEXAMETHASONE SODIUM PHOSPHATE 10 MG: 10 INJECTION, SOLUTION INTRAMUSCULAR; INTRAVENOUS at 13:52

## 2025-04-27 ASSESSMENT — PAIN - FUNCTIONAL ASSESSMENT: PAIN_FUNCTIONAL_ASSESSMENT: NONE - DENIES PAIN

## 2025-04-27 NOTE — ED PROVIDER NOTES
EMERGENCY DEPARTMENT HISTORY AND PHYSICAL EXAM      Date: 4/27/2025  Patient Name: Zuleika Torres    History of Presenting Illness     Chief Complaint   Patient presents with    Cough       History Provided By: Patient     HPI: Zuleika Torres, 64 y.o. female with past medical history listed below, presents via private vehicle to the ED with cc of cough.  Patient reports she has had a chronic cough for past 2 months.  She reports she has been on 2 rounds of prednisone and a Z-Yvan with no improvement.  Her primary doctor gave her guaifenesin with codeine syrup that helped with the cough at night.  She has a history of COPD and is on Anoro.  Has been using albuterol nebulizer with mild improvement.  She reports some subjective fevers and chills over the past week.  She did not take her temperature.  Cough is productive of white and yellow sputum.  She states she has a very chronic cough but states it is worsened over the past month.    On chart review she most recently saw Dr. Feldman on April 21.  She has a diagnosis of centrilobular emphysema.  He prescribed her a Z-Yvan, guaifenesin codeine, refill prescriptions for Pulmicort, Anoro and put her on a 5-day course of prednisone.        There are no other complaints, changes, or physical findings at this time.    PCP: Kalpesh Cisneros MD    No current facility-administered medications on file prior to encounter.     Current Outpatient Medications on File Prior to Encounter   Medication Sig Dispense Refill    DULoxetine (CYMBALTA) 60 MG extended release capsule Take 1 capsule by mouth every morning 30 capsule 0    DULoxetine (CYMBALTA) 30 MG extended release capsule Take 1 capsule by mouth nightly 30 capsule 0    hydrOXYzine HCl (ATARAX) 25 MG tablet Take 1 tablet by mouth 2 times daily as needed for Anxiety (Sleep and Anxiety) 60 tablet 0    azithromycin (ZITHROMAX) 250 MG tablet 500mg on day 1 followed by 250mg on days 2 - 5 6 tablet 0    varenicline (CHANTIX) 1

## 2025-04-28 ENCOUNTER — HOSPITAL ENCOUNTER (OUTPATIENT)
Facility: HOSPITAL | Age: 65
Discharge: HOME | End: 2025-05-01

## 2025-04-30 LAB
ALBUMIN SERPL-MCNC: 4.5 G/DL (ref 3.9–4.9)
ALP SERPL-CCNC: 87 IU/L (ref 44–121)
ALT SERPL-CCNC: 29 IU/L (ref 0–32)
AST SERPL-CCNC: 24 IU/L (ref 0–40)
BASOPHILS # BLD AUTO: 0 X10E3/UL (ref 0–0.2)
BASOPHILS NFR BLD AUTO: 0 %
BILIRUB SERPL-MCNC: 0.3 MG/DL (ref 0–1.2)
BUN SERPL-MCNC: 21 MG/DL (ref 8–27)
BUN/CREAT SERPL: 30 (ref 12–28)
CALCIUM SERPL-MCNC: 9.2 MG/DL (ref 8.7–10.3)
CHLORIDE SERPL-SCNC: 102 MMOL/L (ref 96–106)
CO2 SERPL-SCNC: 22 MMOL/L (ref 20–29)
CREAT SERPL-MCNC: 0.7 MG/DL (ref 0.57–1)
EGFRCR SERPLBLD CKD-EPI 2021: 97 ML/MIN/1.73
EOSINOPHIL # BLD AUTO: 0 X10E3/UL (ref 0–0.4)
EOSINOPHIL NFR BLD AUTO: 0 %
ERYTHROCYTE [DISTWIDTH] IN BLOOD BY AUTOMATED COUNT: 13.4 % (ref 11.7–15.4)
GLOBULIN SER CALC-MCNC: 2.9 G/DL (ref 1.5–4.5)
GLUCOSE SERPL-MCNC: 316 MG/DL (ref 70–99)
HCT VFR BLD AUTO: 43.2 % (ref 34–46.6)
HGB BLD-MCNC: 14.2 G/DL (ref 11.1–15.9)
IMM GRANULOCYTES # BLD AUTO: 0 X10E3/UL (ref 0–0.1)
IMM GRANULOCYTES NFR BLD AUTO: 0 %
LYMPHOCYTES # BLD AUTO: 1 X10E3/UL (ref 0.7–3.1)
LYMPHOCYTES NFR BLD AUTO: 6 %
MCH RBC QN AUTO: 30.9 PG (ref 26.6–33)
MCHC RBC AUTO-ENTMCNC: 32.9 G/DL (ref 31.5–35.7)
MCV RBC AUTO: 94 FL (ref 79–97)
MONOCYTES # BLD AUTO: 0.6 X10E3/UL (ref 0.1–0.9)
MONOCYTES NFR BLD AUTO: 4 %
NEUTROPHILS # BLD AUTO: 14.4 X10E3/UL (ref 1.4–7)
NEUTROPHILS NFR BLD AUTO: 90 %
PLATELET # BLD AUTO: 265 X10E3/UL (ref 150–450)
POTASSIUM SERPL-SCNC: 4.5 MMOL/L (ref 3.5–5.2)
PROT SERPL-MCNC: 7.4 G/DL (ref 6–8.5)
RBC # BLD AUTO: 4.59 X10E6/UL (ref 3.77–5.28)
SODIUM SERPL-SCNC: 142 MMOL/L (ref 134–144)
WBC # BLD AUTO: 16 X10E3/UL (ref 3.4–10.8)

## 2025-05-01 ENCOUNTER — TELEPHONE (OUTPATIENT)
Facility: CLINIC | Age: 65
End: 2025-05-01

## 2025-05-05 NOTE — PROGRESS NOTES
Zuleika Torres, was evaluated through a synchronous (real-time) audio-only encounter. The patient (or guardian if applicable) is aware that this is a billable service, which includes applicable co-pays. This Virtual Visit was conducted with patient's (and/or legal guardian's) consent. Patient identification was verified, and a caregiver was present when appropriate.   The patient was located at Home: [address] [place] [id number]  Provider was located at Facility (Appt Dept): [address] [place] [address] [address]  [place] [address]  Confirm you are appropriately licensed, registered, or certified to deliver care in the state where the patient is located as indicated above. If you are not or unsure, please re-schedule the visit: Yes, I confirm.     Zuleika Torres (:  1960) is a Established patient, presenting virtually for evaluation of the following: coughing like crzy    Assessment & Plan   Below is the assessment and plan developed based on review of pertinent history, physical exam, labs, studies, and medications.  Assessment & Plan  1. Chronic Cough.  - The chronic cough has persisted for nearly 6 weeks longer, despite previous treatments with antibiotics and cough syrup.  - A CAT scan of the chest will be ordered to investigate the underlying cause of the persistent cough.  - A referral to Dr. Archer, a pulmonologist, will be made for further evaluation.  - The current inhaler will be replaced with Trelegy. A prescription for Bactrim will be extended for an additional 10 days, and more cough syrup will be provided.    2. Elevated Blood Sugar.and Inc WBC  - Elevated blood sugar levels are likely due to prednisone use.  - Advised to monitor blood sugar levels closely.    3. Hepatitis C.  - A hepatitis panal LFTs  test was neg for elevation.  Follow-up  - The patient will follow up on 2025 at 11:30 AM.    Results  Labs   - Blood sugar: High   - Liver function tests: Normal  1.

## 2025-05-06 ENCOUNTER — TELEMEDICINE (OUTPATIENT)
Facility: CLINIC | Age: 65
End: 2025-05-06
Payer: MEDICAID

## 2025-05-06 DIAGNOSIS — J20.9 COPD (CHRONIC OBSTRUCTIVE PULMONARY DISEASE) WITH ACUTE BRONCHITIS (HCC): ICD-10-CM

## 2025-05-06 DIAGNOSIS — R05.3 CHRONIC COUGH: Primary | ICD-10-CM

## 2025-05-06 DIAGNOSIS — E78.2 MIXED HYPERLIPIDEMIA: ICD-10-CM

## 2025-05-06 DIAGNOSIS — E11.65 TYPE 2 DIABETES MELLITUS WITH HYPERGLYCEMIA, WITHOUT LONG-TERM CURRENT USE OF INSULIN (HCC): ICD-10-CM

## 2025-05-06 DIAGNOSIS — F33.0 DEPRESSION, MAJOR, RECURRENT, MILD: ICD-10-CM

## 2025-05-06 DIAGNOSIS — J44.0 COPD (CHRONIC OBSTRUCTIVE PULMONARY DISEASE) WITH ACUTE BRONCHITIS (HCC): ICD-10-CM

## 2025-05-06 PROCEDURE — 99214 OFFICE O/P EST MOD 30 MIN: CPT | Performed by: FAMILY MEDICINE

## 2025-05-06 RX ORDER — CODEINE PHOSPHATE AND GUAIFENESIN 10; 100 MG/5ML; MG/5ML
5 SOLUTION ORAL 3 TIMES DAILY PRN
Qty: 120 ML | Refills: 0 | Status: SHIPPED | OUTPATIENT
Start: 2025-05-06 | End: 2025-05-16

## 2025-05-06 RX ORDER — FLUTICASONE FUROATE, UMECLIDINIUM BROMIDE AND VILANTEROL TRIFENATATE 200; 62.5; 25 UG/1; UG/1; UG/1
1 POWDER RESPIRATORY (INHALATION) DAILY
Qty: 1 EACH | Refills: 5 | Status: SHIPPED | OUTPATIENT
Start: 2025-05-06

## 2025-05-06 RX ORDER — SULFAMETHOXAZOLE AND TRIMETHOPRIM 800; 160 MG/1; MG/1
1 TABLET ORAL 2 TIMES DAILY
Qty: 20 TABLET | Refills: 0 | Status: SHIPPED | OUTPATIENT
Start: 2025-05-06 | End: 2025-05-16

## 2025-05-06 ASSESSMENT — PATIENT HEALTH QUESTIONNAIRE - PHQ9
2. FEELING DOWN, DEPRESSED OR HOPELESS: NOT AT ALL
1. LITTLE INTEREST OR PLEASURE IN DOING THINGS: NOT AT ALL
9. THOUGHTS THAT YOU WOULD BE BETTER OFF DEAD, OR OF HURTING YOURSELF: NOT AT ALL
5. POOR APPETITE OR OVEREATING: NOT AT ALL
7. TROUBLE CONCENTRATING ON THINGS, SUCH AS READING THE NEWSPAPER OR WATCHING TELEVISION: NOT AT ALL
10. IF YOU CHECKED OFF ANY PROBLEMS, HOW DIFFICULT HAVE THESE PROBLEMS MADE IT FOR YOU TO DO YOUR WORK, TAKE CARE OF THINGS AT HOME, OR GET ALONG WITH OTHER PEOPLE: NOT DIFFICULT AT ALL
SUM OF ALL RESPONSES TO PHQ QUESTIONS 1-9: 0
SUM OF ALL RESPONSES TO PHQ QUESTIONS 1-9: 0
8. MOVING OR SPEAKING SO SLOWLY THAT OTHER PEOPLE COULD HAVE NOTICED. OR THE OPPOSITE, BEING SO FIGETY OR RESTLESS THAT YOU HAVE BEEN MOVING AROUND A LOT MORE THAN USUAL: NOT AT ALL
6. FEELING BAD ABOUT YOURSELF - OR THAT YOU ARE A FAILURE OR HAVE LET YOURSELF OR YOUR FAMILY DOWN: NOT AT ALL
SUM OF ALL RESPONSES TO PHQ QUESTIONS 1-9: 0
3. TROUBLE FALLING OR STAYING ASLEEP: NOT AT ALL
4. FEELING TIRED OR HAVING LITTLE ENERGY: NOT AT ALL
SUM OF ALL RESPONSES TO PHQ QUESTIONS 1-9: 0

## 2025-05-06 NOTE — PROGRESS NOTES
Have you been to the ER, urgent care clinic since your last visit?  Hospitalized since your last visit?   NO    Have you seen or consulted any other health care providers outside our system since your last visit?   NO    Have you had a mammogram?”   NO    Date of last Mammogram: 9/21/2023       “Have you had a colorectal cancer screening such as a colonoscopy/FIT/Cologuard?    NO    Date of last Colonoscopy: 5/7/2019  No cologuard on file  Date of last FIT: 3/4/2020   No flexible sigmoidoscopy on file     “Have you had a diabetic eye exam?”    NO     No diabetic eye exam on file                Chief Complaint   Patient presents with    Cough     Onset for about 6 weeks; ER visit bronchitis. Albuterol treatments every 4 hrs

## 2025-05-18 ENCOUNTER — APPOINTMENT (OUTPATIENT)
Facility: HOSPITAL | Age: 65
End: 2025-05-18
Payer: MEDICAID

## 2025-05-18 ENCOUNTER — HOSPITAL ENCOUNTER (EMERGENCY)
Facility: HOSPITAL | Age: 65
Discharge: HOME | End: 2025-05-18
Attending: EMERGENCY MEDICINE
Payer: MEDICAID

## 2025-05-18 VITALS
WEIGHT: 180 LBS | BODY MASS INDEX: 26.58 KG/M2 | DIASTOLIC BLOOD PRESSURE: 107 MMHG | RESPIRATION RATE: 18 BRPM | OXYGEN SATURATION: 97 % | TEMPERATURE: 97.5 F | SYSTOLIC BLOOD PRESSURE: 121 MMHG | HEART RATE: 73 BPM

## 2025-05-18 DIAGNOSIS — S20.212A CONTUSION OF LEFT CHEST WALL, INITIAL ENCOUNTER: Primary | ICD-10-CM

## 2025-05-18 PROCEDURE — 6360000002 HC RX W HCPCS: Performed by: EMERGENCY MEDICINE

## 2025-05-18 PROCEDURE — 99284 EMERGENCY DEPT VISIT MOD MDM: CPT

## 2025-05-18 PROCEDURE — 6370000000 HC RX 637 (ALT 250 FOR IP): Performed by: EMERGENCY MEDICINE

## 2025-05-18 PROCEDURE — 96372 THER/PROPH/DIAG INJ SC/IM: CPT

## 2025-05-18 PROCEDURE — 71250 CT THORAX DX C-: CPT

## 2025-05-18 RX ORDER — IBUPROFEN 800 MG/1
800 TABLET, FILM COATED ORAL EVERY 8 HOURS PRN
Qty: 24 TABLET | Refills: 0 | Status: SHIPPED | OUTPATIENT
Start: 2025-05-18

## 2025-05-18 RX ORDER — KETOROLAC TROMETHAMINE 15 MG/ML
15 INJECTION, SOLUTION INTRAMUSCULAR; INTRAVENOUS ONCE
Status: COMPLETED | OUTPATIENT
Start: 2025-05-18 | End: 2025-05-18

## 2025-05-18 RX ORDER — ACETAMINOPHEN 500 MG
1000 TABLET ORAL
Status: COMPLETED | OUTPATIENT
Start: 2025-05-18 | End: 2025-05-18

## 2025-05-18 RX ORDER — OXYCODONE HYDROCHLORIDE 5 MG/1
5 TABLET ORAL EVERY 6 HOURS PRN
Qty: 12 TABLET | Refills: 0 | Status: SHIPPED | OUTPATIENT
Start: 2025-05-18 | End: 2025-05-21

## 2025-05-18 RX ORDER — OXYCODONE HYDROCHLORIDE 5 MG/1
5 TABLET ORAL
Refills: 0 | Status: COMPLETED | OUTPATIENT
Start: 2025-05-18 | End: 2025-05-18

## 2025-05-18 RX ADMIN — KETOROLAC TROMETHAMINE 15 MG: 15 INJECTION, SOLUTION INTRAMUSCULAR; INTRAVENOUS at 14:44

## 2025-05-18 RX ADMIN — OXYCODONE 5 MG: 5 TABLET ORAL at 17:00

## 2025-05-18 RX ADMIN — ACETAMINOPHEN 1000 MG: 500 TABLET, FILM COATED ORAL at 14:43

## 2025-05-18 ASSESSMENT — PAIN SCALES - GENERAL
PAINLEVEL_OUTOF10: 0
PAINLEVEL_OUTOF10: 10
PAINLEVEL_OUTOF10: 10

## 2025-05-18 ASSESSMENT — PAIN DESCRIPTION - LOCATION
LOCATION: RIB CAGE
LOCATION: RIB CAGE

## 2025-05-18 ASSESSMENT — LIFESTYLE VARIABLES
HOW MANY STANDARD DRINKS CONTAINING ALCOHOL DO YOU HAVE ON A TYPICAL DAY: PATIENT DOES NOT DRINK
HOW OFTEN DO YOU HAVE A DRINK CONTAINING ALCOHOL: NEVER

## 2025-05-18 ASSESSMENT — PAIN - FUNCTIONAL ASSESSMENT: PAIN_FUNCTIONAL_ASSESSMENT: 0-10

## 2025-05-18 ASSESSMENT — PAIN DESCRIPTION - ORIENTATION: ORIENTATION: LEFT

## 2025-05-18 NOTE — ED NOTES
Pt is requesting to have her back xray'd due to she is concerned because of how hard she fell.  Provider updated

## 2025-05-18 NOTE — ED PROVIDER NOTES
Given 5/18/25 1443)   oxyCODONE (ROXICODONE) immediate release tablet 5 mg (5 mg Oral Given 5/18/25 1700)       Medical Decision Making  No evidence of pneumothorax on examination, patient appears well and nontoxic, will obtain a CT of the chest to assess for evidence of rib fracture, pneumothorax, pulmonary contusion.  Will provide analgesia    Amount and/or Complexity of Data Reviewed  Radiology: ordered.    Risk  OTC drugs.  Prescription drug management.          CLINICAL MANAGEMENT TOOLS:  Not Applicable                   FINAL IMPRESSION     1. Contusion of left chest wall, initial encounter          DISPOSITION/PLAN   Zuleika Torres's  results have been reviewed with her.  She has been counseled regarding her diagnosis, treatment, and plan.  She verbally conveys understanding and agreement of the signs, symptoms, diagnosis, treatment and prognosis and additionally agrees to follow up as discussed.  She also agrees with the care-plan and conveys that all of her questions have been answered.  I have also provided discharge instructions for her that include: educational information regarding their diagnosis and treatment, and list of reasons why they would want to return to the ED prior to their follow-up appointment, should her condition change.     CLINICAL IMPRESSION    Discharge Note: The patient is stable for discharge home. The signs, symptoms, diagnosis, and discharge instructions have been discussed, understanding conveyed, and agreed upon. The patient is to follow up as recommended or return to ER should their symptoms worsen.      PATIENT REFERRED TO:  Kalpesh Cisneros MD  [address] [facility] [address]  [phone]    Schedule an appointment as soon as possible for a visit       [facility] Emergency Department  [address] [facility] [address]  [phone]    If symptoms worsen       DISCHARGE MEDICATIONS:     Medication List        START taking these medications

## 2025-05-18 NOTE — ED TRIAGE NOTES
Pt arrived with c/o a mechanical fall yesterday striking her left rib cage, today she c/o pain and soreness on the left side and pain with inspiration. Pt talking in full sentences, ambulated into ED independently

## 2025-05-21 ENCOUNTER — HOSPITAL ENCOUNTER (OUTPATIENT)
Facility: HOSPITAL | Age: 65
Discharge: HOME | End: 2025-05-24
Payer: MEDICAID

## 2025-05-21 DIAGNOSIS — G47.00 INSOMNIA, UNSPECIFIED TYPE: ICD-10-CM

## 2025-05-21 DIAGNOSIS — F41.1 GENERALIZED ANXIETY DISORDER WITH PANIC ATTACKS: ICD-10-CM

## 2025-05-21 DIAGNOSIS — F32.9 MAJOR DEPRESSIVE DISORDER WITH CURRENT ACTIVE EPISODE, UNSPECIFIED DEPRESSION EPISODE SEVERITY, UNSPECIFIED WHETHER RECURRENT: Primary | ICD-10-CM

## 2025-05-21 DIAGNOSIS — F41.0 GENERALIZED ANXIETY DISORDER WITH PANIC ATTACKS: ICD-10-CM

## 2025-05-21 DIAGNOSIS — F43.10 PTSD (POST-TRAUMATIC STRESS DISORDER): ICD-10-CM

## 2025-05-21 PROCEDURE — 99214 OFFICE O/P EST MOD 30 MIN: CPT | Performed by: MIDWIFE

## 2025-05-21 RX ORDER — DULOXETIN HYDROCHLORIDE 60 MG/1
60 CAPSULE, DELAYED RELEASE ORAL 2 TIMES DAILY
Qty: 60 CAPSULE | Refills: 0 | Status: SHIPPED | OUTPATIENT
Start: 2025-05-21 | End: 2025-06-20

## 2025-05-21 RX ORDER — ATOMOXETINE 40 MG/1
40 CAPSULE ORAL DAILY
Qty: 30 CAPSULE | Refills: 0 | Status: SHIPPED | OUTPATIENT
Start: 2025-05-21 | End: 2025-06-20

## 2025-05-21 NOTE — BH NOTE
in the a.m.; end date 6/20  Restart Duloxetine with increase to 60 mg BID; end date 6/20  RTO in 1 month            Additional Documentation

## 2025-06-03 ENCOUNTER — TELEPHONE (OUTPATIENT)
Facility: CLINIC | Age: 65
End: 2025-06-03

## 2025-06-03 DIAGNOSIS — T78.2XXD ANAPHYLAXIS, SUBSEQUENT ENCOUNTER: Primary | ICD-10-CM

## 2025-06-07 RX ORDER — EPINEPHRINE 0.3 MG/.3ML
0.3 INJECTION SUBCUTANEOUS ONCE
Qty: 0.3 ML | Refills: 0 | Status: SHIPPED | OUTPATIENT
Start: 2025-06-07 | End: 2025-06-07

## 2025-06-07 RX ORDER — PREDNISONE 20 MG/1
20 TABLET ORAL 2 TIMES DAILY
Qty: 10 TABLET | Refills: 0 | Status: SHIPPED | OUTPATIENT
Start: 2025-06-07 | End: 2025-06-12

## 2025-06-22 ENCOUNTER — HOSPITAL ENCOUNTER (EMERGENCY)
Facility: HOSPITAL | Age: 65
Discharge: HOME | End: 2025-06-22
Attending: EMERGENCY MEDICINE
Payer: MEDICAID

## 2025-06-22 ENCOUNTER — APPOINTMENT (OUTPATIENT)
Facility: HOSPITAL | Age: 65
End: 2025-06-22
Payer: MEDICAID

## 2025-06-22 VITALS
DIASTOLIC BLOOD PRESSURE: 82 MMHG | HEIGHT: 67 IN | SYSTOLIC BLOOD PRESSURE: 156 MMHG | BODY MASS INDEX: 28.25 KG/M2 | TEMPERATURE: 98.2 F | OXYGEN SATURATION: 96 % | HEART RATE: 82 BPM | RESPIRATION RATE: 18 BRPM | WEIGHT: 180 LBS

## 2025-06-22 DIAGNOSIS — S63.502A SPRAIN OF LEFT WRIST, INITIAL ENCOUNTER: Primary | ICD-10-CM

## 2025-06-22 PROCEDURE — 6370000000 HC RX 637 (ALT 250 FOR IP): Performed by: EMERGENCY MEDICINE

## 2025-06-22 PROCEDURE — 73090 X-RAY EXAM OF FOREARM: CPT

## 2025-06-22 PROCEDURE — 99283 EMERGENCY DEPT VISIT LOW MDM: CPT

## 2025-06-22 RX ORDER — HYDROCODONE BITARTRATE AND ACETAMINOPHEN 5; 325 MG/1; MG/1
1 TABLET ORAL
Refills: 0 | Status: COMPLETED | OUTPATIENT
Start: 2025-06-22 | End: 2025-06-22

## 2025-06-22 RX ORDER — HYDROCODONE BITARTRATE AND ACETAMINOPHEN 5; 325 MG/1; MG/1
1 TABLET ORAL EVERY 4 HOURS PRN
Qty: 18 TABLET | Refills: 0 | Status: SHIPPED | OUTPATIENT
Start: 2025-06-22 | End: 2025-06-25

## 2025-06-22 RX ADMIN — HYDROCODONE BITARTRATE AND ACETAMINOPHEN 1 TABLET: 5; 325 TABLET ORAL at 17:30

## 2025-06-22 ASSESSMENT — PAIN SCALES - GENERAL: PAINLEVEL_OUTOF10: 10

## 2025-06-22 ASSESSMENT — PAIN - FUNCTIONAL ASSESSMENT: PAIN_FUNCTIONAL_ASSESSMENT: 0-10

## 2025-06-22 NOTE — ED PROVIDER NOTES
[facility] EMERGENCY DEPARTMENT  EMERGENCY DEPARTMENT ENCOUNTER       Pt Name: Zuleika Torres  MRN: [id number]  Birthdate 1960  Date of evaluation: 6/22/2025  Provider: Zuleika Vanessa MD   PCP: Kalpesh Cisneros MD  Note Started: 7:20 PM EDT 6/22/25     CHIEF COMPLAINT       Chief Complaint   Patient presents with    Arm Injury        HISTORY OF PRESENT ILLNESS: 1 or more elements      History From: Patient, History limited by: none     Zuleika Torres is a 64 y.o. female presents to ED complaining of arm injury.  Patient reports she fell on her left arm yesterday.  Now has severe pain.  Unable to flex and extend wrist or supinate/pronate forearm without severe pain.       Please See MDM for Additional Details of the HPI/PMH  Nursing Notes were all reviewed and agreed with or any disagreements were addressed in the HPI.     REVIEW OF SYSTEMS        Positives and Pertinent negatives as per HPI.    PAST HISTORY     Past Medical History:  Past Medical History:   Diagnosis Date    Arthritis     Asthma     At risk for sleep apnea     COPD (chronic obstructive pulmonary disease) (HCC)     Depression     Diabetes (HCC)     Hemorrhoids     Hepatitis C     Occult blood positive stool     Spinal stenosis        Past Surgical History:  Past Surgical History:   Procedure Laterality Date    BACK SURGERY  09/2016    laminectomy    CERVICAL FUSION  06/2016    COLONOSCOPY  05/07/2019    4 polyps- 2 tubular adenoma 2 hyperplastic polyp    COLONOSCOPY N/A 05/07/2019    COLONOSCOPY performed by Hesham Sanchez MD at [facility] MAIN OR    HYSTERECTOMY (CERVIX STATUS UNKNOWN)  06/2000    GOPI STEREO BREAST BX W LOC DEVICE 1ST LESION LEFT Left 11/28/2023    GOPI STEROTACTIC LOC BREAST BIOPSY LEFT 11/28/2023 [facility] RAD MAMMO    TONSILLECTOMY      TOTAL KNEE ARTHROPLASTY Left 06/2023       Family History:  Family History   Problem Relation Age of Onset    Lung Disease Mother     Colon Cancer Mother     Cancer Mother

## 2025-07-03 ENCOUNTER — APPOINTMENT (OUTPATIENT)
Facility: HOSPITAL | Age: 65
End: 2025-07-03
Payer: MEDICAID

## 2025-07-03 ENCOUNTER — HOSPITAL ENCOUNTER (EMERGENCY)
Facility: HOSPITAL | Age: 65
Discharge: HOME | End: 2025-07-03
Attending: EMERGENCY MEDICINE
Payer: MEDICAID

## 2025-07-03 VITALS
BODY MASS INDEX: 28.25 KG/M2 | TEMPERATURE: 98.2 F | DIASTOLIC BLOOD PRESSURE: 61 MMHG | WEIGHT: 180 LBS | RESPIRATION RATE: 18 BRPM | SYSTOLIC BLOOD PRESSURE: 124 MMHG | HEIGHT: 67 IN | OXYGEN SATURATION: 95 % | HEART RATE: 80 BPM

## 2025-07-03 DIAGNOSIS — M17.11 PRIMARY OSTEOARTHRITIS OF RIGHT KNEE: Primary | ICD-10-CM

## 2025-07-03 PROCEDURE — 6370000000 HC RX 637 (ALT 250 FOR IP): Performed by: EMERGENCY MEDICINE

## 2025-07-03 PROCEDURE — 99283 EMERGENCY DEPT VISIT LOW MDM: CPT

## 2025-07-03 PROCEDURE — 73562 X-RAY EXAM OF KNEE 3: CPT

## 2025-07-03 RX ORDER — HYDROCODONE BITARTRATE AND ACETAMINOPHEN 5; 325 MG/1; MG/1
1 TABLET ORAL
Refills: 0 | Status: COMPLETED | OUTPATIENT
Start: 2025-07-03 | End: 2025-07-03

## 2025-07-03 RX ORDER — HYDROCODONE BITARTRATE AND ACETAMINOPHEN 7.5; 325 MG/1; MG/1
1 TABLET ORAL 2 TIMES DAILY
Qty: 14 TABLET | Refills: 0 | Status: SHIPPED | OUTPATIENT
Start: 2025-07-03 | End: 2025-07-10

## 2025-07-03 RX ADMIN — HYDROCODONE BITARTRATE AND ACETAMINOPHEN 1 TABLET: 5; 325 TABLET ORAL at 16:05

## 2025-07-03 ASSESSMENT — PAIN DESCRIPTION - DESCRIPTORS
DESCRIPTORS: TENDER;DISCOMFORT
DESCRIPTORS: TENDER;DISCOMFORT

## 2025-07-03 ASSESSMENT — PAIN SCALES - GENERAL
PAINLEVEL_OUTOF10: 10
PAINLEVEL_OUTOF10: 10

## 2025-07-03 ASSESSMENT — LIFESTYLE VARIABLES
HOW MANY STANDARD DRINKS CONTAINING ALCOHOL DO YOU HAVE ON A TYPICAL DAY: PATIENT DOES NOT DRINK
HOW OFTEN DO YOU HAVE A DRINK CONTAINING ALCOHOL: NEVER
HOW MANY STANDARD DRINKS CONTAINING ALCOHOL DO YOU HAVE ON A TYPICAL DAY: PATIENT DOES NOT DRINK
HOW OFTEN DO YOU HAVE A DRINK CONTAINING ALCOHOL: NEVER

## 2025-07-03 ASSESSMENT — PAIN - FUNCTIONAL ASSESSMENT
PAIN_FUNCTIONAL_ASSESSMENT: 0-10
PAIN_FUNCTIONAL_ASSESSMENT: 0-10

## 2025-07-03 ASSESSMENT — PAIN DESCRIPTION - LOCATION
LOCATION: KNEE
LOCATION: KNEE

## 2025-07-03 ASSESSMENT — PAIN DESCRIPTION - ORIENTATION
ORIENTATION: RIGHT
ORIENTATION: RIGHT

## 2025-07-03 NOTE — ED TRIAGE NOTES
Pt arrive by EMS for knee pain.  Per EMS pt fell a few days ago injuring her right knee.  Pt complains of right knee pain with increased pain on ambulation.  EMS placed ice pack.  Pt arrived awake alert and oriented X4.  Pt educated on ER flow

## 2025-07-03 NOTE — ED PROVIDER NOTES
signed)    (Please note that parts of this dictation were completed with voice recognition software. Quite often unanticipated grammatical, syntax, homophones, and other interpretive errors are inadvertently transcribed by the computer software. Please disregards these errors. Please excuse any errors that have escaped final proofreading.)         Cristhian Palma DO  07/03/25 7821

## 2025-07-03 NOTE — ED NOTES
Pt right knee wrapped for comfort, pt discharged and provided with papers, has called for medicaid taxi, does not have ride home at this time.

## 2025-07-03 NOTE — ED NOTES
Pt has requested to wait outside for her ride, assisted to bench outside as pt has requested to sit outside to wait on ride/smoke. [place].    I have reviewed discharge instructions with the patient. The patient verbalized understanding. Discharge medications discussed with patient. No questions at this time. Ambulated with antalgic gait, wheeled to bench outside to wait for family.

## 2025-07-07 ENCOUNTER — TELEPHONE (OUTPATIENT)
Age: 65
End: 2025-07-07

## 2025-07-14 ENCOUNTER — OFFICE VISIT (OUTPATIENT)
Age: 65
End: 2025-07-14
Payer: MEDICAID

## 2025-07-14 ENCOUNTER — TELEPHONE (OUTPATIENT)
Age: 65
End: 2025-07-14

## 2025-07-14 ENCOUNTER — CLINICAL DOCUMENTATION (OUTPATIENT)
Age: 65
End: 2025-07-14

## 2025-07-14 VITALS — WEIGHT: 180 LBS | BODY MASS INDEX: 28.25 KG/M2 | HEIGHT: 67 IN

## 2025-07-14 DIAGNOSIS — M17.11 UNILATERAL PRIMARY OSTEOARTHRITIS, RIGHT KNEE: Primary | ICD-10-CM

## 2025-07-14 DIAGNOSIS — Z96.651 S/P TOTAL KNEE ARTHROPLASTY, RIGHT: ICD-10-CM

## 2025-07-14 PROCEDURE — 99214 OFFICE O/P EST MOD 30 MIN: CPT | Performed by: ORTHOPAEDIC SURGERY

## 2025-07-14 RX ORDER — CELECOXIB 200 MG/1
200 CAPSULE ORAL 2 TIMES DAILY PRN
Qty: 60 CAPSULE | Refills: 0 | Status: SHIPPED | OUTPATIENT
Start: 2025-07-14

## 2025-07-14 ASSESSMENT — PATIENT HEALTH QUESTIONNAIRE - PHQ9
2. FEELING DOWN, DEPRESSED OR HOPELESS: NOT AT ALL
SUM OF ALL RESPONSES TO PHQ QUESTIONS 1-9: 0
1. LITTLE INTEREST OR PLEASURE IN DOING THINGS: NOT AT ALL

## 2025-07-14 NOTE — TELEPHONE ENCOUNTER
Contacted patient to schedule RIGHT TOTAL  HIP ARTHROPLASTY. Scheduled for 8/19/25.     Fast Track: YES    Patient will be discharged home and will have DAUGHTER there to assist with post op recovery.    Advised patient they would need to schedule pre op appointments with  PCP,CARDIO,IN-PERSON PAT and would need to bring clearance form and office note to their Pre Admission Testing appointment.    Made patient aware that any cancellations would need to be made at least 2 weeks prior to scheduled surgery date.     Patient would like to complete post op PT at [facility] LOCATION. Clinical team to place order and schedule appt.           ----- Message from Dr. Royce Enrique, DO sent at 7/14/2025  1:29 PM EDT -----  Diagnosis: Unilateral Primary Osteoarthritis, right knee M17.11  Procedure: Right total knee arthroplasty   CPT: 81731  Operative minutes: 100  23 hr obs  Location: [place]  PAT: Yes  Class: Yes  Special Equipment: Regular table, [employer]va foot jones, [place] [employer], Plan for cemented TKA, foot jones for prepping  Staffing: [employer]ractor jones  Anesthesia: spinal with adductor canal block  Surgical index: 1    [facility]

## 2025-07-14 NOTE — TELEPHONE ENCOUNTER
Patient called stating that she went to Pharmacy to   [facility], patient stated that the pharmacy would not let the patient  the prescription due to it needing a prior authorization. Patient call back number is [phone]. Patient would like another prescription sent to patient preferred [facility] [address] [place] [address] - P [phone] - F [phone] [[id number]]  is

## 2025-07-14 NOTE — TELEPHONE ENCOUNTER
Identified pt with two pt identifiers (name and ). Reviewed chart in preparation for visit and have obtained necessary documentation.      Called patient informed that PA had been sent through and it was approved.

## 2025-07-14 NOTE — PROGRESS NOTES
Identified pt with two pt identifiers (name and ). Reviewed chart in preparation for visit and have obtained necessary documentation.    Zuleika Torres is a 64 y.o. female Knee Pain (NP RT knee pain )  .    Vitals:    25 1318   Weight: 81.6 kg (180 lb)   Height: 1.702 m (5' 7\")          1. Have you been to the ER, urgent care clinic since your last visit?  Hospitalized since your last visit?  no     2. Have you seen or consulted any other health care providers outside of the [facility] since your last visit?  Include any pap smears or colon screening.  no  
mLs 2 times daily      lidocaine (LIDODERM) 5 % Place 1 patch onto the skin daily 12 hours on, 12 hours off. 30 patch 5    Insulin Pen Needle (B-D ULTRAFINE III SHORT PEN) 31G X 8 MM MISC USE AS DIRECTED WITH  VICTOZA 100 each 1    albuterol sulfate HFA (PROVENTIL;VENTOLIN;PROAIR) 108 (90 Base) MCG/ACT inhaler Inhale 1 puff into the lungs every 6 hours as needed for Wheezing 18 g 5    EPINEPHrine (EPIPEN 2-GAL) 0.3 MG/0.3ML SOAJ injection Inject 0.3 mLs into the skin once for 1 dose Use as directed for allergic reaction 0.3 mL 0    DULoxetine (CYMBALTA) 60 MG extended release capsule Take 1 capsule by mouth 2 times daily 60 capsule 0    atomoxetine (STRATTERA) 40 MG capsule Take 1 capsule by mouth daily 30 capsule 0     No current facility-administered medications on file prior to visit.       Allergies   Allergen Reactions    Oxycodone Hives and Itching     Patient reports not allergic 7/24/23    Zolpidem Itching       Family History   Problem Relation Age of Onset    Lung Disease Mother     Colon Cancer Mother     Cancer Mother         colon    Thyroid Disease Father     Lung Cancer Father     Cancer Father 70        lung    Breast Cancer Sister 41        age 41    Cancer Sister         uterine    Diabetes Sister        Social History     Socioeconomic History    Marital status: Legally      Spouse name: None    Number of children: None    Years of education: None    Highest education level: None   Tobacco Use    Smoking status: Every Day     Current packs/day: 1.00     Average packs/day: 0.5 packs/day for 50.4 years (26.0 ttl pk-yrs)     Types: Cigarettes     Start date: 1/1/1975     Last attempt to quit: 11/1/2023    Smokeless tobacco: Never   Substance and Sexual Activity    Alcohol use: Not Currently     Alcohol/week: 32.0 standard drinks of alcohol    Drug use: Not Currently   Social History Narrative    Lives with friends     Social Drivers of Health     Financial Resource Strain: Low Risk

## 2025-07-15 DIAGNOSIS — M17.11 UNILATERAL PRIMARY OSTEOARTHRITIS, RIGHT KNEE: Primary | ICD-10-CM

## 2025-07-15 RX ORDER — TRAMADOL HYDROCHLORIDE 50 MG/1
50 TABLET ORAL EVERY 12 HOURS PRN
Qty: 14 TABLET | Refills: 0 | Status: SHIPPED | OUTPATIENT
Start: 2025-07-15 | End: 2025-07-17 | Stop reason: SDUPTHER

## 2025-07-15 NOTE — TELEPHONE ENCOUNTER
Patient called stating that the Tramadol needs PA submitted prior to picking up. Advised the patient that she'd receive a CB with updates at [phone].

## 2025-07-15 NOTE — TELEPHONE ENCOUNTER
Patient CB and the below message was relayed. Patient stated her understanding and disconnected the call.

## 2025-07-15 NOTE — TELEPHONE ENCOUNTER
Called patient to inform of providers message regarding her rx of Tramadol/ with no answer unable to lvm due to vm box being full

## 2025-07-15 NOTE — TELEPHONE ENCOUNTER
Identified pt with two pt identifiers (name and ). Reviewed chart in preparation for visit and have obtained necessary documentation.      Patient stated she would like an alternative to celecoxib stating it is not  and did not work for her last night.     Patient stated she can not remember.  when asked if she has taken any medications that she know has not worked for her in the past.    Patient stated she is not currently taking any other medication.    Preferred pharmacy:[facility] [address] [place] [address] -  [phone] - F [phone]

## 2025-07-15 NOTE — TELEPHONE ENCOUNTER
Patient called stating that the Diclofenac that Dr Enrique prescribed for her did not help at all during the night. Patient called to inquire whether or not patient could be prescribed a different prescription. Patient preferred pharmacy is [facility] [address] [place] [address] - P [phone] - F [phone] [[id number]]    Patient call back number [phone]

## 2025-07-16 ENCOUNTER — TELEPHONE (OUTPATIENT)
Facility: CLINIC | Age: 65
End: 2025-07-16

## 2025-07-16 NOTE — TELEPHONE ENCOUNTER
Patient called requesting an update on the message left yesterday regarding PA for Tramadol. Patient CB is [phone].

## 2025-07-16 NOTE — TELEPHONE ENCOUNTER
Identified pt with two pt identifiers (name and ). Reviewed chart in preparation for visit and have obtained necessary documentation.      Informed patient that her PA for rx Tamadol was denied due to not having Naloxone prescribed due to Tramadol's risk for overdose . Informed patient that I have forwarded this message to the provider to have this medication prescribed so that I can resubmit PA for medication. Informed patient I will try to get this done by eob today but if not I will be in contact with her tomorrow with an update.patient thanked me for my help.

## 2025-07-17 ENCOUNTER — OFFICE VISIT (OUTPATIENT)
Facility: CLINIC | Age: 65
End: 2025-07-17
Payer: MEDICAID

## 2025-07-17 VITALS
RESPIRATION RATE: 16 BRPM | HEIGHT: 67 IN | TEMPERATURE: 98 F | BODY MASS INDEX: 27.03 KG/M2 | DIASTOLIC BLOOD PRESSURE: 85 MMHG | OXYGEN SATURATION: 96 % | WEIGHT: 172.2 LBS | SYSTOLIC BLOOD PRESSURE: 126 MMHG | HEART RATE: 79 BPM

## 2025-07-17 DIAGNOSIS — M17.11 UNILATERAL PRIMARY OSTEOARTHRITIS, RIGHT KNEE: Primary | ICD-10-CM

## 2025-07-17 DIAGNOSIS — R19.7 DIARRHEA, UNSPECIFIED TYPE: ICD-10-CM

## 2025-07-17 DIAGNOSIS — R01.1 HEART MURMUR: ICD-10-CM

## 2025-07-17 DIAGNOSIS — F17.200 TOBACCO DEPENDENCE SYNDROME: ICD-10-CM

## 2025-07-17 DIAGNOSIS — J44.9 CHRONIC OBSTRUCTIVE PULMONARY DISEASE, UNSPECIFIED COPD TYPE (HCC): ICD-10-CM

## 2025-07-17 PROCEDURE — 99214 OFFICE O/P EST MOD 30 MIN: CPT | Performed by: FAMILY MEDICINE

## 2025-07-17 RX ORDER — ALBUTEROL SULFATE 90 UG/1
1 INHALANT RESPIRATORY (INHALATION) EVERY 6 HOURS PRN
Qty: 18 G | Refills: 5 | Status: SHIPPED | OUTPATIENT
Start: 2025-07-17

## 2025-07-17 RX ORDER — DIPHENOXYLATE HYDROCHLORIDE AND ATROPINE SULFATE 2.5; .025 MG/1; MG/1
1 TABLET ORAL 4 TIMES DAILY PRN
Qty: 20 TABLET | Refills: 0 | Status: SHIPPED | OUTPATIENT
Start: 2025-07-17 | End: 2025-07-27

## 2025-07-17 RX ORDER — UMECLIDINIUM BROMIDE AND VILANTEROL TRIFENATATE 62.5; 25 UG/1; UG/1
1 POWDER RESPIRATORY (INHALATION) DAILY
COMMUNITY
End: 2025-07-17 | Stop reason: SDUPTHER

## 2025-07-17 RX ORDER — UMECLIDINIUM BROMIDE AND VILANTEROL TRIFENATATE 62.5; 25 UG/1; UG/1
1 POWDER RESPIRATORY (INHALATION) DAILY
Qty: 1 EACH | Refills: 5 | Status: SHIPPED | OUTPATIENT
Start: 2025-07-17

## 2025-07-17 RX ORDER — VARENICLINE TARTRATE 1 MG/1
1 TABLET, FILM COATED ORAL 2 TIMES DAILY
Qty: 60 TABLET | Refills: 2 | Status: SHIPPED | OUTPATIENT
Start: 2025-07-17

## 2025-07-17 RX ORDER — NALOXONE HYDROCHLORIDE 0.4 MG/ML
0.4 INJECTION, SOLUTION INTRAMUSCULAR; INTRAVENOUS; SUBCUTANEOUS PRN
Status: SHIPPED | OUTPATIENT
Start: 2025-07-17

## 2025-07-17 RX ORDER — TRAMADOL HYDROCHLORIDE 50 MG/1
50 TABLET ORAL EVERY 8 HOURS PRN
Qty: 60 TABLET | Refills: 0 | Status: SHIPPED | OUTPATIENT
Start: 2025-07-17 | End: 2025-08-16

## 2025-07-17 RX ORDER — ALBUTEROL SULFATE 0.83 MG/ML
2.5 SOLUTION RESPIRATORY (INHALATION) 4 TIMES DAILY PRN
Qty: 300 EACH | Refills: 2 | Status: SHIPPED | OUTPATIENT
Start: 2025-07-17

## 2025-07-17 RX ORDER — NYSTATIN 100000 U/G
CREAM TOPICAL
Qty: 30 G | Refills: 0 | Status: SHIPPED | OUTPATIENT
Start: 2025-07-17

## 2025-07-17 NOTE — PROGRESS NOTES
Zuleika Torres (:  1960) is a 64 y.o. female, Established patient, here for evaluation of the following chief complaint(s): preop but too early  Pre-op Exam (Total Knee replacement (R)) and Diabetes (Would like to get the pen prescribed again for her//Pain cream/Would like a podiatrist referral//Flexeril - for muscle spasms)         Assessment & Plan  1. Preoperative clearance.  - Blood count is within the normal range.  - Echocardiogram will be ordered to evaluate heart murmur.  - Advised to discontinue duloxetine gradually due to concurrent use with tramadol.  - Prescription for 60 tablets of tramadol provided to last until surgery.    2. Diarrhea.  - Reports diarrhea when upset.  - Prescription for Lomotil provided.    3. Smoking cessation.  - Advised to quit smoking at least one week prior to surgery.  - Prescription for Chantix provided to assist with smoking cessation.    4. Ringworm.  - Reports using antifungal cream for 2 weeks without improvement.  - Prescription for nystatin cream provided.    5. Medication management.  - Inhaler prescriptions refilled.  - Advised to stop duloxetine gradually.    Follow-up  - Follow-up appointment scheduled in 2 weeks.    Results  Labs   - Blood count: Normal  1. Unilateral primary osteoarthritis, right knee  -     traMADol (ULTRAM) 50 MG tablet; Take 1 tablet by mouth every 8 hours as needed for Pain for up to 30 days. Max Daily Amount: 150 mg, Disp-60 tablet, R-0Normal  2. Diarrhea, unspecified type  -     diphenoxylate-atropine (LOMOTIL) 2.5-0.025 MG per tablet; Take 1 tablet by mouth 4 times daily as needed for Diarrhea for up to 10 days. Max Daily Amount: 4 tablets, Disp-20 tablet, R-0Normal  3. Tobacco dependence syndrome  -     varenicline (CHANTIX) 1 MG tablet; Take 1 tablet by mouth 2 times daily Quit smoking, Disp-60 tablet, R-2Start half a tab daily increase as toleratedNormal  4. Chronic obstructive pulmonary disease, unspecified COPD type

## 2025-07-17 NOTE — PROGRESS NOTES
Have you been to the ER, urgent care clinic since your last visit?  Hospitalized since your last visit?   YES - When: approximately 1  weeks ago.  Where and Why: [place].    Have you seen or consulted any other health care providers outside our system since your last visit?   NO    Have you had a mammogram?”   NO    Date of last Mammogram: 9/21/2023       “Have you had a colorectal cancer screening such as a colonoscopy/FIT/Cologuard?    NO    Date of last Colonoscopy: 5/7/2019  No cologuard on file  Date of last FIT: 3/4/2020   No flexible sigmoidoscopy on file     “Have you had a diabetic eye exam?”    NO     No diabetic eye exam on file              Chief Complaint   Patient presents with    Pre-op Exam     Total Knee replacement (R)    Diabetes     Would like to get the pen prescribed again for her    Pain cream  Would like a podiatrist referral    Flexeril - for muscle spasms

## 2025-07-24 DIAGNOSIS — S69.90XA INJURY TO FINGERNAIL, UNSPECIFIED LATERALITY, INITIAL ENCOUNTER: Primary | ICD-10-CM

## 2025-07-28 ENCOUNTER — TELEPHONE (OUTPATIENT)
Age: 65
End: 2025-07-28

## 2025-07-28 NOTE — TELEPHONE ENCOUNTER
Called patient to inform her of physical therapy appointment 8/21/25, patient did not answer and voicemail was full. Will try to reach out again.

## 2025-08-06 ENCOUNTER — TELEPHONE (OUTPATIENT)
Age: 65
End: 2025-08-06

## 2025-08-08 ENCOUNTER — TELEMEDICINE (OUTPATIENT)
Facility: CLINIC | Age: 65
End: 2025-08-08
Payer: MEDICAID

## 2025-08-08 DIAGNOSIS — E11.65 TYPE 2 DIABETES MELLITUS WITH HYPERGLYCEMIA, WITHOUT LONG-TERM CURRENT USE OF INSULIN (HCC): ICD-10-CM

## 2025-08-08 DIAGNOSIS — M17.11 UNILATERAL PRIMARY OSTEOARTHRITIS, RIGHT KNEE: ICD-10-CM

## 2025-08-08 DIAGNOSIS — N95.1 HOT FLASHES DUE TO MENOPAUSE: ICD-10-CM

## 2025-08-08 DIAGNOSIS — J43.2 CENTRILOBULAR EMPHYSEMA (HCC): Primary | ICD-10-CM

## 2025-08-08 PROCEDURE — 99214 OFFICE O/P EST MOD 30 MIN: CPT | Performed by: FAMILY MEDICINE

## 2025-08-08 RX ORDER — ESTRADIOL 1 MG/1
1 TABLET ORAL DAILY
Qty: 30 TABLET | Refills: 5 | Status: SHIPPED | OUTPATIENT
Start: 2025-08-08

## 2025-08-08 RX ORDER — AZITHROMYCIN 250 MG/1
250 TABLET, FILM COATED ORAL SEE ADMIN INSTRUCTIONS
Qty: 6 TABLET | Refills: 0 | Status: SHIPPED | OUTPATIENT
Start: 2025-08-08 | End: 2025-08-13

## 2025-08-08 SDOH — ECONOMIC STABILITY: FOOD INSECURITY: WITHIN THE PAST 12 MONTHS, THE FOOD YOU BOUGHT JUST DIDN'T LAST AND YOU DIDN'T HAVE MONEY TO GET MORE.: NEVER TRUE

## 2025-08-08 SDOH — ECONOMIC STABILITY: FOOD INSECURITY: WITHIN THE PAST 12 MONTHS, YOU WORRIED THAT YOUR FOOD WOULD RUN OUT BEFORE YOU GOT MONEY TO BUY MORE.: NEVER TRUE

## 2025-08-09 DIAGNOSIS — M17.11 UNILATERAL PRIMARY OSTEOARTHRITIS, RIGHT KNEE: ICD-10-CM

## 2025-08-13 RX ORDER — TRAMADOL HYDROCHLORIDE 50 MG/1
50 TABLET ORAL EVERY 8 HOURS PRN
Qty: 60 TABLET | Refills: 0 | OUTPATIENT
Start: 2025-08-13

## 2025-08-19 ENCOUNTER — TELEMEDICINE (OUTPATIENT)
Facility: CLINIC | Age: 65
End: 2025-08-19
Payer: MEDICAID

## 2025-08-19 DIAGNOSIS — J43.2 CENTRILOBULAR EMPHYSEMA (HCC): ICD-10-CM

## 2025-08-19 DIAGNOSIS — E11.65 TYPE 2 DIABETES MELLITUS WITH HYPERGLYCEMIA, WITHOUT LONG-TERM CURRENT USE OF INSULIN (HCC): Primary | ICD-10-CM

## 2025-08-19 DIAGNOSIS — F32.0 CURRENT MILD EPISODE OF MAJOR DEPRESSIVE DISORDER WITHOUT PRIOR EPISODE: ICD-10-CM

## 2025-08-19 DIAGNOSIS — E78.2 MIXED HYPERLIPIDEMIA: ICD-10-CM

## 2025-08-19 DIAGNOSIS — F17.200 TOBACCO DEPENDENCE SYNDROME: ICD-10-CM

## 2025-08-19 DIAGNOSIS — M17.11 UNILATERAL PRIMARY OSTEOARTHRITIS, RIGHT KNEE: ICD-10-CM

## 2025-08-19 PROCEDURE — 99214 OFFICE O/P EST MOD 30 MIN: CPT | Performed by: FAMILY MEDICINE

## 2025-08-19 RX ORDER — LIRAGLUTIDE 6 MG/ML
0.6 INJECTION SUBCUTANEOUS DAILY
COMMUNITY

## 2025-08-19 RX ORDER — PREDNISONE 20 MG/1
20 TABLET ORAL DAILY PRN
Qty: 10 TABLET | Refills: 0 | Status: SHIPPED | OUTPATIENT
Start: 2025-08-19

## 2025-08-19 RX ORDER — TRAMADOL HYDROCHLORIDE 50 MG/1
50 TABLET ORAL EVERY 8 HOURS PRN
Qty: 90 TABLET | Refills: 0 | Status: SHIPPED | OUTPATIENT
Start: 2025-08-19 | End: 2025-09-18

## 2025-08-27 ENCOUNTER — HOSPITAL ENCOUNTER (OUTPATIENT)
Facility: HOSPITAL | Age: 65
Discharge: HOME | End: 2025-08-30
Payer: MEDICAID

## 2025-08-27 DIAGNOSIS — R01.1 HEART MURMUR: ICD-10-CM

## 2025-08-27 LAB
ANION GAP SERPL CALC-SCNC: 5 MMOL/L (ref 2–12)
BUN SERPL-MCNC: 10 MG/DL (ref 6–20)
BUN/CREAT SERPL: 15 (ref 12–20)
CALCIUM SERPL-MCNC: 8.6 MG/DL (ref 8.5–10.1)
CHLORIDE SERPL-SCNC: 102 MMOL/L (ref 97–108)
CHOLEST SERPL-MCNC: 208 MG/DL
CO2 SERPL-SCNC: 31 MMOL/L (ref 21–32)
CREAT SERPL-MCNC: 0.66 MG/DL (ref 0.55–1.02)
ECHO AO ASC DIAM: 3.4 CM
ECHO AO ROOT DIAM: 2.5 CM
ECHO AV PEAK GRADIENT: 9 MMHG
ECHO AV PEAK VELOCITY: 1.5 M/S
ECHO EST RA PRESSURE: 3 MMHG
ECHO LA DIAMETER: 2.8 CM
ECHO LA TO AORTIC ROOT RATIO: 1.12
ECHO LA VOL A-L A2C: 52 ML (ref 22–52)
ECHO LA VOL A-L A4C: 32 ML (ref 22–52)
ECHO LA VOL MOD A2C: 48 ML (ref 22–52)
ECHO LA VOL MOD A4C: 30 ML (ref 22–52)
ECHO LA VOLUME AREA LENGTH: 44 ML
ECHO LV E' LATERAL VELOCITY: 11.28 CM/S
ECHO LV E' SEPTAL VELOCITY: 6.67 CM/S
ECHO LV EF PHYSICIAN: 65 %
ECHO LV EJECTION FRACTION A2C: 48 %
ECHO LV EJECTION FRACTION A4C: 49 %
ECHO LV FRACTIONAL SHORTENING: 20 % (ref 28–44)
ECHO LV INTERNAL DIMENSION DIASTOLIC: 4.4 CM (ref 3.9–5.3)
ECHO LV INTERNAL DIMENSION SYSTOLIC: 3.5 CM
ECHO LV IVSD: 0.8 CM (ref 0.6–0.9)
ECHO LV MASS 2D: 118.6 G (ref 67–162)
ECHO LV POSTERIOR WALL DIASTOLIC: 0.9 CM (ref 0.6–0.9)
ECHO LV RELATIVE WALL THICKNESS RATIO: 0.41
ECHO LVOT AREA: 2.3 CM2
ECHO LVOT DIAM: 1.7 CM
ECHO MV A VELOCITY: 0.77 M/S
ECHO MV E DECELERATION TIME (DT): 199.2 MS
ECHO MV E VELOCITY: 0.72 M/S
ECHO MV E/A RATIO: 0.94
ECHO MV E/E' LATERAL: 6.38
ECHO MV E/E' RATIO (AVERAGED): 8.59
ECHO MV E/E' SEPTAL: 10.79
ECHO PULMONARY ARTERY SYSTOLIC PRESSURE (PASP): 8 MMHG
ECHO PV MAX VELOCITY: 0.8 M/S
ECHO PV PEAK GRADIENT: 3 MMHG
ECHO RA VOLUME: 25 ML
ECHO RA VOLUME: 27 ML
ECHO RIGHT VENTRICULAR SYSTOLIC PRESSURE (RVSP): 8 MMHG
ECHO RV BASAL DIMENSION: 3.1 CM
ECHO RV FREE WALL PEAK S': 11.9 CM/S
ECHO RV TAPSE: 2.4 CM (ref 1.7–?)
ECHO TV REGURGITANT MAX VELOCITY: 1.14 M/S
ECHO TV REGURGITANT PEAK GRADIENT: 5 MMHG
EST. AVERAGE GLUCOSE BLD GHB EST-MCNC: 160 MG/DL
GLUCOSE SERPL-MCNC: 181 MG/DL (ref 65–100)
HBA1C MFR BLD: 7.2 % (ref 4–5.6)
HDLC SERPL-MCNC: 41 MG/DL
HDLC SERPL: 5.1 (ref 0–5)
LDLC SERPL CALC-MCNC: 125.8 MG/DL (ref 0–100)
POTASSIUM SERPL-SCNC: 4.2 MMOL/L (ref 3.5–5.1)
SODIUM SERPL-SCNC: 138 MMOL/L (ref 136–145)
TRIGL SERPL-MCNC: 206 MG/DL
VLDLC SERPL CALC-MCNC: 41.2 MG/DL

## 2025-08-27 PROCEDURE — 80061 LIPID PANEL: CPT

## 2025-08-27 PROCEDURE — 83036 HEMOGLOBIN GLYCOSYLATED A1C: CPT

## 2025-08-27 PROCEDURE — 93306 TTE W/DOPPLER COMPLETE: CPT

## 2025-08-27 PROCEDURE — 93306 TTE W/DOPPLER COMPLETE: CPT | Performed by: INTERNAL MEDICINE

## 2025-08-27 PROCEDURE — 36415 COLL VENOUS BLD VENIPUNCTURE: CPT

## 2025-08-27 PROCEDURE — 80048 BASIC METABOLIC PNL TOTAL CA: CPT

## (undated) DEVICE — ELECTRODE BLDE L4IN NONINSULATED EDGE

## (undated) DEVICE — SUTURE MCRYL SZ 3-0 L27IN ABSRB UD L24MM PS-1 3/8 CIR PRIM Y936H

## (undated) DEVICE — Device

## (undated) DEVICE — GLOVE SURG SZ 85 L12IN FNGR THK79MIL GRN LTX FREE

## (undated) DEVICE — BANDAGE COMPR M W6INXL10YD WHT BGE VELC E MTRX HK AND LOOP

## (undated) DEVICE — YANKAUER,SMOOTH HANDLE,HIGH CAPACITY: Brand: MEDLINE INDUSTRIES, INC.

## (undated) DEVICE — 3M™ TEGADERM™ TRANSPARENT FILM DRESSING FRAME STYLE, 1627, 4 IN X 10 IN (10 CM X 25 CM), 20/CT 4CT/CASE: Brand: 3M™ TEGADERM™

## (undated) DEVICE — GLOVE SURG SZ 85 L12IN FNGR ORTHO 126MIL CRM LTX FREE

## (undated) DEVICE — SUTURE VCRL SZ 1 L36IN ABSRB UD L36MM CT-1 1/2 CIR J947H

## (undated) DEVICE — HANDPIECE SET WITH COAXIAL HIGH FLOW TIP AND SUCTION TUBE: Brand: INTERPULSE

## (undated) DEVICE — SOLUTION SURG PREP 26 CC PURPREP

## (undated) DEVICE — STRIP,CLOSURE,WOUND,MEDI-STRIP,1/2X4: Brand: MEDLINE

## (undated) DEVICE — TRANSFER SET 3": Brand: MEDLINE INDUSTRIES, INC.

## (undated) DEVICE — TOTAL JOINT-MRMC: Brand: MEDLINE INDUSTRIES, INC.

## (undated) DEVICE — HOOD: Brand: FLYTE

## (undated) DEVICE — CEMENT MIXING SYSTEM WITH FEMORAL BREAKWAY NOZZLE: Brand: REVOLUTION

## (undated) DEVICE — GOWN,SIRUS,NONRNF,XLN/2XL,18/CS: Brand: MEDLINE

## (undated) DEVICE — Device: Brand: JELCO

## (undated) DEVICE — BRUSH SCRB 4% CHG RED DISP --

## (undated) DEVICE — SOLUTION IRRIG 1000ML STRL H2O USP PLAS POUR BTL

## (undated) DEVICE — SUTURE STRATAFIX SPRL SZ 1 L14IN ABSRB VLT L48CM CTX 1/2 SXPD2B405

## (undated) DEVICE — ZIMMER® STERILE DISPOSABLE TOURNIQUET CUFF WITH PROTECTIVE SLEEVE AND PLC, DUAL PORT, SINGLE BLADDER, 34 IN. (86 CM)

## (undated) DEVICE — STRYKER PERFORMANCE SERIES SAGITTAL BLADE: Brand: STRYKER PERFORMANCE SERIES

## (undated) DEVICE — 3M™ IOBAN™ 2 ANTIMICROBIAL INCISE DRAPE 6651EZ: Brand: IOBAN™ 2

## (undated) DEVICE — SUT VCRL 2-0 36IN CT1 UD --

## (undated) DEVICE — DERMABOND SKIN ADH 0.7ML -- DERMABOND ADVANCED 12/BX

## (undated) DEVICE — BNDG COMPR ESMRCH 6INX9FT LF --

## (undated) DEVICE — 450 ML BOTTLE OF 0.05% CHLORHEXIDINE GLUCONATE IN 99.95% STERILE WATER FOR IRRIGATION, USP AND APPLICATOR.: Brand: IRRISEPT ANTIMICROBIAL WOUND LAVAGE

## (undated) DEVICE — REM POLYHESIVE ADULT PATIENT RETURN ELECTRODE: Brand: VALLEYLAB